# Patient Record
Sex: FEMALE | Race: WHITE | Employment: OTHER | URBAN - METROPOLITAN AREA
[De-identification: names, ages, dates, MRNs, and addresses within clinical notes are randomized per-mention and may not be internally consistent; named-entity substitution may affect disease eponyms.]

---

## 2017-01-10 ENCOUNTER — ANTICOAGULATION THERAPY VISIT (OUTPATIENT)
Dept: ANTICOAGULATION | Facility: CLINIC | Age: 77
End: 2017-01-10
Payer: COMMERCIAL

## 2017-01-10 DIAGNOSIS — Z79.01 LONG-TERM (CURRENT) USE OF ANTICOAGULANTS: Primary | ICD-10-CM

## 2017-01-10 LAB — INR POINT OF CARE: 2.5 (ref 0.86–1.14)

## 2017-01-10 PROCEDURE — 36416 COLLJ CAPILLARY BLOOD SPEC: CPT

## 2017-01-10 PROCEDURE — 99207 ZZC NO CHARGE NURSE ONLY: CPT

## 2017-01-10 PROCEDURE — 85610 PROTHROMBIN TIME: CPT | Mod: QW

## 2017-01-10 NOTE — PROGRESS NOTES
ANTICOAGULATION FOLLOW-UP CLINIC VISIT    Patient Name:  Mariya Rowe  Date:  1/10/2017  Contact Type:  Face to Face    SUBJECTIVE:     Patient Findings     Positives No Problem Findings           OBJECTIVE    INR PROTIME   Date Value Ref Range Status   01/10/2017 2.5* 0.86 - 1.14 Final       ASSESSMENT / PLAN  INR assessment THER    Recheck INR In: 2 WEEKS    INR Location Clinic      Anticoagulation Summary as of 1/10/2017     INR goal 2.0-3.0   Selected INR 2.5 (1/10/2017)   Maintenance plan 2.5 mg (5 mg x 0.5) on Wed; 5 mg (5 mg x 1) all other days   Full instructions 1/14: 2.5 mg; 1/21: 2.5 mg; Otherwise 2.5 mg on Wed; 5 mg all other days   Weekly total 32.5 mg   Plan last modified Karla Waller RN (5/25/2016)   Next INR check 1/25/2017   Priority INR   Target end date     Indications   Long-term (current) use of anticoagulants [Z79.01] [Z79.01]  Atrial fibrillation (H) [I48.91]         Anticoagulation Episode Summary     INR check location     Preferred lab     Send INR reminders to [facility]    Comments       Anticoagulation Care Providers     Provider Role Specialty Phone number    Chavez Molina MD Responsible Internal Medicine [phone]            See the Encounter Report to view Anticoagulation Flowsheet and Dosing Calendar (Go to Encounters tab in chart review, and find the Anticoagulation Therapy Visit)    Dosage adjustment made based on physician directed care plan.    Karla Waller, RN

## 2017-01-10 NOTE — MR AVS SNAPSHOT
Mariya Rowe   1/10/2017 9:45 AM   Anticoagulation Therapy Visit    Description:  76 year old female   Provider[facility]  [facility]   Department:  [facility]           INR as of 1/10/2017     Selected INR 2.5 (1/10/2017)      Anticoagulation Summary as of 1/10/2017     INR goal 2.0-3.0   Selected INR 2.5 (1/10/2017)   Full instructions 1/14: 2.5 mg; 1/21: 2.5 mg; Otherwise 2.5 mg on Wed; 5 mg all other days   Next INR check 1/25/2017    Indications   Long-term (current) use of anticoagulants [Z79.01] [Z79.01]  Atrial fibrillation (H) [I48.91]         Your next Anticoagulation Clinic appointment(s)     Jan 25, 2017  9:15 AM   [facility] Visit with [facility]   [facility]    [address] [facility] [address]  [facility] [id number] [phone]              Contact Numbers     [facility] Phone Numbers:  Anticoagulation Clinic Appointments : [phone]  Anticoagulation Nurse: [phone]         January 2017 Details    Sun Mon Tue Wed Thu Fri Sat     1               2               3               4               5               6               7                 8               9               10      5 mg   See details      11      2.5 mg         12      5 mg         13      5 mg         14      2.5 mg           15      5 mg         16      5 mg         17      5 mg         18      2.5 mg         19      5 mg         20      5 mg         21      2.5 mg           22      5 mg         23      5 mg         24      5 mg         25            26               27               28                 29               30               31                    Date Details   01/10 This INR check       Date of next INR:  1/25/2017         How to take your warfarin dose     To take:  2.5 mg Take 0.5 of a 5 mg tablet.    To take:  5 mg Take 1 of the 5 mg tablets.

## 2017-01-24 DIAGNOSIS — R00.2 PALPITATIONS: Primary | ICD-10-CM

## 2017-01-24 RX ORDER — WARFARIN SODIUM 5 MG/1
TABLET ORAL
Qty: 90 TABLET | Refills: 1 | Status: SHIPPED | OUTPATIENT
Start: 2017-01-24 | End: 2017-06-28

## 2017-01-25 ENCOUNTER — ANTICOAGULATION THERAPY VISIT (OUTPATIENT)
Dept: ANTICOAGULATION | Facility: CLINIC | Age: 77
End: 2017-01-25
Payer: COMMERCIAL

## 2017-01-25 DIAGNOSIS — I48.91 ATRIAL FIBRILLATION (H): ICD-10-CM

## 2017-01-25 DIAGNOSIS — Z79.01 LONG-TERM (CURRENT) USE OF ANTICOAGULANTS: Primary | ICD-10-CM

## 2017-01-25 LAB — INR POINT OF CARE: 2.5 (ref 0.86–1.14)

## 2017-01-25 PROCEDURE — 99207 ZZC NO CHARGE NURSE ONLY: CPT

## 2017-01-25 PROCEDURE — 36416 COLLJ CAPILLARY BLOOD SPEC: CPT

## 2017-01-25 PROCEDURE — 85610 PROTHROMBIN TIME: CPT | Mod: QW

## 2017-01-25 NOTE — TELEPHONE ENCOUNTER
Warfarin 5 mg    Last Written Prescription Date: 7/13/16  Last Fill Qty: 90, # refills: 1  Last Office Visit with [facility], [facility] or [facility] prescribing provider: 6/21/16    Date and Result of Last PT/INR:   INR      2.5   1/10/2017  INR      2.7   12/27/2016  INR     2.70   9/21/2011  INR     2.59   9/20/2011     Prescription approved per [facility] Refill Protocol.  Karla Waller RN

## 2017-01-25 NOTE — MR AVS SNAPSHOT
Mariya KUHN Soledad   1/25/2017 9:15 AM   Anticoagulation Therapy Visit    Description:  76 year old female   Provider:  [facility]   Department:  [facility]           INR as of 1/25/2017     Selected INR 2.5 (1/25/2017)      Anticoagulation Summary as of 1/25/2017     INR goal 2.0-3.0   Selected INR 2.5 (1/25/2017)   Full instructions 2.5 mg on Wed, Sat; 5 mg all other days   Next INR check 2/15/2017    Indications   Long-term (current) use of anticoagulants [Z79.01] [Z79.01]  Atrial fibrillation (H) [I48.91]         Your next Anticoagulation Clinic appointment(s)     Jan 25, 2017  9:15 AM   [facility]ticoagulation Visit with [facility]   [facility] ([facility]    [address] [facility] [address]  [facility] [id number] [phone]              Contact Numbers     [facility] Phone Numbers:  Anticoagulation Clinic Appointments : [phone]  Anticoagulation Nurse: [phone]         January 2017 Details    Sun Mon Tue Wed Thu Fri Sat     1               2               3               4               5               6               7                 8               9               10               11               12               13               14                 15               16               17               18               19               20               21                 22               23               24               25      2.5 mg   See details      26      5 mg         27      5 mg         28      2.5 mg           29      5 mg         30      5 mg         31      5 mg              Date Details   01/25 This INR check               How to take your warfarin dose     To take:  2.5 mg Take 0.5 of a 5 mg tablet.    To take:  5 mg Take 1 of the 5 mg tablets.           February 2017 Details    Sun Mon Tue Wed Thu Fri Sat        1      2.5 mg         2      5 mg         3      5 mg         4      2.5 mg           5      5 mg          6      5 mg         7      5 mg         8      2.5 mg         9      5 mg         10      5 mg         11      2.5 mg           12      5 mg         13      5 mg         14      5 mg         15            16               17               18                 19               20               21               22               23               24               25                 26               27               28                    Date Details   No additional details    Date of next INR:  2/15/2017         How to take your warfarin dose     To take:  2.5 mg Take 0.5 of a 5 mg tablet.    To take:  5 mg Take 1 of the 5 mg tablets.

## 2017-01-25 NOTE — PROGRESS NOTES
ANTICOAGULATION FOLLOW-UP CLINIC VISIT    Patient Name:  Mariya Rowe  Date:  1/25/2017  Contact Type:  Face to Face    SUBJECTIVE:     Patient Findings     Positives No Problem Findings    Comments Pt leaving for [place] this week, for 3 months. Have provider down there.            OBJECTIVE    INR PROTIME   Date Value Ref Range Status   01/25/2017 2.5* 0.86 - 1.14 Final       ASSESSMENT / PLAN  INR assessment THER    Recheck INR In: 3 WEEKS    INR Location [facility] [place] for 3 months, will check down there      Anticoagulation Summary as of 1/25/2017     INR goal 2.0-3.0   Selected INR 2.5 (1/25/2017)   Maintenance plan 2.5 mg (5 mg x 0.5) on Wed, Sat; 5 mg (5 mg x 1) all other days   Full instructions 2.5 mg on Wed, Sat; 5 mg all other days   Weekly total 30 mg   Plan last modified Anjali Moss, RN (1/25/2017)   Next INR check 2/15/2017   Priority INR   Target end date     Indications   Long-term (current) use of anticoagulants [Z79.01] [Z79.01]  Atrial fibrillation (H) [I48.91]         Anticoagulation Episode Summary     INR check location     Preferred lab     Send INR reminders to [facility]    Comments       Anticoagulation Care Providers     Provider Role Specialty Phone number    Chavez Molina MD Responsible Internal Medicine [phone]            See the Encounter Report to view Anticoagulation Flowsheet and Dosing Calendar (Go to Encounters tab in chart review, and find the Anticoagulation Therapy Visit)        Anjali Moss RN

## 2017-02-15 ENCOUNTER — TRANSFERRED RECORDS (OUTPATIENT)
Dept: HEALTH INFORMATION MANAGEMENT | Facility: CLINIC | Age: 77
End: 2017-02-15

## 2017-06-08 ENCOUNTER — ANTICOAGULATION THERAPY VISIT (OUTPATIENT)
Dept: ANTICOAGULATION | Facility: CLINIC | Age: 77
End: 2017-06-08
Payer: COMMERCIAL

## 2017-06-08 DIAGNOSIS — Z79.01 LONG-TERM (CURRENT) USE OF ANTICOAGULANTS: ICD-10-CM

## 2017-06-08 LAB — INR POINT OF CARE: 4 (ref 0.86–1.14)

## 2017-06-08 PROCEDURE — 99207 ZZC NO CHARGE NURSE ONLY: CPT

## 2017-06-08 PROCEDURE — 85610 PROTHROMBIN TIME: CPT | Mod: QW

## 2017-06-08 PROCEDURE — 36416 COLLJ CAPILLARY BLOOD SPEC: CPT

## 2017-06-08 NOTE — PROGRESS NOTES
ANTICOAGULATION FOLLOW-UP CLINIC VISIT    Patient Name:  Mariya Rowe  Date:  6/8/2017  Contact Type:  Face to Face    SUBJECTIVE:     Patient Findings     Positives Change in diet/appetite (no changes), Other complaints (Pt has broken blood vessel in right eye, has seen eye dr for this), Unexplained INR or factor level change    Comments Pt reports being back in [place] for about a month after being in [place] over the winter           OBJECTIVE    INR Protime   Date Value Ref Range Status   06/08/2017 4.0 (A) 0.86 - 1.14 Final       ASSESSMENT / PLAN  INR assessment SUPRA    Recheck INR In: 10 DAYS    INR Location Clinic      Anticoagulation Summary as of 6/8/2017     INR goal 2.0-3.0   Today's INR 4.0!   Maintenance plan 2.5 mg (5 mg x 0.5) on Wed, Sat; 5 mg (5 mg x 1) all other days   Full instructions 6/8: Hold; 6/9: 2.5 mg; Otherwise 2.5 mg on Wed, Sat; 5 mg all other days   Weekly total 30 mg   Plan last modified Anjali Moss RN (1/25/2017)   Next INR check 6/19/2017   Priority INR   Target end date     Indications   Long-term (current) use of anticoagulants [Z79.01] [Z79.01]  Atrial fibrillation (H) [I48.91]         Anticoagulation Episode Summary     INR check location     Preferred lab     Send INR reminders to [facility]    Comments       Anticoagulation Care Providers     Provider Role Specialty Phone number    Chavez Molina MD [facility] Internal [facility] [phone]            See the Encounter Report to view Anticoagulation Flowsheet and Dosing Calendar (Go to Encounters tab in chart review, and find the Anticoagulation Therapy Visit)    Dosage adjustment made based on physician directed care plan.    Karla Waller RN

## 2017-06-08 NOTE — MR AVS SNAPSHOT
Mariya Rowe   6/8/2017 11:15 AM   Anticoagulation Therapy Visit    Description:  76 year old female   Provider[facility]  [facility]   Department:  [facility]           INR as of 6/8/2017     Today's INR 4.0!      Anticoagulation Summary as of 6/8/2017     INR goal 2.0-3.0   Today's INR 4.0!   Full instructions 6/8: Hold; 6/9: 2.5 mg; Otherwise 2.5 mg on Wed, Sat; 5 mg all other days   Next INR check 6/19/2017    Indications   Long-term (current) use of anticoagulants [Z79.01] [Z79.01]  Atrial fibrillation (H) [I48.91]         Your next Anticoagulation Clinic appointment(s)     Jun 19, 2017 11:15 AM CDT   [facility] with [facility]   [facility]    [address] [facility] [address]  [facility] [id number] [phone]              Contact Numbers     [facility] Phone Numbers:  Anticoagulation Clinic Appointments : [phone]  Anticoagulation Nurse: [phone]         June 2017 Details    Sun Mon Tue Wed Thu Fri Sat         1               2               3                 4               5               6               7               8      Hold   See details      9      2.5 mg         10      2.5 mg           11      5 mg         12      5 mg         13      5 mg         14      2.5 mg         15      5 mg         16      5 mg         17      2.5 mg           18      5 mg         19            20               21               22               23               24                 25               26               27               28               29               30                 Date Details   06/08 This INR check       Date of next INR:  6/19/2017         How to take your warfarin dose     To take:  2.5 mg Take 0.5 of a 5 mg tablet.    To take:  5 mg Take 1 of the 5 mg tablets.    Hold Do not take your warfarin dose. See the Details table to the right for additional instructions.

## 2017-06-13 ENCOUNTER — TELEPHONE (OUTPATIENT)
Dept: INTERNAL MEDICINE | Facility: CLINIC | Age: 77
End: 2017-06-13

## 2017-06-13 NOTE — TELEPHONE ENCOUNTER
Reason for Call:  Medication or medication refill:Medication replacement    Do you use a [facility]?  Name of the pharmacy and phone number for the current request:  [facility] Tel [phone]    Name of the medication requested: alendronate (FOSAMAX) 10 MG tablet    Other request: Pt stating that [facility] cannot get the alendronate (Fosamax) same message from [facility].  Can provider prescribe a different medication.  Patient has about 4 pills left    Can we leave a detailed message on this number? YES    Phone number patient can be reached at: Home number on file [phone] (home) or Cell number on file:    Telephone Information:   Mobile [phone]       Best Time: anytime    Call taken on 6/13/2017 at 10:30 AM by ZULEIKA SUTTON

## 2017-06-13 NOTE — TELEPHONE ENCOUNTER
Called patient and clarified alendronate, TEVA brand isn't available at [facility] or [facility]. Asking for an alternative medication to be prescribed for Osteoporosis. Patient is going to check with some other pharmacies first to see if they can cover the TEVA brand before another medication is prescribed. Patient will call back with information.

## 2017-06-19 ENCOUNTER — ANTICOAGULATION THERAPY VISIT (OUTPATIENT)
Dept: ANTICOAGULATION | Facility: CLINIC | Age: 77
End: 2017-06-19
Payer: COMMERCIAL

## 2017-06-19 DIAGNOSIS — Z79.01 LONG-TERM (CURRENT) USE OF ANTICOAGULANTS: ICD-10-CM

## 2017-06-19 LAB — INR POINT OF CARE: 2.2 (ref 0.86–1.14)

## 2017-06-19 PROCEDURE — 85610 PROTHROMBIN TIME: CPT | Mod: QW

## 2017-06-19 PROCEDURE — 36416 COLLJ CAPILLARY BLOOD SPEC: CPT

## 2017-06-19 PROCEDURE — 99207 ZZC NO CHARGE NURSE ONLY: CPT

## 2017-06-19 NOTE — MR AVS SNAPSHOT
Mariya Rowe   6/19/2017 11:15 AM   Anticoagulation Therapy Visit    Description:  76 year old female   Provider[facility]   Department:  [facility]           INR as of 6/19/2017     Today's INR 2.2      Anticoagulation Summary as of 6/19/2017     INR goal 2.0-3.0   Today's INR 2.2   Full instructions 2.5 mg on Wed, Sat; 5 mg all other days   Next INR check 7/17/2017    Indications   Long-term (current) use of anticoagulants [Z79.01] [Z79.01]  Atrial fibrillation (H) [I48.91]         Your next Anticoagulation Clinic appointment(s)     Jul 17, 2017  9:00 AM CDT   Anticoagulation Visit with [facility]   [facility] ([facility])    [address] [facility] [address]  [facility] [address] [phone]              Contact Numbers     [facility] Phone Numbers:  Anticoagulation Clinic Appointments : [phone]  Anticoagulation Nurse: [phone]         June 2017 Details    Sun Mon Tue Wed Thu Fri Sat         1               2               3                 4               5               6               7               8               9               10                 11               12               13               14               15               16               17                 18               19      5 mg   See details      20      5 mg         21      2.5 mg         22      5 mg         23      5 mg         24      2.5 mg           25      5 mg         26      5 mg         27      5 mg         28      2.5 mg         29      5 mg         30      5 mg           Date Details   06/19 This INR check               How to take your warfarin dose     To take:  2.5 mg Take 0.5 of a 5 mg tablet.    To take:  5 mg Take 1 of the 5 mg tablets.           July 2017 Details    Sun Mon Tue Wed Thu Fri Sat           1      2.5 mg           2      5 mg         3      5 mg         4      5 mg         5      2.5 mg         6      5 mg          7      5 mg         8      2.5 mg           9      5 mg         10      5 mg         11      5 mg         12      2.5 mg         13      5 mg         14      5 mg         15      2.5 mg           16      5 mg         17            18               19               20               21               22                 23               24               25               26               27               28               29                 30               31                     Date Details   No additional details    Date of next INR:  7/17/2017         How to take your warfarin dose     To take:  2.5 mg Take 0.5 of a 5 mg tablet.    To take:  5 mg Take 1 of the 5 mg tablets.

## 2017-06-28 ENCOUNTER — OFFICE VISIT (OUTPATIENT)
Dept: INTERNAL MEDICINE | Facility: CLINIC | Age: 77
End: 2017-06-28
Payer: COMMERCIAL

## 2017-06-28 VITALS
SYSTOLIC BLOOD PRESSURE: 120 MMHG | WEIGHT: 170.8 LBS | TEMPERATURE: 98 F | BODY MASS INDEX: 23.91 KG/M2 | OXYGEN SATURATION: 98 % | DIASTOLIC BLOOD PRESSURE: 76 MMHG | HEART RATE: 84 BPM | HEIGHT: 71 IN

## 2017-06-28 DIAGNOSIS — M81.0 OSTEOPOROSIS, UNSPECIFIED OSTEOPOROSIS TYPE, UNSPECIFIED PATHOLOGICAL FRACTURE PRESENCE: ICD-10-CM

## 2017-06-28 DIAGNOSIS — I48.0 PAROXYSMAL ATRIAL FIBRILLATION (H): ICD-10-CM

## 2017-06-28 DIAGNOSIS — Z00.00 ROUTINE GENERAL MEDICAL EXAMINATION AT A HEALTH CARE FACILITY: Primary | ICD-10-CM

## 2017-06-28 DIAGNOSIS — I49.5 SSS (SICK SINUS SYNDROME) (H): ICD-10-CM

## 2017-06-28 DIAGNOSIS — R00.2 PALPITATIONS: ICD-10-CM

## 2017-06-28 DIAGNOSIS — Z12.31 VISIT FOR SCREENING MAMMOGRAM: ICD-10-CM

## 2017-06-28 DIAGNOSIS — Q21.12 PFO (PATENT FORAMEN OVALE): ICD-10-CM

## 2017-06-28 DIAGNOSIS — E78.5 HYPERLIPIDEMIA WITH TARGET LDL LESS THAN 130: ICD-10-CM

## 2017-06-28 LAB
ALBUMIN SERPL-MCNC: 3.6 G/DL (ref 3.4–5)
ALP SERPL-CCNC: 75 U/L (ref 40–150)
ALT SERPL W P-5'-P-CCNC: 31 U/L (ref 0–50)
ANION GAP SERPL CALCULATED.3IONS-SCNC: 7 MMOL/L (ref 3–14)
AST SERPL W P-5'-P-CCNC: 22 U/L (ref 0–45)
BILIRUB SERPL-MCNC: 1.3 MG/DL (ref 0.2–1.3)
BUN SERPL-MCNC: 18 MG/DL (ref 7–30)
CALCIUM SERPL-MCNC: 9.5 MG/DL (ref 8.5–10.1)
CHLORIDE SERPL-SCNC: 106 MMOL/L (ref 94–109)
CHOLEST SERPL-MCNC: 147 MG/DL
CO2 SERPL-SCNC: 28 MMOL/L (ref 20–32)
CREAT SERPL-MCNC: 0.91 MG/DL (ref 0.52–1.04)
ERYTHROCYTE [DISTWIDTH] IN BLOOD BY AUTOMATED COUNT: 13.5 % (ref 10–15)
GFR SERPL CREATININE-BSD FRML MDRD: 60 ML/MIN/1.7M2
GLUCOSE SERPL-MCNC: 96 MG/DL (ref 70–99)
HCT VFR BLD AUTO: 45 % (ref 35–47)
HDLC SERPL-MCNC: 63 MG/DL
HGB BLD-MCNC: 14.9 G/DL (ref 11.7–15.7)
LDLC SERPL CALC-MCNC: 68 MG/DL
MCH RBC QN AUTO: 31.4 PG (ref 26.5–33)
MCHC RBC AUTO-ENTMCNC: 33.1 G/DL (ref 31.5–36.5)
MCV RBC AUTO: 95 FL (ref 78–100)
NONHDLC SERPL-MCNC: 84 MG/DL
PLATELET # BLD AUTO: 202 10E9/L (ref 150–450)
POTASSIUM SERPL-SCNC: 4.2 MMOL/L (ref 3.4–5.3)
PROT SERPL-MCNC: 7.1 G/DL (ref 6.8–8.8)
RBC # BLD AUTO: 4.75 10E12/L (ref 3.8–5.2)
SODIUM SERPL-SCNC: 141 MMOL/L (ref 133–144)
TRIGL SERPL-MCNC: 82 MG/DL
TSH SERPL DL<=0.005 MIU/L-ACNC: 2.37 MU/L (ref 0.4–4)
WBC # BLD AUTO: 6.7 10E9/L (ref 4–11)

## 2017-06-28 PROCEDURE — 80053 COMPREHEN METABOLIC PANEL: CPT | Performed by: INTERNAL MEDICINE

## 2017-06-28 PROCEDURE — 99397 PER PM REEVAL EST PAT 65+ YR: CPT | Performed by: INTERNAL MEDICINE

## 2017-06-28 PROCEDURE — 85027 COMPLETE CBC AUTOMATED: CPT | Performed by: INTERNAL MEDICINE

## 2017-06-28 PROCEDURE — 84443 ASSAY THYROID STIM HORMONE: CPT | Performed by: INTERNAL MEDICINE

## 2017-06-28 PROCEDURE — 80061 LIPID PANEL: CPT | Performed by: INTERNAL MEDICINE

## 2017-06-28 PROCEDURE — 36415 COLL VENOUS BLD VENIPUNCTURE: CPT | Performed by: INTERNAL MEDICINE

## 2017-06-28 RX ORDER — WARFARIN SODIUM 5 MG/1
TABLET ORAL
Qty: 90 TABLET | Refills: 1 | Status: SHIPPED | OUTPATIENT
Start: 2017-06-28 | End: 2017-08-22

## 2017-06-28 RX ORDER — ROSUVASTATIN CALCIUM 5 MG/1
5 TABLET, COATED ORAL DAILY
Qty: 93 TABLET | Refills: 3 | Status: SHIPPED | OUTPATIENT
Start: 2017-06-28 | End: 2018-08-02

## 2017-06-28 RX ORDER — ALENDRONATE SODIUM 35 MG/1
35 TABLET ORAL
Qty: 26 TABLET | Refills: 3 | Status: SHIPPED | OUTPATIENT
Start: 2017-06-29 | End: 2017-07-11

## 2017-06-28 RX ORDER — SOTALOL HYDROCHLORIDE 120 MG/1
TABLET ORAL
Qty: 93 TABLET | Refills: 3 | Status: SHIPPED | OUTPATIENT
Start: 2017-06-28 | End: 2017-08-16

## 2017-06-28 RX ORDER — PREDNISOLONE ACETATE 10 MG/ML
SUSPENSION/ DROPS OPHTHALMIC
COMMUNITY
Start: 2017-06-05 | End: 2017-12-28 | Stop reason: ALTCHOICE

## 2017-06-28 NOTE — PATIENT INSTRUCTIONS
Preventive Health Recommendations    Female Ages 65 +    Yearly exam:     See your health care provider every year in order to  o Review health changes.   o Discuss preventive care.    o Review your medicines if your doctor has prescribed any.      You no longer need a yearly Pap test unless you've had an abnormal Pap test in the past 10 years. If you have vaginal symptoms, such as bleeding or discharge, be sure to talk with your provider about a Pap test.      Every 1 to 2 years, have a mammogram.  If you are over 69, talk with your health care provider about whether or not you want to continue having screening mammograms.      Every 10 years, have a colonoscopy. Or, have a yearly FIT test (stool test). These exams will check for colon cancer.       Have a cholesterol test every 5 years, or more often if your doctor advises it.       Have a diabetes test (fasting glucose) every three years. If you are at risk for diabetes, you should have this test more often.       At age 65, have a bone density scan (DEXA) to check for osteoporosis (brittle bone disease).    Shots:    Get a flu shot each year.    Get a tetanus shot every 10 years.    Talk to your doctor about your pneumonia vaccines. There are now two you should receive - Pneumovax (PPSV 23) and Prevnar (PCV 13).    Talk to your doctor about the shingles vaccine.    Talk to your doctor about the hepatitis B vaccine.    Nutrition:     Eat at least 5 servings of fruits and vegetables each day.      Eat whole-grain bread, whole-wheat pasta and brown rice instead of white grains and rice.      Talk to your provider about Calcium and Vitamin D.     Lifestyle    Exercise at least 150 minutes a week (30 minutes a day, 5 days a week). This will help you control your weight and prevent disease.      Limit alcohol to one drink per day.      No smoking.       Wear sunscreen to prevent skin cancer.       See your dentist twice a year for an exam and cleaning.      See your  "eye doctor every 1 to 2 years to screen for conditions such as glaucoma, macular degeneration and cataracts.    You appear due for the second pneumonia vaccination, called \"Prevnar 13\". You may want to check your insurance to assure that it is covered, and we could wait until next year.     Everything looks fine!    Refills of medications have been faxed to your pharmacy.     I'll get back to you with lab results soon, especially if there is anything of concern.      See you in a year, sooner if problems.    "

## 2017-06-28 NOTE — MR AVS SNAPSHOT
After Visit Summary   6/28/2017    Mariya Rowe    MRN: [id number]           Patient Information     Date Of Birth          1940        Visit Information        Provider Department      6/28/2017 8:00 AM Chavez Molina MD [facility]        Today's Diagnoses     Routine general medical examination at a health care facility    -  1    Paroxysmal atrial fibrillation (H)        Palpitations        Hyperlipidemia with target LDL less than 130        SSS (sick sinus syndrome) (H)        Osteoporosis, unspecified osteoporosis type, unspecified pathological fracture presence        PFO (patent foramen ovale)        Visit for screening mammogram          Care Instructions      Preventive Health Recommendations    Female Ages 65 +    Yearly exam:     See your health care provider every year in order to  o Review health changes.   o Discuss preventive care.    o Review your medicines if your doctor has prescribed any.      You no longer need a yearly Pap test unless you've had an abnormal Pap test in the past 10 years. If you have vaginal symptoms, such as bleeding or discharge, be sure to talk with your provider about a Pap test.      Every 1 to 2 years, have a mammogram.  If you are over 69, talk with your health care provider about whether or not you want to continue having screening mammograms.      Every 10 years, have a colonoscopy. Or, have a yearly FIT test (stool test). These exams will check for colon cancer.       Have a cholesterol test every 5 years, or more often if your doctor advises it.       Have a diabetes test (fasting glucose) every three years. If you are at risk for diabetes, you should have this test more often.       At age 65, have a bone density scan (DEXA) to check for osteoporosis (brittle bone disease).    Shots:    Get a flu shot each year.    Get a tetanus shot every 10 years.    Talk to your doctor about your pneumonia vaccines. There are now two you  "should receive - Pneumovax (PPSV 23) and Prevnar (PCV 13).    Talk to your doctor about the shingles vaccine.    Talk to your doctor about the hepatitis B vaccine.    Nutrition:     Eat at least 5 servings of fruits and vegetables each day.      Eat whole-grain bread, whole-wheat pasta and brown rice instead of white grains and rice.      Talk to your provider about Calcium and Vitamin D.     Lifestyle    Exercise at least 150 minutes a week (30 minutes a day, 5 days a week). This will help you control your weight and prevent disease.      Limit alcohol to one drink per day.      No smoking.       Wear sunscreen to prevent skin cancer.       See your dentist twice a year for an exam and cleaning.      See your eye doctor every 1 to 2 years to screen for conditions such as glaucoma, macular degeneration and cataracts.    You appear due for the second pneumonia vaccination, called \"Prevnar 13\". You may want to check your insurance to assure that it is covered, and we could wait until next year.     Everything looks fine!    Refills of medications have been faxed to your pharmacy.     I'll get back to you with lab results soon, especially if there is anything of concern.      See you in a year, sooner if problems.            Follow-ups after your visit        Your next 10 appointments already scheduled     Jul 19, 2017  9:15 AM CDT   [facility] Visit with [facility]   [facility]    [address] [facility] [address]  [facility] [id number] [phone]            Aug 16, 2017  2:45 PM CDT   [facility] RETURN with Yelena Kenyon MD   [facility]    [address]  [facility] [id number] [phone]              Future tests that were ordered for you today     Open Future Orders        Priority Expected Expires Ordered    MA Screening Digital Bilateral Routine  6/28/2018 " "6/28/2017            Who to contact     If you have questions or need follow up information about today's clinic visit or your schedule please contact [facility] directly at [phone].  Normal or non-critical lab and imaging results will be communicated to you by MyChart, letter or phone within 4 business days after the clinic has received the results. If you do not hear from us within 7 days, please contact the clinic through Transactivhart or phone. If you have a critical or abnormal lab result, we will notify you by phone as soon as possible.  Submit refill requests through Textic or call your pharmacy and they will forward the refill request to us. Please allow 3 business days for your refill to be completed.          Additional Information About Your Visit        TransactivharCareem Information     Textic gives you secure access to your electronic health record. If you see a primary care provider, you can also send messages to your care team and make appointments. If you have questions, please call your primary care clinic.  If you do not have a primary care provider, please call [phone] and they will assist you.        Care EveryWhere ID     This is your Care EveryWhere ID. This could be used by other organizations to access your [place] medical records  [id number]        Your Vitals Were     Pulse Temperature Height Pulse Oximetry BMI (Body Mass Index)       84 98  F (36.7  C) (Oral) 5' 11\" (1.803 m) 98% 23.82 kg/m2        Blood Pressure from Last 3 Encounters:   06/28/17 120/76   08/15/16 122/70   06/21/16 130/72    Weight from Last 3 Encounters:   06/28/17 170 lb 12.8 oz (77.5 kg)   08/15/16 173 lb 6.4 oz (78.7 kg)   06/21/16 172 lb (78 kg)              We Performed the Following     CBC with platelets     Comprehensive metabolic panel     Lipid panel reflex to direct LDL     TSH with free T4 reflex          Today's Medication Changes          These changes are accurate as of: 6/28/17  8:55 AM.  " If you have any questions, ask your nurse or doctor.               These medicines have changed or have updated prescriptions.        Dose/Directions    alendronate 35 MG tablet   Commonly known as:  FOSAMAX   This may have changed:    - medication strength  - how much to take  - when to take this  - additional instructions   Used for:  Osteoporosis, unspecified osteoporosis type, unspecified pathological fracture presence   Changed by:  Chavez Molina MD        Dose:  35 mg   Start taking on:  6/29/2017   Take 1 tablet (35 mg) by mouth twice a week   Quantity:  26 tablet   Refills:  3            Where to get your medicines      These medications were sent to [id number] - [place], [place] - [address] [address]   [address] [address] , [facility] [address]     Phone:  [phone]     rosuvastatin 5 MG tablet    warfarin 5 MG tablet         These medications were sent to [facility] #[id number] - [place], [place] - [address] [address]  [address] [address] [address]    Hours:  [facility] Phone:  [phone]     sotalol 120 MG tablet         Some of these will need a paper prescription and others can be bought over the counter.  Ask your nurse if you have questions.     Bring a paper prescription for each of these medications     alendronate 35 MG tablet                Primary Care Provider Office Phone # Fax #    Chavez Molina [phone]       [facility] [address]  [facility] [address]        Equal Access to Services     CARI BROWN AH: Hadii pretty ku hadasho Soomaali, waaxda luqadaha, qaybta kaalmada adeegyada, violeta blood hayesau segura . So [facility] [phone].    ATENCIÓN: Si habla español, tiene a perdomo disposición servicios gratuitos de asistencia lingüística. Llame al [phone].    We comply with applicable federal civil rights laws and [place] laws. We do not discriminate on the basis of race, color, national origin, age,  disability sex, sexual orientation or gender identity.            Thank you!     Thank you for choosing [facility]  for your care. Our goal is always to provide you with excellent care. Hearing back from our patients is one way we can continue to improve our services. Please take a few minutes to complete the written survey that you may receive in the mail after your visit with us. Thank you!             Your Updated Medication List - Protect others around you: Learn how to safely use, store and throw away your medicines at www.disposemymeds.org.          This list is accurate as of: 6/28/17  8:55 AM.  Always use your most recent med list.                   Brand Name Dispense Instructions for use Diagnosis    alendronate 35 MG tablet   Start taking on:  6/29/2017    FOSAMAX    26 tablet    Take 1 tablet (35 mg) by mouth twice a week    Osteoporosis, unspecified osteoporosis type, unspecified pathological fracture presence       ASPIRIN NOT PRESCRIBED    INTENTIONAL    0 each    Antiplatelet medication not prescribed intentionally due to Current anticoagulant therapy (warfarin/enoxaparin)    Paroxysmal atrial fibrillation (H), Hyperlipidemia with target LDL less than 130       CALCIUM 500 + D 500-200 MG-IU Tabs      1 tablet bid    Routine general medical examination at a health care facility       MULTI-VITAMIN PO      Take  by mouth.        prednisoLONE acetate 1 % ophthalmic susp    PRED FORTE          rosuvastatin 5 MG tablet    CRESTOR    93 tablet    Take 1 tablet (5 mg) by mouth daily    Hyperlipidemia with target LDL less than 130       sotalol 120 MG tablet    BETAPACE    93 tablet    Take 1/2 tab (60 mg) every 12 hours    Paroxysmal atrial fibrillation (H)       warfarin 5 MG tablet    COUMADIN    90 tablet    Take 1 tablet daily except half tablet on Wednesday or as instructed based on INR result.    Palpitations

## 2017-06-28 NOTE — NURSING NOTE
"Chief Complaint   Patient presents with     Medicare Visit     Fasting. Medication question (alendronate sodium)       Initial /76  Pulse 84  Temp 98  F (36.7  C) (Oral)  Ht 5' 11\" (1.803 m)  Wt 170 lb 12.8 oz (77.5 kg)  SpO2 98%  BMI 23.82 kg/m2 Estimated body mass index is 23.82 kg/(m^2) as calculated from the following:    Height as of this encounter: 5' 11\" (1.803 m).    Weight as of this encounter: 170 lb 12.8 oz (77.5 kg).  Medication Reconciliation: complete     Roro Man CMA      "

## 2017-06-28 NOTE — PROGRESS NOTES
SUBJECTIVE:   Mariya Rowe is a 76 year old female who presents for Preventive Visit.    Are you in the first 12 months of your Medicare Part B coverage?  No    Healthy Habits:    Do you get at least three servings of calcium containing foods daily (dairy, green leafy vegetables, etc.)? yes    Amount of exercise or daily activities, outside of work: 3 day(s) per week    Problems taking medications regularly No    Medication side effects: No    Have you had an eye exam in the past two years? yes    Do you see a dentist twice per year? yes    Do you have sleep apnea, excessive snoring or daytime drowsiness?no    COGNITIVE SCREEN  1) Repeat 3 items (Banana, Sunrise, Chair)    2) Clock draw: NORMAL  3) 3 item recall: Recalls 2 objects   Results: NORMAL clock, 1-2 items recalled: COGNITIVE IMPAIRMENT LESS LIKELY    Mini-CogTM Copyright S Lefty. Licensed by the author for use in [facility]; reprinted with permission (nuno@[facility]). All rights reserved.        Osteoporosis  Patient states she continues to take fosamax once daily. She notes the pharmacy has been unable to fill 10 mg daily dosing. She is agreeable with taking 35 mg twice a week instead.    Exercise regimen  Patient notes she uses exercise bike or walkes 2-3 times per week.    Integumentary  Patient reports dry skin on her legs. She follows with derm twice per year    Past/recent records reviewed and discussed for:  -Arthritis in hands  -Feels colder than other people  -No recent Afib episodes. Follows with cardiology yearly.   -Medications and refill requests  -Mammogram is due in August 2017  -Colonoscopy in 2015, colon polyps and diverticulosis. Q 3 years. Due in 2018.   -Past medical, family and social histories reviewed and updated as needed.      Reviewed and updated as needed this visit by clinical staff  Tobacco  Allergies  Meds  Med Hx  Surg Hx  Fam Hx  Soc Hx        Reviewed and updated as needed this visit by Provider         Social History   Substance Use Topics     Smoking status: Never Smoker     Smokeless tobacco: Never Used     Alcohol use No       The patient does not drink >3 drinks per day nor >7 drinks per week.    Today's PHQ-2 Score:   PHQ-2 ( 1999 Pfizer) 6/28/2017 6/25/2017   Q1: Little interest or pleasure in doing things 0 0   Q2: Feeling down, depressed or hopeless 0 0   PHQ-2 Score 0 0   Q1: Little interest or pleasure in doing things - Not at all   Q2: Feeling down, depressed or hopeless - Not at all   PHQ-2 Score - 0       Do you feel safe in your environment - Yes    Do you have a Health Care Directive?: Yes: Patient states has Advance Directive and will bring in a copy to clinic.    Current providers sharing in care for this patient include:   Patient Care Team:  Chavez Molina MD as PCP - General (Internal Medicine)      Hearing impairment: No    Ability to successfully perform activities of daily living: Yes, no assistance needed     Fall risk:  Fallen 2 or more times in the past year?: No  Any fall with injury in the past year?: No    Home safety:  none identified      The following health maintenance items are reviewed in Epic and correct as of today:  Health Maintenance   Topic Date Due     PNEUMOCOCCAL (2 of 2 - PCV13) 09/06/2007     ADVANCE DIRECTIVE PLANNING Q5 YRS  05/03/2016     FALL RISK ASSESSMENT  06/21/2017     INFLUENZA VACCINE (SYSTEM ASSIGNED)  09/01/2017     OP ANNUAL INR REFERRAL  09/28/2017     TETANUS IMMUNIZATION (SYSTEM ASSIGNED)  12/17/2018     LIPID SCREEN Q5 YR FEMALE (SYSTEM ASSIGNED)  06/21/2021     COLON CANCER SCREEN (SYSTEM ASSIGNED)  12/17/2025     DEXA SCAN SCREENING (SYSTEM ASSIGNED)  Completed       ROS:  C: NEGATIVE for fever, chills, change in weight  I: POSITIVE for dry skin NEGATIVE for worrisome rashes, moles or lesions  E: NEGATIVE for vision changes or irritation  E/M: NEGATIVE for ear, mouth and throat problems  R: NEGATIVE for significant cough or SOB  B: NEGATIVE for  "masses, tenderness or discharge  CV: NEGATIVE for chest pain, palpitations or peripheral edema  GI: NEGATIVE for nausea, abdominal pain, heartburn, or change in bowel habits  : NEGATIVE for frequency, dysuria, or hematuria  M: NEGATIVE for significant arthralgias or myalgia  N: NEGATIVE for weakness, dizziness or paresthesias  E: POSITIVE for cold intolerance NEGATIVE for skin/hair changes  H: NEGATIVE for bleeding problems  P: NEGATIVE for changes in mood or affect    This document serves as a record of the services and decisions personally performed and made by Chavez Molina MD. It was created on his behalf by Leandra Toscano, a trained medical scribe. The creation of this document is based on the provider's statements to the medical scribe.  Leandra Toscano June 28, 2017 8:34 AM       OBJECTIVE:   /76  Pulse 84  Temp 98  F (36.7  C) (Oral)  Ht 1.803 m (5' 11\")  Wt 77.5 kg (170 lb 12.8 oz)  SpO2 98%  BMI 23.82 kg/m2 Estimated body mass index is 23.82 kg/(m^2) as calculated from the following:    Height as of this encounter: 5' 11\" (1.803 m).    Weight as of this encounter: 170 lb 12.8 oz (77.5 kg).  EXAM:   GENERAL APPEARANCE: healthy, alert and no distress  EYES: Eyes grossly normal to inspection, PERRL and conjunctivae and sclerae normal  HENT: ear canals partially occluded by wax, part of TM's that is visible is normal bilaterally, nose and mouth without ulcers or lesions, oropharynx clear and oral mucous membranes moist  NECK: no adenopathy, no asymmetry, masses, or scars and thyroid normal to palpation  RESP: lungs clear to auscultation - no rales, rhonchi or wheezes  BREAST: normal without masses, tenderness or nipple discharge and no palpable axillary masses or adenopathy  CV: regular rate and rhythm, normal S1 S2, no S3 or S4, no murmur, click or rub, no peripheral edema and peripheral pulses strong  ABDOMEN: soft, nontender, no hepatosplenomegaly, no masses and bowel sounds " normal  PELVIC: Not performed.   MS: no musculoskeletal defects are noted and gait is age appropriate without ataxia  SKIN: no suspicious lesions or rashes  NEURO: Normal strength and tone, sensory exam grossly normal, mentation intact and speech normal  PSYCH: mentation appears normal and affect normal/bright    ASSESSMENT / PLAN:   (Z00.00) Routine general medical examination at a health care facility  (primary encounter diagnosis)  Comment: Stable health. See epic orders. Patient is fasting.  Plan: Comprehensive metabolic panel, Lipid panel         reflex to direct LDL, TSH with free T4 reflex,         CBC with platelets        Follow up yearly     (I48.0) Paroxysmal atrial fibrillation (H)  Comment: Refill provided. No recent episodes of Afib noted by patient.   Plan: sotalol (BETAPACE) 120 MG tablet          (R00.2) Palpitations  Comment: Refill provided. Continue following with cardiology as planned.  Plan: warfarin (COUMADIN) 5 MG tablet          (E78.5) Hyperlipidemia with target LDL less than 130  Comment: Updating lipids. Patient is fasting. Continue current meds.   Plan: rosuvastatin (CRESTOR) 5 MG tablet,         Comprehensive metabolic panel, Lipid panel         reflex to direct LDL          (I49.5) SSS (sick sinus syndrome) (H)    (M81.0) Osteoporosis, unspecified osteoporosis type, unspecified pathological fracture presence  Comment: Refills provided. Patient would like to try 35 mg twice weekly--she is concerned that some friends/acquaintences have had difficulty tolerating 70 mg dose once weekly. Will try Update Dexa scan.   Plan: alendronate (FOSAMAX) 35 MG tablet, DX         Hip/Pelvis/Spine          (Q21.1) PFO (patent foramen ovale)  No workup needed.     (Z12.31) Visit for screening mammogram  Plan: MA Screening Digital Bilateral          End of Life Planning:  Patient currently has an advanced directive: Yes.  Practitioner is supportive of decision.    COUNSELING:  Reviewed preventive health  "counseling, as reflected in patient instructions       Regular exercise       Healthy diet/nutrition       Vision screening       Colon cancer screening      Estimated body mass index is 23.82 kg/(m^2) as calculated from the following:    Height as of this encounter: 1.803 m (5' 11\").    Weight as of this encounter: 77.5 kg (170 lb 12.8 oz).   reports that she has never smoked. She has never used smokeless tobacco.    Appropriate preventive services were discussed with this patient, including applicable screening as appropriate for cardiovascular disease, diabetes, osteopenia/osteoporosis, and glaucoma.  As appropriate for age/gender, discussed screening for colorectal cancer, prostate cancer, breast cancer, and cervical cancer. Checklist reviewing preventive services available has been given to the patient.    Reviewed patients plan of care and provided an AVS. The Basic Care Plan (routine screening as documented in Health Maintenance) for Mariya meets the Care Plan requirement. This Care Plan has been established and reviewed with the Patient.    Counseling Resources:  ATP IV Guidelines  Pooled Cohorts Equation Calculator  Breast Cancer Risk Calculator  FRAX Risk Assessment  ICSI Preventive Guidelines  Dietary Guidelines for Americans, 2010  USDA's MyPlate  ASA Prophylaxis  Lung CA Screening    The information in this document, created by the medical scribe for me, accurately reflects the services I personally performed and the decisions made by me. I have reviewed and approved this document for accuracy prior to leaving the patient care area.  June 28, 2017 8:37 AM    Chavez Molina MD  [facility]   "

## 2017-07-10 ENCOUNTER — TELEPHONE (OUTPATIENT)
Dept: INTERNAL MEDICINE | Facility: CLINIC | Age: 77
End: 2017-07-10

## 2017-07-10 DIAGNOSIS — M81.0 OSTEOPOROSIS, UNSPECIFIED OSTEOPOROSIS TYPE, UNSPECIFIED PATHOLOGICAL FRACTURE PRESENCE: Primary | ICD-10-CM

## 2017-07-10 NOTE — TELEPHONE ENCOUNTER
"Received fax from [facility] for refill of Alendronate 10mg with note pt prefers Impax/Teva Brand NDC [address]-00. Prescription on file is for 35mg twice a week.     Contacted pt, she states that she looked around at different pharmacies for the Teva brand of Alendronate 35mg. None of the pharmacies care this, however, [facility] told her the Teva was merging with another company and would have a generic tablet available in the 10mg tabs. Pt requesting new prescription for \"Teva Brand\" Alendronate 10mg tablets daily. Order pended and sent to provider to review.  "

## 2017-07-11 RX ORDER — ALENDRONATE SODIUM 10 MG/1
10 TABLET ORAL
Qty: 93 TABLET | Refills: 3 | Status: SHIPPED | OUTPATIENT
Start: 2017-07-11 | End: 2017-08-11

## 2017-07-25 ENCOUNTER — ANTICOAGULATION THERAPY VISIT (OUTPATIENT)
Dept: ANTICOAGULATION | Facility: CLINIC | Age: 77
End: 2017-07-25
Payer: COMMERCIAL

## 2017-07-25 DIAGNOSIS — I48.91 ATRIAL FIBRILLATION, UNSPECIFIED TYPE (H): ICD-10-CM

## 2017-07-25 DIAGNOSIS — Z79.01 LONG-TERM (CURRENT) USE OF ANTICOAGULANTS: ICD-10-CM

## 2017-07-25 LAB — INR POINT OF CARE: 3.5 (ref 0.86–1.14)

## 2017-07-25 PROCEDURE — 85610 PROTHROMBIN TIME: CPT | Mod: QW

## 2017-07-25 PROCEDURE — 99207 ZZC NO CHARGE NURSE ONLY: CPT

## 2017-07-25 PROCEDURE — 36416 COLLJ CAPILLARY BLOOD SPEC: CPT

## 2017-07-25 NOTE — MR AVS SNAPSHOT
Mariya KUHN Soledad   7/25/2017 1:15 PM   Anticoagulation Therapy Visit    Description:  76 year old female   Provider:  [facility]   Department:  [facility]           INR as of 7/25/2017     Today's INR 3.5!      Anticoagulation Summary as of 7/25/2017     INR goal 2.0-3.0   Today's INR 3.5!   Full instructions 7/25: Hold; Otherwise 2.5 mg on Wed, Sat; 5 mg all other days   Next INR check 8/8/2017    Indications   Long-term (current) use of anticoagulants [Z79.01] [Z79.01]  Atrial fibrillation (H) [I48.91]         Your next Anticoagulation Clinic appointment(s)     Aug 09, 2017  9:15 AM CDT   Anticoagulation [facility] with [facility]   [facility]    [address] [facility] [address]  [facility] [id number] [phone]            Aug 22, 2017  9:15 AM CDT   Anticoagulation Visit with [facility]   [facility] ([facility]    [address] [facility] [address]  [facility] [id number] [phone]              Contact Numbers     [facility] Phone Numbers:  Anticoagulation Clinic Appointments : [phone]  Anticoagulation Nurse: [phone]         July 2017 Details    Sun Mon Tue Wed Thu Fri Sat           1                 2               3               4               5               6               7               8                 9               10               11               12               13               14               15                 16               17               18               19               20               21               22                 23               24               25      Hold   See details      26      2.5 mg         27      5 mg         28      5 mg         29      2.5 mg           30      5 mg         31      5 mg               Date Details   07/25 This INR check               How to take your warfarin dose     To take:  2.5 mg Take 0.5 of a 5  mg tablet.    To take:  5 mg Take 1 of the 5 mg tablets.    Hold Do not take your warfarin dose. See the Details table to the right for additional instructions.                August 2017 Details    Sun Mon Tue Wed Thu Fri Sat       1      5 mg         2      2.5 mg         3      5 mg         4      5 mg         5      2.5 mg           6      5 mg         7      5 mg         8            9               10               11               12                 13               14               15               16               17               18               19                 20               21               22               23               24               25               26                 27               28               29               30               31                  Date Details   No additional details    Date of next INR:  8/8/2017         How to take your warfarin dose     To take:  2.5 mg Take 0.5 of a 5 mg tablet.    To take:  5 mg Take 1 of the 5 mg tablets.

## 2017-07-25 NOTE — PROGRESS NOTES
ANTICOAGULATION FOLLOW-UP CLINIC VISIT    Patient Name:  Mariya Rowe  Date:  7/25/2017  Contact Type:  Face to Face    SUBJECTIVE:     Patient Findings     Positives Change in diet/appetite (pt has not been eating as many green veggies this week.  Plans to try to increase these in the next couple days.)           OBJECTIVE    INR Protime   Date Value Ref Range Status   07/25/2017 3.5 (A) 0.86 - 1.14 Final       ASSESSMENT / PLAN  INR assessment SUPRA    Recheck INR In: 2 WEEKS    INR Location Clinic      Anticoagulation Summary as of 7/25/2017     INR goal 2.0-3.0   Today's INR 3.5!   Maintenance plan 2.5 mg (5 mg x 0.5) on Wed, Sat; 5 mg (5 mg x 1) all other days   Full instructions 7/25: Hold; Otherwise 2.5 mg on Wed, Sat; 5 mg all other days   Weekly total 30 mg   Plan last modified Anjali Moss RN (1/25/2017)   Next INR check 8/8/2017   Priority INR   Target end date     Indications   Long-term (current) use of anticoagulants [Z79.01] [Z79.01]  Atrial fibrillation (H) [I48.91]         Anticoagulation Episode Summary     INR check location     Preferred lab     Send INR reminders to [facility]    Comments       Anticoagulation Care Providers     Provider Role Specialty Phone number    Chavez Molina MD [facility] Internal Medicine [phone]            See the Encounter Report to view Anticoagulation Flowsheet and Dosing Calendar (Go to Encounters tab in chart review, and find the Anticoagulation Therapy Visit)    Dosage adjustment made based on physician directed care plan.    Vira Crawford RN

## 2017-08-07 ENCOUNTER — MYC MEDICAL ADVICE (OUTPATIENT)
Dept: INTERNAL MEDICINE | Facility: CLINIC | Age: 77
End: 2017-08-07

## 2017-08-09 ENCOUNTER — ANTICOAGULATION THERAPY VISIT (OUTPATIENT)
Dept: ANTICOAGULATION | Facility: CLINIC | Age: 77
End: 2017-08-09
Payer: COMMERCIAL

## 2017-08-09 DIAGNOSIS — Z79.01 LONG-TERM (CURRENT) USE OF ANTICOAGULANTS: ICD-10-CM

## 2017-08-09 LAB — INR POINT OF CARE: 3 (ref 0.86–1.14)

## 2017-08-09 PROCEDURE — 36416 COLLJ CAPILLARY BLOOD SPEC: CPT

## 2017-08-09 PROCEDURE — 85610 PROTHROMBIN TIME: CPT | Mod: QW

## 2017-08-09 PROCEDURE — 99207 ZZC NO CHARGE NURSE ONLY: CPT

## 2017-08-09 NOTE — MR AVS SNAPSHOT
Mariya Rowe   8/9/2017 9:15 AM   Anticoagulation Therapy Visit    Description:  76 year old female   Provider:  [facility]   Department:  [facility]           INR as of 8/9/2017     Today's INR 3.0      Anticoagulation Summary as of 8/9/2017     INR goal 2.0-3.0   Today's INR 3.0   Full instructions 2.5 mg on Wed, Sat; 5 mg all other days   Next INR check 8/22/2017    Indications   Long-term (current) use of anticoagulants [Z79.01] [Z79.01]  Atrial fibrillation (H) [I48.91]         Your next Anticoagulation Clinic appointment(s)     Aug 22, 2017  9:15 AM CDT   Anticoagulation Visit with [facility]   [facility] ([facility])    [address] [facility] [address]  [facility] [address] [phone]              Contact Numbers     [facility] Phone Numbers:  Anticoagulation Clinic Appointments : [phone]  Anticoagulation Nurse: [phone]         August 2017 Details    Sun Mon Tue Wed Thu Fri Sat       1               2               3               4               5                 6               7               8               9      2.5 mg   See details      10      5 mg         11      5 mg         12      2.5 mg           13      5 mg         14      5 mg         15      5 mg         16      2.5 mg         17      5 mg         18      5 mg         19      2.5 mg           20      5 mg         21      5 mg         22            23               24               25               26                 27               28               29               30               31                  Date Details   08/09 This INR check       Date of next INR:  8/22/2017         How to take your warfarin dose     To take:  2.5 mg Take 0.5 of a 5 mg tablet.    To take:  5 mg Take 1 of the 5 mg tablets.

## 2017-08-09 NOTE — PROGRESS NOTES
ANTICOAGULATION FOLLOW-UP CLINIC VISIT    Patient Name:  Mariya Rowe  Date:  8/9/2017  Contact Type:  Face to Face    SUBJECTIVE:     Patient Findings     Positives Change in diet/appetite (pt reports she is increasing her greens in her diet ), No Problem Findings           OBJECTIVE    INR Protime   Date Value Ref Range Status   08/09/2017 3.0 (A) 0.86 - 1.14 Final       ASSESSMENT / PLAN  INR assessment THER    Recheck INR In: 2 WEEKS    INR Location Clinic      Anticoagulation Summary as of 8/9/2017     INR goal 2.0-3.0   Today's INR 3.0   Maintenance plan 2.5 mg (5 mg x 0.5) on Wed, Sat; 5 mg (5 mg x 1) all other days   Full instructions 2.5 mg on Wed, Sat; 5 mg all other days   Weekly total 30 mg   No change documented Anjali Moss RN   Plan last modified Anjali Moss RN (1/25/2017)   Next INR check 8/22/2017   Priority INR   Target end date     Indications   Long-term (current) use of anticoagulants [Z79.01] [Z79.01]  Atrial fibrillation (H) [I48.91]         Anticoagulation Episode Summary     INR check location     Preferred lab     Send INR reminders to [facility]    Comments       Anticoagulation Care Providers     Provider Role Specialty Phone number    Chavez Molina MD [facility] Internal Medicine [phone]            See the Encounter Report to view Anticoagulation Flowsheet and Dosing Calendar (Go to Encounters tab in chart review, and find the Anticoagulation Therapy Visit)    Dosage adjustment made based on physician directed care plan.    Anjali Moss RN

## 2017-08-16 ENCOUNTER — OFFICE VISIT (OUTPATIENT)
Dept: CARDIOLOGY | Facility: CLINIC | Age: 77
End: 2017-08-16
Attending: INTERNAL MEDICINE
Payer: COMMERCIAL

## 2017-08-16 VITALS
DIASTOLIC BLOOD PRESSURE: 62 MMHG | HEART RATE: 87 BPM | OXYGEN SATURATION: 98 % | SYSTOLIC BLOOD PRESSURE: 102 MMHG | BODY MASS INDEX: 24.36 KG/M2 | HEIGHT: 71 IN | WEIGHT: 174 LBS

## 2017-08-16 DIAGNOSIS — I48.19 PERSISTENT ATRIAL FIBRILLATION (H): Primary | ICD-10-CM

## 2017-08-16 PROCEDURE — 99214 OFFICE O/P EST MOD 30 MIN: CPT | Performed by: INTERNAL MEDICINE

## 2017-08-16 PROCEDURE — 93000 ELECTROCARDIOGRAM COMPLETE: CPT | Performed by: INTERNAL MEDICINE

## 2017-08-16 RX ORDER — METOPROLOL TARTRATE 25 MG/1
25 TABLET, FILM COATED ORAL 2 TIMES DAILY
Qty: 60 TABLET | Refills: 11 | Status: SHIPPED | OUTPATIENT
Start: 2017-08-16 | End: 2018-07-30

## 2017-08-16 RX ORDER — SOTALOL HYDROCHLORIDE 120 MG/1
TABLET ORAL
Qty: 90 TABLET | Refills: 3 | Status: CANCELLED | OUTPATIENT
Start: 2017-08-16

## 2017-08-16 NOTE — PROGRESS NOTES
"HPI and Plan:   See dictation    Orders Placed This Encounter   Procedures     Follow-Up with Cardiac Advanced Practice Provider     EKG 12-lead complete w/read - Clinics (performed today)     Holter Monitor 24 hour - Adult       Orders Placed This Encounter   Medications     metoprolol (LOPRESSOR) 25 MG tablet     Sig: Take 1 tablet (25 mg) by mouth 2 times daily     Dispense:  60 tablet     Refill:  11       Medications Discontinued During This Encounter   Medication Reason     sotalol (BETAPACE) 120 MG tablet          Encounter Diagnoses   Name Primary?     Persistent atrial fibrillation (H) Yes     Paroxysmal atrial fibrillation (H)        CURRENT MEDICATIONS:  Current Outpatient Prescriptions   Medication Sig Dispense Refill     metoprolol (LOPRESSOR) 25 MG tablet Take 1 tablet (25 mg) by mouth 2 times daily 60 tablet 11     alendronate (FOSAMAX) 10 MG tablet Take 1 tablet (10 mg) by mouth every morning (before breakfast) Patient requests \"Impax\" generic brand. 93 tablet 3     prednisoLONE acetate (PRED FORTE) 1 % ophthalmic susp        rosuvastatin (CRESTOR) 5 MG tablet Take 1 tablet (5 mg) by mouth daily 93 tablet 3     warfarin (COUMADIN) 5 MG tablet Take 1 tablet daily except half tablet on Wednesday or as instructed based on INR result. 90 tablet 1     ASPIRIN NOT PRESCRIBED (INTENTIONAL) Antiplatelet medication not prescribed intentionally due to Current anticoagulant therapy (warfarin/enoxaparin) 0 each 0     Multiple Vitamin (MULTI-VITAMIN PO) Take  by mouth.       CALCIUM 500 + D 500-200 MG-IU OR TABS 1 tablet bid  0       ALLERGIES     Allergies   Allergen Reactions     Lidocaine Other (See Comments)     Pt lopez Hx of a reaction to Lidocaine Eye solution.     Atorvastatin Rash     LIPITOR - pt gets a rash       PAST MEDICAL HISTORY:  Past Medical History:   Diagnosis Date     A-fib (H)      Abnormal EKG      Atrial flutter (H)      Chronotropic incompetence 8/25/2015     Hyperlipidemia with target LDL " less than 130 6/6/2014     Diagnosis updated by automated process. Provider to review and confirm.     Osteoporosis, unspecified 12/98    improved by DEXA of 2003; then unchanged in 2004     Palpitations      PFO (patent foramen ovale)      SSS (sick sinus syndrome) (H)        PAST SURGICAL HISTORY:  Past Surgical History:   Procedure Laterality Date     C NONSPECIFIC PROCEDURE  2/05    colonoscopy      C NONSPECIFIC PROCEDURE Right 11/2016    Right cataract repair and corneal transplant     COLONOSCOPY  12/17/2015    Adenomatous polyp in cecum, repeat in 5 yrs       FAMILY HISTORY:  Family History   Problem Relation Age of Onset     Adopted: Yes     CEREBROVASCULAR DISEASE Mother      at age 88 ;; lived to be 99     Unknown/Adopted Father      Don't know father's history       SOCIAL HISTORY:  Social History     Social History     Marital status:      Spouse name: Julio     Number of children: 2     Years of education: N/A     Occupational History           Retired     Social History Main Topics     Smoking status: Never Smoker     Smokeless tobacco: Never Used     Alcohol use No     Drug use: No     Sexual activity: Yes     Partners: Male     Other Topics Concern     Caffeine Concern No     Sleep Concern No     Special Diet No     Exercise No     some walking cleaning     Social History Narrative    Exercise bike or walking 2-3x/week.        Review of Systems:  Skin:  Negative       Eyes:  Positive for glasses Hx cornea transplant  ENT:  Negative      Respiratory:  Negative       Cardiovascular:  Negative      Gastroenterology: Negative      Genitourinary:  Negative      Musculoskeletal:  Positive for arthritis    Neurologic:  Positive for migraine headaches rare   Psychiatric:  Negative      Heme/Lymph/Imm:  Negative      Endocrine:  Negative        [id number]             Never smoker

## 2017-08-16 NOTE — LETTER
"8/16/2017    Chavez Molina MD  [address] [facility] [address]  [facility] [address]    RE: Mariya Rowe       Dear Colleague,    I had the pleasure of seeing Mariya Rowe in the [facility].    I had the pleasure of seeing Ms. Mariya Rowe in follow-up of persistent atrial fibrillation and sick sinus syndrome.  Mariya is a very pleasant 77-year-old female who used to have symptomatic persistent AF but maintained sinus rhythm on sotalol for about 5 years.      I have seen her on a yearly basis for the past several years, and she always came in with sinus bradycardia in the 40s and no symptoms.      In coming in today, Mariya tells me that she has continued to feel well.  She has noticed mild dyspnea on exertion when she goes up stairs but nothing she finds too bothersome.  She does not have chest pain and has not had syncope or near-syncope.      PHYSICAL EXAMINATION:   VITAL SIGNS:  Blood pressure 102/62, pulse 87 and irregular, weight 79 kg, height 180 cm.   GENERAL:  She is a pleasant, healthy-appearing woman in no distress.   NECK:  Supple, without carotid bruits.   LUNGS:  Clear.   CARDIOVASCULAR:  Normal JVP, irregularly irregular rhythm without gallop or murmur.   EXTREMITIES:  No edema.  She has some venous varicosities bilaterally.      DIAGNOSTIC STUDIES:    Her 12-lead ECG today showed atrial fibrillation with ventricular rate in the 70s.  Corrected QT of 456 milliseconds.     Outpatient Encounter Prescriptions as of 8/16/2017   Medication Sig Dispense Refill     metoprolol (LOPRESSOR) 25 MG tablet Take 1 tablet (25 mg) by mouth 2 times daily 60 tablet 11     alendronate (FOSAMAX) 10 MG tablet Take 1 tablet (10 mg) by mouth every morning (before breakfast) Patient requests \"Impax\" generic brand. 93 tablet 3     prednisoLONE acetate (PRED FORTE) 1 % ophthalmic susp        rosuvastatin (CRESTOR) 5 MG tablet Take 1 tablet (5 mg) by mouth daily 93 tablet 3     [DISCONTINUED] " warfarin (COUMADIN) 5 MG tablet Take 1 tablet daily except half tablet on Wednesday or as instructed based on INR result. 90 tablet 1     ASPIRIN NOT PRESCRIBED (INTENTIONAL) Antiplatelet medication not prescribed intentionally due to Current anticoagulant therapy (warfarin/enoxaparin) 0 each 0     Multiple Vitamin (MULTI-VITAMIN PO) Take  by mouth.       CALCIUM 500 + D 500-200 MG-IU OR TABS 1 tablet bid  0     [DISCONTINUED] sotalol (BETAPACE) 120 MG tablet Take 1/2 tab (60 mg) every 12 hours 93 tablet 3     No facility-administered encounter medications on file as of 8/16/2017.       IMPRESSION:   1.  Recurrence of atrial fibrillation.  I suspect her arrhythmia is again persistent as was the case in the past.  Mariya has failed sotalol 60 mg every 12 hours, but even on that modest dose she had significant bradycardia while in sinus rhythm, so I would not increase the dose.        On a more positive note, she is almost unaware of the atrial fibrillation at this time, which is markedly different than the situation in 2011.  Given her minimal symptoms and age of 77 years, I recommended treatment with rate control from here on.      RECOMMENDATIONS:   A.  Stop sotalol.   B.  Start metoprolol 25 mg b.i.d.   C.  A 24-hour Holter monitor will be assessed in 2 weeks to evaluate the heart rate response on metoprolol.   D.  Continue warfarin for anticoagulation.   E.  Follow-up in 1 year or sooner if she has issues.       Mariya seemed surprised about being back in AF, but I tried to reassure her that as long as she feels well and remains on anticoagulation, she should be safe.      It was my pleasure seeing this delightful patient.  I do appreciate the opportunity to be part of her care.  Time spent was 25 minutes, with greater than 50% of the time spent in discussion.     Sincerely,    Yelena Kenyon MD     [facility]

## 2017-08-16 NOTE — MR AVS SNAPSHOT
After Visit Summary   8/16/2017    Mariya Rowe    MRN: [id number]           Patient Information     Date Of Birth          1940        Visit Information        Provider Department      8/16/2017 2:45 PM Yelena Kenyon MD [facility] [place]        Today's Diagnoses     Persistent atrial fibrillation (H)    -  1    Paroxysmal atrial fibrillation (H)           Follow-ups after your visit        Additional Services     Follow-Up with Cardiac Advanced Practice Provider                 Your next 10 appointments already scheduled     Aug 22, 2017  9:15 AM CDT   Anticoagulation Visit with [facility]   [facility] ([facility])    [address]  [facility] [id number] [phone]            Aug 30, 2017  3:45 PM CDT   Holter Monitor with [facility] DEVICE [facility] ([facility])    [address]  [facility] [address] [phone]           LOCATION - [address] [facility], [address] [place] [address] **Please check-in at the [place] Registration Office, [address], in the [facility] building. When you are finished registering, please go to [address] and have a seat.              Future tests that were ordered for you today     Open Future Orders        Priority Expected Expires Ordered    Holter Monitor 24 hour - Adult Routine 8/29/2017 8/16/2018 8/16/2017    Follow-Up with Cardiac Advanced Practice Provider Routine 8/16/2018 8/17/2018 8/16/2017            Who to contact     If you have questions or need follow up information about today's clinic visit or your schedule please contact [facility] [place] directly at [phone].  Normal or non-critical lab and imaging results will be communicated to you by MyChart, letter or phone within 4 business days after the clinic has  "received the results. If you do not hear from us within 7 days, please contact the clinic through [employer] or phone. If you have a critical or abnormal lab result, we will notify you by phone as soon as possible.  Submit refill requests through [employer] or call your pharmacy and they will forward the refill request to us. Please allow 3 business days for your refill to be completed.          Additional Information About Your Visit        [employer] Information     [employer] gives you secure access to your electronic health record. If you see a primary care provider, you can also send messages to your care team and make appointments. If you have questions, please call your primary care clinic.  If you do not have a primary care provider, please call [phone] and they will assist you.        Care EveryWhere ID     This is your Care EveryWhere ID. This could be used by other organizations to access your [facility] medical records  [id number]        Your Vitals Were     Pulse Height Pulse Oximetry BMI (Body Mass Index)          87 1.803 m (5' 11\") 98% 24.27 kg/m2         Blood Pressure from Last 3 Encounters:   08/16/17 102/62   06/28/17 120/76   08/15/16 122/70    Weight from Last 3 Encounters:   08/16/17 78.9 kg (174 lb)   06/28/17 77.5 kg (170 lb 12.8 oz)   08/15/16 78.7 kg (173 lb 6.4 oz)              We Performed the Following     EKG 12-lead complete w/read - Clinics (performed today)     Follow-Up with Electrophysiologist          Today's Medication Changes          These changes are accurate as of: 8/16/17  3:10 PM.  If you have any questions, ask your nurse or doctor.               Start taking these medicines.        Dose/Directions    metoprolol 25 MG tablet   Commonly known as:  LOPRESSOR   Used for:  Persistent atrial fibrillation (H)   Started by:  Yelena Kenyon MD        Dose:  25 mg   Take 1 tablet (25 mg) by mouth 2 times daily   Quantity:  60 tablet   Refills:  11         Stop taking these " medicines if you haven't already. Please contact your care team if you have questions.     sotalol 120 MG tablet   Commonly known as:  BETAPACE   Stopped by:  Yelena Kenyon MD                Where to get your medicines      These medications were sent to [facility]/pharmacy #[id number] - [place], [place] - [address] [facility]  [address] [facility] [address], [facility] [address]    Hours:  [facility] Phone:  [phone]     metoprolol 25 MG tablet                Primary Care Provider Office Phone # Fax #    Chavez Molina [phone]       [address] [facility] [address]  [facility] [address]        Equal Access to Services     [facility] AH: Hadii pretty ku hadasho Soomaali, waaxda luqadaha, qaybta kaalmada adeegyada, violeta segura . So [facility] [phone].    ATENCIÓN: Si habla español, tiene a perdomo disposición servicios gratuitos de asistencia lingüística. Llame al [phone].    We comply with applicable federal civil rights laws and [place] laws. We do not discriminate on the basis of race, color, national origin, age, disability sex, sexual orientation or gender identity.            Thank you!     Thank you for choosing [facility] [place]  for your care. Our goal is always to provide you with excellent care. Hearing back from our patients is one way we can continue to improve our services. Please take a few minutes to complete the written survey that you may receive in the mail after your visit with us. Thank you!             Your Updated Medication List - Protect others around you: Learn how to safely use, store and throw away your medicines at www.disposemymeds.org.          This list is accurate as of: 8/16/17  3:10 PM.  Always use your most recent med list.                   Brand Name Dispense Instructions for use Diagnosis    alendronate 10 MG tablet    FOSAMAX    93 tablet    Take 1 tablet (10 mg) by mouth every morning (before  "breakfast) Patient requests \"Impax\" generic brand.    Osteoporosis, unspecified osteoporosis type, unspecified pathological fracture presence       ASPIRIN NOT PRESCRIBED    INTENTIONAL    0 each    Antiplatelet medication not prescribed intentionally due to Current anticoagulant therapy (warfarin/enoxaparin)    Paroxysmal atrial fibrillation (H), Hyperlipidemia with target LDL less than 130       CALCIUM 500 + D 500-200 MG-IU Tabs      1 tablet bid    Routine general medical examination at a health care facility       metoprolol 25 MG tablet    LOPRESSOR    60 tablet    Take 1 tablet (25 mg) by mouth 2 times daily    Persistent atrial fibrillation (H)       MULTI-VITAMIN PO      Take  by mouth.        prednisoLONE acetate 1 % ophthalmic susp    PRED FORTE          rosuvastatin 5 MG tablet    CRESTOR    93 tablet    Take 1 tablet (5 mg) by mouth daily    Hyperlipidemia with target LDL less than 130       warfarin 5 MG tablet    COUMADIN    90 tablet    Take 1 tablet daily except half tablet on Wednesday or as instructed based on INR result.    Palpitations         " 01-Apr-2017 22:45

## 2017-08-17 NOTE — PROGRESS NOTES
HISTORY OF PRESENT ILLNESS:    I had the pleasure of seeing Ms. Mariya Rowe in follow-up of persistent atrial fibrillation and sick sinus syndrome.  Mariya is a very pleasant 77-year-old female who used to have symptomatic persistent AF but maintained sinus rhythm on sotalol for about 5 years.      I have seen her on a yearly basis for the past several years, and she always came in with sinus bradycardia in the 40s and no symptoms.      In coming in today, Mariya tells me that she has continued to feel well.  She has noticed mild dyspnea on exertion when she goes up stairs but nothing she finds too bothersome.  She does not have chest pain and has not had syncope or near-syncope.      PHYSICAL EXAMINATION:   VITAL SIGNS:  Blood pressure 102/62, pulse 87 and irregular, weight 79 kg, height 180 cm.   GENERAL:  She is a pleasant, healthy-appearing woman in no distress.   NECK:  Supple, without carotid bruits.   LUNGS:  Clear.   CARDIOVASCULAR:  Normal JVP, irregularly irregular rhythm without gallop or murmur.   EXTREMITIES:  No edema.  She has some venous varicosities bilaterally.      DIAGNOSTIC STUDIES:    Her 12-lead ECG today showed atrial fibrillation with ventricular rate in the 70s.  Corrected QT of 456 milliseconds.      IMPRESSION:   1.  Recurrence of atrial fibrillation.  I suspect her arrhythmia is again persistent as was the case in the past.  Mariya has failed sotalol 60 mg every 12 hours, but even on that modest dose she had significant bradycardia while in sinus rhythm, so I would not increase the dose.        On a more positive note, she is almost unaware of the atrial fibrillation at this time, which is markedly different than the situation in 2011.  Given her minimal symptoms and age of 77 years, I recommended treatment with rate control from here on.      RECOMMENDATIONS:   A.  Stop sotalol.   B.  Start metoprolol 25 mg b.i.d.   C.  A 24-hour Holter monitor will be assessed in 2 weeks to evaluate  the heart rate response on metoprolol.   D.  Continue warfarin for anticoagulation.   E.  Follow-up in 1 year or sooner if she has issues.       Cleopatra seemed surprised about being back in AF, but I tried to reassure her that as long as she feels well and remains on anticoagulation, she should be safe.      It was my pleasure seeing this delightful patient.  I do appreciate the opportunity to be part of her care.  Time spent was 25 minutes, with greater than 50% of the time spent in discussion.        LARRY BONNER MD, [facility]         cc:   Chavez Molina MD    [facility]    [address]    [place]  [address]          D: 2017 15:12   T: 2017 19:16   MT: BARRY      Name:     CLEOPATRA CHAVARRIA   MRN:      [id number]        Account:      [id number]   :      1940           Service Date: 2017      Document: [id number]

## 2017-08-22 ENCOUNTER — ANTICOAGULATION THERAPY VISIT (OUTPATIENT)
Dept: ANTICOAGULATION | Facility: CLINIC | Age: 77
End: 2017-08-22
Payer: COMMERCIAL

## 2017-08-22 DIAGNOSIS — Z79.01 LONG-TERM (CURRENT) USE OF ANTICOAGULANTS: ICD-10-CM

## 2017-08-22 DIAGNOSIS — R00.2 PALPITATIONS: ICD-10-CM

## 2017-08-22 LAB — INR POINT OF CARE: 2.6 (ref 0.86–1.14)

## 2017-08-22 PROCEDURE — 36416 COLLJ CAPILLARY BLOOD SPEC: CPT

## 2017-08-22 PROCEDURE — 85610 PROTHROMBIN TIME: CPT | Mod: QW

## 2017-08-22 PROCEDURE — 99207 ZZC NO CHARGE NURSE ONLY: CPT

## 2017-08-22 RX ORDER — WARFARIN SODIUM 5 MG/1
TABLET ORAL
Qty: 90 TABLET | Refills: 1 | Status: SHIPPED | OUTPATIENT
Start: 2017-08-22 | End: 2018-07-18

## 2017-08-22 NOTE — PROGRESS NOTES
ANTICOAGULATION FOLLOW-UP CLINIC VISIT    Patient Name:  Mariya Rowe  Date:  8/22/2017  Contact Type:  Face to Face    SUBJECTIVE:     Patient Findings     Positives No Problem Findings           OBJECTIVE    INR Protime   Date Value Ref Range Status   08/22/2017 2.6 (A) 0.86 - 1.14 Final       ASSESSMENT / PLAN  INR assessment THER    Recheck INR In: 4 WEEKS    INR Location Clinic      Anticoagulation Summary as of 8/22/2017     INR goal 2.0-3.0   Today's INR 2.6   Maintenance plan 2.5 mg (5 mg x 0.5) on Wed, Sat; 5 mg (5 mg x 1) all other days   Full instructions 2.5 mg on Wed, Sat; 5 mg all other days   Weekly total 30 mg   No change documented Karla Waller, RN   Plan last modified Anjali Moss RN (1/25/2017)   Next INR check 9/19/2017   Priority INR   Target end date     Indications   Long-term (current) use of anticoagulants [Z79.01] [Z79.01]  Atrial fibrillation (H) [I48.91]         Anticoagulation Episode Summary     INR check location     Preferred lab     Send INR reminders to [facility]    Comments       Anticoagulation Care Providers     Provider Role Specialty Phone number    Chavez Molina MD Responsible Internal Medicine [phone]            See the Encounter Report to view Anticoagulation Flowsheet and Dosing Calendar (Go to Encounters tab in chart review, and find the Anticoagulation Therapy Visit)    Dosage adjustment made based on physician directed care plan.    Karla Waller, RN

## 2017-08-22 NOTE — MR AVS SNAPSHOT
Mariya Rowe   8/22/2017 9:15 AM   Anticoagulation Therapy Visit    Description:  77 year old female   Provider[facility]  [facility]   Department:  [facility]           INR as of 8/22/2017     Today's INR 2.6      Anticoagulation Summary as of 8/22/2017     INR goal 2.0-3.0   Today's INR 2.6   Full instructions 2.5 mg on Wed, Sat; 5 mg all other days   Next INR check 9/19/2017    Indications   Long-term (current) use of anticoagulants [Z79.01] [Z79.01]  Atrial fibrillation (H) [I48.91]         Your next Anticoagulation Clinic appointment(s)     Sep 19, 2017  9:45 AM CDT   [facility]    [address] [facility] [address]  [facility] [address] [phone]              Contact Numbers     [facility] Phone Numbers:  Anticoagulation Clinic Appointments : [phone]  Anticoagulation Nurse: [phone]         August 2017 Details    Sun Mon Tue Wed Thu Fri Sat       1               2               3               4               5                 6               7               8               9               10               11               12                 13               14               15               16               17               18               19                 20               21               22      5 mg   See details      23      2.5 mg         24      5 mg         25      5 mg         26      2.5 mg           27      5 mg         28      5 mg         29      5 mg         30      2.5 mg         31      5 mg            Date Details   08/22 This INR check               How to take your warfarin dose     To take:  2.5 mg Take 0.5 of a 5 mg tablet.    To take:  5 mg Take 1 of the 5 mg tablets.           September 2017 Details    Sun Mon Tue Wed Thu Fri Sat          1      5 mg         2      2.5 mg           3      5 mg         4      5 mg         5      5 mg         6       2.5 mg         7      5 mg         8      5 mg         9      2.5 mg           10      5 mg         11      5 mg         12      5 mg         13      2.5 mg         14      5 mg         15      5 mg         16      2.5 mg           17      5 mg         18      5 mg         19            20               21               22               23                 24               25               26               27               28               29               30                Date Details   No additional details    Date of next INR:  9/19/2017         How to take your warfarin dose     To take:  2.5 mg Take 0.5 of a 5 mg tablet.    To take:  5 mg Take 1 of the 5 mg tablets.

## 2017-08-28 ENCOUNTER — TELEPHONE (OUTPATIENT)
Dept: INTERNAL MEDICINE | Facility: CLINIC | Age: 77
End: 2017-08-28

## 2017-08-28 DIAGNOSIS — Z12.31 ENCOUNTER FOR SCREENING MAMMOGRAM FOR BREAST CANCER: Primary | ICD-10-CM

## 2017-08-28 NOTE — TELEPHONE ENCOUNTER
Pt left voice message at [phone]. She called to schedule mammogram as ordered by Dr. Molina, but told the order is for a 2D mammogram. Pt had a 3D Mammogram last year and would like to get this again. Requesting updated order.     Order pended and sent to provider to review.

## 2017-08-30 ENCOUNTER — HOSPITAL ENCOUNTER (OUTPATIENT)
Dept: CARDIOLOGY | Facility: CLINIC | Age: 77
Discharge: HOME | End: 2017-08-30
Attending: INTERNAL MEDICINE | Admitting: INTERNAL MEDICINE
Payer: MEDICARE

## 2017-08-30 ENCOUNTER — MYC MEDICAL ADVICE (OUTPATIENT)
Dept: INTERNAL MEDICINE | Facility: CLINIC | Age: 77
End: 2017-08-30

## 2017-08-30 DIAGNOSIS — I48.19 PERSISTENT ATRIAL FIBRILLATION (H): ICD-10-CM

## 2017-08-30 PROCEDURE — 93225 XTRNL ECG REC<48 HRS REC: CPT

## 2017-08-30 PROCEDURE — 93227 XTRNL ECG REC<48 HR R&I: CPT | Performed by: INTERNAL MEDICINE

## 2017-09-11 ENCOUNTER — HOSPITAL ENCOUNTER (OUTPATIENT)
Dept: MAMMOGRAPHY | Facility: CLINIC | Age: 77
Discharge: HOME | End: 2017-09-11
Attending: INTERNAL MEDICINE | Admitting: INTERNAL MEDICINE
Payer: MEDICARE

## 2017-09-11 ENCOUNTER — TELEPHONE (OUTPATIENT)
Dept: CARDIOLOGY | Facility: CLINIC | Age: 77
End: 2017-09-11

## 2017-09-11 DIAGNOSIS — Z12.31 VISIT FOR SCREENING MAMMOGRAM: ICD-10-CM

## 2017-09-11 PROCEDURE — G0202 SCR MAMMO BI INCL CAD: HCPCS

## 2017-09-11 PROCEDURE — 77063 BREAST TOMOSYNTHESIS BI: CPT

## 2017-09-11 NOTE — TELEPHONE ENCOUNTER
Pt called back and results reviewed. Instructed to continue current medication regimen. ROB Alberts RN.

## 2017-09-11 NOTE — TELEPHONE ENCOUNTER
Message  Received: Yesterday       Yelena Kenyon MD  [facility] Heart Ep Nurse                     Persistent AF, overall rate control is OK.  Please call her.   I will see her in 1 year, but she can call if she has issues.   DI      Writer attempted to call pt with results as above, but not home. Spouse will have pt return my phone call. ROB Alberts RN.

## 2017-09-14 ENCOUNTER — TELEPHONE (OUTPATIENT)
Dept: INTERNAL MEDICINE | Facility: CLINIC | Age: 77
End: 2017-09-14

## 2017-09-14 NOTE — TELEPHONE ENCOUNTER
Fatou with [facility] left voice message at [phone]. Pt needs to have follow up breast u/s for abnormal mammogram. They placed order for Dr. Molina to co-sign and need this done before they can schedule an appt.

## 2017-09-19 ENCOUNTER — ANTICOAGULATION THERAPY VISIT (OUTPATIENT)
Dept: ANTICOAGULATION | Facility: CLINIC | Age: 77
End: 2017-09-19
Payer: COMMERCIAL

## 2017-09-19 ENCOUNTER — HOSPITAL ENCOUNTER (OUTPATIENT)
Dept: ULTRASOUND IMAGING | Facility: CLINIC | Age: 77
Discharge: HOME | End: 2017-09-19
Attending: INTERNAL MEDICINE | Admitting: INTERNAL MEDICINE
Payer: MEDICARE

## 2017-09-19 DIAGNOSIS — Z79.01 LONG-TERM (CURRENT) USE OF ANTICOAGULANTS: ICD-10-CM

## 2017-09-19 DIAGNOSIS — R92.8 ABNORMAL MAMMOGRAM: ICD-10-CM

## 2017-09-19 LAB — INR POINT OF CARE: 3.9 (ref 0.86–1.14)

## 2017-09-19 PROCEDURE — 76642 ULTRASOUND BREAST LIMITED: CPT | Mod: RT

## 2017-09-19 PROCEDURE — 85610 PROTHROMBIN TIME: CPT | Mod: QW

## 2017-09-19 PROCEDURE — 99207 ZZC NO CHARGE NURSE ONLY: CPT

## 2017-09-19 PROCEDURE — 36416 COLLJ CAPILLARY BLOOD SPEC: CPT

## 2017-09-19 NOTE — PROGRESS NOTES
ANTICOAGULATION FOLLOW-UP CLINIC VISIT    Patient Name:  Mariya Rowe  Date:  9/19/2017  Contact Type:  Face to Face    SUBJECTIVE:     Patient Findings     Positives Unexplained INR or factor level change           OBJECTIVE    INR Protime   Date Value Ref Range Status   09/19/2017 3.9 (A) 0.86 - 1.14 Final       ASSESSMENT / PLAN  INR assessment SUPRA    Recheck INR In: 1 WEEK    INR Location Clinic      Anticoagulation Summary as of 9/19/2017     INR goal 2.0-3.0   Today's INR 3.9!   Maintenance plan 2.5 mg (5 mg x 0.5) on Wed, Sat; 5 mg (5 mg x 1) all other days   Full instructions 9/19: Hold; Otherwise 2.5 mg on Wed, Sat; 5 mg all other days   Weekly total 30 mg   Plan last modified Anjali Moss RN (1/25/2017)   Next INR check 9/26/2017   Priority INR   Target end date     Indications   Long-term (current) use of anticoagulants [Z79.01] [Z79.01]  Atrial fibrillation (H) [I48.91]         Anticoagulation Episode Summary     INR check location     Preferred lab     Send INR reminders to [facility]    Comments       Anticoagulation Care Providers     Provider Role Specialty Phone number    Chavez Molina MD Responsible Internal Medicine [phone]            See the Encounter Report to view Anticoagulation Flowsheet and Dosing Calendar (Go to Encounters tab in chart review, and find the Anticoagulation Therapy Visit)    Dosage adjustment made based on physician directed care plan.    Karla Waller RN

## 2017-09-19 NOTE — PROGRESS NOTES
After INR appt, pt's spouse mentioned they have been drinking a lot of inderjit juice. [place] can increase INR, pt notified.

## 2017-09-19 NOTE — MR AVS SNAPSHOT
Mariya Rowe   9/19/2017 9:45 AM   Anticoagulation Therapy Visit    Description:  77 year old female   Provider[facility]  [facility]   Department:  [facility]           INR as of 9/19/2017     Today's INR 3.9!      Anticoagulation Summary as of 9/19/2017     INR goal 2.0-3.0   Today's INR 3.9!   Full instructions 9/19: Hold; Otherwise 2.5 mg on Wed, Sat; 5 mg all other days   Next INR check 9/26/2017    Indications   Long-term (current) use of anticoagulants [Z79.01] [Z79.01]  Atrial fibrillation (H) [I48.91]         Your next Anticoagulation Clinic appointment(s)     Sep 26, 2017  9:15 AM CDT   [facility]tico[facility] Visit with [facility]   [facility]    [address]  [facility] [id number] [phone]              Contact Numbers     [facility] Phone Numbers:  Anticoagulation Clinic Appointments : [phone]  Anticoagulation Nurse: [phone]         September 2017 Details    Sun Mon Tue Wed Thu Fri Sat          1               2                 3               4               5               6               7               8               9                 10               11               12               13               14               15               16                 17               18               19      Hold   See details      20      2.5 mg         21      5 mg         22      5 mg         23      2.5 mg           24      5 mg         25      5 mg         26            27               28               29               30                Date Details   09/19 This INR check       Date of next INR:  9/26/2017         How to take your warfarin dose     To take:  2.5 mg Take 0.5 of a 5 mg tablet.    To take:  5 mg Take 1 of the 5 mg tablets.    Hold Do not take your warfarin dose. See the Details table to the right for additional instructions.

## 2017-09-26 ENCOUNTER — ANTICOAGULATION THERAPY VISIT (OUTPATIENT)
Dept: ANTICOAGULATION | Facility: CLINIC | Age: 77
End: 2017-09-26
Payer: COMMERCIAL

## 2017-09-26 DIAGNOSIS — Z79.01 LONG-TERM (CURRENT) USE OF ANTICOAGULANTS: ICD-10-CM

## 2017-09-26 LAB — INR POINT OF CARE: 2.9 (ref 0.86–1.14)

## 2017-09-26 PROCEDURE — 85610 PROTHROMBIN TIME: CPT | Mod: QW

## 2017-09-26 PROCEDURE — 36416 COLLJ CAPILLARY BLOOD SPEC: CPT

## 2017-09-26 PROCEDURE — 99207 ZZC NO CHARGE NURSE ONLY: CPT

## 2017-09-26 NOTE — PROGRESS NOTES
ANTICOAGULATION FOLLOW-UP CLINIC VISIT    Patient Name:  Mariya Rowe  Date:  9/26/2017  Contact Type:  Face to Face    SUBJECTIVE:     Patient Findings     Positives No Problem Findings    Comments Pt will be leaving 9/30/17 for [place], will return in November for a visit.           OBJECTIVE    INR Protime   Date Value Ref Range Status   09/26/2017 2.9 (A) 0.86 - 1.14 Final       ASSESSMENT / PLAN  INR assessment THER    Recheck INR In: 4 WEEKS    INR Location Clinic      Anticoagulation Summary as of 9/26/2017     INR goal 2.0-3.0   Today's INR 2.9   Maintenance plan 2.5 mg (5 mg x 0.5) on Wed, Sat; 5 mg (5 mg x 1) all other days   Full instructions 2.5 mg on Wed, Sat; 5 mg all other days   Weekly total 30 mg   No change documented Karla Waller RN   Plan last modified Anjali Moss RN (1/25/2017)   Next INR check 11/20/2017   Priority INR   Target end date     Indications   Long-term (current) use of anticoagulants [Z79.01] [Z79.01]  Atrial fibrillation (H) [I48.91]         Anticoagulation Episode Summary     INR check location     Preferred lab     Send INR reminders to [facility]    Comments       Anticoagulation Care Providers     Provider Role Specialty Phone number    Chavez Molina MD [facility] Internal Medicine [phone]            See the Encounter Report to view Anticoagulation Flowsheet and Dosing Calendar (Go to Encounters tab in chart review, and find the Anticoagulation Therapy Visit)    Dosage adjustment made based on physician directed care plan.    Karla Waller, GILA

## 2017-09-26 NOTE — MR AVS SNAPSHOT
Mariya Rowe   9/26/2017 9:15 AM   Anticoagulation Therapy Visit    Description:  77 year old female   Provider[facility]   Department:  [facility]           INR as of 9/26/2017     Today's INR 2.9      Anticoagulation Summary as of 9/26/2017     INR goal 2.0-3.0   Today's INR 2.9   Full instructions 2.5 mg on Wed, Sat; 5 mg all other days   Next INR check 11/20/2017    Indications   Long-term (current) use of anticoagulants [Z79.01] [Z79.01]  Atrial fibrillation (H) [I48.91]         Your next Anticoagulation Clinic appointment(s)     Nov 20, 2017  9:00 AM CST   Anticoagulation Visit with [facility]   [facility]    [address] [facility] [address]  [facility] [address] [phone]              Contact Numbers     [facility] Phone Numbers:  Anticoagulation Clinic Appointments : [phone]  Anticoagulation Nurse: [phone]         September 2017 Details    Sun Mon Tue Wed Thu Fri Sat          1               2                 3               4               5               6               7               8               9                 10               11               12               13               14               15               16                 17               18               19               20               21               22               23                 24               25               26      5 mg   See details      27      2.5 mg         28      5 mg         29      5 mg         30      2.5 mg          Date Details   09/26 This INR check               How to take your warfarin dose     To take:  2.5 mg Take 0.5 of a 5 mg tablet.    To take:  5 mg Take 1 of the 5 mg tablets.           October 2017 Details    Sun Mon Tue Wed Thu Fri Sat     1      5 mg         2      5 mg         3      5 mg         4      2.5 mg         5      5 mg         6      5 mg         7      2.5 mg           8       5 mg         9      5 mg         10      5 mg         11      2.5 mg         12      5 mg         13      5 mg         14      2.5 mg           15      5 mg         16      5 mg         17      5 mg         18      2.5 mg         19      5 mg         20      5 mg         21      2.5 mg           22      5 mg         23      5 mg         24      5 mg         25      2.5 mg         26      5 mg         27      5 mg         28      2.5 mg           29      5 mg         30      5 mg         31      5 mg              Date Details   No additional details            How to take your warfarin dose     To take:  2.5 mg Take 0.5 of a 5 mg tablet.    To take:  5 mg Take 1 of the 5 mg tablets.           November 2017 Details    Sun Mon Tue Wed Thu Fri Sat        1      2.5 mg         2      5 mg         3      5 mg         4      2.5 mg           5      5 mg         6      5 mg         7      5 mg         8      2.5 mg         9      5 mg         10      5 mg         11      2.5 mg           12      5 mg         13      5 mg         14      5 mg         15      2.5 mg         16      5 mg         17      5 mg         18      2.5 mg           19      5 mg         20            21               22               23               24               25                 26               27               28               29               30                  Date Details   No additional details    Date of next INR:  11/20/2017         How to take your warfarin dose     To take:  2.5 mg Take 0.5 of a 5 mg tablet.    To take:  5 mg Take 1 of the 5 mg tablets.

## 2017-11-20 ENCOUNTER — ANTICOAGULATION THERAPY VISIT (OUTPATIENT)
Dept: ANTICOAGULATION | Facility: CLINIC | Age: 77
End: 2017-11-20
Payer: COMMERCIAL

## 2017-11-20 DIAGNOSIS — Z79.01 LONG-TERM (CURRENT) USE OF ANTICOAGULANTS: ICD-10-CM

## 2017-11-20 LAB — INR POINT OF CARE: 3 (ref 0.86–1.14)

## 2017-11-20 PROCEDURE — 36416 COLLJ CAPILLARY BLOOD SPEC: CPT

## 2017-11-20 PROCEDURE — 99207 ZZC NO CHARGE NURSE ONLY: CPT

## 2017-11-20 PROCEDURE — 85610 PROTHROMBIN TIME: CPT | Mod: QW

## 2017-11-20 NOTE — MR AVS SNAPSHOT
Mariya KUHN Soledad   11/20/2017 9:00 AM   Anticoagulation Therapy Visit    Description:  77 year old female   Provider[facility]  [facility]   Department:  [facility]           INR as of 11/20/2017     Today's INR 3.0      Anticoagulation Summary as of 11/20/2017     INR goal 2.0-3.0   Today's INR 3.0   Full instructions 2.5 mg on Wed, Sat; 5 mg all other days   Next INR check 1/2/2018    Indications   Long-term (current) use of anticoagulants [Z79.01] [Z79.01]  Atrial fibrillation (H) [I48.91]         Your next Anticoagulation Clinic appointment(s)     Jan 02, 2018  9:15 AM CST   Anticoagulation Visit with [facility]   [facility]    [address] [facility] [address]  [facility] [address] [phone]              Contact Numbers     [facility] Phone Numbers:  Anticoagulation Clinic Appointments : [phone]  Anticoagulation Nurse: [phone]         November 2017 Details    Sun Mon Tue Wed Thu Fri Sat        1               2               3               4                 5               6               7               8               9               10               11                 12               13               14               15               16               17               18                 19               20      5 mg   See details      21      5 mg         22      2.5 mg         23      5 mg         24      5 mg         25      2.5 mg           26      5 mg         27      5 mg         28      5 mg         29      2.5 mg         30      5 mg            Date Details   11/20 This INR check               How to take your warfarin dose     To take:  2.5 mg Take 0.5 of a 5 mg tablet.    To take:  5 mg Take 1 of the 5 mg tablets.           December 2017 Details    Sun Mon Tue Wed Thu Fri Sat          1      5 mg         2      2.5 mg           3      5 mg         4      5 mg         5      5 mg         6      2.5 mg          7      5 mg         8      5 mg         9      2.5 mg           10      5 mg         11      5 mg         12      5 mg         13      2.5 mg         14      5 mg         15      5 mg         16      2.5 mg           17      5 mg         18      5 mg         19      5 mg         20      2.5 mg         21      5 mg         22      5 mg         23      2.5 mg           24      5 mg         25      5 mg         26      5 mg         27      2.5 mg         28      5 mg         29      5 mg         30      2.5 mg           31      5 mg                Date Details   No additional details            How to take your warfarin dose     To take:  2.5 mg Take 0.5 of a 5 mg tablet.    To take:  5 mg Take 1 of the 5 mg tablets.           January 2018 Details    Sun Mon Tue Wed Thu Fri Sat      1      5 mg         2            3               4               5               6                 7               8               9               10               11               12               13                 14               15               16               17               18               19               20                 21               22               23               24               25               26               27                 28               29               30               31                   Date Details   No additional details    Date of next INR:  1/2/2018         How to take your warfarin dose     To take:  5 mg Take 1 of the 5 mg tablets.

## 2017-11-20 NOTE — PROGRESS NOTES
ANTICOAGULATION FOLLOW-UP CLINIC VISIT    Patient Name:  Mariya Rowe  Date:  11/20/2017  Contact Type:  Face to Face    SUBJECTIVE:     Patient Findings     Positives No Problem Findings           OBJECTIVE    INR Protime   Date Value Ref Range Status   11/20/2017 3.0 (A) 0.86 - 1.14 Final       ASSESSMENT / PLAN  INR assessment THER    Recheck INR In: 6 WEEKS    INR Location Clinic      Anticoagulation Summary as of 11/20/2017     INR goal 2.0-3.0   Today's INR 3.0   Maintenance plan 2.5 mg (5 mg x 0.5) on Wed, Sat; 5 mg (5 mg x 1) all other days   Full instructions 2.5 mg on Wed, Sat; 5 mg all other days   Weekly total 30 mg   No change documented Karla Waller, RN   Plan last modified Anjali Moss RN (1/25/2017)   Next INR check 1/2/2018   Priority INR   Target end date     Indications   Long-term (current) use of anticoagulants [Z79.01] [Z79.01]  Atrial fibrillation (H) [I48.91]         Anticoagulation Episode Summary     INR check location     Preferred lab     Send INR reminders to [facility]    Comments       Anticoagulation Care Providers     Provider Role Specialty Phone number    Chavez Molina MD Responsible Internal Medicine [phone]            See the Encounter Report to view Anticoagulation Flowsheet and Dosing Calendar (Go to Encounters tab in chart review, and find the Anticoagulation Therapy Visit)    Dosage adjustment made based on physician directed care plan.    Karla Waller, RN

## 2017-11-21 ENCOUNTER — TELEPHONE (OUTPATIENT)
Dept: ANTICOAGULATION | Facility: CLINIC | Age: 77
End: 2017-11-21

## 2017-11-21 DIAGNOSIS — I48.20 CHRONIC ATRIAL FIBRILLATION (H): Primary | ICD-10-CM

## 2017-11-21 NOTE — TELEPHONE ENCOUNTER
For insurance purposes, an annual INR referral is required. Please sign the order and route back to the INR Clinic. Karla Waller RN

## 2017-11-29 ENCOUNTER — TELEPHONE (OUTPATIENT)
Dept: CARDIOLOGY | Facility: CLINIC | Age: 77
End: 2017-11-29

## 2017-12-28 ENCOUNTER — MYC MEDICAL ADVICE (OUTPATIENT)
Dept: INTERNAL MEDICINE | Facility: CLINIC | Age: 77
End: 2017-12-28

## 2017-12-28 ENCOUNTER — TELEPHONE (OUTPATIENT)
Dept: INTERNAL MEDICINE | Facility: CLINIC | Age: 77
End: 2017-12-28

## 2017-12-28 ENCOUNTER — RADIANT APPOINTMENT (OUTPATIENT)
Dept: GENERAL RADIOLOGY | Facility: CLINIC | Age: 77
End: 2017-12-28
Attending: INTERNAL MEDICINE
Payer: COMMERCIAL

## 2017-12-28 ENCOUNTER — OFFICE VISIT (OUTPATIENT)
Dept: INTERNAL MEDICINE | Facility: CLINIC | Age: 77
End: 2017-12-28
Payer: COMMERCIAL

## 2017-12-28 VITALS
RESPIRATION RATE: 16 BRPM | DIASTOLIC BLOOD PRESSURE: 76 MMHG | HEIGHT: 71 IN | HEART RATE: 100 BPM | BODY MASS INDEX: 24.57 KG/M2 | WEIGHT: 175.5 LBS | TEMPERATURE: 97.5 F | OXYGEN SATURATION: 98 % | SYSTOLIC BLOOD PRESSURE: 98 MMHG

## 2017-12-28 DIAGNOSIS — I48.20 CHRONIC ATRIAL FIBRILLATION (H): ICD-10-CM

## 2017-12-28 DIAGNOSIS — R07.89 LEFT-SIDED CHEST WALL PAIN: Primary | ICD-10-CM

## 2017-12-28 DIAGNOSIS — S22.32XA CLOSED FRACTURE OF ONE RIB OF LEFT SIDE, INITIAL ENCOUNTER: ICD-10-CM

## 2017-12-28 DIAGNOSIS — R07.89 LEFT-SIDED CHEST WALL PAIN: ICD-10-CM

## 2017-12-28 DIAGNOSIS — M81.0 OSTEOPOROSIS, UNSPECIFIED OSTEOPOROSIS TYPE, UNSPECIFIED PATHOLOGICAL FRACTURE PRESENCE: ICD-10-CM

## 2017-12-28 LAB — RADIOLOGIST FLAGS: ABNORMAL

## 2017-12-28 PROCEDURE — 99214 OFFICE O/P EST MOD 30 MIN: CPT | Performed by: INTERNAL MEDICINE

## 2017-12-28 PROCEDURE — 71101 X-RAY EXAM UNILAT RIBS/CHEST: CPT | Mod: LT

## 2017-12-28 NOTE — NURSING NOTE
"Chief Complaint   Patient presents with     Musculoskeletal Problem     left sided pain in rib cage area, patient leasned over the arm of a office chair and heard a \"pop\" 3 days ago.       Initial BP 98/76 (BP Location: Right arm, Patient Position: Sitting, Cuff Size: Adult Large)  Pulse 100  Temp 97.5  F (36.4  C) (Oral)  Resp 16  Ht 5' 11\" (1.803 m)  Wt 175 lb 8 oz (79.6 kg)  SpO2 98%  BMI 24.48 kg/m2 Estimated body mass index is 24.48 kg/(m^2) as calculated from the following:    Height as of this encounter: 5' 11\" (1.803 m).    Weight as of this encounter: 175 lb 8 oz (79.6 kg).  Medication Reconciliation: complete    "

## 2017-12-28 NOTE — PROGRESS NOTES
"    ASSESSMENT & PLAN:                                                      She developed left rib fracture after minimal trauma, bending over the chair.  She has pain with movement, but not with breathing.  She agrees she does not need pain medications.  She has osteoporosis, for which she takes Fosamax and follows up with Dr. Molina.   She has had atrial fibrillation, on Coumadin treatment.  She does not have signs of hematoma    (R07.89) Left-sided chest wall pain  (primary encounter diagnosis)  Comment:   Plan: XR Ribs & Chest Left G/E 3 Views            (S22.32XA) Closed fracture of one rib of left side, initial encounter  Comment:   Plan:     (I48.2) Chronic atrial fibrillation (H)  Comment:   Plan:     (M81.0) Osteoporosis, unspecified osteoporosis type, unspecified pathological fracture presence  Comment:   Plan:        Chief Complaint:                                                      Left chest pain      SUBJECTIVE:                                                    History of present illness     3-4 days she bent over the chioar arm rest. She felt a po and has pain since L thorax, pain increases with movoment No pain with breathing    Osteoporosis at lumbar spine - on Fosamax     She wants CXR SHe is leaving town this weekend     I called her back and discussed the chest x-ray results    ROS:                                                      ROS: negative for fever, chills, cough, wheezes,  shortness of breath, vomiting, abdominal pain, leg swelling positive for left chest pain, as above    A 10-point review of systems was obtained.  Those pertinent are above and in the in the Subjective section.  The rest of the systems are negative.        OBJECTIVE:                                                    Physical Exam :    Blood pressure 98/76, pulse 100, temperature 97.5  F (36.4  C), temperature source Oral, resp. rate 16, height 5' 11\" (1.803 m), weight 175 lb 8 oz (79.6 kg), SpO2 98 %, not " "currently breastfeeding.   NAD, appears comfortable  Skin: no rashes   Neck: supple, no JVD,  No thyroidmegaly. Lymph nodes nonpalpable cervical and supraclavicular.  Chest: clear to auscultation bilaterally, good respiratory effort.  Tenderness on the left lower ribs  Heart: S1 S2, RRR, no mgr appreciated  Abdomen: soft, not tender,  Extremities: no edema,   Neurologic: A, Ox3, no focal signs appreciated    PMHx: reviewed  Past Medical History:   Diagnosis Date     A-fib (H)      Abnormal EKG      Atrial flutter (H)      Chronotropic incompetence 8/25/2015     Hyperlipidemia with target LDL less than 130 6/6/2014     Diagnosis updated by automated process. Provider to review and confirm.     Osteoporosis, unspecified 12/98    improved by DEXA of 2003; then unchanged in 2004     Palpitations      PFO (patent foramen ovale)      SSS (sick sinus syndrome) (H)       PSHx: reviewed  Past Surgical History:   Procedure Laterality Date     C NONSPECIFIC PROCEDURE  2/05    colonoscopy      C NONSPECIFIC PROCEDURE Right 11/2016    Right cataract repair and corneal transplant     COLONOSCOPY  12/17/2015    Adenomatous polyp in cecum, repeat in 5 yrs        Meds: reviewed  Current Outpatient Prescriptions   Medication Sig Dispense Refill     warfarin (COUMADIN) 5 MG tablet Take 1 tablet daily except half tablet on Wed and Sat or as instructed based on INR result. 90 tablet 1     metoprolol (LOPRESSOR) 25 MG tablet Take 1 tablet (25 mg) by mouth 2 times daily 60 tablet 11     alendronate (FOSAMAX) 10 MG tablet Take 1 tablet (10 mg) by mouth every morning (before breakfast) Patient requests \"Impax\" generic brand. 93 tablet 3     rosuvastatin (CRESTOR) 5 MG tablet Take 1 tablet (5 mg) by mouth daily 93 tablet 3     CALCIUM 500 + D 500-200 MG-IU OR TABS 1 tablet bid  0     Multiple Vitamin (MULTI-VITAMIN PO) Take  by mouth.         Soc Hx: reviewed  Fam Hx: reviewed          Donna Heaton MD  Internal Medicine     "

## 2017-12-28 NOTE — MR AVS SNAPSHOT
After Visit Summary   12/28/2017    Mariya Rowe    MRN: [id number]           Patient Information     Date Of Birth          1940        Visit Information        Provider Department      12/28/2017 10:20 AM Donna Mercedes MD [facility]        Today's Diagnoses     Left-sided chest wall pain    -  1    Closed fracture of one rib of left side, initial encounter        Chronic atrial fibrillation (H)        Osteoporosis, unspecified osteoporosis type, unspecified pathological fracture presence           Follow-ups after your visit        Your next 10 appointments already scheduled     Jan 02, 2018  9:15 AM CST   [facility]tico[facility] Visit with [facility]   [facility] ([facility])    [address]  [facility] [address] [phone]              Who to contact     If you have questions or need follow up information about today's clinic visit or your schedule please contact [facility] directly at [phone].  Normal or non-critical lab and imaging results will be communicated to you by [employer], letter or phone within 4 business days after the clinic has received the results. If you do not hear from us within 7 days, please contact the clinic through [employer] or phone. If you have a critical or abnormal lab result, we will notify you by phone as soon as possible.  Submit refill requests through [employer] or call your pharmacy and they will forward the refill request to us. Please allow 3 business days for your refill to be completed.          Additional Information About Your Visit        MyChart Information     [employer] gives you secure access to your electronic health record. If you see a primary care provider, you can also send messages to your care team and make appointments. If you have questions, please call your primary care clinic.  If you do not have a primary care provider,  "please call [phone] and they will assist you.        Care EveryWhere ID     This is your Care EveryWhere ID. This could be used by other organizations to access your [place] medical records  [id number]        Your Vitals Were     Pulse Temperature Respirations Height Pulse Oximetry BMI (Body Mass Index)    100 97.5  F (36.4  C) (Oral) 16 5' 11\" (1.803 m) 98% 24.48 kg/m2       Blood Pressure from Last 3 Encounters:   12/28/17 98/76   08/16/17 102/62   06/28/17 120/76    Weight from Last 3 Encounters:   12/28/17 175 lb 8 oz (79.6 kg)   08/16/17 174 lb (78.9 kg)   06/28/17 170 lb 12.8 oz (77.5 kg)                 Today's Medication Changes          These changes are accurate as of: 12/28/17 11:08 PM.  If you have any questions, ask your nurse or doctor.               Stop taking these medicines if you haven't already. Please contact your care team if you have questions.     prednisoLONE acetate 1 % ophthalmic susp   Commonly known as:  PRED FORTE   Stopped by:  Donna Mercedes MD                    Primary Care Provider Office Phone # Fax #    Chavez Molina [phone]       [address] [id number]        Equal Access to Services     [facility] : Hadii pretty wilder hadasho Sominda, waaxda luqadaha, qaybta kaalmada sharlene, violeta silva. So [facility] [phone].    ATENCIÓN: Si habla español, tiene a perdomo disposición servicios gratuitos de asistencia lingüística. Llame al [phone].    We comply with applicable federal civil rights laws and [place] laws. We do not discriminate on the basis of race, color, national origin, age, disability, sex, sexual orientation, or gender identity.            Thank you!     Thank you for choosing [facility]  for your care. Our goal is always to provide you with excellent care. Hearing back from our patients is one way we can continue to improve our services. Please take a few " "minutes to complete the written survey that you may receive in the mail after your visit with us. Thank you!             Your Updated Medication List - Protect others around you: Learn how to safely use, store and throw away your medicines at www.disposemymeds.org.          This list is accurate as of: 12/28/17 11:08 PM.  Always use your most recent med list.                   Brand Name Dispense Instructions for use Diagnosis    alendronate 10 MG tablet    FOSAMAX    93 tablet    Take 1 tablet (10 mg) by mouth every morning (before breakfast) Patient requests \"Impax\" generic brand.    Osteoporosis, unspecified osteoporosis type, unspecified pathological fracture presence       CALCIUM 500 + D 500-200 MG-IU Tabs      1 tablet bid    Routine general medical examination at a health care facility       metoprolol 25 MG tablet    LOPRESSOR    60 tablet    Take 1 tablet (25 mg) by mouth 2 times daily    Persistent atrial fibrillation (H)       MULTI-VITAMIN PO      Take  by mouth.        rosuvastatin 5 MG tablet    CRESTOR    93 tablet    Take 1 tablet (5 mg) by mouth daily    Hyperlipidemia with target LDL less than 130       warfarin 5 MG tablet    COUMADIN    90 tablet    Take 1 tablet daily except half tablet on Wed and Sat or as instructed based on INR result.    Palpitations         "

## 2018-01-02 ENCOUNTER — ANTICOAGULATION THERAPY VISIT (OUTPATIENT)
Dept: ANTICOAGULATION | Facility: CLINIC | Age: 78
End: 2018-01-02
Payer: COMMERCIAL

## 2018-01-02 DIAGNOSIS — Z79.01 LONG-TERM (CURRENT) USE OF ANTICOAGULANTS: ICD-10-CM

## 2018-01-02 LAB — INR POINT OF CARE: 2.5 (ref 0.86–1.14)

## 2018-01-02 PROCEDURE — 36416 COLLJ CAPILLARY BLOOD SPEC: CPT

## 2018-01-02 PROCEDURE — 99207 ZZC NO CHARGE NURSE ONLY: CPT

## 2018-01-02 PROCEDURE — 85610 PROTHROMBIN TIME: CPT | Mod: QW

## 2018-01-02 NOTE — PROGRESS NOTES
ANTICOAGULATION FOLLOW-UP CLINIC VISIT    Patient Name:  Mariya Rowe  Date:  1/2/2018  Contact Type:  Face to Face    SUBJECTIVE:     Patient Findings     Positives No Problem Findings           OBJECTIVE    INR Protime   Date Value Ref Range Status   01/02/2018 2.5 (A) 0.86 - 1.14 Final       ASSESSMENT / PLAN  INR assessment THER    Recheck INR In: 6 WEEKS    INR Location Clinic      Anticoagulation Summary as of 1/2/2018     INR goal 2.0-3.0   Today's INR 2.5   Maintenance plan 2.5 mg (5 mg x 0.5) on Wed, Sat; 5 mg (5 mg x 1) all other days   Full instructions 2.5 mg on Wed, Sat; 5 mg all other days   Weekly total 30 mg   No change documented Karla Waller, RN   Plan last modified Anjali Moss RN (1/25/2017)   Next INR check 5/7/2018   Priority INR   Target end date     Indications   Long-term (current) use of anticoagulants [Z79.01] [Z79.01]  Atrial fibrillation (H) [I48.91]         Anticoagulation Episode Summary     INR check location     Preferred lab     Send INR reminders to [facility]    Comments       Anticoagulation Care Providers     Provider Role Specialty Phone number    Chavez Molina MD Responsible Internal Medicine [phone]            See the Encounter Report to view Anticoagulation Flowsheet and Dosing Calendar (Go to Encounters tab in chart review, and find the Anticoagulation Therapy Visit)    Dosage adjustment made based on physician directed care plan.    Karla Waller, RN

## 2018-01-02 NOTE — MR AVS SNAPSHOT
Mariya Rowe   1/2/2018 9:15 AM   Anticoagulation Therapy Visit    Description:  77 year old female   Provider[facility]  [facility]   Department:  [facility]           INR as of 1/2/2018     Today's INR 2.5      Anticoagulation Summary as of 1/2/2018     INR goal 2.0-3.0   Today's INR 2.5   Full instructions 2.5 mg on Wed, Sat; 5 mg all other days   Next INR check 5/7/2018    Indications   Long-term (current) use of anticoagulants [Z79.01] [Z79.01]  Atrial fibrillation (H) [I48.91]         Your next Anticoagulation Clinic appointment(s)     Jan 02, 2018  9:15 AM CST   Anticoagulation Visit with [facility]   [facility]    [address] [facility] [address]  [facility] [address] [phone]              Contact Numbers     [facility] Phone Numbers:  Anticoagulation Clinic Appointments : [phone]  Anticoagulation Nurse: [phone]         January 2018 Details    Sun Mon Tue Wed Thu Fri Sat      1               2      5 mg   See details      3      2.5 mg         4      5 mg         5      5 mg         6      2.5 mg           7      5 mg         8      5 mg         9      5 mg         10      2.5 mg         11      5 mg         12      5 mg         13      2.5 mg           14      5 mg         15      5 mg         16      5 mg         17      2.5 mg         18      5 mg         19      5 mg         20      2.5 mg           21      5 mg         22      5 mg         23      5 mg         24      2.5 mg         25      5 mg         26      5 mg         27      2.5 mg           28      5 mg         29      5 mg         30      5 mg         31      2.5 mg             Date Details   01/02 This INR check               How to take your warfarin dose     To take:  2.5 mg Take 0.5 of a 5 mg tablet.    To take:  5 mg Take 1 of the 5 mg tablets.           February 2018 Details    Sun Mon Tue Wed Thu Fri Sat         1      5 mg         2       5 mg         3      2.5 mg           4      5 mg         5      5 mg         6      5 mg         7      2.5 mg         8      5 mg         9      5 mg         10      2.5 mg           11      5 mg         12      5 mg         13      5 mg         14      2.5 mg         15      5 mg         16      5 mg         17      2.5 mg           18      5 mg         19      5 mg         20      5 mg         21      2.5 mg         22      5 mg         23      5 mg         24      2.5 mg           25      5 mg         26      5 mg         27      5 mg         28      2.5 mg             Date Details   No additional details            How to take your warfarin dose     To take:  2.5 mg Take 0.5 of a 5 mg tablet.    To take:  5 mg Take 1 of the 5 mg tablets.           March 2018 Details    Sun Mon Tue Wed Thu Fri Sat         1      5 mg         2      5 mg         3      2.5 mg           4      5 mg         5      5 mg         6      5 mg         7      2.5 mg         8      5 mg         9      5 mg         10      2.5 mg           11      5 mg         12      5 mg         13      5 mg         14      2.5 mg         15      5 mg         16      5 mg         17      2.5 mg           18      5 mg         19      5 mg         20      5 mg         21      2.5 mg         22      5 mg         23      5 mg         24      2.5 mg           25      5 mg         26      5 mg         27      5 mg         28      2.5 mg         29      5 mg         30      5 mg         31      2.5 mg          Date Details   No additional details            How to take your warfarin dose     To take:  2.5 mg Take 0.5 of a 5 mg tablet.    To take:  5 mg Take 1 of the 5 mg tablets.           April 2018 Details    Sun Mon Tue Wed Thu Fri Sat     1      5 mg         2      5 mg         3      5 mg         4      2.5 mg         5      5 mg         6      5 mg         7      2.5 mg           8      5 mg         9      5 mg         10      5 mg         11       2.5 mg         12      5 mg         13      5 mg         14      2.5 mg           15      5 mg         16      5 mg         17      5 mg         18      2.5 mg         19      5 mg         20      5 mg         21      2.5 mg           22      5 mg         23      5 mg         24      5 mg         25      2.5 mg         26      5 mg         27      5 mg         28      2.5 mg           29      5 mg         30      5 mg               Date Details   No additional details            How to take your warfarin dose     To take:  2.5 mg Take 0.5 of a 5 mg tablet.    To take:  5 mg Take 1 of the 5 mg tablets.           May 2018 Details    Sun Mon Tue Wed Thu Fri Sat       1      5 mg         2      2.5 mg         3      5 mg         4      5 mg         5      2.5 mg           6      5 mg         7            8               9               10               11               12                 13               14               15               16               17               18               19                 20               21               22               23               24               25               26                 27               28               29               30               31                  Date Details   No additional details    Date of next INR:  5/7/2018         How to take your warfarin dose     To take:  2.5 mg Take 0.5 of a 5 mg tablet.    To take:  5 mg Take 1 of the 5 mg tablets.

## 2018-05-09 ENCOUNTER — ANTICOAGULATION THERAPY VISIT (OUTPATIENT)
Dept: ANTICOAGULATION | Facility: CLINIC | Age: 78
End: 2018-05-09
Payer: COMMERCIAL

## 2018-05-09 DIAGNOSIS — I48.91 ATRIAL FIBRILLATION (H): ICD-10-CM

## 2018-05-09 DIAGNOSIS — Z79.01 LONG-TERM (CURRENT) USE OF ANTICOAGULANTS: ICD-10-CM

## 2018-05-09 LAB — INR POINT OF CARE: 3.3 (ref 0.86–1.14)

## 2018-05-09 PROCEDURE — 85610 PROTHROMBIN TIME: CPT | Mod: QW

## 2018-05-09 PROCEDURE — 36416 COLLJ CAPILLARY BLOOD SPEC: CPT

## 2018-05-09 PROCEDURE — 99207 ZZC NO CHARGE NURSE ONLY: CPT

## 2018-05-09 NOTE — MR AVS SNAPSHOT
Mariya Rowe   5/9/2018 9:00 AM   Anticoagulation Therapy Visit    Description:  77 year old female   Provider[facility]  [facility]   Department:  [facility]           INR as of 5/9/2018     Today's INR 3.3!      Anticoagulation Summary as of 5/9/2018     INR goal 2.0-3.0   Today's INR 3.3!   Full instructions 5/9: Hold; Otherwise 2.5 mg on Wed, Sat; 5 mg all other days   Next INR check 5/30/2018    Indications   Long-term (current) use of anticoagulants [Z79.01] [Z79.01]  Atrial fibrillation (H) [I48.91]         Your next Anticoagulation Clinic appointment(s)     May 30, 2018  9:15 AM CDT   [facility]ticoagulation Visit with [facility]   [facility]    [address] [facility] [address]  [facility] [id number] [phone]            Jun 20, 2018  9:15 AM CDT   Anticoagulation Visit with [facility]   [facility] ([facility])    [address] [facility] [address]  [facility] [id number] [phone]              Contact Numbers     [facility] Phone Numbers:  Anticoagulation Clinic Appointments : [phone]  Anticoagulation Nurse: [phone]         May 2018 Details    Sun Mon Tue Wed Thu Fri Sat       1               2               3               4               5                 6               7               8               9      Hold   See details      10      5 mg         11      5 mg         12      2.5 mg           13      5 mg         14      5 mg         15      5 mg         16      2.5 mg         17      5 mg         18      5 mg         19      2.5 mg           20      5 mg         21      5 mg         22      5 mg         23      2.5 mg         24      5 mg         25      5 mg         26      2.5 mg           27      5 mg         28      5 mg         29      5 mg         30            31                  Date Details   05/09 This INR check       Date of next INR:  5/30/2018          How to take your warfarin dose     To take:  2.5 mg Take 0.5 of a 5 mg tablet.    To take:  5 mg Take 1 of the 5 mg tablets.    Hold Do not take your warfarin dose. See the Details table to the right for additional instructions.

## 2018-05-09 NOTE — PROGRESS NOTES
ANTICOAGULATION FOLLOW-UP CLINIC VISIT    Patient Name:  Mariya Rowe  Date:  5/9/2018  Contact Type:  Face to Face    SUBJECTIVE:     Patient Findings     Positives Unexplained INR or factor level change    Comments Pt just returned from [place].  INR was a little unstable while there.  Last INR was done about 2 weeks ago and was in range.           OBJECTIVE    INR Protime   Date Value Ref Range Status   05/09/2018 3.3 (A) 0.86 - 1.14 Final       ASSESSMENT / PLAN  INR assessment SUPRA    Recheck INR In: 3 WEEKS    INR Location Clinic      Anticoagulation Summary as of 5/9/2018     INR goal 2.0-3.0   Today's INR 3.3!   Maintenance plan 2.5 mg (5 mg x 0.5) on Wed, Sat; 5 mg (5 mg x 1) all other days   Full instructions 5/9: Hold; Otherwise 2.5 mg on Wed, Sat; 5 mg all other days   Weekly total 30 mg   Plan last modified Anjali Moss RN (1/25/2017)   Next INR check 5/30/2018   Priority INR   Target end date     Indications   Long-term (current) use of anticoagulants [Z79.01] [Z79.01]  Atrial fibrillation (H) [I48.91]         Anticoagulation Episode Summary     INR check location     Preferred lab     Send INR reminders to [facility]    Comments       Anticoagulation Care Providers     Provider Role Specialty Phone number    Chavez Molina MD [facility] Internal Medicine [phone]            See the Encounter Report to view Anticoagulation Flowsheet and Dosing Calendar (Go to Encounters tab in chart review, and find the Anticoagulation Therapy Visit)    Dosage adjustment made based on physician directed care plan.    Vira Crawford RN

## 2018-05-30 ENCOUNTER — ANTICOAGULATION THERAPY VISIT (OUTPATIENT)
Dept: ANTICOAGULATION | Facility: CLINIC | Age: 78
End: 2018-05-30
Payer: COMMERCIAL

## 2018-05-30 DIAGNOSIS — I48.91 ATRIAL FIBRILLATION (H): ICD-10-CM

## 2018-05-30 DIAGNOSIS — Z79.01 LONG-TERM (CURRENT) USE OF ANTICOAGULANTS: ICD-10-CM

## 2018-05-30 LAB — INR POINT OF CARE: 2.7 (ref 0.86–1.14)

## 2018-05-30 PROCEDURE — 36416 COLLJ CAPILLARY BLOOD SPEC: CPT

## 2018-05-30 PROCEDURE — 85610 PROTHROMBIN TIME: CPT | Mod: QW

## 2018-05-30 PROCEDURE — 99207 ZZC NO CHARGE NURSE ONLY: CPT

## 2018-05-30 NOTE — MR AVS SNAPSHOT
Mariya Rowe   5/30/2018 9:15 AM   Anticoagulation Therapy Visit    Description:  77 year old female   Provider[facility]   Department:  [facility]           INR as of 5/30/2018     Today's INR 2.7      Anticoagulation Summary as of 5/30/2018     INR goal 2.0-3.0   Today's INR 2.7   Full warfarin instructions 2.5 mg on Wed, Sat; 5 mg all other days   Next INR check 6/20/2018    Indications   Long-term (current) use of anticoagulants [Z79.01] [Z79.01]  Atrial fibrillation (H) [I48.91]         Your next Anticoagulation Clinic appointment(s)     Jun 20, 2018  9:15 AM CDT   [facility] with [facility]   [facility]    [address] [facility] [address]  [facility] [address] [phone]              Contact Numbers     [facility] Phone Numbers:  Anticoagulation Clinic Appointments : [phone]  Anticoagulation Nurse: [phone]         May 2018 Details    Sun Mon Tue Wed Thu Fri Sat       1               2               3               4               5                 6               7               8               9               10               11               12                 13               14               15               16               17               18               19                 20               21               22               23               24               25               26                 27               28               29               30      2.5 mg   See details      31      5 mg            Date Details   05/30 This INR check               How to take your warfarin dose     To take:  2.5 mg Take 0.5 of a 5 mg tablet.    To take:  5 mg Take 1 of the 5 mg tablets.           June 2018 Details    Sun Mon Tue Wed Thu Fri Sat          1      5 mg         2      2.5 mg           3      5 mg         4      5 mg         5      5 mg         6      2.5 mg         7      5 mg          8      5 mg         9      2.5 mg           10      5 mg         11      5 mg         12      5 mg         13      2.5 mg         14      5 mg         15      5 mg         16      2.5 mg           17      5 mg         18      5 mg         19      5 mg         20            21               22               23                 24               25               26               27               28               29               30                Date Details   No additional details    Date of next INR:  6/20/2018         How to take your warfarin dose     To take:  2.5 mg Take 0.5 of a 5 mg tablet.    To take:  5 mg Take 1 of the 5 mg tablets.

## 2018-05-30 NOTE — PROGRESS NOTES
ANTICOAGULATION FOLLOW-UP CLINIC VISIT    Patient Name:  Mariya Rowe  Date:  5/30/2018  Contact Type:  Face to Face    SUBJECTIVE:     Patient Findings     Positives No Problem Findings           OBJECTIVE    INR Protime   Date Value Ref Range Status   05/30/2018 2.7 (A) 0.86 - 1.14 Final       ASSESSMENT / PLAN  INR assessment THER    Recheck INR In: 3 WEEKS    INR Location Clinic      Anticoagulation Summary as of 5/30/2018     INR goal 2.0-3.0   Today's INR 2.7   Warfarin maintenance plan 2.5 mg (5 mg x 0.5) on Wed, Sat; 5 mg (5 mg x 1) all other days   Full warfarin instructions 2.5 mg on Wed, Sat; 5 mg all other days   Weekly warfarin total 30 mg   No change documented Vira Crawford RN   Plan last modified Anjali Moss RN (1/25/2017)   Next INR check 6/20/2018   Priority INR   Target end date     Indications   Long-term (current) use of anticoagulants [Z79.01] [Z79.01]  Atrial fibrillation (H) [I48.91]         Anticoagulation Episode Summary     INR check location     Preferred lab     Send INR reminders to [facility]    Comments       Anticoagulation Care Providers     Provider Role Specialty Phone number    Chavez Molina MD Responsible Internal Medicine [phone]            See the Encounter Report to view Anticoagulation Flowsheet and Dosing Calendar (Go to Encounters tab in chart review, and find the Anticoagulation Therapy Visit)    Dosage adjustment made based on physician directed care plan.    Vira Crawford, RN

## 2018-06-21 ENCOUNTER — ANTICOAGULATION THERAPY VISIT (OUTPATIENT)
Dept: ANTICOAGULATION | Facility: CLINIC | Age: 78
End: 2018-06-21
Payer: COMMERCIAL

## 2018-06-21 DIAGNOSIS — I48.91 ATRIAL FIBRILLATION (H): ICD-10-CM

## 2018-06-21 DIAGNOSIS — Z79.01 LONG-TERM (CURRENT) USE OF ANTICOAGULANTS: ICD-10-CM

## 2018-06-21 LAB — INR POINT OF CARE: 2.5 (ref 0.86–1.14)

## 2018-06-21 PROCEDURE — 85610 PROTHROMBIN TIME: CPT | Mod: QW

## 2018-06-21 PROCEDURE — 99207 ZZC NO CHARGE NURSE ONLY: CPT

## 2018-06-21 PROCEDURE — 36416 COLLJ CAPILLARY BLOOD SPEC: CPT

## 2018-06-21 NOTE — MR AVS SNAPSHOT
Mariya Rowe   6/21/2018 9:00 AM   Anticoagulation Therapy Visit    Description:  77 year old female   Provider[facility]  [facility]   Department:  [facility]           INR as of 6/21/2018     Today's INR 2.5      Anticoagulation Summary as of 6/21/2018     INR goal 2.0-3.0   Today's INR 2.5   Full warfarin instructions 2.5 mg on Wed, Sat; 5 mg all other days   Next INR check 8/3/2018    Indications   Long-term (current) use of anticoagulants [Z79.01] [Z79.01]  Atrial fibrillation (H) [I48.91]         Your next Anticoagulation Clinic appointment(s)     Jun 21, 2018  9:00 AM CDT   [facility] Visit with [facility]   [facility] ([facility]    [address] [facility] [address]  [facility] [id number] [phone]            Aug 03, 2018  9:15 AM CDT   Anticoagulation Visit with [facility]   [facility] ([facility])    [address] [facility] [address]  [facility] [id number] [phone]              Contact Numbers     [facility] Phone Numbers:  Anticoagulation Clinic Appointments : [phone]  Anticoagulation Nurse: [phone]         June 2018 Details    Sun Mon Tue Wed Thu Fri Sat          1               2                 3               4               5               6               7               8               9                 10               11               12               13               14               15               16                 17               18               19               20               21      5 mg   See details      22      5 mg         23      2.5 mg           24      5 mg         25      5 mg         26      5 mg         27      2.5 mg         28      5 mg         29      5 mg         30      2.5 mg          Date Details   06/21 This INR check               How to take your warfarin dose     To take:  2.5 mg Take 0.5 of a 5 mg tablet.    To take:  5  mg Take 1 of the 5 mg tablets.           July 2018 Details    Sun Mon Tue Wed Thu Fri Sat     1      5 mg         2      5 mg         3      5 mg         4      2.5 mg         5      5 mg         6      5 mg         7      2.5 mg           8      5 mg         9      5 mg         10      5 mg         11      2.5 mg         12      5 mg         13      5 mg         14      2.5 mg           15      5 mg         16      5 mg         17      5 mg         18      2.5 mg         19      5 mg         20      5 mg         21      2.5 mg           22      5 mg         23      5 mg         24      5 mg         25      2.5 mg         26      5 mg         27      5 mg         28      2.5 mg           29      5 mg         30      5 mg         31      5 mg              Date Details   No additional details            How to take your warfarin dose     To take:  2.5 mg Take 0.5 of a 5 mg tablet.    To take:  5 mg Take 1 of the 5 mg tablets.           August 2018 Details    Sun Mon Tue Wed Thu Fri Sat        1      2.5 mg         2      5 mg         3            4                 5               6               7               8               9               10               11                 12               13               14               15               16               17               18                 19               20               21               22               23               24               25                 26               27               28               29               30               31                 Date Details   No additional details    Date of next INR:  8/3/2018         How to take your warfarin dose     To take:  2.5 mg Take 0.5 of a 5 mg tablet.    To take:  5 mg Take 1 of the 5 mg tablets.

## 2018-06-21 NOTE — PROGRESS NOTES
ANTICOAGULATION FOLLOW-UP CLINIC VISIT    Patient Name:  Mariya Rowe  Date:  6/21/2018  Contact Type:  Face to Face    SUBJECTIVE:     Patient Findings     Positives No Problem Findings           OBJECTIVE    INR Protime   Date Value Ref Range Status   06/21/2018 2.5 (A) 0.86 - 1.14 Final       ASSESSMENT / PLAN  INR assessment THER    Recheck INR In: 6 WEEKS    INR Location Clinic      Anticoagulation Summary as of 6/21/2018     INR goal 2.0-3.0   Today's INR 2.5   Warfarin maintenance plan 2.5 mg (5 mg x 0.5) on Wed, Sat; 5 mg (5 mg x 1) all other days   Full warfarin instructions 2.5 mg on Wed, Sat; 5 mg all other days   Weekly warfarin total 30 mg   No change documented Vira Crawford RN   Plan last modified Anjali Moss RN (1/25/2017)   Next INR check 8/3/2018   Priority INR   Target end date     Indications   Long-term (current) use of anticoagulants [Z79.01] [Z79.01]  Atrial fibrillation (H) [I48.91]         Anticoagulation Episode Summary     INR check location     Preferred lab     Send INR reminders to [facility]    Comments       Anticoagulation Care Providers     Provider Role Specialty Phone number    Chavez Molina MD Responsible Internal Medicine [phone]            See the Encounter Report to view Anticoagulation Flowsheet and Dosing Calendar (Go to Encounters tab in chart review, and find the Anticoagulation Therapy Visit)    Dosage adjustment made based on physician directed care plan.    Vira Crawford, RN

## 2018-07-15 DIAGNOSIS — M81.0 OSTEOPOROSIS, UNSPECIFIED OSTEOPOROSIS TYPE, UNSPECIFIED PATHOLOGICAL FRACTURE PRESENCE: ICD-10-CM

## 2018-07-16 NOTE — TELEPHONE ENCOUNTER
"Requested Prescriptions   Pending Prescriptions Disp Refills     alendronate (FOSAMAX) 10 MG tablet [Pharmacy Med Name: ALENDRONATE SODIUM 10 MG TAB] 42 tablet 0    Last Written Prescription Date:  08/11/2017  Last Fill Quantity: 93,  # refills: 3   Last office visit: 12/28/2017 with prescribing provider:     Future Office Visit:   Next 5 appointments (look out 90 days)     Aug 22, 2018  7:00 AM CDT   PHYSICAL with Chavez Molina MD   [facility] ([facility])    [address]  [facility] [address]   [phone]                Sig: TAKE 1 TABLET BY MOUTH EVERY MORNING BEFORE BREAKFAST    Bisphosphonates Failed    7/15/2018  2:11 AM       Failed - Normal serum creatinine on file within past 12 months    Recent Labs   Lab Test  06/28/17   0908   CR  0.91            Passed - Recent (12 mo) or future (30 days) visit within the authorizing provider's specialty    Patient had office visit in the last 12 months or has a visit in the next 30 days with authorizing provider or within the authorizing provider's specialty.  See \"Patient Info\" tab in inbasket, or \"Choose Columns\" in Meds & Orders section of the refill encounter.           Passed - Dexa on file within past 2 years    Please review last Dexa result.          Passed - Patient is age 18 or older        "

## 2018-07-18 DIAGNOSIS — R00.2 PALPITATIONS: ICD-10-CM

## 2018-07-18 RX ORDER — ALENDRONATE SODIUM 10 MG/1
TABLET ORAL
Qty: 42 TABLET | Refills: 0 | Status: SHIPPED | OUTPATIENT
Start: 2018-07-18 | End: 2018-08-28

## 2018-07-18 RX ORDER — WARFARIN SODIUM 5 MG/1
TABLET ORAL
Qty: 90 TABLET | Refills: 1 | Status: SHIPPED | OUTPATIENT
Start: 2018-07-18 | End: 2019-02-24

## 2018-07-18 NOTE — TELEPHONE ENCOUNTER
"Requested Prescriptions   Pending Prescriptions Disp Refills     warfarin (COUMADIN) 5 MG tablet 90 tablet 1     Sig: Take 1 tablet daily except half tablet on Wed and Sat or as instructed based on INR result.    Vitamin K Antagonists Failed    7/18/2018  2:03 PM       Failed - INR is within goal in the past 6 weeks    Confirm INR is within goal in the past 6 weeks.     Recent Labs   Lab Test 06/21/18   INR  2.5*                      Passed - Recent (12 mo) or future (30 days) visit within the authorizing provider's specialty    Patient had office visit in the last 12 months or has a visit in the next 30 days with authorizing provider or within the authorizing provider's specialty.  See \"Patient Info\" tab in inbasket, or \"Choose Columns\" in Meds & Orders section of the refill encounter.           Passed - Patient is 18 years of age or older       Passed - Patient is not pregnant       Passed - No positive pregnancy on file in past 12 months        Last office visit 12/28/17.  Prescription approved per [facility] Refill Protocol.  Vira Crawford RN    "

## 2018-07-30 DIAGNOSIS — I48.19 PERSISTENT ATRIAL FIBRILLATION (H): ICD-10-CM

## 2018-07-30 RX ORDER — METOPROLOL TARTRATE 25 MG/1
25 TABLET, FILM COATED ORAL 2 TIMES DAILY
Qty: 30 TABLET | Refills: 0 | Status: SHIPPED | OUTPATIENT
Start: 2018-07-30 | End: 2018-08-13

## 2018-08-02 DIAGNOSIS — E78.5 HYPERLIPIDEMIA WITH TARGET LDL LESS THAN 130: ICD-10-CM

## 2018-08-02 NOTE — TELEPHONE ENCOUNTER
"Requested Prescriptions   Pending Prescriptions Disp Refills     rosuvastatin (CRESTOR) 5 MG tablet  Last Written Prescription Date:  06/28/17  Last Fill Quantity: 93,  # refills: 3   Last office visit: 12/28/2017 with prescribing provider:  Sudarshan  Future Office Visit:   Next 5 appointments (look out 90 days)     Aug 22, 2018  7:00 AM CDT   PHYSICAL with Chavez Molina MD   [facility]    [address]  [facility] [id number] [phone]                  93 tablet 3     Sig: Take 1 tablet (5 mg) by mouth daily    Statins Protocol Failed    8/2/2018  3:40 PM       Failed - LDL on file in past 12 months    Recent Labs   Lab Test  06/28/17   0908   LDL  68            Passed - No abnormal creatine kinase in past 12 months    Recent Labs   Lab Test  05/17/12   0933   CKT  58               Passed - Recent (12 mo) or future (30 days) visit within the authorizing provider's specialty    Patient had office visit in the last 12 months or has a visit in the next 30 days with authorizing provider or within the authorizing provider's specialty.  See \"Patient Info\" tab in inbasket, or \"Choose Columns\" in Meds & Orders section of the refill encounter.           Passed - Patient is age 18 or older       Passed - No active pregnancy on record       Passed - No positive pregnancy test in past 12 months          "

## 2018-08-03 ENCOUNTER — ANTICOAGULATION THERAPY VISIT (OUTPATIENT)
Dept: ANTICOAGULATION | Facility: CLINIC | Age: 78
End: 2018-08-03
Payer: COMMERCIAL

## 2018-08-03 DIAGNOSIS — I48.91 ATRIAL FIBRILLATION (H): ICD-10-CM

## 2018-08-03 DIAGNOSIS — Z79.01 LONG-TERM (CURRENT) USE OF ANTICOAGULANTS: ICD-10-CM

## 2018-08-03 LAB — INR POINT OF CARE: 2.6 (ref 0.86–1.14)

## 2018-08-03 PROCEDURE — 36416 COLLJ CAPILLARY BLOOD SPEC: CPT

## 2018-08-03 PROCEDURE — 85610 PROTHROMBIN TIME: CPT | Mod: QW

## 2018-08-03 PROCEDURE — 99207 ZZC NO CHARGE NURSE ONLY: CPT

## 2018-08-03 NOTE — PROGRESS NOTES
ANTICOAGULATION FOLLOW-UP CLINIC VISIT    Patient Name:  Mariya Rowe  Date:  8/3/2018  Contact Type:  Face to Face    SUBJECTIVE:     Patient Findings     Positives No Problem Findings           OBJECTIVE    INR Protime   Date Value Ref Range Status   08/03/2018 2.6 (A) 0.86 - 1.14 Final       ASSESSMENT / PLAN  INR assessment THER    Recheck INR In: 6 WEEKS    INR Location Clinic      Anticoagulation Summary as of 8/3/2018     INR goal 2.0-3.0   Today's INR 2.6   Warfarin maintenance plan 2.5 mg (5 mg x 0.5) on Wed, Sat; 5 mg (5 mg x 1) all other days   Full warfarin instructions 2.5 mg on Wed, Sat; 5 mg all other days   Weekly warfarin total 30 mg   No change documented Vira Crawford RN   Plan last modified Anjali Moss RN (1/25/2017)   Next INR check 9/14/2018   Priority INR   Target end date     Indications   Long-term (current) use of anticoagulants [Z79.01] [Z79.01]  Atrial fibrillation (H) [I48.91]         Anticoagulation Episode Summary     INR check location     Preferred lab     Send INR reminders to [facility]    Comments       Anticoagulation Care Providers     Provider Role Specialty Phone number    Chavez Molina MD Responsible Internal Medicine [phone]            See the Encounter Report to view Anticoagulation Flowsheet and Dosing Calendar (Go to Encounters tab in chart review, and find the Anticoagulation Therapy Visit)    Dosage adjustment made based on physician directed care plan.    Vira Crawford, RN

## 2018-08-03 NOTE — MR AVS SNAPSHOT
Mariya Rowe   8/3/2018 9:15 AM   Anticoagulation Therapy Visit    Description:  77 year old female   Provider[facility]  [facility]   Department:  [facility]           INR as of 8/3/2018     Today's INR 2.6      Anticoagulation Summary as of 8/3/2018     INR goal 2.0-3.0   Today's INR 2.6   Full warfarin instructions 2.5 mg on Wed, Sat; 5 mg all other days   Next INR check 9/14/2018    Indications   Long-term (current) use of anticoagulants [Z79.01] [Z79.01]  Atrial fibrillation (H) [I48.91]         Your next Anticoagulation Clinic appointment(s)     Sep 14, 2018  9:15 AM CDT   Anticoagulation [facility] with [facility]   [facility] ([facility]    [address] [facility] [address]  [facility] [address] [phone]              Contact Numbers     [facility] Phone Numbers:  Anticoagulation Clinic Appointments : [phone]  Anticoagulation Nurse: [phone]         August 2018 Details    Sun Mon Tue Wed Thu Fri Sat        1               2               3      5 mg   See details      4      2.5 mg           5      5 mg         6      5 mg         7      5 mg         8      2.5 mg         9      5 mg         10      5 mg         11      2.5 mg           12      5 mg         13      5 mg         14      5 mg         15      2.5 mg         16      5 mg         17      5 mg         18      2.5 mg           19      5 mg         20      5 mg         21      5 mg         22      2.5 mg         23      5 mg         24      5 mg         25      2.5 mg           26      5 mg         27      5 mg         28      5 mg         29      2.5 mg         30      5 mg         31      5 mg           Date Details   08/03 This INR check               How to take your warfarin dose     To take:  2.5 mg Take 0.5 of a 5 mg tablet.    To take:  5 mg Take 1 of the 5 mg tablets.           September 2018 Details    Sun Mon Tue Wed Thu Fri Sat           1      2.5 mg            2      5 mg         3      5 mg         4      5 mg         5      2.5 mg         6      5 mg         7      5 mg         8      2.5 mg           9      5 mg         10      5 mg         11      5 mg         12      2.5 mg         13      5 mg         14            15                 16               17               18               19               20               21               22                 23               24               25               26               27               28               29                 30                      Date Details   No additional details    Date of next INR:  9/14/2018         How to take your warfarin dose     To take:  2.5 mg Take 0.5 of a 5 mg tablet.    To take:  5 mg Take 1 of the 5 mg tablets.

## 2018-08-04 RX ORDER — ROSUVASTATIN CALCIUM 5 MG/1
5 TABLET, COATED ORAL DAILY
Qty: 93 TABLET | Refills: 0 | Status: SHIPPED | OUTPATIENT
Start: 2018-08-04 | End: 2018-08-22

## 2018-08-13 DIAGNOSIS — I48.19 PERSISTENT ATRIAL FIBRILLATION (H): ICD-10-CM

## 2018-08-13 RX ORDER — METOPROLOL TARTRATE 25 MG/1
25 TABLET, FILM COATED ORAL 2 TIMES DAILY
Qty: 180 TABLET | Refills: 0 | Status: SHIPPED | OUTPATIENT
Start: 2018-08-13 | End: 2018-08-15

## 2018-08-15 ENCOUNTER — OFFICE VISIT (OUTPATIENT)
Dept: CARDIOLOGY | Facility: CLINIC | Age: 78
End: 2018-08-15
Attending: INTERNAL MEDICINE
Payer: COMMERCIAL

## 2018-08-15 VITALS
HEIGHT: 71 IN | BODY MASS INDEX: 24.43 KG/M2 | SYSTOLIC BLOOD PRESSURE: 136 MMHG | WEIGHT: 174.5 LBS | HEART RATE: 80 BPM | DIASTOLIC BLOOD PRESSURE: 82 MMHG

## 2018-08-15 DIAGNOSIS — I48.19 PERSISTENT ATRIAL FIBRILLATION (H): Primary | ICD-10-CM

## 2018-08-15 PROCEDURE — 99213 OFFICE O/P EST LOW 20 MIN: CPT | Performed by: NURSE PRACTITIONER

## 2018-08-15 RX ORDER — METOPROLOL TARTRATE 25 MG/1
25 TABLET, FILM COATED ORAL 2 TIMES DAILY
Qty: 180 TABLET | Refills: 3 | Status: SHIPPED | OUTPATIENT
Start: 2018-08-15 | End: 2019-08-27

## 2018-08-15 RX ORDER — FLUOROMETHOLONE 0.1 %
1 SUSPENSION, DROPS(FINAL DOSAGE FORM)(ML) OPHTHALMIC (EYE) DAILY
COMMUNITY

## 2018-08-15 NOTE — PROGRESS NOTES
HPI:  Mariya Rowe is a 78 year old female who is a patient of Dr. Kenyon and presents for annual follow up for her chronic rate controlled atrial fibrillation and sick sinus syndrome.  She has a history of symptomatic persistent AF and was maintained sinus rhythm on sotalol for about 5 years, however when she saw Dr. Kenyon on 8/2017 it was discontinued and a rate controlled method was adopted.  She also has a history of asymptomatic sinus bradycardia in the 40s.  Her last Holter (2017) revealed 100% atrial fibrillation with average HR 83 bpm, minimum 56 and maximum 158 bpm.        Today Mariya Rowe presents feeling good with no CV complaints.  She uses an exercise bike and walks intermittently with no CV symptoms.  She had many questions about permanent atrial fibrillation and particularly going to higher elevations in [facility] where she guillen.  She does not smoke cigarettes and she does not drink alcohol because she is on Coumadin.  At this time, she denies chest pain or pressure, headaches, dizziness, syncope, angina, dyspnea at rest or with exertion, dry cough, palpitations, orthopnea, PND, abdominal pain, abdominal edema, pedal edema, or claudication.  Denies easy bruising or bleeding, hematuria, hematochezia, and epistaxis. Denies signs/symptoms of stroke such as visual disturbance, difficulty speaking, facial drooping, confusion, problems with gait, or any new numbness or weakness.    ASSESSMENT AND PLAN    (I48.1) Persistent atrial fibrillation (H)  Continue metoprolol tartrate 25 mg twice daily for rate control.    Anticoagulation indefinitely for CHADS VASC 3 (female, age++), currently tolerating warfarin with INR goal of 2-3.     Follow up in 1 year with Holter prior to seeing Dr. Kenyon.     Patient expresses understanding and agreement with the plan.     I appreciate the chance to help with Mariya Rowe Please let me know if you have any questions or concerns.    DAVID Howard,  CNP    This note was completed in part using Dragon voice recognition software. Although reviewed after completion, some word and grammatical errors may occur.    Orders Placed This Encounter   Procedures     Follow-Up with Electrophysiologist     Holter Monitor 24 hour - Adult     Orders Placed This Encounter   Medications     fluorometholone (FML LIQUIFILM) 0.1 % ophthalmic susp     Sig: Place 1 drop into the right eye daily     metoprolol tartrate (LOPRESSOR) 25 MG tablet     Sig: Take 1 tablet (25 mg) by mouth 2 times daily     Dispense:  180 tablet     Refill:  3     Medications Discontinued During This Encounter   Medication Reason     metoprolol tartrate (LOPRESSOR) 25 MG tablet Reorder         Encounter Diagnosis   Name Primary?     Persistent atrial fibrillation (H) Yes       CURRENT MEDICATIONS:  Current Outpatient Prescriptions   Medication Sig Dispense Refill     alendronate (FOSAMAX) 10 MG tablet TAKE 1 TABLET BY MOUTH EVERY MORNING BEFORE BREAKFAST 42 tablet 0     CALCIUM 500 + D 500-200 MG-IU OR TABS 1 tablet bid  0     fluorometholone (FML LIQUIFILM) 0.1 % ophthalmic susp Place 1 drop into the right eye daily       metoprolol tartrate (LOPRESSOR) 25 MG tablet Take 1 tablet (25 mg) by mouth 2 times daily 180 tablet 3     Multiple Vitamin (MULTI-VITAMIN PO) Take  by mouth.       rosuvastatin (CRESTOR) 5 MG tablet Take 1 tablet (5 mg) by mouth daily 93 tablet 0     warfarin (COUMADIN) 5 MG tablet Take 1 tablet daily except half tablet on Wed and Sat or as instructed based on INR result. 90 tablet 1     [DISCONTINUED] metoprolol tartrate (LOPRESSOR) 25 MG tablet Take 1 tablet (25 mg) by mouth 2 times daily 180 tablet 0       ALLERGIES     Allergies   Allergen Reactions     Lidocaine Other (See Comments)     Pt lopez Hx of a reaction to Lidocaine Eye solution.     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 Provider to review and confirm.     Osteoporosis, unspecified 12/98    improved by DEXA of 2003; then unchanged in 2004     Palpitations      PFO (patent foramen ovale)      SSS (sick sinus syndrome) (H)        PAST SURGICAL HISTORY:  Past Surgical History:   Procedure Laterality Date     C NONSPECIFIC PROCEDURE  2/05    colonoscopy      C NONSPECIFIC PROCEDURE Right 11/2016    Right cataract repair and corneal transplant     COLONOSCOPY  12/17/2015    Adenomatous polyp in cecum, repeat in 5 yrs       FAMILY HISTORY:  Family History   Problem Relation Age of Onset     Adopted: Yes     Cerebrovascular Disease Mother      at age 88 ;; lived to be 99     Unknown/Adopted Father      Don't know father's history       SOCIAL HISTORY:  Social History     Social History     Marital status:      Spouse name: Julio     Number of children: 2     Years of education: N/A     Occupational History           Retired     Social History Main Topics     Smoking status: Never Smoker     Smokeless tobacco: Never Used     Alcohol use No     Drug use: No     Sexual activity: Yes     Partners: Male     Other Topics Concern     Caffeine Concern No     Sleep Concern No     Special Diet No     Exercise No     some walking cleaning     Social History Narrative    Exercise bike or walking 2-3x/week.        Review of Systems:  Skin:  Negative     Eyes:  Positive for glasses  ENT:  Negative    Respiratory:  Negative    Cardiovascular:    Positive for;palpitations  Gastroenterology: Negative    Genitourinary:  Negative    Musculoskeletal:  Positive for arthritis  Neurologic:  Negative    Psychiatric:  Negative    Heme/Lymph/Imm:  Negative    Endocrine:  Negative      Physical Exam:  Vitals: /82 (BP Location:  "Right arm, Patient Position: Chair, Cuff Size: Adult Regular)  Pulse 80  Ht 1.803 m (5' 11\")  Wt 79.2 kg (174 lb 8 oz)  BMI 24.34 kg/m2    Constitutional:  cooperative, alert and oriented, well developed, well nourished, in no acute distress        Skin:  warm and dry to the touch, no apparent skin lesions or masses noted        Head:  normocephalic        Eyes:  pupils equal and round        ENT:  no pallor or cyanosis        Neck:  JVP normal        Chest:  clear to auscultation        Cardiac:   irregularly irregular rhythm                Abdomen:  abdomen soft        Vascular:       right radial artery;1+             left radial artery;1+                  Extremities and Back:  no edema        Neurological:  no gross motor deficits          Recent Lab Results:  LIPID RESULTS:  Lab Results   Component Value Date    CHOL 147 06/28/2017    HDL 63 06/28/2017    LDL 68 06/28/2017    TRIG 82 06/28/2017    CHOLHDLRATIO 2.5 06/16/2015       LIVER ENZYME RESULTS:  Lab Results   Component Value Date    AST 22 06/28/2017    ALT 31 06/28/2017       CBC RESULTS:  Lab Results   Component Value Date    WBC 6.7 06/28/2017    RBC 4.75 06/28/2017    HGB 14.9 06/28/2017    HCT 45.0 06/28/2017    MCV 95 06/28/2017    MCH 31.4 06/28/2017    MCHC 33.1 06/28/2017    RDW 13.5 06/28/2017     06/28/2017       BMP RESULTS:  Lab Results   Component Value Date     06/28/2017    POTASSIUM 4.2 06/28/2017    CHLORIDE 106 06/28/2017    CO2 28 06/28/2017    ANIONGAP 7 06/28/2017    GLC 96 06/28/2017    BUN 18 06/28/2017    CR 0.91 06/28/2017    GFRESTIMATED 60 (L) 06/28/2017    GFRESTBLACK 73 06/28/2017    TONO 9.5 06/28/2017        A1C RESULTS:  No results found for: A1C    INR RESULTS:  Lab Results   Component Value Date    INR 2.6 (A) 08/03/2018    INR 2.5 (A) 06/21/2018    INR 2.70 (H) 09/21/2011    INR 2.59 (H) 09/20/2011           CC  Yelena Kenyon MD  [address]  [place] [address]                "

## 2018-08-15 NOTE — MR AVS SNAPSHOT
After Visit Summary   8/15/2018    Mariya Rowe    MRN: [id number]           Patient Information     Date Of Birth          1940        Visit Information        Provider Department      8/15/2018 8:10 AM Callie Whiting APRN [facility]        Today's Diagnoses     Persistent atrial fibrillation (H)    -  1      Care Instructions    Follow up in 1 year with a Holter monitor prior to seeing Dr. Kenyon    As always, thank you for trusting us with your health care needs!    If you have any questions regarding your visit please contact your care team:   Cardiology  Telephone Number    Afib RNs:  Flores Ash, and Romi [phone]   Call for Electrophysiology procedure scheduling concerns [phone]   Device Clinic (Pacemakers, ICDs, Loop Recorders)   RN's:  Lali Enrique Lynda, MJ, Amy Ramey During business hours:   [phone]                 Follow-ups after your visit        Additional Services     Follow-Up with Electrophysiologist                 Your next 10 appointments already scheduled     Aug 22, 2018  7:00 AM CDT   [facility]    [address] [place]  [facility] [address] [phone]            Sep 14, 2018  9:15 AM CDT   Anticoagulation Visit with [facility]   [facility]    [address]  [facility] [id number] [phone]              Future tests that were ordered for you today     Open Future Orders        Priority Expected Expires Ordered    Holter Monitor 24 hour - Adult Routine 8/15/2019 8/16/2019 8/15/2018    Follow-Up with Electrophysiologist Routine 8/15/2019 8/16/2019 8/15/2018            Who to contact     If you have questions or need follow up information about today's clinic visit or your schedule please contact [facility]  "HEART CARE   [place] directly at [phone].  Normal or non-critical lab and imaging results will be communicated to you by MyChart, letter or phone within 4 business days after the clinic has received the results. If you do not hear from us within 7 days, please contact the clinic through Nanobiotix[employer] or phone. If you have a critical or abnormal lab result, we will notify you by phone as soon as possible.  Submit refill requests through [employer]ler or call your pharmacy and they will forward the refill request to us. Please allow 3 business days for your refill to be completed.          Additional Information About Your Visit        [employer]iotix[employer]Ex[employer] Information     [employer]ler gives you secure access to your electronic health record. If you see a primary care provider, you can also send messages to your care team and make appointments. If you have questions, please call your primary care clinic.  If you do not have a primary care provider, please call [phone] and they will assist you.        Care EveryWhere ID     This is your Care EveryWhere ID. This could be used by other organizations to access your [facility] medical records  [id number]        Your Vitals Were     Pulse Height BMI (Body Mass Index)             80 1.803 m (5' 11\") 24.34 kg/m2          Blood Pressure from Last 3 Encounters:   08/15/18 136/82   12/28/17 98/76   08/16/17 102/62    Weight from Last 3 Encounters:   08/15/18 79.2 kg (174 lb 8 oz)   12/28/17 79.6 kg (175 lb 8 oz)   08/16/17 78.9 kg (174 lb)              We Performed the Following     Follow-Up with Cardiac Advanced Practice Provider          Where to get your medicines      These medications were sent to [facility]/pharmacy #[id number] - [place], [place] - [address]  [address] [address], [facility] [address]    Hours:  [facility] Phone:  [phone]     metoprolol tartrate 25 MG tablet          Primary Care Provider Office Phone # Fax #    Chavez Molina [phone]    "    [address] [facility] [address]  [facility] [address]        Equal Access to Services     SHIREENMANDIE STEPHANIE : Hadii aad ku hadchristeno Solisaali, waaxda luqadaha, qaybta kaalmada okclevebrian, violeta blood michellerigo bairesdelfinoannita segura . So [facility] [phone].    ATENCIÓN: Si habla español, tiene a perdomo disposición servicios gratuitos de asistencia lingüística. Llame al [phone].    We comply with applicable federal civil rights laws and [place] laws. We do not discriminate on the basis of race, color, national origin, age, disability, sex, sexual orientation, or gender identity.            Thank you!     Thank you for choosing [facility]  for your care. Our goal is always to provide you with excellent care. Hearing back from our patients is one way we can continue to improve our services. Please take a few minutes to complete the written survey that you may receive in the mail after your visit with us. Thank you!             Your Updated Medication List - Protect others around you: Learn how to safely use, store and throw away your medicines at www.disposemymeds.org.          This list is accurate as of 8/15/18  8:35 AM.  Always use your most recent med list.                   Brand Name Dispense Instructions for use Diagnosis    alendronate 10 MG tablet    FOSAMAX    42 tablet    TAKE 1 TABLET BY MOUTH EVERY MORNING BEFORE BREAKFAST    Osteoporosis, unspecified osteoporosis type, unspecified pathological fracture presence       CALCIUM 500 + D 500-200 MG-IU Tabs      1 tablet bid    Routine general medical examination at a health care facility       fluorometholone 0.1 % ophthalmic susp    FML LIQUIFILM     Place 1 drop into the right eye daily        metoprolol tartrate 25 MG tablet    LOPRESSOR    180 tablet    Take 1 tablet (25 mg) by mouth 2 times daily    Persistent atrial fibrillation (H)       MULTI-VITAMIN PO      Take  by mouth.        rosuvastatin 5 MG tablet    CRESTOR    93  tablet    Take 1 tablet (5 mg) by mouth daily    Hyperlipidemia with target LDL less than 130       warfarin 5 MG tablet    COUMADIN    90 tablet    Take 1 tablet daily except half tablet on Wed and Sat or as instructed based on INR result.    Palpitations

## 2018-08-15 NOTE — PATIENT INSTRUCTIONS
Follow up in 1 year with a Holter monitor prior to seeing Dr. Kenyon    As always, thank you for trusting us with your health care needs!    If you have any questions regarding your visit please contact your care team:   Cardiology  Telephone Number    Afib RNs:  Flores Ash and Pat [phone]   Call for Electrophysiology procedure scheduling concerns [phone]   Device Clinic (Pacemakers, ICDs, Loop Recorders)   RN's:  Lali Enrique, TALAT Grimes, Amy Ramey During business hours:   [phone]

## 2018-08-15 NOTE — LETTER
8/15/2018    Chavez Molina MD, MD  [address] [facility] [address]  [facility] [address]    RE: Mariya Rowe       Dear Colleague,    I had the pleasure of seeing Mariya Rowe in the [facility].    HPI:  Mariya Rowe is a 78 year old female who is a patient of Dr. Kenyon and presents for annual follow up for her chronic rate controlled atrial fibrillation and sick sinus syndrome.  She has a history of symptomatic persistent AF and was maintained sinus rhythm on sotalol for about 5 years, however when she saw Dr. Kenyon on 8/2017 it was discontinued and a rate controlled method was adopted.  She also has a history of asymptomatic sinus bradycardia in the 40s.  Her last Holter (2017) revealed 100% atrial fibrillation with average HR 83 bpm, minimum 56 and maximum 158 bpm.        Today Mariya Rowe presents feeling good with no CV complaints.  She uses an exercise bike and walks intermittently with no CV symptoms.  She had many questions about permanent atrial fibrillation and particularly going to higher elevations in [facility] where she guillen.  She does not smoke cigarettes and she does not drink alcohol because she is on Coumadin.  At this time, she denies chest pain or pressure, headaches, dizziness, syncope, angina, dyspnea at rest or with exertion, dry cough, palpitations, orthopnea, PND, abdominal pain, abdominal edema, pedal edema, or claudication.  Denies easy bruising or bleeding, hematuria, hematochezia, and epistaxis. Denies signs/symptoms of stroke such as visual disturbance, difficulty speaking, facial drooping, confusion, problems with gait, or any new numbness or weakness.    ASSESSMENT AND PLAN    (I48.1) Persistent atrial fibrillation (H)  Continue metoprolol tartrate 25 mg twice daily for rate control.    Anticoagulation indefinitely for CHADS VASC 3 (female, age++), currently tolerating warfarin with INR goal of 2-3.     Follow up in 1 year with Holter  prior to seeing Dr. Kenyon.     Patient expresses understanding and agreement with the plan.     I appreciate the chance to help with Mariya Rowe Please let me know if you have any questions or concerns.    DAVID Howard, CNP    This note was completed in part using Dragon voice recognition software. Although reviewed after completion, some word and grammatical errors may occur.    Orders Placed This Encounter   Procedures     Follow-Up with Electrophysiologist     Holter Monitor 24 hour - Adult     Orders Placed This Encounter   Medications     fluorometholone (FML LIQUIFILM) 0.1 % ophthalmic susp     Sig: Place 1 drop into the right eye daily     metoprolol tartrate (LOPRESSOR) 25 MG tablet     Sig: Take 1 tablet (25 mg) by mouth 2 times daily     Dispense:  180 tablet     Refill:  3     Medications Discontinued During This Encounter   Medication Reason     metoprolol tartrate (LOPRESSOR) 25 MG tablet Reorder         Encounter Diagnosis   Name Primary?     Persistent atrial fibrillation (H) Yes       CURRENT MEDICATIONS:  Current Outpatient Prescriptions   Medication Sig Dispense Refill     alendronate (FOSAMAX) 10 MG tablet TAKE 1 TABLET BY MOUTH EVERY MORNING BEFORE BREAKFAST 42 tablet 0     CALCIUM 500 + D 500-200 MG-IU OR TABS 1 tablet bid  0     fluorometholone (FML LIQUIFILM) 0.1 % ophthalmic susp Place 1 drop into the right eye daily       metoprolol tartrate (LOPRESSOR) 25 MG tablet Take 1 tablet (25 mg) by mouth 2 times daily 180 tablet 3     Multiple Vitamin (MULTI-VITAMIN PO) Take  by mouth.       rosuvastatin (CRESTOR) 5 MG tablet Take 1 tablet (5 mg) by mouth daily 93 tablet 0     warfarin (COUMADIN) 5 MG tablet Take 1 tablet daily except half tablet on Wed and Sat or as instructed based on INR result. 90 tablet 1     [DISCONTINUED] metoprolol tartrate (LOPRESSOR) 25 MG tablet Take 1 tablet (25 mg) by mouth 2 times daily 180 tablet 0       ALLERGIES     Allergies   Allergen Reactions      Lidocaine Other (See Comments)     Pt lopez Hx of a reaction to Lidocaine Eye solution.     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 Provider to review and confirm.     Osteoporosis, unspecified 12/98    improved by DEXA of 2003; then unchanged in 2004     Palpitations      PFO (patent foramen ovale)      SSS (sick sinus syndrome) (H)        PAST SURGICAL HISTORY:  Past Surgical History:   Procedure Laterality Date     C NONSPECIFIC PROCEDURE  2/05    colonoscopy      C NONSPECIFIC PROCEDURE Right 11/2016    Right cataract repair and corneal transplant     COLONOSCOPY  12/17/2015    Adenomatous polyp in cecum, repeat in 5 yrs       FAMILY HISTORY:  Family History   Problem Relation Age of Onset     Adopted: Yes     Cerebrovascular Disease Mother      at age 88 ;; lived to be 99     Unknown/Adopted Father      Don't know father's history       SOCIAL HISTORY:  Social History     Social History     Marital status:      Spouse name: Julio     Number of children: 2     Years of education: N/A     Occupational History           Retired     Social History Main Topics     Smoking status: Never Smoker     Smokeless tobacco: Never Used     Alcohol use No     Drug use: No     Sexual activity: Yes     Partners: Male     Other Topics Concern     Caffeine Concern No     Sleep Concern No     Special Diet No     Exercise No     some walking cleaning     Social History Narrative    Exercise bike or walking 2-3x/week.        Review of Systems:  Skin:  Negative     Eyes:  Positive for glasses  ENT:  Negative    Respiratory:  Negative    Cardiovascular:    Positive for;palpitations  Gastroenterology:  "Negative    Genitourinary:  Negative    Musculoskeletal:  Positive for arthritis  Neurologic:  Negative    Psychiatric:  Negative    Heme/Lymph/Imm:  Negative    Endocrine:  Negative      Physical Exam:  Vitals: /82 (BP Location: Right arm, Patient Position: Chair, Cuff Size: Adult Regular)  Pulse 80  Ht 1.803 m (5' 11\")  Wt 79.2 kg (174 lb 8 oz)  BMI 24.34 kg/m2    Constitutional:  cooperative, alert and oriented, well developed, well nourished, in no acute distress        Skin:  warm and dry to the touch, no apparent skin lesions or masses noted        Head:  normocephalic        Eyes:  pupils equal and round        ENT:  no pallor or cyanosis        Neck:  JVP normal        Chest:  clear to auscultation        Cardiac:   irregularly irregular rhythm                Abdomen:  abdomen soft        Vascular:       right radial artery;1+             left radial artery;1+                  Extremities and Back:  no edema        Neurological:  no gross motor deficits          Recent Lab Results:  LIPID RESULTS:  Lab Results   Component Value Date    CHOL 147 06/28/2017    HDL 63 06/28/2017    LDL 68 06/28/2017    TRIG 82 06/28/2017    CHOLHDLRATIO 2.5 06/16/2015       LIVER ENZYME RESULTS:  Lab Results   Component Value Date    AST 22 06/28/2017    ALT 31 06/28/2017       CBC RESULTS:  Lab Results   Component Value Date    WBC 6.7 06/28/2017    RBC 4.75 06/28/2017    HGB 14.9 06/28/2017    HCT 45.0 06/28/2017    MCV 95 06/28/2017    MCH 31.4 06/28/2017    MCHC 33.1 06/28/2017    RDW 13.5 06/28/2017     06/28/2017       BMP RESULTS:  Lab Results   Component Value Date     06/28/2017    POTASSIUM 4.2 06/28/2017    CHLORIDE 106 06/28/2017    CO2 28 06/28/2017    ANIONGAP 7 06/28/2017    GLC 96 06/28/2017    BUN 18 06/28/2017    CR 0.91 06/28/2017    GFRESTIMATED 60 (L) 06/28/2017    GFRESTBLACK 73 06/28/2017    TONO 9.5 06/28/2017        A1C RESULTS:  No results found for: A1C    INR RESULTS:  Lab " Results   Component Value Date    INR 2.6 (A) 08/03/2018    INR 2.5 (A) 06/21/2018    INR 2.70 (H) 09/21/2011    INR 2.59 (H) 09/20/2011           AL Kenyon MD  [address]  [place] [address]                  Thank you for allowing me to participate in the care of your patient.      Sincerely,     DAVID Barragan [facility]

## 2018-08-22 ENCOUNTER — OFFICE VISIT (OUTPATIENT)
Dept: INTERNAL MEDICINE | Facility: CLINIC | Age: 78
End: 2018-08-22
Payer: COMMERCIAL

## 2018-08-22 VITALS
OXYGEN SATURATION: 99 % | SYSTOLIC BLOOD PRESSURE: 138 MMHG | HEART RATE: 68 BPM | RESPIRATION RATE: 14 BRPM | HEIGHT: 71 IN | BODY MASS INDEX: 24.08 KG/M2 | TEMPERATURE: 98.4 F | WEIGHT: 172 LBS | DIASTOLIC BLOOD PRESSURE: 78 MMHG

## 2018-08-22 DIAGNOSIS — I48.20 CHRONIC ATRIAL FIBRILLATION (H): ICD-10-CM

## 2018-08-22 DIAGNOSIS — Z00.00 ROUTINE GENERAL MEDICAL EXAMINATION AT A HEALTH CARE FACILITY: Primary | ICD-10-CM

## 2018-08-22 DIAGNOSIS — M81.0 OSTEOPOROSIS, UNSPECIFIED OSTEOPOROSIS TYPE, UNSPECIFIED PATHOLOGICAL FRACTURE PRESENCE: ICD-10-CM

## 2018-08-22 DIAGNOSIS — Z12.31 VISIT FOR SCREENING MAMMOGRAM: ICD-10-CM

## 2018-08-22 DIAGNOSIS — E78.5 HYPERLIPIDEMIA WITH TARGET LDL LESS THAN 130: ICD-10-CM

## 2018-08-22 DIAGNOSIS — H69.92 DYSFUNCTION OF LEFT EUSTACHIAN TUBE: ICD-10-CM

## 2018-08-22 DIAGNOSIS — D12.2 BENIGN NEOPLASM OF ASCENDING COLON: ICD-10-CM

## 2018-08-22 LAB
ALBUMIN SERPL-MCNC: 3.5 G/DL (ref 3.4–5)
ALP SERPL-CCNC: 86 U/L (ref 40–150)
ALT SERPL W P-5'-P-CCNC: 29 U/L (ref 0–50)
ANION GAP SERPL CALCULATED.3IONS-SCNC: 5 MMOL/L (ref 3–14)
AST SERPL W P-5'-P-CCNC: 30 U/L (ref 0–45)
BILIRUB SERPL-MCNC: 1 MG/DL (ref 0.2–1.3)
BUN SERPL-MCNC: 13 MG/DL (ref 7–30)
CALCIUM SERPL-MCNC: 9.2 MG/DL (ref 8.5–10.1)
CHLORIDE SERPL-SCNC: 106 MMOL/L (ref 94–109)
CHOLEST SERPL-MCNC: 144 MG/DL
CO2 SERPL-SCNC: 30 MMOL/L (ref 20–32)
CREAT SERPL-MCNC: 0.82 MG/DL (ref 0.52–1.04)
ERYTHROCYTE [DISTWIDTH] IN BLOOD BY AUTOMATED COUNT: 13.4 % (ref 10–15)
GFR SERPL CREATININE-BSD FRML MDRD: 68 ML/MIN/1.7M2
GLUCOSE SERPL-MCNC: 92 MG/DL (ref 70–99)
HCT VFR BLD AUTO: 44.7 % (ref 35–47)
HDLC SERPL-MCNC: 57 MG/DL
HGB BLD-MCNC: 14.7 G/DL (ref 11.7–15.7)
LDLC SERPL CALC-MCNC: 69 MG/DL
MCH RBC QN AUTO: 31.4 PG (ref 26.5–33)
MCHC RBC AUTO-ENTMCNC: 32.9 G/DL (ref 31.5–36.5)
MCV RBC AUTO: 96 FL (ref 78–100)
NONHDLC SERPL-MCNC: 87 MG/DL
PLATELET # BLD AUTO: 209 10E9/L (ref 150–450)
POTASSIUM SERPL-SCNC: 4.1 MMOL/L (ref 3.4–5.3)
PROT SERPL-MCNC: 7.2 G/DL (ref 6.8–8.8)
RBC # BLD AUTO: 4.68 10E12/L (ref 3.8–5.2)
SODIUM SERPL-SCNC: 141 MMOL/L (ref 133–144)
TRIGL SERPL-MCNC: 92 MG/DL
TSH SERPL DL<=0.005 MIU/L-ACNC: 2.48 MU/L (ref 0.4–4)
WBC # BLD AUTO: 6.8 10E9/L (ref 4–11)

## 2018-08-22 PROCEDURE — 85027 COMPLETE CBC AUTOMATED: CPT | Performed by: INTERNAL MEDICINE

## 2018-08-22 PROCEDURE — 80053 COMPREHEN METABOLIC PANEL: CPT | Performed by: INTERNAL MEDICINE

## 2018-08-22 PROCEDURE — 36415 COLL VENOUS BLD VENIPUNCTURE: CPT | Performed by: INTERNAL MEDICINE

## 2018-08-22 PROCEDURE — 80061 LIPID PANEL: CPT | Performed by: INTERNAL MEDICINE

## 2018-08-22 PROCEDURE — 99397 PER PM REEVAL EST PAT 65+ YR: CPT | Performed by: INTERNAL MEDICINE

## 2018-08-22 PROCEDURE — 84443 ASSAY THYROID STIM HORMONE: CPT | Performed by: INTERNAL MEDICINE

## 2018-08-22 RX ORDER — ROSUVASTATIN CALCIUM 5 MG/1
5 TABLET, COATED ORAL DAILY
Qty: 93 TABLET | Refills: 3 | Status: SHIPPED | OUTPATIENT
Start: 2018-08-22 | End: 2019-08-27

## 2018-08-22 ASSESSMENT — ACTIVITIES OF DAILY LIVING (ADL)
I_NEED_ASSISTANCE_FOR_THE_FOLLOWING_DAILY_ACTIVITIES:: NO ASSISTANCE IS NEEDED
CURRENT_FUNCTION: NO ASSISTANCE NEEDED

## 2018-08-22 NOTE — PATIENT INSTRUCTIONS
Preventive Health Recommendations    Female Ages 65 +    Yearly exam:     See your health care provider every year in order to  o Review health changes.   o Discuss preventive care.    o Review your medicines if your doctor has prescribed any.      You no longer need a yearly Pap test unless you've had an abnormal Pap test in the past 10 years. If you have vaginal symptoms, such as bleeding or discharge, be sure to talk with your provider about a Pap test.      Every 1 to 2 years, have a mammogram.  If you are over 69, talk with your health care provider about whether or not you want to continue having screening mammograms.      Every 10 years, have a colonoscopy. Or, have a yearly FIT test (stool test). These exams will check for colon cancer.       Have a cholesterol test every 5 years, or more often if your doctor advises it.       Have a diabetes test (fasting glucose) every three years. If you are at risk for diabetes, you should have this test more often.       At age 65, have a bone density scan (DEXA) to check for osteoporosis (brittle bone disease).    Shots:    Get a flu shot each year.    Get a tetanus shot every 10 years.    Talk to your doctor about your pneumonia vaccines. There are now two you should receive - Pneumovax (PPSV 23) and Prevnar (PCV 13).    Talk to your pharmacist about the shingles vaccine.    Talk to your doctor about the hepatitis B vaccine.    Nutrition:     Eat at least 5 servings of fruits and vegetables each day.      Eat whole-grain bread, whole-wheat pasta and brown rice instead of white grains and rice.      Get adequate Calcium and Vitamin D.     Lifestyle    Exercise at least 150 minutes a week (30 minutes a day, 5 days a week). This will help you control your weight and prevent disease.      Limit alcohol to one drink per day.      No smoking.       Wear sunscreen to prevent skin cancer.       See your dentist twice a year for an exam and cleaning.      See your eye doctor  "every 1 to 2 years to screen for conditions such as glaucoma, macular degeneration and cataracts.        Let's allow you to take a break from alendronate--you've taken this since at least 2004. We can update a bone density test this year, then maybe again in 2-3 years to assure no major changes.     Someone will also contact you to get a mammogram. Consider future pneumonia shot called \"Prevnar-13.\"    My records sugest that [facility] wanted to repeat the  Colonoscopy in three years, which would be around December of 2018, They will likely contact you, or you could give them a call. (Okay to stop warfarin for 4-5 days prior to procedure without use of any Lovenox injections).     Everything looks fine! For your left low back, you could google \"Abdi exercises\".     Refills of medications have been faxed to your pharmacy.     I'll get back to you with lab results soon, especially if there is anything of concern.      See you in a year, sooner if problems.    "

## 2018-08-22 NOTE — MR AVS SNAPSHOT
After Visit Summary   8/22/2018    Mariya Rowe    MRN: [id number]           Patient Information     Date Of Birth          1940        Visit Information        Provider Department      8/22/2018 7:00 AM Chavez Molina MD [facility]        Today's Diagnoses     Routine general medical examination at a health care facility    -  1    Chronic atrial fibrillation (H)        Hyperlipidemia with target LDL less than 130        Osteoporosis, unspecified osteoporosis type, unspecified pathological fracture presence        Benign neoplasm of ascending colon        Visit for screening mammogram          Care Instructions      Preventive Health Recommendations    Female Ages 65 +    Yearly exam:     See your health care provider every year in order to  o Review health changes.   o Discuss preventive care.    o Review your medicines if your doctor has prescribed any.      You no longer need a yearly Pap test unless you've had an abnormal Pap test in the past 10 years. If you have vaginal symptoms, such as bleeding or discharge, be sure to talk with your provider about a Pap test.      Every 1 to 2 years, have a mammogram.  If you are over 69, talk with your health care provider about whether or not you want to continue having screening mammograms.      Every 10 years, have a colonoscopy. Or, have a yearly FIT test (stool test). These exams will check for colon cancer.       Have a cholesterol test every 5 years, or more often if your doctor advises it.       Have a diabetes test (fasting glucose) every three years. If you are at risk for diabetes, you should have this test more often.       At age 65, have a bone density scan (DEXA) to check for osteoporosis (brittle bone disease).    Shots:    Get a flu shot each year.    Get a tetanus shot every 10 years.    Talk to your doctor about your pneumonia vaccines. There are now two you should receive - Pneumovax (PPSV 23) and Prevnar (PCV  "13).    Talk to your pharmacist about the shingles vaccine.    Talk to your doctor about the hepatitis B vaccine.    Nutrition:     Eat at least 5 servings of fruits and vegetables each day.      Eat whole-grain bread, whole-wheat pasta and brown rice instead of white grains and rice.      Get adequate Calcium and Vitamin D.     Lifestyle    Exercise at least 150 minutes a week (30 minutes a day, 5 days a week). This will help you control your weight and prevent disease.      Limit alcohol to one drink per day.      No smoking.       Wear sunscreen to prevent skin cancer.       See your dentist twice a year for an exam and cleaning.      See your eye doctor every 1 to 2 years to screen for conditions such as glaucoma, macular degeneration and cataracts.        Let's allow you to take a break from alendronate--you've taken this since at least 2004. We can update a bone density test this year, then maybe again in 2-3 years to assure no major changes.     Someone will also contact you to get a mammogram. Consider future pneumonia shot called \"Prevnar-13.\"    My records sugest that AJAY Briggs wanted to repeat the  Colonoscopy in three years, which would be around December of 2018, They will likely contact you, or you could give them a call. (Okay to stop warfarin for 4-5 days prior to procedure without use of any Lovenox injections).     Everything looks fine! For your left low back, you could google \"Abdi exercises\".     Refills of medications have been faxed to your pharmacy.     I'll get back to you with lab results soon, especially if there is anything of concern.      See you in a year, sooner if problems.            Follow-ups after your visit        Additional Services     GASTROENTEROLOGY ADULT REF PROCEDURE ONLY Other; [facility] [phone]       Last Lab Result: Creatinine (mg/dL)       Date                     Value                 06/28/2017               0.91             ----------  Body mass index is " 23.99 kg/(m^2).      Patient will be contacted to schedule procedure.     Please be aware that coverage of these services is subject to the terms and limitations of your health insurance plan.  Call member services at your health plan with any benefit or coverage questions.  Any procedures must be performed at a [facility] facility OR coordinated by your clinic's referral office.    Please bring the following with you to your appointment:    (1) Any X-Rays, CTs or MRIs which have been performed.  Contact the facility where they were done to arrange for  prior to your scheduled appointment.    (2) List of current medications   (3) This referral request   (4) Any documents/labs given to you for this referral                  Your next 10 appointments already scheduled     Sep 14, 2018  9:15 AM CDT   [facility]tico[facility] with [facility]    [address] [facility] [address]  [facility] [id number] [phone]              Future tests that were ordered for you today     Open Future Orders        Priority Expected Expires Ordered    *MA Screening Digital Bilateral Routine  8/22/2019 8/22/2018    DX Hip/Pelvis/Spine Routine  8/22/2019 8/22/2018            Who to contact     If you have questions or need follow up information about today's clinic visit or your schedule please contact [facility] directly at [phone].  Normal or non-critical lab and imaging results will be communicated to you by MyChart, letter or phone within 4 business days after the clinic has received the results. If you do not hear from us within 7 days, please contact the clinic through MyChart or phone. If you have a critical or abnormal lab result, we will notify you by phone as soon as possible.  Submit refill requests through [employer] or call your pharmacy and they will forward the refill request to us. Please allow 3 business days for your refill to  "be completed.          Additional Information About Your Visit        [employer] Information     [employer] gives you secure access to your electronic health record. If you see a primary care provider, you can also send messages to your care team and make appointments. If you have questions, please call your primary care clinic.  If you do not have a primary care provider, please call [phone] and they will assist you.        Care EveryWhere ID     This is your Care EveryWhere ID. This could be used by other organizations to access your [place] medical records  [id number]        Your Vitals Were     Pulse Temperature Respirations Height Pulse Oximetry Breastfeeding?    68 98.4  F (36.9  C) (Oral) 14 5' 11\" (1.803 m) 99% No    BMI (Body Mass Index)                   23.99 kg/m2            Blood Pressure from Last 3 Encounters:   08/22/18 138/78   08/15/18 136/82   12/28/17 98/76    Weight from Last 3 Encounters:   08/22/18 172 lb (78 kg)   08/15/18 174 lb 8 oz (79.2 kg)   12/28/17 175 lb 8 oz (79.6 kg)              We Performed the Following     CBC with platelets     Comprehensive metabolic panel     GASTROENTEROLOGY ADULT REF PROCEDURE ONLY Other; [facility] [phone]     Lipid panel reflex to direct LDL Fasting     TSH with free T4 reflex          Today's Medication Changes          These changes are accurate as of 8/22/18  7:41 AM.  If you have any questions, ask your nurse or doctor.               Start taking these medicines.        Dose/Directions    ASPIRIN NOT PRESCRIBED   Commonly known as:  INTENTIONAL   Used for:  Hyperlipidemia with target LDL less than 130, Chronic atrial fibrillation (H)   Started by:  Chavez Molina MD        Please choose reason not prescribed, below   Quantity:  1 each   Refills:  0            Where to get your medicines      These medications were sent to [id number] - [place], [place] - [address]  [address], [place] [address]     Phone:  " [phone]     rosuvastatin 5 MG tablet         Some of these will need a paper prescription and others can be bought over the counter.  Ask your nurse if you have questions.     You don't need a prescription for these medications     ASPIRIN NOT PRESCRIBED                Primary Care Provider Office Phone # Fax #    Chavez Molina [phone]       [address] [facility] [address]  [facility] [address]        Equal Access to Services     [facility] : Hadii aad ku hadasho Soomaali, waaxda luqadaha, qaybta kaalmada adeegyada, waxay idiin hayaan adeeg kharash la'aan . So [facility] [phone].    ATENCIÓN: Si habla español, tiene a perdomo disposición servicios gratuitos de asistencia lingüística. Llame al [phone].    We comply with applicable federal civil rights laws and [place] laws. We do not discriminate on the basis of race, color, national origin, age, disability, sex, sexual orientation, or gender identity.            Thank you!     Thank you for choosing [facility]  for your care. Our goal is always to provide you with excellent care. Hearing back from our patients is one way we can continue to improve our services. Please take a few minutes to complete the written survey that you may receive in the mail after your visit with us. Thank you!             Your Updated Medication List - Protect others around you: Learn how to safely use, store and throw away your medicines at www.disposemymeds.org.          This list is accurate as of 8/22/18  7:41 AM.  Always use your most recent med list.                   Brand Name Dispense Instructions for use Diagnosis    alendronate 10 MG tablet    FOSAMAX    42 tablet    TAKE 1 TABLET BY MOUTH EVERY MORNING BEFORE BREAKFAST    Osteoporosis, unspecified osteoporosis type, unspecified pathological fracture presence       ASPIRIN NOT PRESCRIBED    INTENTIONAL    1 each    Please choose reason not prescribed, below    Hyperlipidemia with target LDL  less than 130, Chronic atrial fibrillation (H)       CALCIUM 500 + D 500-200 MG-IU Tabs      1 tablet bid    Routine general medical examination at a health care facility       fluorometholone 0.1 % ophthalmic susp    FML LIQUIFILM     Place 1 drop into the right eye daily        metoprolol tartrate 25 MG tablet    LOPRESSOR    180 tablet    Take 1 tablet (25 mg) by mouth 2 times daily    Persistent atrial fibrillation (H)       MULTI-VITAMIN PO      Take  by mouth.        rosuvastatin 5 MG tablet    CRESTOR    93 tablet    Take 1 tablet (5 mg) by mouth daily    Hyperlipidemia with target LDL less than 130       warfarin 5 MG tablet    COUMADIN    90 tablet    Take 1 tablet daily except half tablet on Wed and Sat or as instructed based on INR result.    Palpitations

## 2018-08-22 NOTE — PROGRESS NOTES
"  SUBJECTIVE:   Mariya Rowe is a 78 year old female who presents for Preventive Visit.    Are you in the first 12 months of your Medicare Part B coverage?  No    Healthy Habits:  Answers for HPI/ROS submitted by the patient on 8/22/2018   Annual Exam:  Getting at least 3 servings of Calcium per day:: Yes  Bi-annual eye exam:: Yes  Dental care twice a year:: Yes  Sleep apnea or symptoms of sleep apnea:: None  Diet:: Regular (no restrictions), Low fat/cholesterol  Taking medications regularly:: Yes  Medication side effects:: None  Additional concerns today:: YES  Activities of Daily Living: no assistance needed  Home safety: no safety concerns identified  Hearing Impairment:: no hearing concerns  PHQ-2 Score: 0      COGNITIVE SCREEN  1) Repeat 3 items (Leader, Season, Table)    2) Clock draw: NORMAL  3) 3 item recall: Recalls 3 objects  Results: 3 items recalled: COGNITIVE IMPAIRMENT LESS LIKELY    Mini-CogTM Copyright S [employer]. Licensed by the author for use in [place] [employer]; reprinted with permission (nuno@[facility]). All rights reserved.      Atrial Fibulation   Patient reports she is seeing cardiology NP Q1 year to monitor Afib and check up on Warfarin and metoprolol. Last seen by cardiology on 8/15/18.     Osteoporosis  Reports she has been on Fosamax >10 years, reviewed medication refill history with patient, has been on medications since at least 2004.     Arthritis   Reports some pain in knees, upper back that does not radiate. Patient believes it is due to her arthritis.     Right Finger  Patient has noticed a bump on right middle finger that gives her shooting, needle/knife pain occasionally. Radiates into the base of her finger when palpated.     Also:  Sees dermatology 2x a year. Notes she is \"always cold\".     Reviewed and updated as needed this visit by clinical staff  Tobacco  Allergies  Meds  Med Hx  Surg Hx  Fam Hx  Soc Hx        Reviewed and updated as needed this visit by " Provider        Social History   Substance Use Topics     Smoking status: Never Smoker     Smokeless tobacco: Never Used     Alcohol use No       If you drink alcohol do you typically have >3 drinks per day or >7 drinks per week? No                        Today's PHQ-2 Score:   PHQ-2 ( 1999 [employer]) 8/22/2018 6/28/2017   Q1: Little interest or pleasure in doing things 0 0   Q2: Feeling down, depressed or hopeless 0 0   PHQ-2 Score 0 0   Q1: Little interest or pleasure in doing things Not at all -   Q2: Feeling down, depressed or hopeless Not at all -   PHQ-2 Score 0 -       Do you feel safe in your environment - Yes    Do you have a Health Care Directive?: No: Advance care planning reviewed with patient; information given to patient to review.    Current providers sharing in care for this patient include:   Patient Care Team:  Chavez Molina MD as PCP - General (Internal Medicine)    The following health maintenance items are reviewed in Epic and correct as of today:  Health Maintenance   Topic Date Due     PNEUMOCOCCAL (2 of 2 - PCV13) 09/06/2007     ADVANCE DIRECTIVE PLANNING Q5 YRS  05/03/2016     OP ANNUAL INR REFERRAL  09/28/2017     FALL RISK ASSESSMENT  06/28/2018     PHQ-2 Q1 YR  06/28/2018     INFLUENZA VACCINE (1) 09/01/2018     TETANUS IMMUNIZATION (SYSTEM ASSIGNED)  12/17/2018     LIPID SCREEN Q5 YR FEMALE (SYSTEM ASSIGNED)  06/28/2022     DEXA SCAN SCREENING (SYSTEM ASSIGNED)  Completed     Labs reviewed in EPIC  BP Readings from Last 3 Encounters:   08/22/18 (!) 138/8   08/15/18 136/82   12/28/17 98/76    Wt Readings from Last 3 Encounters:   08/22/18 78 kg (172 lb)   08/15/18 79.2 kg (174 lb 8 oz)   12/28/17 79.6 kg (175 lb 8 oz)                  Patient Active Problem List   Diagnosis     Osteoporosis     Advanced directives, counseling/discussion     Atrial fibrillation (H)     Hyperlipidemia with target LDL less than 130     Atrial flutter (H)     Palpitations     PFO (patent foramen ovale)      Abnormal EKG     SSS (sick sinus syndrome) (H)     Long-term (current) use of anticoagulants [Z79.01]     Chronotropic incompetence     Past Surgical History:   Procedure Laterality Date     C NONSPECIFIC PROCEDURE  2/05    colonoscopy      C NONSPECIFIC PROCEDURE Right 11/2016    Right cataract repair and corneal transplant     COLONOSCOPY  12/17/2015    11-15 mm Adenomatous polyp in cecum, repeat in 3 yrs       Social History   Substance Use Topics     Smoking status: Never Smoker     Smokeless tobacco: Never Used     Alcohol use No     Family History   Problem Relation Age of Onset     Adopted: Yes     Cerebrovascular Disease Mother      at age 88 ;; lived to be 99     Unknown/Adopted Father      Don't know father's history         Current Outpatient Prescriptions   Medication Sig Dispense Refill     alendronate (FOSAMAX) 10 MG tablet TAKE 1 TABLET BY MOUTH EVERY MORNING BEFORE BREAKFAST 42 tablet 0     CALCIUM 500 + D 500-200 MG-IU OR TABS 1 tablet bid  0     fluorometholone (FML LIQUIFILM) 0.1 % ophthalmic susp Place 1 drop into the right eye daily       metoprolol tartrate (LOPRESSOR) 25 MG tablet Take 1 tablet (25 mg) by mouth 2 times daily 180 tablet 3     Multiple Vitamin (MULTI-VITAMIN PO) Take  by mouth.       rosuvastatin (CRESTOR) 5 MG tablet Take 1 tablet (5 mg) by mouth daily 93 tablet 0     warfarin (COUMADIN) 5 MG tablet Take 1 tablet daily except half tablet on Wed and Sat or as instructed based on INR result. 90 tablet 1     Allergies   Allergen Reactions     Lidocaine Other (See Comments)     Pt lopez Hx of a reaction to Lidocaine Eye solution.     Atorvastatin Rash     LIPITOR - pt gets a rash       Pneumonia Vaccine:Adults age 65+ who received Pneumovax (PPSV23) at 65 years or older: Should be given PCV13 > 1 year after their most recent PPSV23    ROS:  REVIEW OF SYSTEMS: The following systems have been completely reviewed and are negative except as noted in the HPI:   Constitutional, HEENT,  "respiratory, cardiovascular, gastrointestinal, genitourinary, musculoskeletal, dermatologic, hematologic, endocrine, psychiatric, and neurologic systems.    OBJECTIVE:   BP (!) 138/8 (BP Location: Left arm, Patient Position: Sitting, Cuff Size: Adult Large)  Pulse 68  Temp 98.4  F (36.9  C) (Oral)  Resp 14  Ht 5' 11\" (1.803 m)  Wt 172 lb (78 kg)  SpO2 99%  Breastfeeding? No  BMI 23.99 kg/m2 Estimated body mass index is 23.99 kg/(m^2) as calculated from the following:    Height as of this encounter: 5' 11\" (1.803 m).    Weight as of this encounter: 172 lb (78 kg).  EXAM:   GENERAL APPEARANCE: healthy, alert and no distress  EYES: Eyes grossly normal to inspection, PERRL and conjunctivae and sclerae normal  HENT: wax in R TM, L TM appears normal, nose and mouth without ulcers or lesions, oropharynx clear and oral mucous membranes moist  NECK: no adenopathy, no asymmetry, masses, or scars and thyroid normal to palpation  RESP: lungs clear to auscultation - no rales, rhonchi or wheezesadenopathy  CV: regular rate and rhythm, normal S1 S2, no S3 or S4, no murmur, click or rub, no peripheral edema and peripheral pulses strong  ABDOMEN: soft, nontender, no hepatosplenomegaly, no masses and bowel sounds normal  MS: no musculoskeletal defects are noted and gait is age appropriate without ataxia  SKIN: no suspicious lesions or rashes  NEURO: Normal strength and tone, sensory exam grossly normal, mentation intact and speech normal  PSYCH: mentation appears normal and affect normal/bright    Diagnostic Test Results:  No results found for this or any previous visit (from the past 24 hour(s)).    ASSESSMENT / PLAN:   (Z00.00) Routine general medical examination at a health care facility  (primary encounter diagnosis)  Immunizations utd, encouraged regular exercise and healthy diet. Routine lab work taken today. Patient requesting to continue with mammograms, reordered.   Plan: Comprehensive metabolic panel, Lipid panel    " "     reflex to direct LDL Fasting, TSH with free T4         reflex, CBC with platele    (I48.2) Chronic atrial fibrillation (H)  Followed by cardiology, continue medications without changes.   Plan: ASPIRIN NOT PRESCRIBED (INTENTIONAL)    (E78.5) Hyperlipidemia with target LDL less than 130  Recheck fasting labs.  Plan: Comprehensive metabolic panel, Lipid panel         reflex to direct LDL Fasting, rosuvastatin         (CRESTOR) 5 MG tablet            (M81.0) Osteoporosis, unspecified osteoporosis type, unspecified pathological fracture presence  Patient has been on Fosamax since at least 2004, reviewed recent DEXA scanning in 2016. Will take drug holiday and repeat DEXA in 2 years.   Plan: DX Hip/Pelvis/Spine          (D12.2) Benign neoplasm of ascending colon  Reviewed last colonoscopy results with patient recommending screening Q3 years. Order placed for referral for colonoscopy. Okay to stop Warifrin 3-4 days before procedure without Lovenox injections.   Plan: GASTROENTEROLOGY ADULT REF PROCEDURE ONLY         Other; [facility] [phone]    (Z12.31) Visit for screening mammogram  Plan: *MA Screening Digital Bilateral          Right Middle Finger Pain  Likely inflamed blood vessel or nerve pain. Offered patient she can see hand surgeon, but patient defers at this time. Will continue with watchful waiting.           End of Life Planning:  Patient currently has an advanced directive: Yes.  Practitioner is supportive of decision.    COUNSELING:  Reviewed preventive health counseling, as reflected in patient instructions    BP Readings from Last 1 Encounters:   08/22/18 (!) 138/8     Estimated body mass index is 23.99 kg/(m^2) as calculated from the following:    Height as of this encounter: 5' 11\" (1.803 m).    Weight as of this encounter: 172 lb (78 kg).           reports that she has never smoked. She has never used smokeless tobacco.      Appropriate preventive services were discussed with this patient, " including applicable screening as appropriate for cardiovascular disease, diabetes, osteopenia/osteoporosis, and glaucoma.  As appropriate for age/gender, discussed screening for colorectal cancer, prostate cancer, breast cancer, and cervical cancer. Checklist reviewing preventive services available has been given to the patient.    Reviewed patients plan of care and provided an AVS. The Basic Care Plan (routine screening as documented in Health Maintenance) for Mariya meets the Care Plan requirement. This Care Plan has been established and reviewed with the Patient.    Counseling Resources:  ATP IV Guidelines  Pooled Cohorts Equation Calculator  Breast Cancer Risk Calculator  FRAX Risk Assessment  ICSI Preventive Guidelines  Dietary Guidelines for Americans, 2010  USDA's MyPlate  ASA Prophylaxis  Lung CA Screening    The information in this document, created by the medical scribe for me, accurately reflects the services I personally performed and the decisions made by me. I have reviewed and approved this document for accuracy prior to leaving the patient care area.  Chavez Molina MD,   [facility]

## 2018-08-28 DIAGNOSIS — M81.0 OSTEOPOROSIS, UNSPECIFIED OSTEOPOROSIS TYPE, UNSPECIFIED PATHOLOGICAL FRACTURE PRESENCE: ICD-10-CM

## 2018-08-28 NOTE — TELEPHONE ENCOUNTER
"Requested Prescriptions   Pending Prescriptions Disp Refills     alendronate (FOSAMAX) 10 MG tablet [Pharmacy Med Name: ALENDRONATE SODIUM 10 MG TAB]  Last Written Prescription Date:  7/18/18  Last Fill Quantity: 42,  # refills: 0   Last office visit: 8/22/2018 with prescribing provider:  Jesse   Future Office Visit:     42 tablet 0     Sig: TAKE 1 TABLET BY MOUTH EVERY DAY BEFORE BREAKFAST    Bisphosphonates Failed    8/28/2018  2:11 AM       Failed - Dexa on file within past 2 years    Please review last Dexa result.          Passed - Recent (12 mo) or future (30 days) visit within the authorizing provider's specialty    Patient had office visit in the last 12 months or has a visit in the next 30 days with authorizing provider or within the authorizing provider's specialty.  See \"Patient Info\" tab in inbasket, or \"Choose Columns\" in Meds & Orders section of the refill encounter.           Passed - Patient is age 18 or older       Passed - Normal serum creatinine on file within past 12 months    Recent Labs   Lab Test  08/22/18   0746   CR  0.82               "

## 2018-08-30 ENCOUNTER — TELEPHONE (OUTPATIENT)
Dept: INTERNAL MEDICINE | Facility: CLINIC | Age: 78
End: 2018-08-30

## 2018-08-30 NOTE — TELEPHONE ENCOUNTER
Call received from AJAY HADDAD requesting orders to hold Warfarin for 4 days prior to an upcoming colonoscopy. Also asking if bridging is needed. Please advise.

## 2018-08-31 NOTE — TELEPHONE ENCOUNTER
Okay to hold warfarin for 4 days prior to procedure without Lovenox bridging. Please advise AJAY HADDAD.

## 2018-09-04 RX ORDER — ALENDRONATE SODIUM 10 MG/1
TABLET ORAL
Qty: 12 TABLET | Refills: 0 | Status: SHIPPED | OUTPATIENT
Start: 2018-09-04 | End: 2018-09-04

## 2018-09-04 RX ORDER — ALENDRONATE SODIUM 10 MG/1
TABLET ORAL
Qty: 42 TABLET | Refills: 0 | Status: SHIPPED | OUTPATIENT
Start: 2018-09-04 | End: 2019-08-27

## 2018-09-04 NOTE — TELEPHONE ENCOUNTER
Patient scheduled for DEXA on 9/24/18.   Medication is being filled for 1 time refill only due to:  pending DEXA results

## 2018-09-14 ENCOUNTER — ANTICOAGULATION THERAPY VISIT (OUTPATIENT)
Dept: ANTICOAGULATION | Facility: CLINIC | Age: 78
End: 2018-09-14
Payer: COMMERCIAL

## 2018-09-14 DIAGNOSIS — Z79.01 LONG-TERM (CURRENT) USE OF ANTICOAGULANTS: ICD-10-CM

## 2018-09-14 LAB — INR POINT OF CARE: 2.6 (ref 0.86–1.14)

## 2018-09-14 PROCEDURE — 36416 COLLJ CAPILLARY BLOOD SPEC: CPT

## 2018-09-14 PROCEDURE — 85610 PROTHROMBIN TIME: CPT | Mod: QW

## 2018-09-14 PROCEDURE — 99207 ZZC NO CHARGE NURSE ONLY: CPT

## 2018-09-14 NOTE — PROGRESS NOTES
ANTICOAGULATION FOLLOW-UP CLINIC VISIT    Patient Name:  Mariya Rowe  Date:  9/14/2018  Contact Type:  Face to Face    SUBJECTIVE:     Patient Findings     Positives No Problem Findings    Comments Pt leaving for [place] and will return mid November. Pt and spouse have Dr down there that they will have check INR.            OBJECTIVE    INR Protime   Date Value Ref Range Status   09/14/2018 2.6 (A) 0.86 - 1.14 Final       ASSESSMENT / PLAN  INR assessment THER    Recheck INR In: 6 WEEKS In [place]    INR Location Clinic In [place]      Anticoagulation Summary as of 9/14/2018     INR goal 2.0-3.0   Today's INR 2.6   Warfarin maintenance plan 2.5 mg (5 mg x 0.5) on Wed, Sat; 5 mg (5 mg x 1) all other days   Full warfarin instructions 2.5 mg on Wed, Sat; 5 mg all other days   Weekly warfarin total 30 mg   No change documented Anjali Moss, RN   Plan last modified Anjali Moss RN (1/25/2017)   Next INR check 11/15/2018   Priority INR   Target end date     Indications   Long-term (current) use of anticoagulants [Z79.01] [Z79.01]  Atrial fibrillation (H) [I48.91]         Anticoagulation Episode Summary     INR check location     Preferred lab     Send INR reminders to [facility]    Comments       Anticoagulation Care Providers     Provider Role Specialty Phone number    Chavez Molina MD [facility] Internal Medicine [phone]            See the Encounter Report to view Anticoagulation Flowsheet and Dosing Calendar (Go to Encounters tab in chart review, and find the Anticoagulation Therapy Visit)    Dosage adjustment made based on physician directed care plan.    Anjali Moss RN

## 2018-09-14 NOTE — MR AVS SNAPSHOT
Mariya Rowe   9/14/2018 9:15 AM   Anticoagulation Therapy Visit    Description:  78 year old female   Provider[facility]  [facility]   Department:  [facility]           INR as of 9/14/2018     Today's INR 2.6      Anticoagulation Summary as of 9/14/2018     INR goal 2.0-3.0   Today's INR 2.6   Full warfarin instructions 2.5 mg on Wed, Sat; 5 mg all other days   Next INR check 11/15/2018    Indications   Long-term (current) use of anticoagulants [Z79.01] [Z79.01]  Atrial fibrillation (H) [I48.91]         Your next Anticoagulation Clinic appointment(s)     Nov 15, 2018  9:00 AM CST   [facility] with [facility]   [facility]    [address] [facility] [address]  [facility] [address] [phone]              Contact Numbers     [facility] Phone Numbers:  Anticoagulation Clinic Appointments : [phone]  Anticoagulation Nurse: [phone]         September 2018 Details    Sun Mon Tue Wed Thu Fri Sat           1                 2               3               4               5               6               7               8                 9               10               11               12               13               14      5 mg   See details      15      2.5 mg           16      5 mg         17      5 mg         18      5 mg         19      2.5 mg         20      5 mg         21      5 mg         22      2.5 mg           23      5 mg         24      5 mg         25      5 mg         26      2.5 mg         27      5 mg         28      5 mg         29      2.5 mg           30      5 mg                Date Details   09/14 This INR check               How to take your warfarin dose     To take:  2.5 mg Take 0.5 of a 5 mg tablet.    To take:  5 mg Take 1 of the 5 mg tablets.           October 2018 Details    Sun Mon Tue Wed Thu Fri Sat      1      5 mg         2      5 mg         3      2.5 mg         4      5 mg          5      5 mg         6      2.5 mg           7      5 mg         8      5 mg         9      5 mg         10      2.5 mg         11      5 mg         12      5 mg         13      2.5 mg           14      5 mg         15      5 mg         16      5 mg         17      2.5 mg         18      5 mg         19      5 mg         20      2.5 mg           21      5 mg         22      5 mg         23      5 mg         24      2.5 mg         25      5 mg         26      5 mg         27      2.5 mg           28      5 mg         29      5 mg         30      5 mg         31      2.5 mg             Date Details   No additional details            How to take your warfarin dose     To take:  2.5 mg Take 0.5 of a 5 mg tablet.    To take:  5 mg Take 1 of the 5 mg tablets.           November 2018 Details    Sun Mon Tue Wed Thu Fri Sat         1      5 mg         2      5 mg         3      2.5 mg           4      5 mg         5      5 mg         6      5 mg         7      2.5 mg         8      5 mg         9      5 mg         10      2.5 mg           11      5 mg         12      5 mg         13      5 mg         14      2.5 mg         15            16               17                 18               19               20               21               22               23               24                 25               26               27               28               29               30                 Date Details   No additional details    Date of next INR:  11/15/2018         How to take your warfarin dose     To take:  2.5 mg Take 0.5 of a 5 mg tablet.    To take:  5 mg Take 1 of the 5 mg tablets.

## 2018-09-21 ENCOUNTER — HOSPITAL ENCOUNTER (OUTPATIENT)
Dept: MAMMOGRAPHY | Facility: CLINIC | Age: 78
Discharge: HOME | End: 2018-09-21
Attending: INTERNAL MEDICINE | Admitting: INTERNAL MEDICINE
Payer: MEDICARE

## 2018-09-21 DIAGNOSIS — Z12.31 VISIT FOR SCREENING MAMMOGRAM: ICD-10-CM

## 2018-09-21 PROCEDURE — 77063 BREAST TOMOSYNTHESIS BI: CPT

## 2018-11-14 ENCOUNTER — RADIANT APPOINTMENT (OUTPATIENT)
Dept: BONE DENSITY | Facility: CLINIC | Age: 78
End: 2018-11-14
Payer: COMMERCIAL

## 2018-11-14 DIAGNOSIS — M81.0 OSTEOPOROSIS, UNSPECIFIED OSTEOPOROSIS TYPE, UNSPECIFIED PATHOLOGICAL FRACTURE PRESENCE: ICD-10-CM

## 2018-11-14 PROCEDURE — 77085 DXA BONE DENSITY AXL VRT FX: CPT | Performed by: INTERNAL MEDICINE

## 2018-11-15 ENCOUNTER — ANTICOAGULATION THERAPY VISIT (OUTPATIENT)
Dept: ANTICOAGULATION | Facility: CLINIC | Age: 78
End: 2018-11-15
Payer: COMMERCIAL

## 2018-11-15 DIAGNOSIS — I48.91 ATRIAL FIBRILLATION (H): ICD-10-CM

## 2018-11-15 DIAGNOSIS — Z79.01 LONG TERM CURRENT USE OF ANTICOAGULANTS WITH INR GOAL OF 2.0-3.0: Primary | ICD-10-CM

## 2018-11-15 LAB — INR POINT OF CARE: 1.8 (ref 0.86–1.14)

## 2018-11-15 PROCEDURE — 36416 COLLJ CAPILLARY BLOOD SPEC: CPT

## 2018-11-15 PROCEDURE — 85610 PROTHROMBIN TIME: CPT | Mod: QW

## 2018-11-15 PROCEDURE — 99207 ZZC NO CHARGE NURSE ONLY: CPT

## 2018-11-15 NOTE — MR AVS SNAPSHOT
Mariya Rowe   11/15/2018 9:00 AM   Anticoagulation Therapy Visit    Description:  78 year old female   Provider[facility]   Department:  [facility]           INR as of 11/15/2018     Today's INR 1.8!      Anticoagulation Summary as of 11/15/2018     INR goal 2.0-3.0   Today's INR 1.8!   Full warfarin instructions 2.5 mg on Wed, Sat; 5 mg all other days   Next INR check 11/29/2018    Indications   Long term current use of anticoagulants with INR goal of 2.0-3.0 [Z79.01]  Atrial fibrillation (H) [I48.91]         Your next Anticoagulation Clinic appointment(s)     Nov 29, 2018 11:15 AM CST   [facility] with [facility]   [facility]    [address] [facility] [address]  [facility] [address] [phone]              Contact Numbers     [facility] Phone Numbers:  Anticoagulation Clinic Appointments : [phone]  Anticoagulation Nurse: [phone]         November 2018 Details    Sun Mon Tue Wed Thu Fri Sat         1               2               3                 4               5               6               7               8               9               10                 11               12               13               14               15      5 mg   See details      16      5 mg         17      2.5 mg           18      5 mg         19      5 mg         20      5 mg         21      2.5 mg         22      5 mg         23      5 mg         24      2.5 mg           25      5 mg         26      5 mg         27      5 mg         28      2.5 mg         29            30                 Date Details   11/15 This INR check       Date of next INR:  11/29/2018         How to take your warfarin dose     To take:  2.5 mg Take 0.5 of a 5 mg tablet.    To take:  5 mg Take 1 of the 5 mg tablets.

## 2018-11-15 NOTE — PROGRESS NOTES
ANTICOAGULATION FOLLOW-UP CLINIC VISIT    Patient Name:  Mariya Rowe  Date:  11/15/2018  Contact Type:  Face to Face    SUBJECTIVE:     Patient Findings     Positives Unexplained INR or factor level change    Comments Intentional hold of therapy - Pt reports did hold warfarin 11/7 and 11/8 and decreased dose 11/9/18 d/t elevated INR.  Pt denies any bleeding or bruising problems. Pt reports had INR tested in [place] 11/7/18 and was 4.6.              OBJECTIVE    INR Protime   Date Value Ref Range Status   11/15/2018 1.8 (A) 0.86 - 1.14 Final       ASSESSMENT / PLAN  INR assessment SUB    Recheck INR In: 2 WEEKS    INR Location Clinic      Anticoagulation Summary as of 11/15/2018     INR goal 2.0-3.0   Today's INR 1.8!   Warfarin maintenance plan 2.5 mg (5 mg x 0.5) on Wed, Sat; 5 mg (5 mg x 1) all other days   Full warfarin instructions 2.5 mg on Wed, Sat; 5 mg all other days   Weekly warfarin total 30 mg   No change documented Karla Waller RN   Plan last modified Anjali Moss RN (1/25/2017)   Next INR check 11/29/2018   Priority INR   Target end date     Indications   Long term current use of anticoagulants with INR goal of 2.0-3.0 [Z79.01]  Atrial fibrillation (H) [I48.91]         Anticoagulation Episode Summary     INR check location     Preferred lab     Send INR reminders to [facility]    Comments       Anticoagulation Care Providers     Provider Role Specialty Phone number    Chavez Molina MD [facility] Internal Medicine [phone]            See the Encounter Report to view Anticoagulation Flowsheet and Dosing Calendar (Go to Encounters tab in chart review, and find the Anticoagulation Therapy Visit)    Dosage adjustment made based on physician directed care plan.    Karla Waller, RN

## 2018-11-29 ENCOUNTER — ANTICOAGULATION THERAPY VISIT (OUTPATIENT)
Dept: ANTICOAGULATION | Facility: CLINIC | Age: 78
End: 2018-11-29
Payer: COMMERCIAL

## 2018-11-29 ENCOUNTER — TELEPHONE (OUTPATIENT)
Dept: ANTICOAGULATION | Facility: CLINIC | Age: 78
End: 2018-11-29

## 2018-11-29 DIAGNOSIS — Z79.01 LONG TERM CURRENT USE OF ANTICOAGULANTS WITH INR GOAL OF 2.0-3.0: ICD-10-CM

## 2018-11-29 DIAGNOSIS — I48.91 ATRIAL FIBRILLATION (H): Primary | ICD-10-CM

## 2018-11-29 DIAGNOSIS — I48.20 CHRONIC ATRIAL FIBRILLATION (H): ICD-10-CM

## 2018-11-29 LAB — INR POINT OF CARE: 2.8 (ref 0.86–1.14)

## 2018-11-29 PROCEDURE — 36416 COLLJ CAPILLARY BLOOD SPEC: CPT

## 2018-11-29 PROCEDURE — 99207 ZZC NO CHARGE NURSE ONLY: CPT

## 2018-11-29 PROCEDURE — 85610 PROTHROMBIN TIME: CPT | Mod: QW

## 2018-11-29 NOTE — TELEPHONE ENCOUNTER
For insurance purposes, an annual INR referral is required. Please complete and sign the order then route back to [facility]. Karla Waller RN

## 2018-11-29 NOTE — PROGRESS NOTES
ANTICOAGULATION FOLLOW-UP CLINIC VISIT    Patient Name:  Mariya Rowe  Date:  11/29/2018  Contact Type:  Face to Face    SUBJECTIVE:     Patient Findings     Positives No Problem Findings    Comments Pt denies any changes or missed warfarin doses since last INR visit. Pt denies any bleeding or bruising problems. Pt has colonoscopy on 12/4/18           OBJECTIVE    INR Protime   Date Value Ref Range Status   11/29/2018 2.8 (A) 0.86 - 1.14 Final       ASSESSMENT / PLAN  INR assessment THER    Recheck INR In: 2 WEEKS    INR Location Clinic      Anticoagulation Summary as of 11/29/2018     INR goal 2.0-3.0   Today's INR 2.8   Warfarin maintenance plan 2.5 mg (5 mg x 0.5) on Wed, Sat; 5 mg (5 mg x 1) all other days   Full warfarin instructions 11/29: Hold; 11/30: Hold; 12/1: Hold; 12/2: Hold; 12/3: Hold; Otherwise 2.5 mg on Wed, Sat; 5 mg all other days   Weekly warfarin total 30 mg   Plan last modified Anjali Moss RN (1/25/2017)   Next INR check 12/13/2018   Priority INR   Target end date     Indications   Long term current use of anticoagulants with INR goal of 2.0-3.0 [Z79.01]  Atrial fibrillation (H) [I48.91]         Anticoagulation Episode Summary     INR check location     Preferred lab     Send INR reminders to [facility]    Comments       Anticoagulation Care Providers     Provider Role Specialty Phone number    Chavez Molina MD Responsible Internal Medicine [phone]            See the Encounter Report to view Anticoagulation Flowsheet and Dosing Calendar (Go to Encounters tab in chart review, and find the Anticoagulation Therapy Visit)    Dosage adjustment made based on physician directed care plan.    Karla Waller RN

## 2018-11-29 NOTE — MR AVS SNAPSHOT
Mariya Rowe   11/29/2018 11:15 AM   Anticoagulation Therapy Visit    Description:  78 year old female   Provider[facility]   Department:  [facility]           INR as of 11/29/2018     Today's INR 2.8      Anticoagulation Summary as of 11/29/2018     INR goal 2.0-3.0   Today's INR 2.8   Full warfarin instructions 11/29: Hold; 11/30: Hold; 12/1: Hold; 12/2: Hold; 12/3: Hold; Otherwise 2.5 mg on Wed, Sat; 5 mg all other days   Next INR check 12/13/2018    Indications   Long term current use of anticoagulants with INR goal of 2.0-3.0 [Z79.01]  Atrial fibrillation (H) [I48.91]         Your next Anticoagulation Clinic appointment(s)     Dec 13, 2018  9:30 AM CST   [facility] with [facility]   [facility]    [address] [facility] [address]  [facility] [id number] [phone]              Contact Numbers     [facility] Phone Numbers:  Anticoagulation Clinic Appointments : [phone]  Anticoagulation Nurse: [phone]         November 2018 Details    Sun Mon Tue Wed Thu Fri Sat         1               2               3                 4               5               6               7               8               9               10                 11               12               13               14               15               16               17                 18               19               20               21               22               23               24                 25               26               27               28               29      Hold   See details      30      Hold           Date Details   11/29 This INR check               How to take your warfarin dose     Hold Do not take your warfarin dose. See the Details table to the right for additional instructions.                December 2018 Details    Sun Mon Tue Wed Thu Fri Sat           1      Hold           2      Hold         3       Hold         4      5 mg         5      2.5 mg         6      5 mg         7      5 mg         8      2.5 mg           9      5 mg         10      5 mg         11      5 mg         12      2.5 mg         13            14               15                 16               17               18               19               20               21               22                 23               24               25               26               27               28               29                 30               31                     Date Details   No additional details    Date of next INR:  12/13/2018         How to take your warfarin dose     To take:  2.5 mg Take 0.5 of a 5 mg tablet.    To take:  5 mg Take 1 of the 5 mg tablets.    Hold Do not take your warfarin dose. See the Details table to the right for additional instructions.

## 2018-12-04 ENCOUNTER — TRANSFERRED RECORDS (OUTPATIENT)
Dept: HEALTH INFORMATION MANAGEMENT | Facility: CLINIC | Age: 78
End: 2018-12-04

## 2018-12-06 LAB — INR PPP: 1 (ref 0.8–1.1)

## 2018-12-07 ENCOUNTER — ANTICOAGULATION THERAPY VISIT (OUTPATIENT)
Dept: ANTICOAGULATION | Facility: CLINIC | Age: 78
End: 2018-12-07
Payer: COMMERCIAL

## 2018-12-07 DIAGNOSIS — Z79.01 LONG TERM CURRENT USE OF ANTICOAGULANTS WITH INR GOAL OF 2.0-3.0: ICD-10-CM

## 2018-12-07 DIAGNOSIS — I48.91 ATRIAL FIBRILLATION (H): ICD-10-CM

## 2018-12-07 PROCEDURE — 99207 ZZC NO CHARGE NURSE ONLY: CPT | Performed by: INTERNAL MEDICINE

## 2018-12-07 NOTE — MR AVS SNAPSHOT
Mariya Rowe   12/7/2018   Anticoagulation Therapy Visit    Description:  78 year old female   Provider:  Chavez Molina MD   Department:  [facility]           INR as of 12/7/2018     Today's INR 1.0! (12/6/2018)      Anticoagulation Summary as of 12/7/2018     INR goal 2.0-3.0   Today's INR 1.0! (12/6/2018)   Full warfarin instructions 2.5 mg on Wed, Sat; 5 mg all other days   Next INR check 12/17/2018    Indications   Long term current use of anticoagulants with INR goal of 2.0-3.0 [Z79.01]  Atrial fibrillation (H) [I48.91]         Your next Anticoagulation Clinic appointment(s)     Dec 27, 2018 11:45 AM CST   [facility] with [facility]   [facility] ([facility])    [address] [facility] [address]  [facility] [id number] [phone]              Contact Numbers     [facility] Phone Numbers:  Anticoagulation Clinic Appointments : [phone]  Anticoagulation Nurse: [phone]         December 2018 Details    Sun Mon Tue Wed Thu Fri Sat           1                 2               3               4               5               6               7      5 mg   See details      8      2.5 mg           9      5 mg         10      5 mg         11      5 mg         12      2.5 mg         13      5 mg         14      5 mg         15      2.5 mg           16      5 mg         17            18               19               20               21               22                 23               24               25               26               27               28               29                 30               31                     Date Details   12/07 This INR check       Date of next INR:  12/17/2018         How to take your warfarin dose     To take:  2.5 mg Take 0.5 of a 5 mg tablet.    To take:  5 mg Take 1 of the 5 mg tablets.

## 2018-12-07 NOTE — PROGRESS NOTES
ANTICOAGULATION FOLLOW-UP CLINIC VISIT    Patient Name:  Mariya Rowe  Date:  12/7/2018  Contact Type:  Telephone/ discussed dosing instructions with patient.    SUBJECTIVE:     Patient Findings     Positives Intentional hold of therapy (held for colonoscopy.  Restarted warfarin on 12/5/18)    Comments Patient was started on the [employer] home monitoring program.  [employer] came to her home for this INR check to educate her on how to do home INR testing.  Results were received today and INR was drawn yesterday.  Advised patient to also call INR clinic if she does not hear from us so that we can discuss dosing information.  Patient agreed.  She plans to do the next INR check at home.  She will be traveling until 12/17 and plans to check it upon her return.           OBJECTIVE    INR   Date Value Ref Range Status   12/06/2018 1.0 0.8 - 1.1 Final       ASSESSMENT / PLAN  INR assessment SUB    Recheck INR In: 10 DAYS    INR Location Home INR    Billed home INR No      Anticoagulation Summary as of 12/7/2018     INR goal 2.0-3.0   Today's INR 1.0! (12/6/2018)   Warfarin maintenance plan 2.5 mg (5 mg x 0.5) on Wed, Sat; 5 mg (5 mg x 1) all other days   Full warfarin instructions 2.5 mg on Wed, Sat; 5 mg all other days   Weekly warfarin total 30 mg   Plan last modified Anjali Moss, RN (1/25/2017)   Next INR check 12/17/2018   Priority INR   Target end date     Indications   Long term current use of anticoagulants with INR goal of 2.0-3.0 [Z79.01]  Atrial fibrillation (H) [I48.91]         Anticoagulation Episode Summary     INR check location     Preferred lab     Send INR reminders to [facility]    Comments started home testing on 12/6/18.      Anticoagulation Care Providers     Provider Role Specialty Phone number    Chavez Molina MD Responsible Internal Medicine [phone]            See the Encounter Report to view Anticoagulation Flowsheet and Dosing Calendar (Go to Encounters tab in chart review, and find the  Anticoagulation Therapy Visit)    Dosage adjustment made based on physician directed care plan.    Vira Crawford RN

## 2018-12-18 ENCOUNTER — ANTICOAGULATION THERAPY VISIT (OUTPATIENT)
Dept: ANTICOAGULATION | Facility: CLINIC | Age: 78
End: 2018-12-18
Payer: COMMERCIAL

## 2018-12-18 DIAGNOSIS — Z79.01 LONG TERM CURRENT USE OF ANTICOAGULANTS WITH INR GOAL OF 2.0-3.0: ICD-10-CM

## 2018-12-18 LAB — INR PPP: 2.6 (ref 0.8–1.1)

## 2018-12-18 PROCEDURE — 99207 ZZC NO CHARGE NURSE ONLY: CPT | Performed by: INTERNAL MEDICINE

## 2018-12-18 NOTE — PROGRESS NOTES
ANTICOAGULATION FOLLOW-UP CLINIC VISIT    Patient Name:  Mariya Rowe  Date:  2018  Contact Type:  Telephone/ Fax received from [employer] reporting INR result. Called pt, reports no changes. Orders discussed with pt.     SUBJECTIVE:     Patient Findings     Positives:   No Problem Findings    Comments:   Pt denies any changes or missed warfarin doses since last INR check. Pt denies any bleeding or bruising problems.              OBJECTIVE    INR   Date Value Ref Range Status   2018 2.6 (A) 0.8 - 1.1 Final     Comment:     [employer]       ASSESSMENT / PLAN  INR assessment THER    Recheck INR In: 2 WEEKS    INR Location Home INR    Billed home INR No      Anticoagulation Summary  As of 2018    INR goal:   2.0-3.0   TTR:   68.1 % (2.5 y)   INR used for dosin.6 (2018)   Warfarin maintenance plan:   2.5 mg (5 mg x 0.5) every Wed, Sat; 5 mg (5 mg x 1) all other days   Full warfarin instructions:   2.5 mg every Wed, Sat; 5 mg all other days   Weekly warfarin total:   30 mg   No change documented:   Karla Waller RN   Plan last modified:   Anjali Moss RN (2017)   Next INR check:   2018   Priority:   INR   Target end date:       Indications    Long term current use of anticoagulants with INR goal of 2.0-3.0 [Z79.01]  Atrial fibrillation (H) [I48.91]             Anticoagulation Episode Summary     INR check location:       Preferred lab:       Send INR reminders to:   [facility]    Comments:   started home testing on 18.      Anticoagulation Care Providers     Provider Role Specialty Phone number    Chavez Molina MD [facility] Internal Medicine [phone]            See the Encounter Report to view Anticoagulation Flowsheet and Dosing Calendar (Go to Encounters tab in chart review, and find the Anticoagulation Therapy Visit)    Dosage adjustment made based on physician directed care plan.    Karla Waller, RN

## 2018-12-31 ENCOUNTER — ANTICOAGULATION THERAPY VISIT (OUTPATIENT)
Dept: ANTICOAGULATION | Facility: CLINIC | Age: 78
End: 2018-12-31
Payer: COMMERCIAL

## 2018-12-31 DIAGNOSIS — Z79.01 LONG TERM CURRENT USE OF ANTICOAGULANTS WITH INR GOAL OF 2.0-3.0: ICD-10-CM

## 2018-12-31 LAB — INR PPP: 2.4 (ref 0.8–1.1)

## 2018-12-31 PROCEDURE — 99207 ZZC NO CHARGE NURSE ONLY: CPT | Performed by: INTERNAL MEDICINE

## 2018-12-31 NOTE — PROGRESS NOTES
ANTICOAGULATION FOLLOW-UP CLINIC VISIT    Patient Name:  Mariya Rowe  Date:  2018  Contact Type:  Telephone/ Faxe received from [employer] reporting INR result. Called pt, denies any changes. Orders discussed with pt.     SUBJECTIVE:     Patient Findings     Positives:   No Problem Findings    Comments:   Pt denies any changes or missed warfarin doses since last INR check. Pt denies any bleeding or bruising problems.              OBJECTIVE    INR   Date Value Ref Range Status   2018 2.4 (A) 0.8 - 1.1 Final     Comment:     [employer]       ASSESSMENT / PLAN  INR assessment THER    Recheck INR In: 2 WEEKS    INR Location Home INR    Billed home INR No      Anticoagulation Summary  As of 2018    INR goal:   2.0-3.0   TTR:   68.5 % (2.6 y)   INR used for dosin.4 (2018)   Warfarin maintenance plan:   2.5 mg (5 mg x 0.5) every Wed, Sat; 5 mg (5 mg x 1) all other days   Full warfarin instructions:   2.5 mg every Wed, Sat; 5 mg all other days   Weekly warfarin total:   30 mg   No change documented:   Karla Waller RN   Plan last modified:   Anjali Moss RN (2017)   Next INR check:   2019   Priority:   INR   Target end date:       Indications    Long term current use of anticoagulants with INR goal of 2.0-3.0 [Z79.01]  Atrial fibrillation (H) [I48.91]             Anticoagulation Episode Summary     INR check location:       Preferred lab:       Send INR reminders to:   [facility]    Comments:   started home testing on 18.      Anticoagulation Care Providers     Provider Role Specialty Phone number    Chavez Molina MD [facility] Internal Medicine [phone]            See the Encounter Report to view Anticoagulation Flowsheet and Dosing Calendar (Go to Encounters tab in chart review, and find the Anticoagulation Therapy Visit)    Dosage adjustment made based on physician directed care plan.    Karla Waller, RN

## 2019-01-16 LAB — INR PPP: 3.1 (ref 0.8–1.1)

## 2019-01-17 ENCOUNTER — ANTICOAGULATION THERAPY VISIT (OUTPATIENT)
Dept: ANTICOAGULATION | Facility: CLINIC | Age: 79
End: 2019-01-17
Payer: MEDICARE

## 2019-01-17 DIAGNOSIS — Z79.01 LONG TERM CURRENT USE OF ANTICOAGULANTS WITH INR GOAL OF 2.0-3.0: ICD-10-CM

## 2019-01-17 PROCEDURE — G0250 MD INR TEST REVIE INTER MGMT: HCPCS | Performed by: INTERNAL MEDICINE

## 2019-01-17 NOTE — PROGRESS NOTES
ANTICOAGULATION FOLLOW-UP CLINIC VISIT    Patient Name:  Mariya Rowe  Date:  1/17/2019  Contact Type:  Telephone/ Fax received from [employer] reporting INR result. Called pt, left detailed message advising of dosing instruction and to call INR clinic with any changes, questions, or concerns.    SUBJECTIVE:     Patient Findings     Comments:   Fax received from [employer] reporting INR result, was place on INR desk after hours on 1/16/18           OBJECTIVE    INR   Date Value Ref Range Status   01/16/2019 3.1 (A) 0.8 - 1.1 Final     Comment:     [employer]       ASSESSMENT / PLAN  INR assessment THER    Recheck INR In: 2 WEEKS    INR Location Home INR    Billed home INR Yes      Anticoagulation Summary  As of 1/17/2019    INR goal:   2.0-3.0   TTR:   68.8 % (2.6 y)   INR used for dosing:   3.1! (1/16/2019)   Warfarin maintenance plan:   2.5 mg (5 mg x 0.5) every Wed, Sat; 5 mg (5 mg x 1) all other days   Full warfarin instructions:   2.5 mg every Wed, Sat; 5 mg all other days   Weekly warfarin total:   30 mg   No change documented:   Karla Waller, RN   Plan last modified:   Anjali Moss RN (1/25/2017)   Next INR check:   1/29/2019   Priority:   INR   Target end date:       Indications    Long term current use of anticoagulants with INR goal of 2.0-3.0 [Z79.01]  Atrial fibrillation (H) [I48.91]             Anticoagulation Episode Summary     INR check location:       Preferred lab:       Send INR reminders to:   [facility]    Comments:   started home testing on 12/6/18.      Anticoagulation Care Providers     Provider Role Specialty Phone number    Chavez Molina MD [facility] Internal Medicine [phone]            See the Encounter Report to view Anticoagulation Flowsheet and Dosing Calendar (Go to Encounters tab in chart review, and find the Anticoagulation Therapy Visit)    Dosage adjustment made based on physician directed care plan.    Karla Waller, RN

## 2019-01-30 ENCOUNTER — ANTICOAGULATION THERAPY VISIT (OUTPATIENT)
Dept: ANTICOAGULATION | Facility: CLINIC | Age: 79
End: 2019-01-30
Payer: COMMERCIAL

## 2019-01-30 DIAGNOSIS — I48.91 ATRIAL FIBRILLATION (H): ICD-10-CM

## 2019-01-30 DIAGNOSIS — Z79.01 LONG TERM CURRENT USE OF ANTICOAGULANTS WITH INR GOAL OF 2.0-3.0: ICD-10-CM

## 2019-01-30 LAB — INR PPP: 3.3 (ref 0.8–1.1)

## 2019-01-30 PROCEDURE — 99207 ZZC NO CHARGE NURSE ONLY: CPT | Performed by: INTERNAL MEDICINE

## 2019-01-30 NOTE — PROGRESS NOTES
ANTICOAGULATION FOLLOW-UP CLINIC VISIT    Patient Name:  Mariya Rowe  Date:  1/30/2019  Contact Type:  Telephone/ discussed warfarin dosing and next INR draw with patient.    SUBJECTIVE:     Patient Findings     Positives:   Unexplained INR or factor level change    Comments:   Patient denies any changes in diet or medications since last INR visit.  Denies any bleeding or bruising problems.  INR has been consistently elevated so maintenance dose was adjusted today.           OBJECTIVE    INR   Date Value Ref Range Status   01/30/2019 3.3 (A) 0.8 - 1.1 Final       ASSESSMENT / PLAN  INR assessment SUPRA    Recheck INR In: 2 WEEKS    INR Location Home INR      Anticoagulation Summary  As of 1/30/2019    INR goal:   2.0-3.0   TTR:   67.8 % (2.7 y)   INR used for dosing:   3.3! (1/30/2019)   Warfarin maintenance plan:   2.5 mg (5 mg x 0.5) every Wed, Sat; 5 mg (5 mg x 1) all other days   Full warfarin instructions:   1/30: Hold; 2/4: 2.5 mg; 2/11: 2.5 mg; Otherwise 2.5 mg every Wed, Sat; 5 mg all other days   Weekly warfarin total:   30 mg   Plan last modified:   Anjali Moss RN (1/25/2017)   Next INR check:   2/13/2019   Priority:   INR   Target end date:       Indications    Long term current use of anticoagulants with INR goal of 2.0-3.0 [Z79.01]  Atrial fibrillation (H) [I48.91]             Anticoagulation Episode Summary     INR check location:       Preferred lab:       Send INR reminders to:   [facility]    Comments:   started home testing on 12/6/18.  Contact patient at [phone] until back in [place].      Anticoagulation Care Providers     Provider Role Specialty Phone number    Chavez Molina MD Responsible Internal Medicine [phone]            See the Encounter Report to view Anticoagulation Flowsheet and Dosing Calendar (Go to Encounters tab in chart review, and find the Anticoagulation Therapy Visit)    Dosage adjustment made based on physician directed care plan.    Vira Crawford RN

## 2019-02-08 LAB — INR PPP: 1.7 (ref 0.8–1.1)

## 2019-02-11 ENCOUNTER — ANTICOAGULATION THERAPY VISIT (OUTPATIENT)
Dept: ANTICOAGULATION | Facility: CLINIC | Age: 79
End: 2019-02-11
Payer: COMMERCIAL

## 2019-02-11 DIAGNOSIS — Z79.01 LONG TERM CURRENT USE OF ANTICOAGULANTS WITH INR GOAL OF 2.0-3.0: ICD-10-CM

## 2019-02-11 PROCEDURE — 99207 ZZC NO CHARGE NURSE ONLY: CPT | Performed by: INTERNAL MEDICINE

## 2019-02-11 NOTE — PROGRESS NOTES
ANTICOAGULATION FOLLOW-UP CLINIC VISIT    Patient Name:  Mariya Rowe  Date:  2019  Contact Type:  Telephone/ discussed warfarin dosing and when to do next INR check with patient.     SUBJECTIVE:     Patient Findings     Positives:   Med error (Pt reports was adjusting warfarin dose herself), No Problem Findings    Comments:   Pt denies any missed warfarin doses since last INR check. Pt denies any bleeding or bruising problems. Pt was not due for INR check until 19. Pt checked INR on 19, results received in clinic on 19 but not received in INR Clinic until 19. Pt reports was adjusting warfarin dose herself. Pt was reminded not to do that and pt reports has been checking INR more often, states will not do that anymore.            OBJECTIVE    INR   Date Value Ref Range Status   2019 1.7 (A) 0.8 - 1.1 Final     Comment:     [facility]       ASSESSMENT / PLAN  INR assessment SUB    Recheck INR In: 2 WEEKS    INR Location Home INR    Billed home INR No      Anticoagulation Summary  As of 2019    INR goal:   2.0-3.0   TTR:   67.8 % (2.7 y)   INR used for dosin.7! (2019)   Warfarin maintenance plan:   2.5 mg (5 mg x 0.5) every Wed, Sat; 5 mg (5 mg x 1) all other days   Full warfarin instructions:   2.5 mg every Wed, Sat; 5 mg all other days   Weekly warfarin total:   30 mg   Plan last modified:   Anjali Moss RN (2017)   Next INR check:   2019   Priority:   INR   Target end date:       Indications    Long term current use of anticoagulants with INR goal of 2.0-3.0 [Z79.01]  Atrial fibrillation (H) [I48.91]             Anticoagulation Episode Summary     INR check location:       Preferred lab:       Send INR reminders to:   [facility]    Comments:   started home testing on 18.  Contact patient at [phone] until back in [place].      Anticoagulation Care Providers     Provider Role Specialty Phone number    Chavez Molina MD Responsible Internal Medicine  [phone]            See the Encounter Report to view Anticoagulation Flowsheet and Dosing Calendar (Go to Encounters tab in chart review, and find the Anticoagulation Therapy Visit)    Dosage adjustment made based on physician directed care plan.    Karla Waller RN

## 2019-02-22 ENCOUNTER — ANTICOAGULATION THERAPY VISIT (OUTPATIENT)
Dept: ANTICOAGULATION | Facility: CLINIC | Age: 79
End: 2019-02-22
Payer: COMMERCIAL

## 2019-02-22 DIAGNOSIS — Z79.01 LONG TERM CURRENT USE OF ANTICOAGULANTS WITH INR GOAL OF 2.0-3.0: ICD-10-CM

## 2019-02-22 LAB — INR PPP: 2.8 (ref 0.8–1.1)

## 2019-02-22 PROCEDURE — 99207 ZZC NO CHARGE NURSE ONLY: CPT | Performed by: INTERNAL MEDICINE

## 2019-02-22 NOTE — PROGRESS NOTES
ANTICOAGULATION FOLLOW-UP CLINIC VISIT    Patient Name:  Mariya Rowe  Date:  2019  Contact Type:  Telephone/ Fax received from "[employer] reporting INR result. Called pt, reports no changes. Orders discussed with pt.     SUBJECTIVE:     Patient Findings     Positives:   No Problem Findings    Comments:   Pt denies any changes or missed warfarin doses since last INR check. Pt denies any bleeding or bruising problems.              OBJECTIVE    INR   Date Value Ref Range Status   2019 2.8 (A) 0.8 - 1.1 Final     Comment:     [facility]       ASSESSMENT / PLAN  INR assessment THER    Recheck INR In: 2 WEEKS    INR Location Home INR    Billed home INR No      Anticoagulation Summary  As of 2019    INR goal:   2.0-3.0   TTR:   67.8 % (2.7 y)   INR used for dosin.8 (2019)   Warfarin maintenance plan:   2.5 mg (5 mg x 0.5) every Wed, Sat; 5 mg (5 mg x 1) all other days   Full warfarin instructions:   2.5 mg every Wed, Sat; 5 mg all other days   Weekly warfarin total:   30 mg   No change documented:   Karla Waller, RN   Plan last modified:   Anjali Moss RN (2017)   Next INR check:   3/8/2019   Priority:   INR   Target end date:       Indications    Long term current use of anticoagulants with INR goal of 2.0-3.0 [Z79.01]  Atrial fibrillation (H) [I48.91]             Anticoagulation Episode Summary     INR check location:       Preferred lab:       Send INR reminders to:   [facility]    Comments:   started home testing on 18.  Contact patient at [phone] until back in [place].      Anticoagulation Care Providers     Provider Role Specialty Phone number    Chavez Molina MD [facility] Internal Medicine [phone]            See the Encounter Report to view Anticoagulation Flowsheet and Dosing Calendar (Go to Encounters tab in chart review, and find the Anticoagulation Therapy Visit)    Dosage adjustment made based on physician directed care plan.    Karla Waller  RN

## 2019-02-24 DIAGNOSIS — R00.2 PALPITATIONS: ICD-10-CM

## 2019-02-25 RX ORDER — WARFARIN SODIUM 5 MG/1
TABLET ORAL
Qty: 90 TABLET | Refills: 0 | Status: SHIPPED | OUTPATIENT
Start: 2019-02-25 | End: 2019-06-10

## 2019-02-25 NOTE — TELEPHONE ENCOUNTER
"Warfarin 5 mg      Last Written Prescription Date:  7/18/18  Last Fill Quantity: 90,   # refills: 1  Last Office Visit: 8/22/18  Future Office visit:       Requested Prescriptions   Pending Prescriptions Disp Refills     warfarin (GUILLERMOTOVEN) 5 MG tablet [Pharmacy Med Name: Guillermotoven Oral Tablet 5 MG] 90 tablet 0     Sig: TAKE 1 TABLET BY MOUTH DAILY EXCEPT FOR WEDNESDAY AND SATURDAY TAKE 1/2 TABLET OR AS INSTRUCTED    Vitamin K Antagonists Failed - 2/24/2019 11:44 AM       Failed - INR is within goal in the past 6 weeks    Confirm INR is within goal in the past 6 weeks.     Recent Labs   Lab Test 02/22/19   INR 2.8*                      Passed - Recent (12 mo) or future (30 days) visit within the authorizing provider's specialty    Patient had office visit in the last 12 months or has a visit in the next 30 days with authorizing provider or within the authorizing provider's specialty.  See \"Patient Info\" tab in inbasket, or \"Choose Columns\" in Meds & Orders section of the refill encounter.             Passed - Medication is active on med list       Passed - Patient is 18 years of age or older       Passed - Patient is not pregnant       Passed - No positive pregnancy on file in past 12 months        Prescription approved per [facility] Refill Protocol.  Karla Waller RN    "

## 2019-03-08 ENCOUNTER — ANTICOAGULATION THERAPY VISIT (OUTPATIENT)
Dept: ANTICOAGULATION | Facility: CLINIC | Age: 79
End: 2019-03-08
Payer: MEDICARE

## 2019-03-08 DIAGNOSIS — Z79.01 LONG TERM CURRENT USE OF ANTICOAGULANTS WITH INR GOAL OF 2.0-3.0: ICD-10-CM

## 2019-03-08 LAB — INR PPP: 3.2 (ref 0.8–1.1)

## 2019-03-08 PROCEDURE — G0250 MD INR TEST REVIE INTER MGMT: HCPCS | Performed by: INTERNAL MEDICINE

## 2019-03-08 NOTE — PROGRESS NOTES
ANTICOAGULATION FOLLOW-UP CLINIC VISIT    Patient Name:  Mariya Rowe  Date:  3/8/2019  Contact Type:  Telephone/ Fax received from [employer] reporting INR result.Called pt reports only diet change, no other changes. Orders discussed with patient.     SUBJECTIVE:     Patient Findings     Positives:   Change in diet/appetite (Pt reports decreased green intake, will resume usual intake. ), No Problem Findings    Comments:   Pt denies any missed warfarin doses since last INR visit. Pt denies any bleeding or bruising problems.              OBJECTIVE    INR   Date Value Ref Range Status   03/08/2019 3.2 (A) 0.8 - 1.1 Final     Comment:     [facility]       ASSESSMENT / PLAN  INR assessment SUPRA    Recheck INR In: 2 WEEKS    INR Location Home INR    Billed home INR Yes      Anticoagulation Summary  As of 3/8/2019    INR goal:   2.0-3.0   TTR:   67.6 % (2.8 y)   INR used for dosing:   3.2! (3/8/2019)   Warfarin maintenance plan:   2.5 mg (5 mg x 0.5) every Wed, Sat; 5 mg (5 mg x 1) all other days   Full warfarin instructions:   3/8: 2.5 mg; Otherwise 2.5 mg every Wed, Sat; 5 mg all other days   Weekly warfarin total:   30 mg   Plan last modified:   Anjali Moss RN (1/25/2017)   Next INR check:   3/22/2019   Priority:   INR   Target end date:       Indications    Long term current use of anticoagulants with INR goal of 2.0-3.0 [Z79.01]  Atrial fibrillation (H) [I48.91]             Anticoagulation Episode Summary     INR check location:       Preferred lab:       Send INR reminders to:   [facility]    Comments:   started home testing on 12/6/18.  Contact patient at [phone] until back in [place].      Anticoagulation Care Providers     Provider Role Specialty Phone number    Chavez Molina MD [facility] Internal Medicine [phone]            See the Encounter Report to view Anticoagulation Flowsheet and Dosing Calendar (Go to Encounters tab in chart review, and find the Anticoagulation Therapy Visit)    Dosage  adjustment made based on physician directed care plan.    Karla Waller RN

## 2019-03-22 ENCOUNTER — ANTICOAGULATION THERAPY VISIT (OUTPATIENT)
Dept: ANTICOAGULATION | Facility: CLINIC | Age: 79
End: 2019-03-22
Payer: MEDICARE

## 2019-03-22 DIAGNOSIS — Z79.01 LONG TERM CURRENT USE OF ANTICOAGULANTS WITH INR GOAL OF 2.0-3.0: ICD-10-CM

## 2019-03-22 DIAGNOSIS — I48.91 ATRIAL FIBRILLATION (H): ICD-10-CM

## 2019-03-22 LAB — INR PPP: 2.6 (ref 0.8–1.1)

## 2019-03-22 PROCEDURE — 99207 ZZC NO CHARGE NURSE ONLY: CPT | Performed by: INTERNAL MEDICINE

## 2019-03-22 NOTE — PROGRESS NOTES
ANTICOAGULATION FOLLOW-UP CLINIC VISIT    Patient Name:  Mariya Rowe  Date:  3/22/2019  Contact Type:  Telephone/ discussed warfarin dosing and next INR check with patient.    SUBJECTIVE:     Patient Findings     Comments:   The patient was assessed for diet, medication, and activity level changes, missed or extra doses, bruising or bleeding, with no problem findings.             OBJECTIVE    INR   Date Value Ref Range Status   2019 2.6 (A) 0.8 - 1.1 Final       ASSESSMENT / PLAN  INR assessment THER    Recheck INR In: 2 WEEKS    INR Location Home INR    Billed home INR No      Anticoagulation Summary  As of 3/22/2019    INR goal:   2.0-3.0   TTR:   67.6 % (2.8 y)   INR used for dosin.6 (3/22/2019)   Warfarin maintenance plan:   2.5 mg (5 mg x 0.5) every Wed, Sat; 5 mg (5 mg x 1) all other days   Full warfarin instructions:   2.5 mg every Wed, Sat; 5 mg all other days   Weekly warfarin total:   30 mg   No change documented:   Vira Crawford RN   Plan last modified:   Anjali Moss RN (2017)   Next INR check:   2019   Priority:   INR   Target end date:       Indications    Long term current use of anticoagulants with INR goal of 2.0-3.0 [Z79.01]  Atrial fibrillation (H) [I48.91]             Anticoagulation Episode Summary     INR check location:       Preferred lab:       Send INR reminders to:   [facility]    Comments:   started home testing on 18.  Contact patient at [phone] until back in [place].      Anticoagulation Care Providers     Provider Role Specialty Phone number    Chavez Molina MD [facility] Internal Medicine [phone]            See the Encounter Report to view Anticoagulation Flowsheet and Dosing Calendar (Go to Encounters tab in chart review, and find the Anticoagulation Therapy Visit)    Dosage adjustment made based on physician directed care plan.    Vira Crawford RN

## 2019-05-17 ENCOUNTER — ANTICOAGULATION THERAPY VISIT (OUTPATIENT)
Dept: ANTICOAGULATION | Facility: CLINIC | Age: 79
End: 2019-05-17
Payer: MEDICARE

## 2019-05-17 DIAGNOSIS — Z79.01 LONG TERM CURRENT USE OF ANTICOAGULANTS WITH INR GOAL OF 2.0-3.0: ICD-10-CM

## 2019-05-17 LAB — INR POINT OF CARE: 4.1 (ref 0.86–1.14)

## 2019-05-17 PROCEDURE — 99207 ZZC NO CHARGE NURSE ONLY: CPT

## 2019-05-17 PROCEDURE — 36416 COLLJ CAPILLARY BLOOD SPEC: CPT

## 2019-05-17 PROCEDURE — 85610 PROTHROMBIN TIME: CPT | Mod: QW

## 2019-05-17 NOTE — PROGRESS NOTES
ANTICOAGULATION FOLLOW-UP CLINIC VISIT    Patient Name:  Mariya Rowe  Date:  5/17/2019  Contact Type:  Face to Face    SUBJECTIVE:  Patient Findings     Comments:   The patient was assessed for diet, medication, and activity level changes, missed or extra doses, bruising or bleeding, with no problem findings. Last INR check on file was 3/22/19. Patient brought home monitor to clinic visit today, was in [place] for the winter, ran out of supplies. Patient is back in [place], was getting errors on her machine, bumped the strip or not enough blood. Spoke to patient this morning to remind her she was past due for INR check and we scheduled appointment for patient to come to clinic. Observed patient test, provided patient with tips for applying the blood to the strip without touching the strip. INR test result on patients home monitor was 4.1. INR result on clinic monitor was 4.1.          Clinical Outcomes     Negatives:   Major bleeding event, Thromboembolic event, Anticoagulation-related hospital admission, Anticoagulation-related ED visit, Anticoagulation-related fatality    Comments:   The patient was assessed for diet, medication, and activity level changes, missed or extra doses, bruising or bleeding, with no problem findings. Last INR check on file was 3/22/19. Patient brought home monitor to clinic visit today, was in [place] for the winter, ran out of supplies. Patient is back in [place], was getting errors on her machine, bumped the strip or not enough blood. Spoke to patient this morning to remind her she was past due for INR check and we scheduled appointment for patient to come to clinic. Observed patient test, provided patient with tips for applying the blood to the strip without touching the strip. INR test result on patients home monitor was 4.1. INR result on clinic monitor was 4.1.             OBJECTIVE    INR Protime   Date Value Ref Range Status   05/17/2019 4.1 (A) 0.86 - 1.14 Final       ASSESSMENT / PLAN  INR  assessment SUPRA    Recheck INR In: 10 DAYS    INR Location Clinic      Anticoagulation Summary  As of 2019    INR goal:   2.0-3.0   TTR:   65.5 % (3 y)   INR used for dosin.1! (2019)   Warfarin maintenance plan:   2.5 mg (5 mg x 0.5) every Wed, Sat; 5 mg (5 mg x 1) all other days   Full warfarin instructions:   : Hold; Otherwise 2.5 mg every Wed, Sat; 5 mg all other days   Weekly warfarin total:   30 mg   Plan last modified:   Anjali Moss RN (2017)   Next INR check:   2019   Priority:   INR   Target end date:       Indications    Long term current use of anticoagulants with INR goal of 2.0-3.0 [Z79.01]  Atrial fibrillation (H) [I48.91]             Anticoagulation Episode Summary     INR check location:       Preferred lab:       Send INR reminders to:   [facility]    Comments:   started home testing on 18.  Contact patient at [phone] until back in [place].      Anticoagulation Care Providers     Provider Role Specialty Phone number    Chavez Molina MD [facility] Internal Medicine [phone]            See the Encounter Report to view Anticoagulation Flowsheet and Dosing Calendar (Go to Encounters tab in chart review, and find the Anticoagulation Therapy Visit)    Dosage adjustment made based on physician directed care plan.    Karla Waller RN

## 2019-05-28 ENCOUNTER — ANTICOAGULATION THERAPY VISIT (OUTPATIENT)
Dept: ANTICOAGULATION | Facility: CLINIC | Age: 79
End: 2019-05-28
Payer: MEDICARE

## 2019-05-28 DIAGNOSIS — Z79.01 LONG TERM CURRENT USE OF ANTICOAGULANTS WITH INR GOAL OF 2.0-3.0: ICD-10-CM

## 2019-05-28 LAB — INR PPP: 2.7 (ref 0.8–1.1)

## 2019-05-28 PROCEDURE — 99207 ZZC NO CHARGE NURSE ONLY: CPT | Performed by: INTERNAL MEDICINE

## 2019-05-28 NOTE — PROGRESS NOTES
ANTICOAGULATION FOLLOW-UP CLINIC VISIT    Patient Name:  Mariya Rowe  Date:  2019  Contact Type:  Telephone/ Fax received from [employer] reporting INR result. Called patient, reports no changes. Orders discussed with patient.     SUBJECTIVE:  Patient Findings     Comments:   The patient was assessed for diet, medication, and activity level changes, missed or extra doses, bruising or bleeding, with no problem findings.          Clinical Outcomes     Negatives:   Major bleeding event, Thromboembolic event, Anticoagulation-related hospital admission, Anticoagulation-related ED visit, Anticoagulation-related fatality    Comments:   The patient was assessed for diet, medication, and activity level changes, missed or extra doses, bruising or bleeding, with no problem findings.             OBJECTIVE    INR   Date Value Ref Range Status   2019 2.7 (A) 0.8 - 1.1 Final     Comment:     [facility]       ASSESSMENT / PLAN  INR assessment THER    Recheck INR In: 2 WEEKS    INR Location Home INR    Billed home INR No      Anticoagulation Summary  As of 2019    INR goal:   2.0-3.0   TTR:   65.0 % (3 y)   INR used for dosin.7 (2019)   Warfarin maintenance plan:   2.5 mg (5 mg x 0.5) every Wed, Sat; 5 mg (5 mg x 1) all other days   Full warfarin instructions:   2.5 mg every Wed, Sat; 5 mg all other days   Weekly warfarin total:   30 mg   No change documented:   Karla Waller RN   Plan last modified:   Anjali Moss, RN (2017)   Next INR check:   2019   Priority:   INR   Target end date:       Indications    Long term current use of anticoagulants with INR goal of 2.0-3.0 [Z79.01]  Atrial fibrillation (H) [I48.91]             Anticoagulation Episode Summary     INR check location:       Preferred lab:       Send INR reminders to:   [facility]    Comments:   started home testing on 18.  Contact patient at [phone] until back in [place].      Anticoagulation Care Providers     Provider Role  Specialty Phone number    Chavez Molina MD [facility] Internal Medicine [phone]            See the Encounter Report to view Anticoagulation Flowsheet and Dosing Calendar (Go to Encounters tab in chart review, and find the Anticoagulation Therapy Visit)    Dosage adjustment made based on physician directed care plan.    Karla Waller RN

## 2019-06-10 DIAGNOSIS — R00.2 PALPITATIONS: ICD-10-CM

## 2019-06-10 RX ORDER — WARFARIN SODIUM 5 MG/1
TABLET ORAL
Qty: 90 TABLET | Refills: 0 | Status: SHIPPED | OUTPATIENT
Start: 2019-06-10 | End: 2019-08-27

## 2019-06-10 NOTE — TELEPHONE ENCOUNTER
"Requested Prescriptions   Pending Prescriptions Disp Refills     warfarin (JANTOVEN) 5 MG tablet [Pharmacy Med Name: Jantoven Oral Tablet 5 MG] 90 tablet 0     Sig: TAKE 1 TABLET BY MOUTH DAILY EXCEPT FOR WED, AND SAT. TAKE 1/2 TAB OR AS DIRECTED       Vitamin K Antagonists Failed - 6/10/2019 10:49 AM        Failed - INR is within goal in the past 6 weeks     Confirm INR is within goal in the past 6 weeks.     Recent Labs   Lab Test 05/28/19   INR 2.7*                       Passed - Recent (12 mo) or future (30 days) visit within the authorizing provider's specialty     Patient had office visit in the last 12 months or has a visit in the next 30 days with authorizing provider or within the authorizing provider's specialty.  See \"Patient Info\" tab in inbasket, or \"Choose Columns\" in Meds & Orders section of the refill encounter.              Passed - Medication is active on med list        Passed - Patient is 18 years of age or older        Passed - Patient is not pregnant        Passed - No positive pregnancy on file in past 12 months        Last office visit 8/22/18. Jantoven dosing is managed by INR Clinic.  Prescription approved per [facility] Refill Protocol.  Vira Crawford RN      "

## 2019-06-12 ENCOUNTER — ANTICOAGULATION THERAPY VISIT (OUTPATIENT)
Dept: ANTICOAGULATION | Facility: CLINIC | Age: 79
End: 2019-06-12
Payer: MEDICARE

## 2019-06-12 DIAGNOSIS — Z79.01 LONG TERM CURRENT USE OF ANTICOAGULANTS WITH INR GOAL OF 2.0-3.0: ICD-10-CM

## 2019-06-12 DIAGNOSIS — I48.91 ATRIAL FIBRILLATION (H): ICD-10-CM

## 2019-06-12 LAB — INR PPP: 3.3 (ref 0.8–1.1)

## 2019-06-12 PROCEDURE — 99207 ZZC NO CHARGE NURSE ONLY: CPT | Performed by: INTERNAL MEDICINE

## 2019-06-12 NOTE — PROGRESS NOTES
"ANTICOAGULATION FOLLOW-UP CLINIC VISIT    Patient Name:  Mariya Rowe  Date:  6/12/2019  Contact Type:  Telephone/ discussed warfarin dosing and next INR check with patient.    SUBJECTIVE:  Patient Findings     Positives:   Change in diet/appetite (Patient has not been eating as many green veggies recently.)    Comments:   Patient \"didn't feel well yesterday.\"  She had a HA, and some stomach discomfort.  Denied any vomiting or diarrhea.  She is feeling better today. The patient was assessed for diet, medication, and activity level changes, missed or extra doses, bruising or bleeding, with no problem findings.          Clinical Outcomes     Negatives:   Major bleeding event, Thromboembolic event, Anticoagulation-related hospital admission, Anticoagulation-related ED visit, Anticoagulation-related fatality    Comments:   Patient \"didn't feel well yesterday.\"  She had a HA, and some stomach discomfort.  Denied any vomiting or diarrhea.  She is feeling better today. The patient was assessed for diet, medication, and activity level changes, missed or extra doses, bruising or bleeding, with no problem findings.             OBJECTIVE    INR   Date Value Ref Range Status   06/12/2019 3.3 (A) 0.8 - 1.1 Final       ASSESSMENT / PLAN  INR assessment SUPRA    Recheck INR In: 2 WEEKS    INR Location Home INR    Billed home INR No      Anticoagulation Summary  As of 6/12/2019    INR goal:   2.0-3.0   TTR:   64.8 % (3 y)   INR used for dosing:   3.3! (6/12/2019)   Warfarin maintenance plan:   2.5 mg (5 mg x 0.5) every Wed, Sat; 5 mg (5 mg x 1) all other days   Full warfarin instructions:   6/13: 2.5 mg; Otherwise 2.5 mg every Wed, Sat; 5 mg all other days   Weekly warfarin total:   30 mg   Plan last modified:   Anjali Moss RN (1/25/2017)   Next INR check:   6/26/2019   Priority:   INR   Target end date:       Indications    Long term current use of anticoagulants with INR goal of 2.0-3.0 [Z79.01]  Atrial fibrillation " (H) [I48.91]             Anticoagulation Episode Summary     INR check location:       Preferred lab:       Send INR reminders to:   [facility]    Comments:   started home testing on 12/6/18.  Contact patient at [phone] until back in [place].      Anticoagulation Care Providers     Provider Role Specialty Phone number    Chavez Molina MD [facility] Internal Medicine [phone]            See the Encounter Report to view Anticoagulation Flowsheet and Dosing Calendar (Go to Encounters tab in chart review, and find the Anticoagulation Therapy Visit)    Dosage adjustment made based on physician directed care plan.    Vira Crawford RN

## 2019-06-25 ENCOUNTER — ANTICOAGULATION THERAPY VISIT (OUTPATIENT)
Dept: ANTICOAGULATION | Facility: CLINIC | Age: 79
End: 2019-06-25
Payer: MEDICARE

## 2019-06-25 DIAGNOSIS — Z79.01 LONG TERM CURRENT USE OF ANTICOAGULANTS WITH INR GOAL OF 2.0-3.0: ICD-10-CM

## 2019-06-25 LAB — INR PPP: 3 (ref 0.8–1.1)

## 2019-06-25 PROCEDURE — G0250 MD INR TEST REVIE INTER MGMT: HCPCS | Performed by: INTERNAL MEDICINE

## 2019-06-25 NOTE — PROGRESS NOTES
ANTICOAGULATION FOLLOW-UP CLINIC VISIT    Patient Name:  Mariya Rowe  Date:  6/25/2019  Contact Type:  Telephone/ Fax received from [employer] reporting INR result. Called patient, no changes. Orders discussed with patient.     SUBJECTIVE:  Patient Findings     Comments:   The patient was assessed for diet, medication, and activity level changes, missed or extra doses, bruising or bleeding, with no problem findings.          Clinical Outcomes     Negatives:   Major bleeding event, Thromboembolic event, Anticoagulation-related hospital admission, Anticoagulation-related ED visit, Anticoagulation-related fatality    Comments:   The patient was assessed for diet, medication, and activity level changes, missed or extra doses, bruising or bleeding, with no problem findings.             OBJECTIVE    INR   Date Value Ref Range Status   06/25/2019 3.0 (A) 0.8 - 1.1 Final     Comment:     [facility]       ASSESSMENT / PLAN  INR assessment THER    Recheck INR In: 2 WEEKS    INR Location Home INR    Billed home INR Yes      Anticoagulation Summary  As of 6/25/2019    INR goal:   2.0-3.0   TTR:   64.1 % (3.1 y)   INR used for dosing:   3.0 (6/25/2019)   Warfarin maintenance plan:   2.5 mg (5 mg x 0.5) every Wed, Sat; 5 mg (5 mg x 1) all other days   Full warfarin instructions:   2.5 mg every Wed, Sat; 5 mg all other days   Weekly warfarin total:   30 mg   No change documented:   Karla Waller, RN   Plan last modified:   Anjali Moss, RN (1/25/2017)   Next INR check:   7/9/2019   Priority:   INR   Target end date:       Indications    Long term current use of anticoagulants with INR goal of 2.0-3.0 [Z79.01]  Atrial fibrillation (H) [I48.91]             Anticoagulation Episode Summary     INR check location:       Preferred lab:       Send INR reminders to:   [facility]    Comments:   started home testing on 12/6/18.  Contact patient at [phone] until back in [place].      Anticoagulation Care Providers      Provider Role Specialty Phone number    Chavez Molnia MD [facility] Internal Medicine [phone]            See the Encounter Report to view Anticoagulation Flowsheet and Dosing Calendar (Go to Encounters tab in chart review, and find the Anticoagulation Therapy Visit)    Dosage adjustment made based on physician directed care plan.    Karla Waller RN

## 2019-07-09 ENCOUNTER — ANTICOAGULATION THERAPY VISIT (OUTPATIENT)
Dept: ANTICOAGULATION | Facility: CLINIC | Age: 79
End: 2019-07-09
Payer: MEDICARE

## 2019-07-09 DIAGNOSIS — Z79.01 LONG TERM CURRENT USE OF ANTICOAGULANTS WITH INR GOAL OF 2.0-3.0: ICD-10-CM

## 2019-07-09 LAB — INR PPP: 2.8 (ref 0.8–1.1)

## 2019-07-09 PROCEDURE — 99207 ZZC NO CHARGE NURSE ONLY: CPT | Performed by: INTERNAL MEDICINE

## 2019-07-09 NOTE — PROGRESS NOTES
ANTICOAGULATION FOLLOW-UP CLINIC VISIT    Patient Name:  Mariya Rowe  Date:  2019  Contact Type:Telephone. Fax received from [employer] reporting INR result. Called patient, not at home, spouse reports no changes. Orders discussed with spouse, consent to communicate is on file.     SUBJECTIVE:  Patient Findings     Comments:   The patient was assessed for diet, medication, and activity level changes, missed or extra doses, bruising or bleeding, with no problem findings.          Clinical Outcomes     Negatives:   Major bleeding event, Thromboembolic event, Anticoagulation-related hospital admission, Anticoagulation-related ED visit, Anticoagulation-related fatality    Comments:   The patient was assessed for diet, medication, and activity level changes, missed or extra doses, bruising or bleeding, with no problem findings.             OBJECTIVE    INR   Date Value Ref Range Status   2019 2.8 (A) 0.8 - 1.1 Final     Comment:     [facility]       ASSESSMENT / PLAN  INR assessment THER    Recheck INR In: 2 WEEKS    INR Location Home INR    Billed home INR No      Anticoagulation Summary  As of 2019    INR goal:   2.0-3.0   TTR:   64.5 % (3.1 y)   INR used for dosin.8 (2019)   Warfarin maintenance plan:   2.5 mg (5 mg x 0.5) every Wed, Sat; 5 mg (5 mg x 1) all other days   Full warfarin instructions:   2.5 mg every Wed, Sat; 5 mg all other days   Weekly warfarin total:   30 mg   No change documented:   Karla Waller RN   Plan last modified:   Anjali Moss, RN (2017)   Next INR check:   2019   Priority:   INR   Target end date:       Indications    Long term current use of anticoagulants with INR goal of 2.0-3.0 [Z79.01]  Atrial fibrillation (H) [I48.91]             Anticoagulation Episode Summary     INR check location:       Preferred lab:       Send INR reminders to:   [facility]    Comments:   started home testing on 18.  Contact patient at [phone]  until back in [place].      Anticoagulation Care Providers     Provider Role Specialty Phone number    Chavez Molina MD Responsible Internal Medicine [phone]            See the Encounter Report to view Anticoagulation Flowsheet and Dosing Calendar (Go to Encounters tab in chart review, and find the Anticoagulation Therapy Visit)    Dosage adjustment made based on physician directed care plan.    Karla Waller RN

## 2019-07-23 ENCOUNTER — ANTICOAGULATION THERAPY VISIT (OUTPATIENT)
Dept: ANTICOAGULATION | Facility: CLINIC | Age: 79
End: 2019-07-23
Payer: MEDICARE

## 2019-07-23 DIAGNOSIS — I48.91 ATRIAL FIBRILLATION (H): ICD-10-CM

## 2019-07-23 DIAGNOSIS — Z79.01 LONG TERM CURRENT USE OF ANTICOAGULANTS WITH INR GOAL OF 2.0-3.0: ICD-10-CM

## 2019-07-23 LAB — INR PPP: 3.4 (ref 0.8–1.1)

## 2019-07-23 PROCEDURE — 99207 ZZC NO CHARGE NURSE ONLY: CPT | Performed by: INTERNAL MEDICINE

## 2019-07-23 NOTE — PROGRESS NOTES
ANTICOAGULATION FOLLOW-UP CLINIC VISIT    Patient Name:  Mariya Rowe  Date:  7/23/2019  Contact Type:  Telephone/ discussed warfarin dosing and next INR with patient    SUBJECTIVE:  Patient Findings     Positives:   Change in diet/appetite (has not had as many green veggies this week.  She will eat more green veggies to try to lower her INR.)    Comments:   The patient was assessed for medication, and activity level changes, missed or extra doses, bruising or bleeding, with no problem findings.          Clinical Outcomes     Negatives:   Major bleeding event, Thromboembolic event, Anticoagulation-related hospital admission, Anticoagulation-related ED visit, Anticoagulation-related fatality    Comments:   The patient was assessed for medication, and activity level changes, missed or extra doses, bruising or bleeding, with no problem findings.             OBJECTIVE    INR   Date Value Ref Range Status   07/23/2019 3.4 (A) 0.8 - 1.1 Final       ASSESSMENT / PLAN  INR assessment SUPRA    Recheck INR In: 2 WEEKS    INR Location Home INR    Billed home INR No      Anticoagulation Summary  As of 7/23/2019    INR goal:   2.0-3.0   TTR:   64.1 % (3.1 y)   INR used for dosing:   3.4! (7/23/2019)   Warfarin maintenance plan:   2.5 mg (5 mg x 0.5) every Wed, Sat; 5 mg (5 mg x 1) all other days   Full warfarin instructions:   2.5 mg every Wed, Sat; 5 mg all other days   Weekly warfarin total:   30 mg   No change documented:   Vira Crawford, RN   Plan last modified:   Anjali Moss, RN (1/25/2017)   Next INR check:   8/6/2019   Priority:   INR   Target end date:       Indications    Long term current use of anticoagulants with INR goal of 2.0-3.0 [Z79.01]  Atrial fibrillation (H) [I48.91]             Anticoagulation Episode Summary     INR check location:       Preferred lab:       Send INR reminders to:   [facility]    Comments:   started home testing on 12/6/18.  Contact patient at [phone] until  back in [place].      Anticoagulation Care Providers     Provider Role Specialty Phone number    Chavez Molina MD Responsible Internal Medicine [phone]            See the Encounter Report to view Anticoagulation Flowsheet and Dosing Calendar (Go to Encounters tab in chart review, and find the Anticoagulation Therapy Visit)    Dosage adjustment made based on physician directed care plan.    Vira Crawford RN

## 2019-08-06 ENCOUNTER — ANTICOAGULATION THERAPY VISIT (OUTPATIENT)
Dept: ANTICOAGULATION | Facility: CLINIC | Age: 79
End: 2019-08-06
Payer: MEDICARE

## 2019-08-06 DIAGNOSIS — Z79.01 LONG TERM CURRENT USE OF ANTICOAGULANTS WITH INR GOAL OF 2.0-3.0: ICD-10-CM

## 2019-08-06 LAB — INR PPP: 2.7 (ref 0.8–1.1)

## 2019-08-06 PROCEDURE — 99207 ZZC NO CHARGE NURSE ONLY: CPT | Performed by: INTERNAL MEDICINE

## 2019-08-06 NOTE — PROGRESS NOTES
ANTICOAGULATION FOLLOW-UP CLINIC VISIT    Patient Name:  Mariya Rowe  Date:  2019  Contact Type:  Telephone/ Fax received from [employer] reporting INR result. Called patient, reports no changes. Orders discussed with patient.     SUBJECTIVE:  Patient Findings     Comments:   The patient was assessed for diet, medication, and activity level changes, missed or extra doses, bruising or bleeding, with no problem findings.          Clinical Outcomes     Negatives:   Major bleeding event, Thromboembolic event, Anticoagulation-related hospital admission, Anticoagulation-related ED visit, Anticoagulation-related fatality    Comments:   The patient was assessed for diet, medication, and activity level changes, missed or extra doses, bruising or bleeding, with no problem findings.             OBJECTIVE    INR   Date Value Ref Range Status   2019 2.7 (A) 0.8 - 1.1 Final     Comment:     [facility]       ASSESSMENT / PLAN  INR assessment THER    Recheck INR In: 2 WEEKS    INR Location Home INR    Billed home INR No      Anticoagulation Summary  As of 2019    INR goal:   2.0-3.0   TTR:   63.9 % (3.2 y)   INR used for dosin.7 (2019)   Warfarin maintenance plan:   2.5 mg (5 mg x 0.5) every Wed, Sat; 5 mg (5 mg x 1) all other days   Full warfarin instructions:   2.5 mg every Wed, Sat; 5 mg all other days   Weekly warfarin total:   30 mg   No change documented:   Karla Waller RN   Plan last modified:   Anjali Moss, RN (2017)   Next INR check:   2019   Priority:   INR   Target end date:       Indications    Long term current use of anticoagulants with INR goal of 2.0-3.0 [Z79.01]  Atrial fibrillation (H) [I48.91]             Anticoagulation Episode Summary     INR check location:       Preferred lab:       Send INR reminders to:   [facility]    Comments:   started home testing on 18.  Contact patient at [phone] until back in [place].      Anticoagulation Care Providers      Provider Role Specialty Phone number    Chavez Molina MD [facility] Internal Medicine [phone]            See the Encounter Report to view Anticoagulation Flowsheet and Dosing Calendar (Go to Encounters tab in chart review, and find the Anticoagulation Therapy Visit)    Dosage adjustment made based on physician directed care plan.    Karla Waller RN

## 2019-08-12 ENCOUNTER — HOSPITAL ENCOUNTER (OUTPATIENT)
Dept: CARDIOLOGY | Facility: CLINIC | Age: 79
Discharge: HOME | End: 2019-08-12
Attending: NURSE PRACTITIONER | Admitting: NURSE PRACTITIONER
Payer: MEDICARE

## 2019-08-12 DIAGNOSIS — I48.19 PERSISTENT ATRIAL FIBRILLATION (H): ICD-10-CM

## 2019-08-12 PROCEDURE — 93225 XTRNL ECG REC<48 HRS REC: CPT

## 2019-08-12 PROCEDURE — 93227 XTRNL ECG REC<48 HR R&I: CPT | Performed by: INTERNAL MEDICINE

## 2019-08-21 ENCOUNTER — ANTICOAGULATION THERAPY VISIT (OUTPATIENT)
Dept: ANTICOAGULATION | Facility: CLINIC | Age: 79
End: 2019-08-21
Payer: MEDICARE

## 2019-08-21 DIAGNOSIS — I48.91 ATRIAL FIBRILLATION (H): ICD-10-CM

## 2019-08-21 DIAGNOSIS — Z79.01 LONG TERM CURRENT USE OF ANTICOAGULANTS WITH INR GOAL OF 2.0-3.0: ICD-10-CM

## 2019-08-21 LAB — INR PPP: 2.7 (ref 0.8–1.1)

## 2019-08-21 PROCEDURE — G0250 MD INR TEST REVIE INTER MGMT: HCPCS | Performed by: INTERNAL MEDICINE

## 2019-08-21 NOTE — PROGRESS NOTES
ANTICOAGULATION FOLLOW-UP CLINIC VISIT    Patient Name:  Mariya Rowe  Date:  2019  Contact Type:  Telephone/ warfarin dosing and next INR draw discussed with patient.    SUBJECTIVE:  Patient Findings     Comments:   The patient was assessed for diet, medication, and activity level changes, missed or extra doses, bruising or bleeding, with no problem findings.          Clinical Outcomes     Negatives:   Major bleeding event, Thromboembolic event, Anticoagulation-related hospital admission, Anticoagulation-related ED visit, Anticoagulation-related fatality    Comments:   The patient was assessed for diet, medication, and activity level changes, missed or extra doses, bruising or bleeding, with no problem findings.             OBJECTIVE    INR   Date Value Ref Range Status   2019 2.7 (A) 0.8 - 1.1 Final       ASSESSMENT / PLAN  INR assessment THER    Recheck INR In: 2 WEEKS    INR Location Home INR    Billed home INR Yes      Anticoagulation Summary  As of 2019    INR goal:   2.0-3.0   TTR:   64.3 % (3.2 y)   INR used for dosin.7 (2019)   Warfarin maintenance plan:   2.5 mg (5 mg x 0.5) every Wed, Sat; 5 mg (5 mg x 1) all other days   Full warfarin instructions:   2.5 mg every Wed, Sat; 5 mg all other days   Weekly warfarin total:   30 mg   No change documented:   Vira Crawford RN   Plan last modified:   Anjali Moss RN (2017)   Next INR check:   2019   Priority:   INR   Target end date:       Indications    Long term current use of anticoagulants with INR goal of 2.0-3.0 [Z79.01]  Atrial fibrillation (H) [I48.91]             Anticoagulation Episode Summary     INR check location:       Preferred lab:       Send INR reminders to:   [facility]    Comments:   started home testing on 18.  Contact patient at [phone] until back in [place].      Anticoagulation Care Providers     Provider Role Specialty Phone number    Chavez Molina MD Responsible  Internal Medicine [phone]            See the Encounter Report to view Anticoagulation Flowsheet and Dosing Calendar (Go to Encounters tab in chart review, and find the Anticoagulation Therapy Visit)    Dosage adjustment made based on physician directed care plan.    Vira Crawford RN

## 2019-08-27 ENCOUNTER — OFFICE VISIT (OUTPATIENT)
Dept: INTERNAL MEDICINE | Facility: CLINIC | Age: 79
End: 2019-08-27
Payer: MEDICARE

## 2019-08-27 VITALS
BODY MASS INDEX: 23.62 KG/M2 | RESPIRATION RATE: 12 BRPM | HEART RATE: 97 BPM | WEIGHT: 168.7 LBS | TEMPERATURE: 98.1 F | OXYGEN SATURATION: 99 % | HEIGHT: 71 IN | SYSTOLIC BLOOD PRESSURE: 142 MMHG | DIASTOLIC BLOOD PRESSURE: 83 MMHG

## 2019-08-27 DIAGNOSIS — M81.0 OSTEOPOROSIS WITHOUT CURRENT PATHOLOGICAL FRACTURE, UNSPECIFIED OSTEOPOROSIS TYPE: ICD-10-CM

## 2019-08-27 DIAGNOSIS — E78.5 HYPERLIPIDEMIA WITH TARGET LDL LESS THAN 130: ICD-10-CM

## 2019-08-27 DIAGNOSIS — I49.5 SSS (SICK SINUS SYNDROME) (H): ICD-10-CM

## 2019-08-27 DIAGNOSIS — Z91.89 RECENTLY IN LYME DISEASE ENDEMIC AREA: ICD-10-CM

## 2019-08-27 DIAGNOSIS — Z12.31 VISIT FOR SCREENING MAMMOGRAM: ICD-10-CM

## 2019-08-27 DIAGNOSIS — Z00.00 ENCOUNTER FOR MEDICARE ANNUAL WELLNESS EXAM: Primary | ICD-10-CM

## 2019-08-27 DIAGNOSIS — I48.20 CHRONIC ATRIAL FIBRILLATION (H): ICD-10-CM

## 2019-08-27 LAB
ERYTHROCYTE [DISTWIDTH] IN BLOOD BY AUTOMATED COUNT: 13.9 % (ref 10–15)
HCT VFR BLD AUTO: 45.7 % (ref 35–47)
HGB BLD-MCNC: 15 G/DL (ref 11.7–15.7)
MCH RBC QN AUTO: 30.9 PG (ref 26.5–33)
MCHC RBC AUTO-ENTMCNC: 32.8 G/DL (ref 31.5–36.5)
MCV RBC AUTO: 94 FL (ref 78–100)
PLATELET # BLD AUTO: 207 10E9/L (ref 150–450)
RBC # BLD AUTO: 4.86 10E12/L (ref 3.8–5.2)
WBC # BLD AUTO: 6.8 10E9/L (ref 4–11)

## 2019-08-27 PROCEDURE — 99213 OFFICE O/P EST LOW 20 MIN: CPT | Mod: 25 | Performed by: INTERNAL MEDICINE

## 2019-08-27 PROCEDURE — 80053 COMPREHEN METABOLIC PANEL: CPT | Performed by: INTERNAL MEDICINE

## 2019-08-27 PROCEDURE — 84443 ASSAY THYROID STIM HORMONE: CPT | Performed by: INTERNAL MEDICINE

## 2019-08-27 PROCEDURE — 80061 LIPID PANEL: CPT | Performed by: INTERNAL MEDICINE

## 2019-08-27 PROCEDURE — G0439 PPPS, SUBSEQ VISIT: HCPCS | Performed by: INTERNAL MEDICINE

## 2019-08-27 PROCEDURE — 85027 COMPLETE CBC AUTOMATED: CPT | Performed by: INTERNAL MEDICINE

## 2019-08-27 PROCEDURE — 36415 COLL VENOUS BLD VENIPUNCTURE: CPT | Performed by: INTERNAL MEDICINE

## 2019-08-27 PROCEDURE — 86618 LYME DISEASE ANTIBODY: CPT | Performed by: INTERNAL MEDICINE

## 2019-08-27 RX ORDER — ROSUVASTATIN CALCIUM 5 MG/1
5 TABLET, COATED ORAL DAILY
Qty: 93 TABLET | Refills: 3 | Status: SHIPPED | OUTPATIENT
Start: 2019-08-27 | End: 2020-10-13

## 2019-08-27 RX ORDER — WARFARIN SODIUM 5 MG/1
TABLET ORAL
Qty: 90 TABLET | Refills: 0 | Status: SHIPPED | OUTPATIENT
Start: 2019-08-27 | End: 2019-11-14

## 2019-08-27 RX ORDER — METOPROLOL TARTRATE 25 MG/1
25 TABLET, FILM COATED ORAL 2 TIMES DAILY
Qty: 180 TABLET | Refills: 3 | Status: SHIPPED | OUTPATIENT
Start: 2019-08-27 | End: 2020-10-20

## 2019-08-27 SDOH — SOCIAL STABILITY: SOCIAL NETWORK: ARE YOU MARRIED, WIDOWED, DIVORCED, SEPARATED, NEVER MARRIED, OR LIVING WITH A PARTNER?: MARRIED

## 2019-08-27 SDOH — SOCIAL STABILITY: SOCIAL NETWORK: HOW OFTEN DO YOU ATTENT MEETINGS OF THE CLUB OR ORGANIZATION YOU BELONG TO?: NEVER

## 2019-08-27 SDOH — ECONOMIC STABILITY: FOOD INSECURITY: WITHIN THE PAST 12 MONTHS, YOU WORRIED THAT YOUR FOOD WOULD RUN OUT BEFORE YOU GOT MONEY TO BUY MORE.: NEVER TRUE

## 2019-08-27 SDOH — SOCIAL STABILITY: SOCIAL INSECURITY: WITHIN THE LAST YEAR, HAVE YOU BEEN AFRAID OF YOUR PARTNER OR EX-PARTNER?: NO

## 2019-08-27 SDOH — ECONOMIC STABILITY: TRANSPORTATION INSECURITY
IN THE PAST 12 MONTHS, HAS THE LACK OF TRANSPORTATION KEPT YOU FROM MEDICAL APPOINTMENTS OR FROM GETTING MEDICATIONS?: NO

## 2019-08-27 SDOH — SOCIAL STABILITY: SOCIAL INSECURITY
WITHIN THE LAST YEAR, HAVE TO BEEN RAPED OR FORCED TO HAVE ANY KIND OF SEXUAL ACTIVITY BY YOUR PARTNER OR EX-PARTNER?: NO

## 2019-08-27 SDOH — SOCIAL STABILITY: SOCIAL INSECURITY: WITHIN THE LAST YEAR, HAVE YOU BEEN HUMILIATED OR EMOTIONALLY ABUSED IN OTHER WAYS BY YOUR PARTNER OR EX-PARTNER?: NO

## 2019-08-27 SDOH — SOCIAL STABILITY: SOCIAL INSECURITY
WITHIN THE LAST YEAR, HAVE YOU BEEN KICKED, HIT, SLAPPED, OR OTHERWISE PHYSICALLY HURT BY YOUR PARTNER OR EX-PARTNER?: NO

## 2019-08-27 SDOH — SOCIAL STABILITY: SOCIAL NETWORK
IN A TYPICAL WEEK, HOW MANY TIMES DO YOU TALK ON THE PHONE WITH FAMILY, FRIENDS, OR NEIGHBORS?: MORE THAN THREE TIMES A WEEK

## 2019-08-27 SDOH — ECONOMIC STABILITY: TRANSPORTATION INSECURITY
IN THE PAST 12 MONTHS, HAS LACK OF TRANSPORTATION KEPT YOU FROM MEETINGS, WORK, OR FROM GETTING THINGS NEEDED FOR DAILY LIVING?: NO

## 2019-08-27 SDOH — SOCIAL STABILITY: SOCIAL NETWORK
DO YOU BELONG TO ANY CLUBS OR ORGANIZATIONS SUCH AS CHURCH GROUPS UNIONS, FRATERNAL OR ATHLETIC GROUPS, OR SCHOOL GROUPS?: NO

## 2019-08-27 SDOH — HEALTH STABILITY: PHYSICAL HEALTH: ON AVERAGE, HOW MANY MINUTES DO YOU ENGAGE IN EXERCISE AT THIS LEVEL?: 20 MIN

## 2019-08-27 SDOH — HEALTH STABILITY: MENTAL HEALTH
STRESS IS WHEN SOMEONE FEELS TENSE, NERVOUS, ANXIOUS, OR CAN'T SLEEP AT NIGHT BECAUSE THEIR MIND IS TROUBLED. HOW STRESSED ARE YOU?: NOT AT ALL

## 2019-08-27 SDOH — ECONOMIC STABILITY: INCOME INSECURITY: HOW HARD IS IT FOR YOU TO PAY FOR THE VERY BASICS LIKE FOOD, HOUSING, MEDICAL CARE, AND HEATING?: NOT HARD AT ALL

## 2019-08-27 SDOH — ECONOMIC STABILITY: FOOD INSECURITY: WITHIN THE PAST 12 MONTHS, THE FOOD YOU BOUGHT JUST DIDN'T LAST AND YOU DIDN'T HAVE MONEY TO GET MORE.: NEVER TRUE

## 2019-08-27 SDOH — SOCIAL STABILITY: SOCIAL NETWORK: HOW OFTEN DO YOU ATTEND CHURCH OR RELIGIOUS SERVICES?: MORE THAN 4 TIMES PER YEAR

## 2019-08-27 SDOH — SOCIAL STABILITY: SOCIAL NETWORK: HOW OFTEN DO YOU GET TOGETHER WITH FRIENDS OR RELATIVES?: ONCE A WEEK

## 2019-08-27 SDOH — HEALTH STABILITY: PHYSICAL HEALTH: ON AVERAGE, HOW MANY DAYS PER WEEK DO YOU ENGAGE IN MODERATE TO STRENUOUS EXERCISE (LIKE A BRISK WALK)?: 4 DAYS

## 2019-08-27 ASSESSMENT — ENCOUNTER SYMPTOMS
HEADACHES: 0
ABDOMINAL PAIN: 0
PARESTHESIAS: 0
CHILLS: 0
HEMATOCHEZIA: 0
FEVER: 0
DIZZINESS: 0
DIARRHEA: 0
MYALGIAS: 1
COUGH: 0
NERVOUS/ANXIOUS: 0
HEARTBURN: 0
JOINT SWELLING: 1
FREQUENCY: 0
NAUSEA: 0
ARTHRALGIAS: 1
PALPITATIONS: 0
HEMATURIA: 0
EYE PAIN: 0
DYSURIA: 0
CONSTIPATION: 0

## 2019-08-27 ASSESSMENT — ACTIVITIES OF DAILY LIVING (ADL): CURRENT_FUNCTION: NO ASSISTANCE NEEDED

## 2019-08-27 ASSESSMENT — MIFFLIN-ST. JEOR: SCORE: 1341.35

## 2019-08-27 NOTE — PATIENT INSTRUCTIONS
Patient Education           Everything looks fine!    Refills of medications have been faxed to your pharmacy.     I'll get back to you with lab results soon, especially if there is anything of concern.      See me in a year, sooner if problems.

## 2019-08-27 NOTE — PROGRESS NOTES
"SUBJECTIVE:   Mariya Rowe is a 79 year old female who presents for Preventive Visit.    Are you in the first 12 months of your Medicare coverage?  No    Healthy Habits:     In general, how would you rate your overall health?  Good    Frequency of exercise:  2-3 days/week    Duration of exercise:  15-30 minutes    Do you usually eat at least 4 servings of fruit and vegetables a day, include whole grains    & fiber and avoid regularly eating high fat or \"junk\" foods?  Yes    Taking medications regularly:  Yes    Medication side effects:  None    Ability to successfully perform activities of daily living:  No assistance needed    Home Safety:  No safety concerns identified    Hearing Impairment:  No hearing concerns    In the past 6 months, have you been bothered by leaking of urine?  No    In general, how would you rate your overall mental or emotional health?  Excellent      PHQ-2 Total Score: 0    Additional concerns today:  No    Do you feel safe in your environment? Yes    Do you have a Health Care Directive? No: Advance care planning reviewed with patient; information given to patient to review.      Fall risk  Fallen 2 or more times in the past year?: No  Any fall with injury in the past year?: No    Cognitive Screening   1) Repeat 3 items (Leader, Season, Table)    2) Clock draw: NORMAL  3) 3 item recall: Recalls 3 objects  Results: 3 items recalled: COGNITIVE IMPAIRMENT LESS LIKELY    Mini-CogTM Copyright MARTHA Olea. Licensed by the author for use in [facility]; reprinted with permission (nuno@.[facility]). All rights reserved.      Do you have sleep apnea, excessive snoring or daytime drowsiness?: no     In addition to a preventive exam, we addressed osteoporosis, chronic atrial fibrillation, hyperlipidemia.     She is on chronic oral anticoagulation and metoprolol for rate control. BP slightly elevated, which she states in not typical. She is scheduled to see Dr Kenyon of [facility] tomorrow. "     Alendronate was taken for about 15 years before discontinuation about a year ago. She is interested in having her DEXA rechecked to assure no significant decline in bone density.         Reviewed and updated as needed this visit by clinical staff  Tobacco  Allergies  Meds  Med Hx  Surg Hx  Fam Hx  Soc Hx        Reviewed and updated as needed this visit by Provider        Social History     Tobacco Use     Smoking status: Never Smoker     Smokeless tobacco: Never Used   Substance Use Topics     Alcohol use: No     Alcohol/week: 0.0 oz     If you drink alcohol do you typically have >3 drinks per day or >7 drinks per week? No    Alcohol Use 8/22/2018   Prescreen: >3 drinks/day or >7 drinks/week? Not Applicable   Prescreen: >3 drinks/day or >7 drinks/week? -   No flowsheet data found.        Discuss Fosamax, arthritis in hands.    Current providers sharing in care for this patient include:   Patient Care Team:  Chavez Molina MD as PCP - General (Internal Medicine)  Chavez Mloina MD as Assigned PCP    The following health maintenance items are reviewed in Epic and correct as of today:  Health Maintenance   Topic Date Due     PNEUMOCOCCAL IMMUNIZATION 65+ LOW/MEDIUM RISK (2 of 2 - PCV13) 09/06/2007     ZOSTER IMMUNIZATION (2 of 3) 01/16/2013     DTAP/TDAP/TD IMMUNIZATION (3 - Td) 12/17/2018     PHQ-2  01/01/2019     FALL RISK ASSESSMENT  08/22/2019     MEDICARE ANNUAL WELLNESS VISIT  08/22/2019     INFLUENZA VACCINE (1) 09/01/2019     OP ANNUAL INR REFERRAL  11/29/2019     LIPID  08/22/2023     ADVANCE CARE PLANNING  08/22/2023     DEXA  Completed     IPV IMMUNIZATION  Aged Out     MENINGITIS IMMUNIZATION  Aged Out     Lab work is in process  Pneumonia Vaccine:Declines Prevnar-13    Review of Systems   Constitutional: Negative for chills and fever.   HENT: Negative for congestion, ear pain and hearing loss.    Eyes: Negative for pain.   Respiratory: Negative for cough.    Cardiovascular: Negative for chest  "pain, palpitations and peripheral edema.   Gastrointestinal: Negative for abdominal pain, constipation, diarrhea, heartburn, hematochezia and nausea.   Genitourinary: Negative for dysuria, frequency, genital sores and hematuria.   Musculoskeletal: Positive for arthralgias, joint swelling and myalgias.   Neurological: Negative for dizziness, headaches and paresthesias.   Psychiatric/Behavioral: Negative for mood changes. The patient is not nervous/anxious.      BREAST: NEGATIVE for masses, tenderness or discharge  ENDOCRINE: NEGATIVE for temperature intolerance, skin/hair changes  HEME: NEGATIVE for bleeding problems    OBJECTIVE:   Physical Exam   Vitals: BP (!) 142/83 (BP Location: Right arm, Patient Position: Sitting, Cuff Size: Adult Large)   Pulse 97   Temp 98.1  F (36.7  C) (Oral)   Resp 12   Ht 1.803 m (5' 11\")   Wt 76.5 kg (168 lb 11.2 oz)   SpO2 99%   BMI 23.53 kg/m    BMI= Body mass index is 23.53 kg/m .    GENERAL: healthy, alert and no distress  EYES: Eyes grossly normal to inspection, PERRL and conjunctivae and sclerae normal  HENT: ear canals and TM's normal, nose and mouth without ulcers or lesions  NECK: no adenopathy, no asymmetry, masses, or scars and thyroid normal to palpation  RESP: lungs clear to auscultation - no rales, rhonchi or wheezes  BREAST/PELVIC: not performed  CV: regular rate and rhythm, normal S1 S2, no S3 or S4, no murmur, click or rub, no peripheral edema and peripheral pulses strong  ABDOMEN: soft, nontender, no hepatosplenomegaly, no masses and bowel sounds normal  MS: no gross musculoskeletal defects noted, no edema  SKIN: no suspicious lesions or rashes  NEURO: Normal strength and tone, mentation intact and speech normal  PSYCH: mentation appears normal, affect normal/bright    Diagnostic Test Results:  Labs reviewed in Epic    ASSESSMENT / PLAN:   (Z00.00) Encounter for Medicare annual wellness exam  (primary encounter diagnosis)  Comment: Stable health. See epic " "orders.   Plan: Comprehensive metabolic panel, Lipid panel         reflex to direct LDL Fasting, TSH with free T4         reflex, CBC with platelets         (I48.2) Chronic atrial fibrillation (H)  Comment: Rate controlled. Continue current meds. F/u with EP cardiology tomorrow.   Plan: TSH with free T4 reflex, OFFICE/OUTPT         VISIT,EST,LEVL III          (M81.0) Osteoporosis without current pathological fracture, unspecified osteoporosis type  Comment: Remain off of alendronate--discussed that 10-15 years of use should be satisfactory. Check DEXA.   Plan: DX Hip/Pelvis/Spine, OFFICE/OUTPT         VISIT,EST,LEVL III          (E78.5) Hyperlipidemia with target LDL less than 130  Comment: Refill Rx. Update lipids.   Plan: rosuvastatin (CRESTOR) 5 MG tablet,         Comprehensive metabolic panel, Lipid panel         reflex to direct LDL Fasting, OFFICE/OUTPT         VISIT,EST,LEVL III          (I49.5) SSS (sick sinus syndrome) (H)    (Z20.89) Recently in Lyme disease endemic area  Comment: Patient not aware of any tick bites, rashes or systemic illness, but requests Lyme serology.   Plan: Lyme Disease Domenica with reflex to WB Serum          (Z12.31) Visit for screening mammogram  Plan: MA Screen Bilateral w/Sonny          End of Life Planning:  Patient currently has an advanced directive: No.  I have verified the patient's ablity to prepare an advanced directive/make health care decisions.  Literature was provided to assist patient in preparing an advanced directive.    COUNSELING:  Reviewed preventive health counseling, as reflected in patient instructions    Estimated body mass index is 23.99 kg/m  as calculated from the following:    Height as of 8/22/18: 1.803 m (5' 11\").    Weight as of 8/22/18: 78 kg (172 lb).         reports that she has never smoked. She has never used smokeless tobacco.      Appropriate preventive services were discussed with this patient, including applicable screening as appropriate for " cardiovascular disease, diabetes, osteopenia/osteoporosis, and glaucoma.  As appropriate for age/gender, discussed screening for colorectal cancer, prostate cancer, breast cancer, and cervical cancer. Checklist reviewing preventive services available has been given to the patient.    Reviewed patients plan of care and provided an AVS. The Basic Care Plan (routine screening as documented in Health Maintenance) for Mariya meets the Care Plan requirement. This Care Plan has been established and reviewed with the Patient.    Counseling Resources:  ATP IV Guidelines  Pooled Cohorts Equation Calculator  Breast Cancer Risk Calculator  FRAX Risk Assessment  ICSI Preventive Guidelines  Dietary Guidelines for Americans, 2010  USDA's MyPlate  ASA Prophylaxis  Lung CA Screening    Chavez Molina MD,   [facility]    Identified Health Risks:

## 2019-08-28 ENCOUNTER — OFFICE VISIT (OUTPATIENT)
Dept: CARDIOLOGY | Facility: CLINIC | Age: 79
End: 2019-08-28
Payer: MEDICARE

## 2019-08-28 VITALS
HEIGHT: 71 IN | BODY MASS INDEX: 23.57 KG/M2 | SYSTOLIC BLOOD PRESSURE: 128 MMHG | OXYGEN SATURATION: 95 % | WEIGHT: 168.4 LBS | DIASTOLIC BLOOD PRESSURE: 82 MMHG | HEART RATE: 86 BPM

## 2019-08-28 DIAGNOSIS — I48.21 PERMANENT ATRIAL FIBRILLATION (H): Primary | ICD-10-CM

## 2019-08-28 LAB
ALBUMIN SERPL-MCNC: 3.8 G/DL (ref 3.4–5)
ALP SERPL-CCNC: 77 U/L (ref 40–150)
ALT SERPL W P-5'-P-CCNC: 28 U/L (ref 0–50)
ANION GAP SERPL CALCULATED.3IONS-SCNC: 8 MMOL/L (ref 3–14)
AST SERPL W P-5'-P-CCNC: 24 U/L (ref 0–45)
B BURGDOR IGG+IGM SER QL: 0.05 (ref 0–0.89)
BILIRUB SERPL-MCNC: 1 MG/DL (ref 0.2–1.3)
BUN SERPL-MCNC: 16 MG/DL (ref 7–30)
CALCIUM SERPL-MCNC: 9.2 MG/DL (ref 8.5–10.1)
CHLORIDE SERPL-SCNC: 108 MMOL/L (ref 94–109)
CHOLEST SERPL-MCNC: 160 MG/DL
CO2 SERPL-SCNC: 26 MMOL/L (ref 20–32)
CREAT SERPL-MCNC: 0.83 MG/DL (ref 0.52–1.04)
GFR SERPL CREATININE-BSD FRML MDRD: 67 ML/MIN/{1.73_M2}
GLUCOSE SERPL-MCNC: 88 MG/DL (ref 70–99)
HDLC SERPL-MCNC: 64 MG/DL
LDLC SERPL CALC-MCNC: 74 MG/DL
NONHDLC SERPL-MCNC: 96 MG/DL
POTASSIUM SERPL-SCNC: 4 MMOL/L (ref 3.4–5.3)
PROT SERPL-MCNC: 7.5 G/DL (ref 6.8–8.8)
SODIUM SERPL-SCNC: 142 MMOL/L (ref 133–144)
TRIGL SERPL-MCNC: 109 MG/DL
TSH SERPL DL<=0.005 MIU/L-ACNC: 2.57 MU/L (ref 0.4–4)

## 2019-08-28 PROCEDURE — 99213 OFFICE O/P EST LOW 20 MIN: CPT | Performed by: INTERNAL MEDICINE

## 2019-08-28 ASSESSMENT — MIFFLIN-ST. JEOR: SCORE: 1339.73

## 2019-08-28 NOTE — LETTER
8/28/2019    Chavez Molina MD, MD  [address] [facility] [address]  [facility] [address]    RE: Mariya KUHN Soledad       Dear Colleague,    I had the pleasure of seeing Mariya BAM Rowe in the [facility].    HPI and Plan:   See dictation    Orders Placed This Encounter   Procedures     Follow-Up with Cardiac Advanced Practice Provider       No orders of the defined types were placed in this encounter.      There are no discontinued medications.      Encounter Diagnosis   Name Primary?     Permanent atrial fibrillation (H) Yes       CURRENT MEDICATIONS:  Current Outpatient Medications   Medication Sig Dispense Refill     CALCIUM 500 + D 500-200 MG-IU OR TABS 1 tablet bid  0     fluorometholone (FML LIQUIFILM) 0.1 % ophthalmic susp Place 1 drop into the right eye daily       metoprolol tartrate (LOPRESSOR) 25 MG tablet Take 1 tablet (25 mg) by mouth 2 times daily 180 tablet 3     Multiple Vitamin (MULTI-VITAMIN PO) Take  by mouth.       rosuvastatin (CRESTOR) 5 MG tablet Take 1 tablet (5 mg) by mouth daily 93 tablet 3     warfarin (JANTOVEN) 5 MG tablet TAKE 1 TABLET BY MOUTH DAILY EXCEPT FOR WED, AND SAT. TAKE 1/2 TAB OR AS DIRECTED 90 tablet 0     ASPIRIN NOT PRESCRIBED (INTENTIONAL) Please choose reason not prescribed, below (Patient not taking: Reported on 8/28/2019) 1 each 0       ALLERGIES     Allergies   Allergen Reactions     Lidocaine Other (See Comments)     Pt lopez Hx of a reaction to Lidocaine Eye solution.     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 Provider to review and confirm.     Osteoporosis, unspecified 12/98    improved by DEXA of 2003; then unchanged in 2004      Palpitations      PFO (patent foramen ovale)      SSS (sick sinus syndrome) (H)        PAST SURGICAL HISTORY:  Past Surgical History:   Procedure Laterality Date     C NONSPECIFIC PROCEDURE  2/05    colonoscopy      C NONSPECIFIC PROCEDURE Right 11/2016    Right cataract repair and corneal transplant     COLONOSCOPY  12/17/2015    11-15 mm Adenomatous polyp in cecum, repeat in 3 yrs     COLONOSCOPY  12/04/2018    One 2 mm adenomatous polyp, repeat in 5 years (12/2023)       FAMILY HISTORY:  Family History   Adopted: Yes   Problem Relation Age of Onset     Cerebrovascular Disease Mother         at age 88 ;; lived to be 99     Unknown/Adopted Father         Don't know father's history       SOCIAL HISTORY:  Social History     Socioeconomic History     Marital status:      Spouse name: Julio     Number of children: 2     Years of education: None     Highest education level: Associate degree: occupational, technical, or vocational program   Occupational History     Occupation:      Comment: Retired at age 63   Social Needs     Financial resource strain: Not hard at all     Food insecurity:     Worry: Never true     Inability: Never true     Transportation needs:     Medical: No     Non-medical: No   Tobacco Use     Smoking status: Never Smoker     Smokeless tobacco: Never Used   Substance and Sexual Activity     Alcohol use: No     Alcohol/week: 0.0 oz     Drug use: No     Sexual activity: Yes     Partners: Male   Lifestyle     Physical activity:     Days per week: 4 days     Minutes per session: 20 min     Stress: Not at all   Relationships     Social connections:     Talks on phone: More than three times a week     Gets together: Once a week     Attends Church service: More than 4 times per year     Active member of club or organization: No     Attends meetings of clubs or organizations: Never     Relationship status:      Intimate partner violence:     Fear of current or ex partner: No  "    Emotionally abused: No     Physically abused: No     Forced sexual activity: No   Other Topics Concern     Parent/sibling w/ CABG, MI or angioplasty before 65F 55M? Not Asked      Service Not Asked     Blood Transfusions Not Asked     Caffeine Concern No     Occupational Exposure Not Asked     Hobby Hazards Not Asked     Sleep Concern No     Stress Concern Not Asked     Weight Concern Not Asked     Special Diet No     Back Care Not Asked     Exercise No     Comment: some walking cleaning     Bike Helmet Not Asked     Seat Belt Not Asked     Self-Exams Not Asked   Social History Narrative    Exercise bike or walking 3-4x/week.     Spends 5 months/year in [place], [place].    2 children, both in [place], [place]. 4 grandchildren.        Review of Systems:  Skin:  Negative       Eyes:  Positive for glasses Hx cornea transplant  ENT:  Negative      Respiratory:  Negative       Cardiovascular:  Negative for;palpitations;chest pain;edema;fatigue;syncope or near-syncope;lightheadedness;dizziness   \"very very slight\"  Gastroenterology: Negative      Genitourinary:  Negative      Musculoskeletal:  Positive for arthritis hands  Neurologic:  Negative      Psychiatric:  Negative      Heme/Lymph/Imm:  Negative      Endocrine:  Negative        [id number]              Thank you for allowing me to participate in the care of your patient.      Sincerely,     Yelena Kenyon MD     [facility]    cc:   Chavez Molina MD  [facility] [address]  [place] [address]        "

## 2019-08-28 NOTE — PROGRESS NOTES
HPI and Plan:   See dictation    Orders Placed This Encounter   Procedures     Follow-Up with Cardiac Advanced Practice Provider       No orders of the defined types were placed in this encounter.      There are no discontinued medications.      Encounter Diagnosis   Name Primary?     Permanent atrial fibrillation (H) Yes       CURRENT MEDICATIONS:  Current Outpatient Medications   Medication Sig Dispense Refill     CALCIUM 500 + D 500-200 MG-IU OR TABS 1 tablet bid  0     fluorometholone (FML LIQUIFILM) 0.1 % ophthalmic susp Place 1 drop into the right eye daily       metoprolol tartrate (LOPRESSOR) 25 MG tablet Take 1 tablet (25 mg) by mouth 2 times daily 180 tablet 3     Multiple Vitamin (MULTI-VITAMIN PO) Take  by mouth.       rosuvastatin (CRESTOR) 5 MG tablet Take 1 tablet (5 mg) by mouth daily 93 tablet 3     warfarin (JANTOVEN) 5 MG tablet TAKE 1 TABLET BY MOUTH DAILY EXCEPT FOR WED, AND SAT. TAKE 1/2 TAB OR AS DIRECTED 90 tablet 0     ASPIRIN NOT PRESCRIBED (INTENTIONAL) Please choose reason not prescribed, below (Patient not taking: Reported on 8/28/2019) 1 each 0       ALLERGIES     Allergies   Allergen Reactions     Lidocaine Other (See Comments)     Pt lopez Hx of a reaction to Lidocaine Eye solution.     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 Provider to review and confirm.     Osteoporosis, unspecified 12/98    improved by DEXA of 2003; then unchanged in 2004     Palpitations      PFO (patent foramen ovale)      SSS (sick sinus syndrome) (H)        PAST SURGICAL HISTORY:  Past Surgical History:   Procedure Laterality Date     C NONSPECIFIC PROCEDURE  2/05    colonoscopy      C NONSPECIFIC  PROCEDURE Right 11/2016    Right cataract repair and corneal transplant     COLONOSCOPY  12/17/2015    11-15 mm Adenomatous polyp in cecum, repeat in 3 yrs     COLONOSCOPY  12/04/2018    One 2 mm adenomatous polyp, repeat in 5 years (12/2023)       FAMILY HISTORY:  Family History   Adopted: Yes   Problem Relation Age of Onset     Cerebrovascular Disease Mother         at age 88 ;; lived to be 99     Unknown/Adopted Father         Don't know father's history       SOCIAL HISTORY:  Social History     Socioeconomic History     Marital status:      Spouse name: Julio     Number of children: 2     Years of education: None     Highest education level: Associate degree: occupational, technical, or vocational program   Occupational History     Occupation:      Comment: Retired at age 63   Social Needs     Financial resource strain: Not hard at all     Food insecurity:     Worry: Never true     Inability: Never true     Transportation needs:     Medical: No     Non-medical: No   Tobacco Use     Smoking status: Never Smoker     Smokeless tobacco: Never Used   Substance and Sexual Activity     Alcohol use: No     Alcohol/week: 0.0 oz     Drug use: No     Sexual activity: Yes     Partners: Male   Lifestyle     Physical activity:     Days per week: 4 days     Minutes per session: 20 min     Stress: Not at all   Relationships     Social connections:     Talks on phone: More than three times a week     Gets together: Once a week     Attends [employer] service: More than 4 times per year     Active member of club or organization: No     Attends meetings of clubs or organizations: Never     Relationship status:      Intimate partner violence:     Fear of current or ex partner: No     Emotionally abused: No     Physically abused: No     Forced sexual activity: No   Other Topics Concern     Parent/sibling w/ CABG, MI or angioplasty before 65F 55M? Not Asked      Service Not Asked     Blood  "Transfusions Not Asked     Caffeine Concern No     Occupational Exposure Not Asked     Hobby Hazards Not Asked     Sleep Concern No     Stress Concern Not Asked     Weight Concern Not Asked     Special Diet No     Back Care Not Asked     Exercise No     Comment: some walking cleaning     Bike Helmet Not Asked     Seat Belt Not Asked     Self-Exams Not Asked   Social History Narrative    Exercise bike or walking 3-4x/week.     Spends 5 months/year in [place], [place].    2 children, both in [place], [place]. 4 grandchildren.        Review of Systems:  Skin:  Negative       Eyes:  Positive for glasses Hx cornea transplant  ENT:  Negative      Respiratory:  Negative       Cardiovascular:  Negative for;palpitations;chest pain;edema;fatigue;syncope or near-syncope;lightheadedness;dizziness   \"very very slight\"  Gastroenterology: Negative      Genitourinary:  Negative      Musculoskeletal:  Positive for arthritis hands  Neurologic:  Negative      Psychiatric:  Negative      Heme/Lymph/Imm:  Negative      Endocrine:  Negative        [id number]            "

## 2019-08-28 NOTE — LETTER
"8/28/2019      Chavez Molina MD, MD  [address] [facility] at   [facility] [address]      RE: Mariya Rowe       Dear Colleague,    I had the pleasure of seeing Mariya Rowe in the [facility].    Service Date: 08/28/2019      HISTORY OF PRESENT ILLNESS:    I had the pleasure of seeing Ms. Mariya Rowe, a delightful 79-year-old female with the following issues:   A.  Sick sinus syndrome with bradycardia in the past (while in normal sinus rhythm).   B.  History of persistent atrial fibrillation, treated with antiarrhythmic drugs for years.  However, for the past 2 years the patient has been in atrial fibrillation continuously, thus \"permanent AF\".  Treated with metoprolol and warfarin.   C.  Dyslipidemia.      Mariya has been in atrial fibrillation for at least 2 years.  Interestingly, 8 or 9 years ago when we first met, she felt profoundly fatigued and dyspneic in atrial fibrillation.  Thus, we pursued rhythm control with sotalol for years.  Interestingly, when she developed recurrent atrial fibrillation in 2017 she had no awareness of it whatsoever.  Thus, we have treated her with rate control/anticoagulation since then.      In coming in today, Mariya tells me she feels well and she is able to exercise without limitation.  No chest pain, unusual dyspnea, orthopnea, PND, syncope or near-syncope.      She is on warfarin, and her INR is monitored by Dr. Molina's clinic.  She has been stable in terms of INR.      PHYSICAL EXAMINATION:   VITAL SIGNS:  Blood pressure 128/82, pulse 86 and irregular, weight 76 kilos, height 180 cm.   GENERAL:  She is a pleasant woman in no distress.   NECK:  Supple with normal JVP.   LUNGS:  Clear.   CARDIOVASCULAR:  Irregularly irregular rhythm, no gallop, murmur or rub.   ABDOMEN:  Soft, nontender.   EXTREMITIES:  No edema.      DIAGNOSTIC STUDIES:    Holter monitor earlier this month showed atrial fibrillation throughout.  Average heart rate was 84. "  There was maximum heart rate of 173 around 3:30 p.m. when the patient was exercising.  Overall rate control is very acceptable.        IMPRESSION & PLAN:   1.  Permanent atrial fibrillation.  Treated with metoprolol for rate control and warfarin for anticoagulation.  The patient is virtually asymptomatic.  Her recent Holter monitor showed good rate control.       She will continue the same medications without changes.  Follow-up has been requested in 2 years.  There is no reason to see us on a yearly basis unless new issues arise.  If she remains stable at next visit, she can be discharged from the clinic with long-term f/up with her PCP.       I appreciate the opportunity to be part of her care.         LARRY BONNER MD, [facility]         cc:   Chavez Molina MD    [facility]    [address]    [place]  [address]          D: 2019   T: 2019   MT: BARRY      Name:     CLEOPATRA CHAVARRIA   MRN:      [id number]        Account:      [id number]   :      1940           Service Date: 2019      Document: [id number]           Outpatient Encounter Medications as of 2019   Medication Sig Dispense Refill     CALCIUM 500 + D 500-200 MG-IU OR TABS 1 tablet bid  0     fluorometholone (FML LIQUIFILM) 0.1 % ophthalmic susp Place 1 drop into the right eye daily       metoprolol tartrate (LOPRESSOR) 25 MG tablet Take 1 tablet (25 mg) by mouth 2 times daily 180 tablet 3     Multiple Vitamin (MULTI-VITAMIN PO) Take  by mouth.       rosuvastatin (CRESTOR) 5 MG tablet Take 1 tablet (5 mg) by mouth daily 93 tablet 3     warfarin (JANTOVEN) 5 MG tablet TAKE 1 TABLET BY MOUTH DAILY EXCEPT FOR WED, AND SAT. TAKE 1/2 TAB OR AS DIRECTED 90 tablet 0     ASPIRIN NOT PRESCRIBED (INTENTIONAL) Please choose reason not prescribed, below (Patient not taking: Reported on 2019) 1 each 0     No facility-administered encounter medications on file as of 2019.        Again, thank you for  allowing me to participate in the care of your patient.      Sincerely,    Yelena Kenyon MD     [facility]

## 2019-08-29 NOTE — PROGRESS NOTES
"Service Date: 08/28/2019      HISTORY OF PRESENT ILLNESS:    I had the pleasure of seeing Ms. Mariya Rowe, a delightful 79-year-old female with the following issues:   A.  Sick sinus syndrome with bradycardia in the past (while in normal sinus rhythm).   B.  History of persistent atrial fibrillation, treated with antiarrhythmic drugs for years.  However, for the past 2 years the patient has been in atrial fibrillation continuously, thus \"permanent AF\".  Treated with metoprolol and warfarin.   C.  Dyslipidemia.      Mariya has been in atrial fibrillation for at least 2 years.  Interestingly, 8 or 9 years ago when we first met, she felt profoundly fatigued and dyspneic in atrial fibrillation.  Thus, we pursued rhythm control with sotalol for years.  Interestingly, when she developed recurrent atrial fibrillation in 2017 she had no awareness of it whatsoever.  Thus, we have treated her with rate control/anticoagulation since then.      In coming in today, Mariya tells me she feels well and she is able to exercise without limitation.  No chest pain, unusual dyspnea, orthopnea, PND, syncope or near-syncope.      She is on warfarin, and her INR is monitored by Dr. Molina's clinic.  She has been stable in terms of INR.      PHYSICAL EXAMINATION:   VITAL SIGNS:  Blood pressure 128/82, pulse 86 and irregular, weight 76 kilos, height 180 cm.   GENERAL:  She is a pleasant woman in no distress.   NECK:  Supple with normal JVP.   LUNGS:  Clear.   CARDIOVASCULAR:  Irregularly irregular rhythm, no gallop, murmur or rub.   ABDOMEN:  Soft, nontender.   EXTREMITIES:  No edema.      DIAGNOSTIC STUDIES:    Holter monitor earlier this month showed atrial fibrillation throughout.  Average heart rate was 84.  There was maximum heart rate of 173 around 3:30 p.m. when the patient was exercising.  Overall rate control is very acceptable.        IMPRESSION & PLAN:   1.  Permanent atrial fibrillation.  Treated with metoprolol for rate control " and warfarin for anticoagulation.  The patient is virtually asymptomatic.  Her recent Holter monitor showed good rate control.       She will continue the same medications without changes.  Follow-up has been requested in 2 years.  There is no reason to see us on a yearly basis unless new issues arise.  If she remains stable at next visit, she can be discharged from the clinic with long-term f/up with her PCP.       I appreciate the opportunity to be part of her care.         LARRY BONNER MD, [facility]         cc:   Chavez Molina MD    [facility]    [address]    [place]  [address]          D: 2019   T: 2019   MT: BARRY      Name:     CLEOPATRA CHAVARRIA   MRN:      [id number]        Account:      [id number]   :      1940           Service Date: 2019      Document: [id number]

## 2019-09-03 ENCOUNTER — ANTICOAGULATION THERAPY VISIT (OUTPATIENT)
Dept: ANTICOAGULATION | Facility: CLINIC | Age: 79
End: 2019-09-03
Payer: MEDICARE

## 2019-09-03 DIAGNOSIS — Z79.01 LONG TERM CURRENT USE OF ANTICOAGULANTS WITH INR GOAL OF 2.0-3.0: ICD-10-CM

## 2019-09-03 LAB — INR PPP: 3 (ref 0.8–1.1)

## 2019-09-03 PROCEDURE — 99207 ZZC NO CHARGE NURSE ONLY: CPT | Performed by: INTERNAL MEDICINE

## 2019-09-03 NOTE — PROGRESS NOTES
ANTICOAGULATION FOLLOW-UP CLINIC VISIT    Patient Name:  Mariya Rowe  Date:  9/3/2019  Contact Type:  Telephone/ Fax receivef from [facility] reporting INR result. Called patient, reports no changes. Orders discussed with patient. .sarah    SUBJECTIVE:  Patient Findings     Comments:   The patient was assessed for diet, medication, and activity level changes, missed or extra doses, bruising or bleeding, with no problem findings.          Clinical Outcomes     Negatives:   Major bleeding event, Thromboembolic event, Anticoagulation-related hospital admission, Anticoagulation-related ED visit, Anticoagulation-related fatality    Comments:   The patient was assessed for diet, medication, and activity level changes, missed or extra doses, bruising or bleeding, with no problem findings.             OBJECTIVE    INR   Date Value Ref Range Status   09/03/2019 3.0 (A) 0.8 - 1.1 Final     Comment:     Krzysztof       ASSESSMENT / PLAN  INR assessment THER    Recheck INR In: 2 WEEKS    INR Location Home INR    Billed home INR No      Anticoagulation Summary  As of 9/3/2019    INR goal:   2.0-3.0   TTR:   64.7 % (3.2 y)   INR used for dosing:   3.0 (9/3/2019)   Warfarin maintenance plan:   2.5 mg (5 mg x 0.5) every Wed, Sat; 5 mg (5 mg x 1) all other days   Full warfarin instructions:   2.5 mg every Wed, Sat; 5 mg all other days   Weekly warfarin total:   30 mg   Plan last modified:   Anjali Moss, RN (1/25/2017)   Next INR check:      Priority:   INR   Target end date:       Indications    Long term current use of anticoagulants with INR goal of 2.0-3.0 [Z79.01]  Atrial fibrillation (H) [I48.91]             Anticoagulation Episode Summary     INR check location:       Preferred lab:       Send INR reminders to:   [facility]    Comments:   started home testing on 12/6/18.  Contact patient at [phone] until back in [place].      Anticoagulation Care Providers     Provider Role Specialty Phone number    Jesse  Chavez AGUILAR MD [facility] Internal Medicine [phone]            See the Encounter Report to view Anticoagulation Flowsheet and Dosing Calendar (Go to Encounters tab in chart review, and find the Anticoagulation Therapy Visit)    Dosage adjustment made based on physician directed care plan.    Karla Waller RN

## 2019-09-17 ENCOUNTER — ANTICOAGULATION THERAPY VISIT (OUTPATIENT)
Dept: ANTICOAGULATION | Facility: CLINIC | Age: 79
End: 2019-09-17
Payer: MEDICARE

## 2019-09-17 DIAGNOSIS — Z79.01 LONG TERM CURRENT USE OF ANTICOAGULANTS WITH INR GOAL OF 2.0-3.0: ICD-10-CM

## 2019-09-17 LAB — INR PPP: 2.9 (ref 0.8–1.1)

## 2019-09-17 PROCEDURE — 99207 ZZC NO CHARGE NURSE ONLY: CPT | Performed by: INTERNAL MEDICINE

## 2019-09-17 NOTE — PROGRESS NOTES
ANTICOAGULATION FOLLOW-UP CLINIC VISIT    Patient Name:  Mariya Rowe  Date:  2019  Contact Type:  Telephone/ Fax received from [employer] reporting INR result, Called patient, reports no changes. Orders discussed with patient.     SUBJECTIVE:  Patient Findings     Comments:   The patient was assessed for diet, medication, and activity level changes, missed or extra doses, bruising or bleeding, with no problem findings.          Clinical Outcomes     Negatives:   Major bleeding event, Thromboembolic event, Anticoagulation-related hospital admission, Anticoagulation-related ED visit, Anticoagulation-related fatality    Comments:   The patient was assessed for diet, medication, and activity level changes, missed or extra doses, bruising or bleeding, with no problem findings.             OBJECTIVE    INR   Date Value Ref Range Status   2019 2.9 (A) 0.8 - 1.1 Final     Comment:     Acelis       ASSESSMENT / PLAN  INR assessment THER    Recheck INR In: 2 WEEKS    INR Location Home INR    Billed home INR No      Anticoagulation Summary  As of 2019    INR goal:   2.0-3.0   TTR:   65.1 % (3.3 y)   INR used for dosin.9 (2019)   Warfarin maintenance plan:   2.5 mg (5 mg x 0.5) every Wed, Sat; 5 mg (5 mg x 1) all other days   Full warfarin instructions:   2.5 mg every Wed, Sat; 5 mg all other days   Weekly warfarin total:   30 mg   No change documented:   Karla Waller RN   Plan last modified:   Anjali Moss, RN (2017)   Next INR check:   10/1/2019   Priority:   INR   Target end date:       Indications    Long term current use of anticoagulants with INR goal of 2.0-3.0 [Z79.01]  Atrial fibrillation (H) [I48.91]             Anticoagulation Episode Summary     INR check location:       Preferred lab:       Send INR reminders to:   [facility]    Comments:   started home testing on 18.  Contact patient at [phone] until back in [place].      Anticoagulation Care  Providers     Provider Role Specialty Phone number    Chavez Molina MD [facility] Internal Medicine [phone]            See the Encounter Report to view Anticoagulation Flowsheet and Dosing Calendar (Go to Encounters tab in chart review, and find the Anticoagulation Therapy Visit)    Dosage adjustment made based on physician directed care plan.    Krala Waller RN

## 2019-09-29 ENCOUNTER — HEALTH MAINTENANCE LETTER (OUTPATIENT)
Age: 79
End: 2019-09-29

## 2019-10-01 LAB — INR PPP: 3.2 (ref 0.8–1.1)

## 2019-10-03 ENCOUNTER — ANTICOAGULATION THERAPY VISIT (OUTPATIENT)
Dept: ANTICOAGULATION | Facility: CLINIC | Age: 79
End: 2019-10-03
Payer: MEDICARE

## 2019-10-03 DIAGNOSIS — I48.91 ATRIAL FIBRILLATION (H): ICD-10-CM

## 2019-10-03 DIAGNOSIS — Z79.01 LONG TERM CURRENT USE OF ANTICOAGULANTS WITH INR GOAL OF 2.0-3.0: ICD-10-CM

## 2019-10-03 PROCEDURE — 99207 ZZC NO CHARGE NURSE ONLY: CPT | Performed by: INTERNAL MEDICINE

## 2019-10-03 NOTE — PROGRESS NOTES
ANTICOAGULATION FOLLOW-UP CLINIC VISIT    Patient Name:  Mariya Rowe  Date:  10/3/2019  Contact Type:  Telephone/ left message for patient to contact INR Clinic    SUBJECTIVE:  Patient Findings     Comments:   The patient was assessed for diet, medication, and activity level changes, missed or extra doses, bruising or bleeding, with no problem findings.  Tried contacting patient by phone multiple times, but she did not return calls.  Will assume she does not have any changes to report since INR is close to her therapeutic range.          Clinical Outcomes     Negatives:   Major bleeding event, Thromboembolic event, Anticoagulation-related hospital admission, Anticoagulation-related ED visit, Anticoagulation-related fatality    Comments:   The patient was assessed for diet, medication, and activity level changes, missed or extra doses, bruising or bleeding, with no problem findings.  Tried contacting patient by phone multiple times, but she did not return calls.  Will assume she does not have any changes to report since INR is close to her therapeutic range.             OBJECTIVE    INR   Date Value Ref Range Status   10/01/2019 3.2 (A) 0.8 - 1.1 Final       ASSESSMENT / PLAN  INR assessment SUPRA    Recheck INR In: 2 WEEKS    INR Location Home INR    Billed home INR No      Anticoagulation Summary  As of 10/3/2019    INR goal:   2.0-3.0   TTR:   64.8 % (3.3 y)   INR used for dosing:   3.2! (10/1/2019)   Warfarin maintenance plan:   2.5 mg (5 mg x 0.5) every Wed, Sat; 5 mg (5 mg x 1) all other days   Full warfarin instructions:   2.5 mg every Wed, Sat; 5 mg all other days   Weekly warfarin total:   30 mg   Plan last modified:   Anjali Moss RN (1/25/2017)   Next INR check:   10/15/2019   Priority:   INR   Target end date:       Indications    Long term current use of anticoagulants with INR goal of 2.0-3.0 [Z79.01]  Atrial fibrillation (H) [I48.91]             Anticoagulation Episode Summary     INR check  location:       Preferred lab:       Send INR reminders to:   [facility]    Comments:   started home testing on 12/6/18.  Contact patient at [phone] until back in [place].      Anticoagulation Care Providers     Provider Role Specialty Phone number    Chavez Molina MD [facility] Internal Medicine [phone]            See the Encounter Report to view Anticoagulation Flowsheet and Dosing Calendar (Go to Encounters tab in chart review, and find the Anticoagulation Therapy Visit)    Dosage adjustment made based on physician directed care plan.    Vira Crawford RN

## 2019-10-04 NOTE — PROGRESS NOTES
10/4/19--patient returned call.  She is currently in [place] and should be contacted on her cell phone [phone].  Patient adjusted her dose on Tuesday to 2.5 mg instead of 5 mg.  Updated calendar.  Recommended resuming her maintenance dose and rechecking INR on 10/15 unless she notices any bleeding or bruising problems.  Patient verbalized understanding.  Vira Crawford RN

## 2019-10-08 LAB — INR PPP: 2.5 (ref 0.8–1.1)

## 2019-10-15 ENCOUNTER — TELEPHONE (OUTPATIENT)
Dept: INTERNAL MEDICINE | Facility: CLINIC | Age: 79
End: 2019-10-15

## 2019-10-15 LAB — INR PPP: 3.2 (ref 0.8–1.1)

## 2019-10-15 NOTE — TELEPHONE ENCOUNTER
ANTICOAGULATION MANAGEMENT     Patient Name:  Mariya Rowe  Date:  10/15/2019    ASSESSMENT /SUBJECTIVE:      Today's INR result of 3.2 is Supratherapeutic. Goal INR of 2.0-3.0      Warfarin dose taken: Warfarin taken as previously instructed    Diet: Increased greens/vitamin K intake may be affecting INR    Medication changes/ interactions: No new medications/supplements affecting INR    Previous INR: Supratherapeutic     S/S of bleeding or thromboembolism: No    New injury or illness:  No    Upcoming surgery, procedure or cardioversion:  No    Additional findings: she did recheck her INR last week with home monitor but did not call it in and it was 2.5.      PLAN:    Spoke with Mariya regarding INR result and instructed:     Warfarin Dosing Instructions: Take 2.5 mg today then change your warfarin dose to 2.5 mg Mondays, Wednesdays and Saturdays and 5 mg all othe days.    Instructed patient to follow up no later than: 2 weeks    Education provided: Yes: effects of greens and dose change on INR results.       Mariya verbalizes understanding and agrees to warfarin dosing plan.    Instructed to call the Anticoagulation Clinic for any changes, questions or concerns. (#[phone])        OBJECTIVE:  INR   Date Value Ref Range Status   10/15/2019 3.2 (A) 0.8 - 1.1 Final             Anticoagulation Summary  As of 10/15/2019    INR goal:   2.0-3.0   TTR:   64.0 % (3.4 y)   INR used for dosing:   3.2! (10/15/2019)   Warfarin maintenance plan:   2.5 mg (5 mg x 0.5) every Wed, Sat; 5 mg (5 mg x 1) all other days   Full warfarin instructions:   2.5 mg every Wed, Sat; 5 mg all other days   Weekly warfarin total:   30 mg   Plan last modified:   Anjali Moss, RN (1/25/2017)   Next INR check:   10/29/2019   Priority:   INR   Target end date:       Indications    Long term current use of anticoagulants with INR goal of 2.0-3.0 [Z79.01]  Atrial fibrillation (H) [I48.91]             Anticoagulation Episode Summary     INR  check location:       Preferred lab:       Send INR reminders to:   [facility]    Comments:   started home testing on 12/6/18.  Contact patient at [phone] until back in [place].      Anticoagulation Care Providers     Provider Role Specialty Phone number    Chavez Molina MD [facility] Internal Medicine [phone]

## 2019-10-29 ENCOUNTER — TELEPHONE (OUTPATIENT)
Dept: INTERNAL MEDICINE | Facility: CLINIC | Age: 79
End: 2019-10-29

## 2019-10-29 DIAGNOSIS — I48.91 ATRIAL FIBRILLATION (H): ICD-10-CM

## 2019-10-29 DIAGNOSIS — Z79.01 LONG TERM CURRENT USE OF ANTICOAGULANTS WITH INR GOAL OF 2.0-3.0: ICD-10-CM

## 2019-10-29 LAB — INR PPP: 2.9 (ref 0.8–1.1)

## 2019-10-29 NOTE — TELEPHONE ENCOUNTER
ANTICOAGULATION MANAGEMENT     Patient Name:  Mariya Rowe  Date:  10/29/2019    ASSESSMENT /SUBJECTIVE:      Today's INR result of 2.9 is therapeutic. Goal INR of 2.0-3.0      Warfarin dose taken: Warfarin taken as previously instructed    Diet: No new diet changes affecting INR    Medication changes/ interactions: No new medications/supplements affecting INR    Previous INR: Supratherapeutic     S/S of bleeding or thromboembolism: No    New injury or illness:  No    Upcoming surgery, procedure or cardioversion:  No    Additional findings: none      PLAN:    Spoke with Mariya regarding INR result and instructed:     Warfarin Dosing Instructions: Continue your current warfarin dose of 2.5 mg every Mon, Wed, Sat; 5 mg all other days    Instructed patient to follow up no later than: 2 weeks    Education provided: Yes: no call if therapeutic next time; call clinic with any changes      Mariya verbalizes understanding and agrees to warfarin dosing plan.    Instructed to call the Anticoagulation Clinic for any changes, questions or concerns. (#[phone])        OBJECTIVE:  INR   Date Value Ref Range Status   10/29/2019 2.9 (A) 0.8 - 1.1 Final             Anticoagulation Summary  As of 10/29/2019    INR goal:   2.0-3.0   TTR:   64.5 % (3.4 y)   INR used for dosin.9 (10/29/2019)   Warfarin maintenance plan:   2.5 mg (5 mg x 0.5) every Mon, Wed, Sat; 5 mg (5 mg x 1) all other days   Full warfarin instructions:   2.5 mg every Mon, Wed, Sat; 5 mg all other days   Weekly warfarin total:   27.5 mg   Plan last modified:   Alyssa Cole RN (10/15/2019)   Next INR check:   2019   Priority:   INR   Target end date:       Indications    Long term current use of anticoagulants with INR goal of 2.0-3.0 [Z79.01]  Atrial fibrillation (H) [I48.91]             Anticoagulation Episode Summary     INR check location:       Preferred lab:       Send INR reminders to:   [facility] [place] [facility]    Comments:   started  home testing on 12/6/18.  Contact patient at [phone] until back in [place].      Anticoagulation Care Providers     Provider Role Specialty Phone number    Chavez Molina MD [facility] Internal Medicine [phone]

## 2019-11-13 ENCOUNTER — TELEPHONE (OUTPATIENT)
Dept: INTERNAL MEDICINE | Facility: CLINIC | Age: 79
End: 2019-11-13

## 2019-11-13 DIAGNOSIS — I48.91 ATRIAL FIBRILLATION (H): ICD-10-CM

## 2019-11-13 DIAGNOSIS — Z79.01 LONG TERM CURRENT USE OF ANTICOAGULANTS WITH INR GOAL OF 2.0-3.0: ICD-10-CM

## 2019-11-13 LAB — INR PPP: 2 (ref 0.8–1.1)

## 2019-11-13 NOTE — TELEPHONE ENCOUNTER
ANTICOAGULATION  MANAGEMENT-Patient Home Monitoring Result    Assessment     Therapeutic INR result of 2.0 . Goal range 2.0-3.0. Received via fax from [employer] home INR monitoring company.        Previous INR was therapeutic    Mariya was last contacted by phone: 10/29/2019    Plan     Per home monitoring agreement with patient, patient was NOT contacted regarding therapeutic result today.  Patient is to continue current dose and continue to check INR with home monitor per protocol.  ?       OBJECTIVE    INR   Date Value Ref Range Status   2019 2.0 (A) 0.8 - 1.1 Final       ASSESSMENT / PLAN      Anticoagulation Summary  As of 2019    INR goal:   2.0-3.0   TTR:   64.9 % (3.4 y)   INR used for dosin.0 (2019)   Warfarin maintenance plan:   2.5 mg (5 mg x 0.5) every Mon, Wed, Sat; 5 mg (5 mg x 1) all other days   Full warfarin instructions:   2.5 mg every Mon, Wed, Sat; 5 mg all other days   Weekly warfarin total:   27.5 mg   No change documented:   Thais Castro RN   Plan last modified:   Alyssa Cole, RN (10/15/2019)   Next INR check:   2019   Priority:   Maintenance   Target end date:       Indications    Long term current use of anticoagulants with INR goal of 2.0-3.0 [Z79.01]  Atrial fibrillation (H) [I48.91]             Anticoagulation Episode Summary     INR check location:       Preferred lab:       [facility]    Comments:   started home testing on 18.  Contact patient at [phone] until back in [place].      Anticoagulation Care Providers     Provider Role Specialty Phone number    Chavez Molina MD [facility] Internal Medicine [phone]

## 2019-11-14 DIAGNOSIS — I48.91 ATRIAL FIBRILLATION (H): Primary | ICD-10-CM

## 2019-11-14 RX ORDER — WARFARIN SODIUM 5 MG/1
TABLET ORAL
Qty: 72 TABLET | Refills: 0 | Status: SHIPPED | OUTPATIENT
Start: 2019-11-14 | End: 2020-03-12

## 2019-11-14 NOTE — TELEPHONE ENCOUNTER
"Requested Prescriptions   Pending Prescriptions Disp Refills     warfarin ANTICOAGULANT (JANTOVEN ANTICOAGULANT) 5 MG tablet [Pharmacy  Last Written Prescription Date:  8/27/2019  Last Fill Quantity: 90,  # refills: 0   Last office visit: 8/27/2019 with prescribing provider:     Future Office Visit:   Med Name: Guillermotoven Oral Tablet 5 MG] 72 tablet 0     Sig: TAKE 1/2 TABLET BY MOUTH ON WED AND SAT, AND TAKE 1 TABLET ALL OTHER DAYS AS DIRECTED       Vitamin K Antagonists Failed - 11/14/2019  2:53 PM        Failed - INR is within goal in the past 6 weeks     Confirm INR is within goal in the past 6 weeks.     Recent Labs   Lab Test 11/13/19   INR 2.0*                       Failed - Medication is active on med list        Passed - Recent (12 mo) or future (30 days) visit within the authorizing provider's specialty     Patient has had an office visit with the authorizing provider or a provider within the authorizing providers department within the previous 12 mos or has a future within next 30 days. See \"Patient Info\" tab in inbasket, or \"Choose Columns\" in Meds & Orders section of the refill encounter.              Passed - Patient is 18 years of age or older        Passed - Patient is not pregnant        Passed - No positive pregnancy on file in past 12 months        "

## 2019-11-15 NOTE — TELEPHONE ENCOUNTER
Anticoagulation Monitoring Return Date Recheck   Latest Ref Rng & Units     11/13/2019 11/27/2019      Anticoagulation Monitoring Instructions   Latest Ref Rng & Units    11/13/2019 2.5 mg every Mon, Wed, Sat; 5 mg all other days     Prescription approved per [facility] Refill Protocol.  Anita Palmer, RN  Anticoagulation Nurse - [facility]

## 2019-11-18 ENCOUNTER — HOSPITAL ENCOUNTER (OUTPATIENT)
Dept: MAMMOGRAPHY | Facility: CLINIC | Age: 79
Discharge: HOME | End: 2019-11-18
Attending: INTERNAL MEDICINE | Admitting: INTERNAL MEDICINE
Payer: MEDICARE

## 2019-11-18 DIAGNOSIS — Z12.31 VISIT FOR SCREENING MAMMOGRAM: ICD-10-CM

## 2019-11-18 PROCEDURE — 77063 BREAST TOMOSYNTHESIS BI: CPT

## 2019-11-22 ENCOUNTER — ANCILLARY PROCEDURE (OUTPATIENT)
Dept: BONE DENSITY | Facility: CLINIC | Age: 79
End: 2019-11-22
Attending: INTERNAL MEDICINE
Payer: MEDICARE

## 2019-11-22 DIAGNOSIS — M81.0 OSTEOPOROSIS WITHOUT CURRENT PATHOLOGICAL FRACTURE, UNSPECIFIED OSTEOPOROSIS TYPE: ICD-10-CM

## 2019-11-22 PROCEDURE — 77085 DXA BONE DENSITY AXL VRT FX: CPT | Performed by: INTERNAL MEDICINE

## 2019-11-26 ENCOUNTER — TELEPHONE (OUTPATIENT)
Dept: INTERNAL MEDICINE | Facility: CLINIC | Age: 79
End: 2019-11-26

## 2019-11-26 DIAGNOSIS — I48.91 ATRIAL FIBRILLATION (H): ICD-10-CM

## 2019-11-26 DIAGNOSIS — Z79.01 LONG TERM CURRENT USE OF ANTICOAGULANTS WITH INR GOAL OF 2.0-3.0: ICD-10-CM

## 2019-11-26 LAB — INR PPP: 2.7 (ref 0.8–1.1)

## 2019-11-26 NOTE — TELEPHONE ENCOUNTER
ANTICOAGULATION  MANAGEMENT-Patient Home Monitoring Result    Assessment     Therapeutic INR result of 2.7 . Goal range 2.0-3.0. Received via fax from [employer] home INR monitoring company.        Previous INR was therapeutic    Mariya was last contacted by phone: 10/29/2019    Plan     Per home monitoring agreement with patient, patient was NOT contacted regarding therapeutic result today.  Patient is to continue current dose and continue to check INR with home monitor per protocol.  ?       OBJECTIVE    INR   Date Value Ref Range Status   2019 2.7 (A) 0.8 - 1.1 Final       ASSESSMENT / PLAN      Anticoagulation Summary  As of 2019    INR goal:   2.0-3.0   TTR:   57.1 % (1 y)   INR used for dosin.7 (2019)   Warfarin maintenance plan:   2.5 mg (5 mg x 0.5) every Mon, Wed, Sat; 5 mg (5 mg x 1) all other days   Full warfarin instructions:   2.5 mg every Mon, Wed, Sat; 5 mg all other days   Weekly warfarin total:   27.5 mg   Plan last modified:   Alyssa Cole RN (10/15/2019)   Next INR check:   12/10/2019   Priority:   Maintenance   Target end date:       Indications    Long term current use of anticoagulants with INR goal of 2.0-3.0 [Z79.01]  Atrial fibrillation (H) [I48.91]             Anticoagulation Episode Summary     INR check location:       Preferred lab:       Send INR reminders to:   [facility]    Comments:   started home testing on 18.  Contact patient at [phone] until back in [place].      Anticoagulation Care Providers     Provider Role Specialty Phone number    Chavez Molina MD [facility] Internal Medicine [phone]

## 2019-12-10 ENCOUNTER — TELEPHONE (OUTPATIENT)
Dept: INTERNAL MEDICINE | Facility: CLINIC | Age: 79
End: 2019-12-10

## 2019-12-10 DIAGNOSIS — Z79.01 LONG TERM CURRENT USE OF ANTICOAGULANTS WITH INR GOAL OF 2.0-3.0: ICD-10-CM

## 2019-12-10 DIAGNOSIS — I48.91 ATRIAL FIBRILLATION (H): ICD-10-CM

## 2019-12-10 LAB — INR PPP: 2.8 (ref 0.8–1.1)

## 2019-12-10 NOTE — TELEPHONE ENCOUNTER
ANTICOAGULATION  MANAGEMENT-Patient Home Monitoring Result    Assessment     Therapeutic INR result of 2.8 . Goal range 2.0-3.0. Received via fax from [employer] home INR monitoring company.        Previous INR was therapeutic    Mariya was last contacted by phone: 10/29/2019    Plan     Per home monitoring agreement with patient, patient was NOT contacted regarding therapeutic result today.  Patient is to continue current dose and continue to check INR with home monitor per protocol.  ?       OBJECTIVE    INR   Date Value Ref Range Status   12/10/2019 2.8 (A) 0.8 - 1.1 Final       ASSESSMENT / PLAN      Anticoagulation Summary  As of 12/10/2019    INR goal:   2.0-3.0   TTR:   59.2 % (1 y)   INR used for dosin.8 (12/10/2019)   Warfarin maintenance plan:   2.5 mg (5 mg x 0.5) every Mon, Wed, Sat; 5 mg (5 mg x 1) all other days   Full warfarin instructions:   2.5 mg every Mon, Wed, Sat; 5 mg all other days   Weekly warfarin total:   27.5 mg   Plan last modified:   Alyssa Cole RN (10/15/2019)   Next INR check:      Priority:   Maintenance   Target end date:       Indications    Long term current use of anticoagulants with INR goal of 2.0-3.0 [Z79.01]  Atrial fibrillation (H) [I48.91]             Anticoagulation Episode Summary     INR check location:       Preferred lab:       Send INR reminders to:   [facility]    Comments:   started home testing on 18.  Contact patient at [phone] until back in [place].      Anticoagulation Care Providers     Provider Role Specialty Phone number    Chavez Molina MD [facility] Internal Medicine [phone]

## 2019-12-13 ENCOUNTER — TELEPHONE (OUTPATIENT)
Dept: INTERNAL MEDICINE | Facility: CLINIC | Age: 79
End: 2019-12-13

## 2019-12-13 NOTE — TELEPHONE ENCOUNTER
Patient called for results of the Dexa scan she had on 11/22/19. Please call her at [phone] (home)

## 2019-12-25 ENCOUNTER — TRANSFERRED RECORDS (OUTPATIENT)
Dept: HEALTH INFORMATION MANAGEMENT | Facility: CLINIC | Age: 79
End: 2019-12-25

## 2019-12-25 LAB
INR PPP: 3.7 (ref 0.9–1.1)
INR PPP: 3.7 (ref 0.9–1.1)

## 2019-12-26 ENCOUNTER — TELEPHONE (OUTPATIENT)
Dept: INTERNAL MEDICINE | Facility: CLINIC | Age: 79
End: 2019-12-26

## 2019-12-26 ENCOUNTER — ANTICOAGULATION THERAPY VISIT (OUTPATIENT)
Dept: INTERNAL MEDICINE | Facility: CLINIC | Age: 79
End: 2019-12-26
Payer: MEDICARE

## 2019-12-26 DIAGNOSIS — I48.91 ATRIAL FIBRILLATION (H): ICD-10-CM

## 2019-12-26 DIAGNOSIS — Z79.01 LONG TERM CURRENT USE OF ANTICOAGULANTS WITH INR GOAL OF 2.0-3.0: ICD-10-CM

## 2019-12-26 PROCEDURE — 99207 ZZC NO CHARGE NURSE ONLY: CPT | Performed by: INTERNAL MEDICINE

## 2019-12-26 NOTE — PROGRESS NOTES
ANTICOAGULATION FOLLOW-UP TELEPHONE VISIT    Patient Name:  Mariya Rowe  Date:  12/26/2019  Contact Type:  Telephone/ I called patient regarding fax INR result from home meter co from yesterday, 12-25-19.  See telephone encounter also  She skipped her 2.5 mg warfarin dose that evening.  I advised to resume her usual warfarin schedule today and eat some plant based vitamin k to help get INR back in range of 2.0-3.0.  patient is agreeable to plan of care.  she will retest INR at her usual day next week Tuesday 12-31-19.    SUBJECTIVE:  Patient Findings     Comments:   See telephone encounter also        Clinical Outcomes     Negatives:   Major bleeding event, Thromboembolic event, Anticoagulation-related hospital admission, Anticoagulation-related ED visit, Anticoagulation-related fatality    Comments:   See telephone encounter also           OBJECTIVE    INR   Date Value Ref Range Status   12/25/2019 3.7 (A) 0.90 - 1.10 Final   12/25/2019 3.7 (A) 0.90 - 1.10 Final       ASSESSMENT / PLAN  INR assessment SUPRA    Recheck INR In: 1 WEEK    INR Location Home INR      Anticoagulation Summary  As of 12/26/2019    INR goal:   2.0-3.0   TTR:   56.9 % (1 y)   INR used for dosing:   3.7! (12/25/2019)   Warfarin maintenance plan:   2.5 mg (5 mg x 0.5) every Mon, Wed, Sat; 5 mg (5 mg x 1) all other days   Full warfarin instructions:   2.5 mg every Mon, Wed, Sat; 5 mg all other days   Weekly warfarin total:   27.5 mg   No change documented:   Misty Contreras RN   Plan last modified:   Alyssa Cole RN (10/15/2019)   Next INR check:   12/31/2019   Priority:   Critical   Target end date:       Indications    Long term current use of anticoagulants with INR goal of 2.0-3.0 [Z79.01]  Atrial fibrillation (H) [I48.91]             Anticoagulation Episode Summary     INR check location:       Preferred lab:       Send INR reminders to:   [facility]    Comments:   started home testing on 12/6/18.  Contact  patient at [phone] until back in [place].      Anticoagulation Care Providers     Provider Role Specialty Phone number    Chavez Molina MD Responsible Internal Medicine [phone]            See the Encounter Report to view Anticoagulation Flowsheet and Dosing Calendar (Go to Encounters tab in chart review, and find the Anticoagulation Therapy Visit)    Dosage adjustment made based on physician directed care plan.    Misty Contreras RN

## 2019-12-26 NOTE — TELEPHONE ENCOUNTER
Spoke to patient and verified her INR was 3.7 on Wednesday 12-25-19.    Patient skipped her 2.5mg warfarin dose that evening.    Plan to resume her usual schedule today and she will have a salad or green veg / plant based vitamin k to help achieve her INR goal of 2.0-3.0.    She denies symptoms of concern and unknown reason for supra-therapeutic result.    See other anticoagulation episode documentation.    Misty Mishra RN

## 2019-12-26 NOTE — TELEPHONE ENCOUNTER
Incoming fax from [employer] home monitoring company    Date of INR 12/25/19    INR result 3.7

## 2020-01-01 ENCOUNTER — TRANSFERRED RECORDS (OUTPATIENT)
Dept: HEALTH INFORMATION MANAGEMENT | Facility: CLINIC | Age: 80
End: 2020-01-01

## 2020-01-02 ENCOUNTER — TELEPHONE (OUTPATIENT)
Dept: INTERNAL MEDICINE | Facility: CLINIC | Age: 80
End: 2020-01-02

## 2020-01-02 DIAGNOSIS — I48.91 ATRIAL FIBRILLATION (H): ICD-10-CM

## 2020-01-02 DIAGNOSIS — Z79.01 LONG TERM CURRENT USE OF ANTICOAGULANTS WITH INR GOAL OF 2.0-3.0: ICD-10-CM

## 2020-01-02 LAB — INR PPP: 2.5 (ref 0.9–1.1)

## 2020-01-02 NOTE — TELEPHONE ENCOUNTER
Incoming fax from [email or web address] home monitoring company    Date of INR 01/01/20     INR result 2.5

## 2020-01-02 NOTE — TELEPHONE ENCOUNTER
ANTICOAGULATION MANAGEMENT     Patient Name:  Mariya Rowe  Date:  2020    ASSESSMENT /SUBJECTIVE:      Today's INR result of 2.5 is therapeutic. Goal INR of 2.0-3.0      Warfarin dose taken: Warfarin taken as previously instructed    Diet: No new diet changes affecting INR    Medication changes/ interactions: No new medications/supplements affecting INR    Previous INR: Therapeutic     S/S of bleeding or thromboembolism: No    New injury or illness:  No    Upcoming surgery, procedure or cardioversion:  No    Additional findings: None      PLAN:    Spoke with Mariya regarding INR result and instructed:     Warfarin Dosing Instructions: Continue your current warfarin dose    Instructed patient to follow up no later than: 2 weeks    Education provided: Yes: Watch for increased signs of bruising or bleeding and if noted to be seen right away.       Mariya,  verbalizes understanding and agrees to warfarin dosing plan.    Instructed to call the Anticoagulation Clinic for any changes, questions or concerns. (#[phone])        OBJECTIVE:  INR   Date Value Ref Range Status   2020 2.5 (A) 0.90 - 1.10 Final             Anticoagulation Summary  As of 2020    INR goal:   2.0-3.0   TTR:   55.7 % (1 y)   INR used for dosin.5 (2020)   Warfarin maintenance plan:   2.5 mg (5 mg x 0.5) every Mon, Wed, Sat; 5 mg (5 mg x 1) all other days   Full warfarin instructions:   2.5 mg every Mon, Wed, Sat; 5 mg all other days   Weekly warfarin total:   27.5 mg   No change documented:   Anita Palmer RN   Plan last modified:   Alyssa Cole RN (10/15/2019)   Next INR check:   2020   Priority:   Maintenance   Target end date:       Indications    Long term current use of anticoagulants with INR goal of 2.0-3.0 [Z79.01]  Atrial fibrillation (H) [I48.91]             Anticoagulation Episode Summary     INR check location:       Preferred lab:       Send INR reminders to:   [facility]     Comments:   started home testing on 12/6/18.  Contact patient at [phone] until back in [place].      Anticoagulation Care Providers     Provider Role Specialty Phone number    Chvaez Molina MD [facility] Internal Medicine [phone]

## 2020-01-15 ENCOUNTER — TRANSFERRED RECORDS (OUTPATIENT)
Dept: HEALTH INFORMATION MANAGEMENT | Facility: CLINIC | Age: 80
End: 2020-01-15

## 2020-01-15 ENCOUNTER — TELEPHONE (OUTPATIENT)
Dept: INTERNAL MEDICINE | Facility: CLINIC | Age: 80
End: 2020-01-15

## 2020-01-15 DIAGNOSIS — Z79.01 LONG TERM CURRENT USE OF ANTICOAGULANTS WITH INR GOAL OF 2.0-3.0: ICD-10-CM

## 2020-01-15 DIAGNOSIS — I48.91 ATRIAL FIBRILLATION (H): ICD-10-CM

## 2020-01-15 LAB — INR PPP: 3.2 (ref 0.9–1.1)

## 2020-01-15 NOTE — TELEPHONE ENCOUNTER
ANTICOAGULATION MANAGEMENT     Patient Name:  Mariya Rowe  Date:  1/15/2020    ASSESSMENT /SUBJECTIVE:      Today's INR result of 3.2 is supratherapeutic. Goal INR of 2.0-3.0      Warfarin dose taken: Warfarin taken as previously instructed    Diet: [place] change may be affecting diet and INR    Medication changes/ interactions: No new medications/supplements affecting INR    Previous INR: Therapeutic     S/S of bleeding or thromboembolism: No    New injury or illness:  No    Upcoming surgery, procedure or cardioversion:  No    Additional findings: Patient is in [place] for winter      PLAN:    Spoke with Mariya regarding INR result and instructed:     Warfarin Dosing Instructions: hold x 1, the resume normal dosing    Instructed patient to follow up no later than: 2 weeks    Education provided:  Yes: Watch for increased signs of bruising or bleeding and notify INR if noted.  Also notifiy INR clinic of any medication changes including OTCs, diet, or activity changes and any upcoming procedures.    Mariya,  verbalizes understanding and agrees to warfarin dosing plan.    Instructed to call the Anticoagulation Clinic for any changes, questions or concerns. (#[phone])        OBJECTIVE:  INR   Date Value Ref Range Status   01/15/2020 3.2 (A) 0.90 - 1.10 Final             Anticoagulation Summary  As of 1/15/2020    INR goal:   2.0-3.0   TTR:   55.1 % (1 y)   INR used for dosing:   3.2! (1/15/2020)   Warfarin maintenance plan:   2.5 mg (5 mg x 0.5) every Wed, Sat; 5 mg (5 mg x 1) all other days   Full warfarin instructions:   1/15: Hold; Otherwise 2.5 mg every Wed, Sat; 5 mg all other days   Weekly warfarin total:   30 mg   Plan last modified:   Anita Palmer RN (1/15/2020)   Next INR check:   1/29/2020   Priority:   Maintenance   Target end date:       Indications    Long term current use of anticoagulants with INR goal of 2.0-3.0 [Z79.01]  Atrial fibrillation (H) [I48.91]             Anticoagulation  Episode Summary     INR check location:       Preferred lab:   EXTERNAL LAB    Send INR reminders to:   [facility]    Comments:   started home testing on 12/6/18.  Contact patient at [phone] until back in [place].      Anticoagulation Care Providers     Provider Role Specialty Phone number    Chavez Molina MD [facility] Internal Medicine [phone]

## 2020-01-29 ENCOUNTER — TELEPHONE (OUTPATIENT)
Dept: INTERNAL MEDICINE | Facility: CLINIC | Age: 80
End: 2020-01-29

## 2020-01-29 ENCOUNTER — TRANSFERRED RECORDS (OUTPATIENT)
Dept: HEALTH INFORMATION MANAGEMENT | Facility: CLINIC | Age: 80
End: 2020-01-29

## 2020-01-29 DIAGNOSIS — Z79.01 LONG TERM CURRENT USE OF ANTICOAGULANTS WITH INR GOAL OF 2.0-3.0: ICD-10-CM

## 2020-01-29 DIAGNOSIS — I48.91 ATRIAL FIBRILLATION (H): ICD-10-CM

## 2020-01-29 LAB — INR PPP: 2.1 (ref 0.9–1.1)

## 2020-01-29 NOTE — TELEPHONE ENCOUNTER
ANTICOAGULATION MANAGEMENT     Patient Name:  Mariya Rowe  Date:  2020    ASSESSMENT /SUBJECTIVE:    Today's INR result of 2.1 is therapeutic. Goal INR of 2.0-3.0      Warfarin dose taken: Warfarin taken as previously instructed    Diet: No new diet changes affecting INR    Medication changes/ interactions: No new medications/supplements affecting INR    Previous INR: Supratherapeutic     S/S of bleeding or thromboembolism: No    New injury or illness:  No    Upcoming surgery, procedure or cardioversion:  No    Additional findings: None    PLAN:    Spoke with Mariya regarding INR result and instructed:     Warfarin Dosing Instructions: Continue your current warfarin dose of 2.5mg Wed/Sat; 5mg all other days    Instructed patient to follow up no later than: 2 weeks  Patient to recheck with home meter    Education provided: Mary Meraz verbalizes understanding and agrees to warfarin dosing plan.    Instructed to call the Anticoagulation Clinic for any changes, questions or concerns. (#[phone])        OBJECTIVE:  INR   Date Value Ref Range Status   2020 2.1 (A) 0.90 - 1.10 Final             Anticoagulation Summary  As of 2020    INR goal:   2.0-3.0   TTR:   58.3 % (1 y)   INR used for dosin.1 (2020)   Warfarin maintenance plan:   2.5 mg (5 mg x 0.5) every Wed, Sat; 5 mg (5 mg x 1) all other days   Full warfarin instructions:   2.5 mg every Wed, Sat; 5 mg all other days   Weekly warfarin total:   30 mg   Plan last modified:   Anita Palmer RN (1/15/2020)   Next INR check:   2020   Priority:   Maintenance   Target end date:       Indications    Long term current use of anticoagulants with INR goal of 2.0-3.0 [Z79.01]  Atrial fibrillation (H) [I48.91]             Anticoagulation Episode Summary     INR check location:       Preferred lab:   EXTERNAL LAB    Send INR reminders to:   [facility]    Comments:   started home testing on 18.  Contact patient at [phone]  until back in [place].      Anticoagulation Care Providers     Provider Role Specialty Phone number    Chavez Molina MD [facility] Internal Medicine [phone]

## 2020-02-11 ENCOUNTER — TELEPHONE (OUTPATIENT)
Dept: INTERNAL MEDICINE | Facility: CLINIC | Age: 80
End: 2020-02-11

## 2020-02-11 DIAGNOSIS — Z79.01 LONG TERM CURRENT USE OF ANTICOAGULANTS WITH INR GOAL OF 2.0-3.0: ICD-10-CM

## 2020-02-11 DIAGNOSIS — I48.91 ATRIAL FIBRILLATION (H): ICD-10-CM

## 2020-02-11 LAB — INR PPP: 2.9 (ref 0.9–1.1)

## 2020-02-11 NOTE — TELEPHONE ENCOUNTER
ANTICOAGULATION MANAGEMENT     Patient Name:  Mariya Rowe  Date:  2020    ASSESSMENT /SUBJECTIVE:      Today's INR result of 2.9 is therapeutic. Goal INR of 2.0-3.0      Warfarin dose taken: Warfarin taken as previously instructed    Diet: No new diet changes affecting INR    Medication changes/ interactions: No new medications/supplements affecting INR    Previous INR: Therapeutic     S/S of bleeding or thromboembolism: No    New injury or illness:  No    Upcoming surgery, procedure or cardioversion:  No    Additional findings: None      PLAN:    Spoke with Mariya regarding INR result and instructed:     Warfarin Dosing Instructions: Continue your current warfarin dose    Instructed patient to follow up no later than: 2 weeks  Patient to recheck with home meter    Education provided: Affects of diet, vitamin K on INR      Mariya verbalizes understanding and agrees to warfarin dosing plan.    Instructed to call the Anticoagulation Clinic for any changes, questions or concerns. (#[phone])        OBJECTIVE:  INR   Date Value Ref Range Status   2020 2.9 (A) 0.90 - 1.10 Final             Anticoagulation Summary  As of 2020    INR goal:   2.0-3.0   TTR:   60.3 % (1 y)   INR used for dosin.9 (2020)   Warfarin maintenance plan:   2.5 mg (5 mg x 0.5) every Wed, Sat; 5 mg (5 mg x 1) all other days   Full warfarin instructions:   2.5 mg every Wed, Sat; 5 mg all other days   Weekly warfarin total:   30 mg   No change documented:   Destinee Christine RN   Plan last modified:   Anita Palmer RN (1/15/2020)   Next INR check:   2020   Priority:   Maintenance   Target end date:       Indications    Long term current use of anticoagulants with INR goal of 2.0-3.0 [Z79.01]  Atrial fibrillation (H) [I48.91]             Anticoagulation Episode Summary     INR check location:       Preferred lab:   EXTERNAL LAB    Send INR reminders to:   [facility]    Comments:   started home testing on  12/6/18.  Contact patient at [phone] until back in [place].      Anticoagulation Care Providers     Provider Role Specialty Phone number    Chavez Molina MD [facility] Internal Medicine [phone]           Destinee Christine RN - [facility] Anticoagulation Clinic

## 2020-02-25 ENCOUNTER — TELEPHONE (OUTPATIENT)
Dept: INTERNAL MEDICINE | Facility: CLINIC | Age: 80
End: 2020-02-25

## 2020-02-25 ENCOUNTER — TRANSFERRED RECORDS (OUTPATIENT)
Dept: HEALTH INFORMATION MANAGEMENT | Facility: CLINIC | Age: 80
End: 2020-02-25

## 2020-02-25 DIAGNOSIS — I48.91 ATRIAL FIBRILLATION (H): ICD-10-CM

## 2020-02-25 DIAGNOSIS — Z79.01 LONG TERM CURRENT USE OF ANTICOAGULANTS WITH INR GOAL OF 2.0-3.0: ICD-10-CM

## 2020-02-25 LAB — INR PPP: 3 (ref 0.9–1.1)

## 2020-03-10 ENCOUNTER — TELEPHONE (OUTPATIENT)
Dept: INTERNAL MEDICINE | Facility: CLINIC | Age: 80
End: 2020-03-10

## 2020-03-10 DIAGNOSIS — Z79.01 LONG TERM CURRENT USE OF ANTICOAGULANTS WITH INR GOAL OF 2.0-3.0: ICD-10-CM

## 2020-03-10 DIAGNOSIS — I48.91 ATRIAL FIBRILLATION (H): ICD-10-CM

## 2020-03-10 LAB — INR PPP: 2.7 (ref 0.9–1.1)

## 2020-03-10 NOTE — TELEPHONE ENCOUNTER
ANTICOAGULATION MANAGEMENT     Patient Name:  Mariya Rowe  Date:  3/10/2020    ASSESSMENT /SUBJECTIVE:      Today's INR result of 2.7 is therapeutic. Goal INR of 2.0-3.0      Warfarin dose taken: Warfarin taken as previously instructed    Diet: No new diet changes affecting INR    Medication changes/ interactions: No new medications/supplements affecting INR    Previous INR: Therapeutic     S/S of bleeding or thromboembolism: No    New injury or illness:  No    Upcoming surgery, procedure or cardioversion:  No    Additional findings: patient is back from [place]      PLAN:    Spoke with Mariya regarding INR result and instructed:     Warfarin Dosing Instructions: Continue your current warfarin dose    Instructed patient to follow up no later than: 2 weeks  Patient to recheck with home meter    Education provided: Yes: discussed management by exception and patient is interested.  Did discuss s/s bleeding/clotting and what to watch for.  [place] number given to patient to call with any questions/concerns or changes.      Mariya verbalizes understanding and agrees to warfarin dosing plan.    Instructed to call the Anticoagulation Clinic for any changes, questions or concerns. (#[phone])        OBJECTIVE:  INR   Date Value Ref Range Status   03/10/2020 2.7 (A) 0.90 - 1.10 Final             Anticoagulation Summary  As of 3/10/2020    INR goal:   2.0-3.0   TTR:   63.2 % (1 y)   INR used for dosin.7 (3/10/2020)   Warfarin maintenance plan:   2.5 mg (5 mg x 0.5) every Wed, Sat; 5 mg (5 mg x 1) all other days   Full warfarin instructions:   2.5 mg every Wed, Sat; 5 mg all other days   Weekly warfarin total:   30 mg   Plan last modified:   Anita Palmer RN (1/15/2020)   Next INR check:   3/24/2020   Priority:   Maintenance   Target end date:       Indications    Long term current use of anticoagulants with INR goal of 2.0-3.0 [Z79.01]  Atrial fibrillation (H) [I48.91]             Anticoagulation Episode Summary      INR check location:       Preferred lab:   EXTERNAL LAB    Send INR reminders to:   [facility]    Comments:    Contact patient at [phone] until back in [place]. Management by Exception       Anticoagulation Care Providers     Provider Role Specialty Phone number    Chavez Molina MD [facility] Internal Medicine [phone]

## 2020-03-24 ENCOUNTER — TELEPHONE (OUTPATIENT)
Dept: INTERNAL MEDICINE | Facility: CLINIC | Age: 80
End: 2020-03-24

## 2020-03-24 ENCOUNTER — ANTICOAGULATION THERAPY VISIT (OUTPATIENT)
Dept: ANTICOAGULATION | Facility: CLINIC | Age: 80
End: 2020-03-24

## 2020-03-24 DIAGNOSIS — I48.91 ATRIAL FIBRILLATION (H): Primary | ICD-10-CM

## 2020-03-24 DIAGNOSIS — Z79.01 LONG TERM CURRENT USE OF ANTICOAGULANTS WITH INR GOAL OF 2.0-3.0: ICD-10-CM

## 2020-03-24 DIAGNOSIS — I48.91 ATRIAL FIBRILLATION (H): ICD-10-CM

## 2020-03-24 DIAGNOSIS — I48.21 PERMANENT ATRIAL FIBRILLATION (H): ICD-10-CM

## 2020-03-24 LAB — INR PPP: 4 (ref 0.9–1.1)

## 2020-03-24 NOTE — PROGRESS NOTES
Patient denies any changes.  Will hold dose today and then resume maintenance dose and recheck INR in 9 days on home monitor.     Mariya is aware if signs of clotting (pain, tenderness, swelling, color change in leg or arm, SOB) and bleeding occur (blood in stool, urine, large bruising, bleeding gums, nosebleeds) to have INR check sooner. If sx severe report to ER or concerned for stroke call 911. If general questions or concerns arise, call clinic.     Brittni Narvaez RN  [facility]

## 2020-03-31 ENCOUNTER — TELEPHONE (OUTPATIENT)
Dept: INTERNAL MEDICINE | Facility: CLINIC | Age: 80
End: 2020-03-31

## 2020-03-31 DIAGNOSIS — I48.91 ATRIAL FIBRILLATION (H): ICD-10-CM

## 2020-03-31 DIAGNOSIS — I48.21 PERMANENT ATRIAL FIBRILLATION (H): ICD-10-CM

## 2020-03-31 DIAGNOSIS — Z79.01 LONG TERM CURRENT USE OF ANTICOAGULANTS WITH INR GOAL OF 2.0-3.0: ICD-10-CM

## 2020-03-31 LAB — INR PPP: 2.1 (ref 0.9–1.1)

## 2020-03-31 NOTE — TELEPHONE ENCOUNTER
ANTICOAGULATION MANAGEMENT     Patient Name:  Mariya Rowe  Date:  3/31/2020    ASSESSMENT /SUBJECTIVE:      Today's INR result of 2.1 is therapeutic. Goal INR of 2.0-3.0      Warfarin dose taken: Warfarin taken as previously instructed    Diet: No new diet changes affecting INR    Medication changes/ interactions: No new medications/supplements affecting INR    Previous INR: Supratherapeutic     S/S of bleeding or thromboembolism: No    New injury or illness:  No    Upcoming surgery, procedure or cardioversion:  No    Additional findings: None      PLAN:    Spoke with Mariya regarding INR result and instructed:     Warfarin Dosing Instructions: Continue your current warfarin dose 2.5 mg Wed/Sat and 5 mg ROW    Instructed patient to follow up no later than: 1 week  Patient to recheck with home meter    Education provided: Mary Meraz verbalizes understanding and agrees to warfarin dosing plan.    Instructed to call the Anticoagulation Clinic for any changes, questions or concerns. (#[phone])        OBJECTIVE:  INR   Date Value Ref Range Status   2020 2.1 (A) 0.90 - 1.10 Final             Anticoagulation Summary  As of 3/31/2020    INR goal:   2.0-3.0   TTR:   59.7 % (1 y)   INR used for dosin.1 (3/31/2020)   Warfarin maintenance plan:   2.5 mg (5 mg x 0.5) every Wed, Sat; 5 mg (5 mg x 1) all other days   Full warfarin instructions:   2.5 mg every Wed, Sat; 5 mg all other days   Weekly warfarin total:   30 mg   No change documented:   Destinee Christine RN   Plan last modified:   Anita Palmer RN (1/15/2020)   Next INR check:   2020   Priority:   Maintenance   Target end date:   Indefinite    Indications    Long term current use of anticoagulants with INR goal of 2.0-3.0 [Z79.01]  Atrial fibrillation (H) [I48.91]  Permanent atrial fibrillation [I48.21]             Anticoagulation Episode Summary     INR check location:       Preferred lab:   EXTERNAL LAB    Send INR reminders to:   LUIS EDUARDO  FREDERICK    Comments:    Contact patient at [phone] until back in [place]. Management by Exception       Anticoagulation Care Providers     Provider Role Specialty Phone number    Chavez Molina MD Referring Internal Medicine [phone]         Destinee Christine RN - [facility]

## 2020-04-07 ENCOUNTER — ANTICOAGULATION THERAPY VISIT (OUTPATIENT)
Dept: ANTICOAGULATION | Facility: CLINIC | Age: 80
End: 2020-04-07

## 2020-04-07 DIAGNOSIS — I48.21 PERMANENT ATRIAL FIBRILLATION (H): ICD-10-CM

## 2020-04-07 DIAGNOSIS — Z79.01 LONG TERM CURRENT USE OF ANTICOAGULANTS WITH INR GOAL OF 2.0-3.0: ICD-10-CM

## 2020-04-07 DIAGNOSIS — I48.91 ATRIAL FIBRILLATION (H): ICD-10-CM

## 2020-04-07 LAB — INR PPP: 2.3 (ref 0.9–1.1)

## 2020-04-07 NOTE — PROGRESS NOTES
ANTICOAGULATION  MANAGEMENT-Patient Home Monitoring Result    Assessment     Therapeutic INR result of 2.3 . Goal range 2.0-3.0. Received via fax from [employer] home INR monitoring company.        Previous INR was therapeutic    Mariya was last contacted by phone: 3/31/2020    Plan     Per home monitoring agreement with patient, patient was NOT contacted regarding therapeutic result today.  Patient is to continue current dose and continue to check INR with home monitor per protocol.  ?       OBJECTIVE    INR   Date Value Ref Range Status   2020 2.3 (A) 0.90 - 1.10 Final       ASSESSMENT / PLAN  No question data found.  Anticoagulation Summary  As of 2020    INR goal:   2.0-3.0   TTR:   60.2 % (1 y)   INR used for dosin.3 (2020)   Warfarin maintenance plan:   2.5 mg (5 mg x 0.5) every Wed, Sat; 5 mg (5 mg x 1) all other days   Full warfarin instructions:   2.5 mg every Wed, Sat; 5 mg all other days   Weekly warfarin total:   30 mg   No change documented:   Brittni Narvaez RN   Plan last modified:   Anita Palmer RN (1/15/2020)   Next INR check:   2020   Priority:   Maintenance   Target end date:   Indefinite    Indications    Long term current use of anticoagulants with INR goal of 2.0-3.0 [Z79.01]  Atrial fibrillation (H) [I48.91]  Permanent atrial fibrillation [I48.21]             Anticoagulation Episode Summary     INR check location:       Preferred lab:   EXTERNAL LAB    Send INR reminders to:   LUIS EDUARDO MACK    Comments:    Contact patient at [phone] until back in [place]. Management by Exception       Anticoagulation Care Providers     Provider Role Specialty Phone number    Chavez Molina MD Referring Internal Medicine [phone]

## 2020-04-21 ENCOUNTER — TRANSFERRED RECORDS (OUTPATIENT)
Dept: HEALTH INFORMATION MANAGEMENT | Facility: CLINIC | Age: 80
End: 2020-04-21

## 2020-04-21 ENCOUNTER — TELEPHONE (OUTPATIENT)
Dept: INTERNAL MEDICINE | Facility: CLINIC | Age: 80
End: 2020-04-21

## 2020-04-21 DIAGNOSIS — I48.21 PERMANENT ATRIAL FIBRILLATION (H): ICD-10-CM

## 2020-04-21 DIAGNOSIS — Z79.01 LONG TERM CURRENT USE OF ANTICOAGULANTS WITH INR GOAL OF 2.0-3.0: ICD-10-CM

## 2020-04-21 DIAGNOSIS — I48.0 PAROXYSMAL ATRIAL FIBRILLATION (H): ICD-10-CM

## 2020-04-21 LAB — INR PPP: 3.1 (ref 0.9–1.1)

## 2020-04-21 NOTE — TELEPHONE ENCOUNTER
Incoming fax from [employer] home monitoring company    Date of INR 4/21    INR result 3.1

## 2020-04-21 NOTE — TELEPHONE ENCOUNTER
ANTICOAGULATION MANAGEMENT     Patient Name:  Mariya Rowe  Date:  4/21/2020    ASSESSMENT /SUBJECTIVE:    Today's INR result of 3.1 is supratherapeutic. Goal INR of 2.0-3.0      Warfarin dose taken: Warfarin taken as previously instructed    Diet: No new diet changes affecting INR    Medication changes/ interactions: No new medications/supplements affecting INR    Previous INR: Therapeutic     S/S of bleeding or thromboembolism: No    New injury or illness: No    Upcoming surgery, procedure or cardioversion: No    Additional findings: None      PLAN:    Spoke with Mariya regarding INR result and instructed:     Warfarin Dosing Instructions: Reduce dose today of 2.5 mg, then resume normal dosing    Instructed patient to follow up no later than: 2 weeks  Patient to recheck with home meter    Education provided: Monitoring for bleeding signs and symptoms and Monitoring for clotting signs and symptoms      Mariya verbalizes understanding and agrees to warfarin dosing plan.    Instructed to call the Anticoagulation Clinic for any changes, questions or concerns. (#[phone])        OBJECTIVE:  INR   Date Value Ref Range Status   04/21/2020 3.1 (A) 0.90 - 1.10 Final             Anticoagulation Summary  As of 4/21/2020    INR goal:   2.0-3.0   TTR:   63.6 % (1 y)   INR used for dosing:   3.1! (4/21/2020)   Warfarin maintenance plan:   2.5 mg (5 mg x 0.5) every Wed, Sat; 5 mg (5 mg x 1) all other days   Full warfarin instructions:   4/21: 2.5 mg; Otherwise 2.5 mg every Wed, Sat; 5 mg all other days   Weekly warfarin total:   30 mg   Plan last modified:   Anita Palmer RN (1/15/2020)   Next INR check:   5/5/2020   Priority:   Maintenance   Target end date:   Indefinite    Indications    Long term current use of anticoagulants with INR goal of 2.0-3.0 [Z79.01]  Atrial fibrillation (H) [I48.91]  Permanent atrial fibrillation [I48.21]             Anticoagulation Episode Summary     INR check location:       Preferred  lab:   EXTERNAL LAB    Send INR reminders to:   LUIS EDUARDO MACK    Comments:    Contact patient at [phone] until back in [place]. Management by Exception       Anticoagulation Care Providers     Provider Role Specialty Phone number    Chavez Molina MD Referring Internal Medicine [phone]

## 2020-05-07 ENCOUNTER — TRANSFERRED RECORDS (OUTPATIENT)
Dept: HEALTH INFORMATION MANAGEMENT | Facility: CLINIC | Age: 80
End: 2020-05-07

## 2020-05-07 ENCOUNTER — TELEPHONE (OUTPATIENT)
Dept: INTERNAL MEDICINE | Facility: CLINIC | Age: 80
End: 2020-05-07

## 2020-05-07 DIAGNOSIS — I48.0 PAROXYSMAL ATRIAL FIBRILLATION (H): ICD-10-CM

## 2020-05-07 DIAGNOSIS — Z79.01 LONG TERM CURRENT USE OF ANTICOAGULANTS WITH INR GOAL OF 2.0-3.0: ICD-10-CM

## 2020-05-07 DIAGNOSIS — I48.21 PERMANENT ATRIAL FIBRILLATION (H): ICD-10-CM

## 2020-05-07 LAB — INR PPP: 3.2 (ref 0.9–1.1)

## 2020-05-07 NOTE — TELEPHONE ENCOUNTER
ANTICOAGULATION MANAGEMENT     Patient Name:  Mariya Rowe  Date:  5/7/2020    ASSESSMENT /SUBJECTIVE:    Today's INR result of 3.2 is supratherapeutic. Goal INR of 2.0-3.0      Warfarin dose taken: Warfarin taken as previously instructed    Diet: No new diet changes affecting INR    Medication changes/ interactions: No new medications/supplements affecting INR    Previous INR: Supratherapeutic     S/S of bleeding or thromboembolism: No    New injury or illness: No    Upcoming surgery, procedure or cardioversion: No    Additional findings: None      PLAN:    Spoke with Mariya regarding INR result and instructed:     Warfarin Dosing Instructions: 2.5mg today (pt requested a partial hold) then change your warfarin dose to 2.5mg Mon/Wed/Sat; 5mg all other days (8.3% reduction in weekly maintenance dose)    Instructed patient to follow up no later than: 1 week  Patient to recheck with home meter    Education provided: Target INR goal and significance of current INR result      Mariya verbalizes understanding and agrees to warfarin dosing plan.    Instructed to call the Anticoagulation Clinic for any changes, questions or concerns. (#[phone])        OBJECTIVE:  INR   Date Value Ref Range Status   05/07/2020 3.2 (A) 0.90 - 1.10 Final             Anticoagulation Summary  As of 5/7/2020    INR goal:   2.0-3.0   TTR:   63.6 % (1 y)   INR used for dosing:   3.2! (5/7/2020)   Warfarin maintenance plan:   2.5 mg (5 mg x 0.5) every Mon, Wed, Sat; 5 mg (5 mg x 1) all other days   Full warfarin instructions:   5/7: 2.5 mg; Otherwise 2.5 mg every Mon, Wed, Sat; 5 mg all other days   Weekly warfarin total:   27.5 mg   Plan last modified:   Ijeoma Concepcion, RN (5/7/2020)   Next INR check:   5/14/2020   Priority:   High   Target end date:   Indefinite    Indications    Long term current use of anticoagulants with INR goal of 2.0-3.0 [Z79.01]  Atrial fibrillation (H) [I48.91]  Permanent atrial fibrillation [I48.21]              Anticoagulation Episode Summary     INR check location:       Preferred lab:   EXTERNAL LAB    Send INR reminders to:   LUIS EDUARDO MACK    Comments:    Contact patient at [phone] until back in [place]. Management by Exception       Anticoagulation Care Providers     Provider Role Specialty Phone number    Chavez Molina MD Referring Internal Medicine [phone]

## 2020-05-14 ENCOUNTER — TELEPHONE (OUTPATIENT)
Dept: INTERNAL MEDICINE | Facility: CLINIC | Age: 80
End: 2020-05-14

## 2020-05-14 ENCOUNTER — TRANSFERRED RECORDS (OUTPATIENT)
Dept: HEALTH INFORMATION MANAGEMENT | Facility: CLINIC | Age: 80
End: 2020-05-14

## 2020-05-14 DIAGNOSIS — I48.0 PAROXYSMAL ATRIAL FIBRILLATION (H): ICD-10-CM

## 2020-05-14 DIAGNOSIS — Z79.01 LONG TERM CURRENT USE OF ANTICOAGULANTS WITH INR GOAL OF 2.0-3.0: ICD-10-CM

## 2020-05-14 DIAGNOSIS — I48.21 PERMANENT ATRIAL FIBRILLATION (H): ICD-10-CM

## 2020-05-14 LAB — INR PPP: 2.5 (ref 0.9–1.1)

## 2020-05-14 NOTE — TELEPHONE ENCOUNTER
ANTICOAGULATION MANAGEMENT     Patient Name:  Mariya Rowe  Date:  2020    ASSESSMENT /SUBJECTIVE:    Today's INR result of 2.5 is therapeutic. Goal INR of 2.0-3.0      Warfarin dose taken: Warfarin taken as previously instructed    Diet: No new diet changes affecting INR    Medication changes/ interactions: No new medications/supplements affecting INR    Previous INR: Supratherapeutic     S/S of bleeding or thromboembolism: No    New injury or illness: No    Upcoming surgery, procedure or cardioversion: No    Additional findings: None      PLAN:    Spoke with Mariya regarding INR result and instructed:     Warfarin Dosing Instructions: Continue your current warfarin dose      2.5 mg every Mon, Wed, Sat; 5 mg all other days         Instructed patient to follow up no later than: 2 weeks  Patient to recheck with home meter    Education provided: Target INR goal and significance of current INR result      Mariya verbalizes understanding and agrees to warfarin dosing plan.    Instructed to call the Anticoagulation Clinic for any changes, questions or concerns. (#[phone])        OBJECTIVE:  INR   Date Value Ref Range Status   2020 2.5 (A) 0.90 - 1.10 Final             Anticoagulation Summary  As of 2020    INR goal:   2.0-3.0   TTR:   65.0 % (1 y)   INR used for dosin.5 (2020)   Warfarin maintenance plan:   2.5 mg (5 mg x 0.5) every Mon, Wed, Sat; 5 mg (5 mg x 1) all other days   Full warfarin instructions:   2.5 mg every Mon, Wed, Sat; 5 mg all other days   Weekly warfarin total:   27.5 mg   Plan last modified:   Ijeoma Concepcion RN (2020)   Next INR check:   2020   Priority:   High   Target end date:   Indefinite    Indications    Long term current use of anticoagulants with INR goal of 2.0-3.0 [Z79.01]  Atrial fibrillation (H) [I48.91]  Permanent atrial fibrillation [I48.21]             Anticoagulation Episode Summary     INR check location:       Preferred lab:   EXTERNAL  LAB    Send INR reminders to:   LUIS EDUARDO MACK    Comments:    Contact patient at [phone] until back in [place]. Management by Exception       Anticoagulation Care Providers     Provider Role Specialty Phone number    Chavez Molina MD Referring Internal Medicine [phone]

## 2020-05-26 ENCOUNTER — TELEPHONE (OUTPATIENT)
Dept: INTERNAL MEDICINE | Facility: CLINIC | Age: 80
End: 2020-05-26

## 2020-05-26 DIAGNOSIS — I48.0 PAROXYSMAL ATRIAL FIBRILLATION (H): ICD-10-CM

## 2020-05-26 DIAGNOSIS — I48.21 PERMANENT ATRIAL FIBRILLATION (H): ICD-10-CM

## 2020-05-26 DIAGNOSIS — Z79.01 LONG TERM CURRENT USE OF ANTICOAGULANTS WITH INR GOAL OF 2.0-3.0: ICD-10-CM

## 2020-05-26 LAB — INR PPP: 2.1 (ref 0.9–1.1)

## 2020-05-26 NOTE — TELEPHONE ENCOUNTER
ANTICOAGULATION  MANAGEMENT-Patient Home Monitoring Result    Assessment     Therapeutic INR result of 2.1 . Goal range 2.0-3.0. Received via fax from [employer] home INR monitoring company.        Previous INR was therapeutic    Mariya was last contacted by phone: 2020    Plan     Per home monitoring agreement with patient, patient was NOT contacted regarding therapeutic result today.  Patient is to continue current dose and continue to check INR with home monitor per protocol.  ?       OBJECTIVE    INR   Date Value Ref Range Status   2020 2.1 (A) 0.90 - 1.10 Final       ASSESSMENT / PLAN      Anticoagulation Summary  As of 2020    INR goal:   2.0-3.0   TTR:   67.9 % (1 y)   INR used for dosin.1 (2020)   Warfarin maintenance plan:   2.5 mg (5 mg x 0.5) every Mon, Wed, Sat; 5 mg (5 mg x 1) all other days   Full warfarin instructions:   2.5 mg every Mon, Wed, Sat; 5 mg all other days   Weekly warfarin total:   27.5 mg   No change documented:   Destinee Christine, RN   Plan last modified:   Ijeoma Concepcion RN (2020)   Next INR check:   2020   Priority:   High   Target end date:   Indefinite    Indications    Long term current use of anticoagulants with INR goal of 2.0-3.0 [Z79.01]  Atrial fibrillation (H) [I48.91]  Permanent atrial fibrillation [I48.21]             Anticoagulation Episode Summary     INR check location:       Preferred lab:   EXTERNAL LAB    Send INR reminders to:   LUIS EDUARDO MACK    Comments:    Contact patient at [phone] until back in [place]. Management by Exception       Anticoagulation Care Providers     Provider Role Specialty Phone number    Chavez Molina MD Referring Internal Medicine [phone]            Destinee Christine, RN - [facility]

## 2020-06-10 ENCOUNTER — DOCUMENTATION ONLY (OUTPATIENT)
Dept: INTERNAL MEDICINE | Facility: CLINIC | Age: 80
End: 2020-06-10

## 2020-06-10 LAB — INR PPP: 2.1 (ref 0.9–1.1)

## 2020-06-23 ENCOUNTER — ANTICOAGULATION THERAPY VISIT (OUTPATIENT)
Dept: NURSING | Facility: CLINIC | Age: 80
End: 2020-06-23

## 2020-06-23 DIAGNOSIS — Z79.01 LONG TERM CURRENT USE OF ANTICOAGULANTS WITH INR GOAL OF 2.0-3.0: ICD-10-CM

## 2020-06-23 DIAGNOSIS — I48.21 PERMANENT ATRIAL FIBRILLATION (H): ICD-10-CM

## 2020-06-23 DIAGNOSIS — I48.0 PAROXYSMAL ATRIAL FIBRILLATION (H): ICD-10-CM

## 2020-06-23 LAB — INR PPP: 2.8 (ref 0.9–1.1)

## 2020-06-23 NOTE — PROGRESS NOTES
ANTICOAGULATION  MANAGEMENT-Patient Home Monitoring Result    Assessment     Therapeutic INR result of 2.8 . Goal range 2.0-3.0. Received via fax from [employer] home INR monitoring company.        Previous INR was therapeutic    Mariya was last contacted by phone: 2020    Plan     Per home monitoring agreement with patient, patient was NOT contacted regarding therapeutic result today.  Patient is to continue current dose and continue to check INR with home monitor per protocol.  ?       OBJECTIVE    INR   Date Value Ref Range Status   2020 2.8 (A) 0.90 - 1.10 Final       ASSESSMENT / PLAN  No question data found.  Anticoagulation Summary  As of 2020    INR goal:   2.0-3.0   TTR:   73.3 % (1 y)   INR used for dosin.8 (2020)   Warfarin maintenance plan:   2.5 mg (5 mg x 0.5) every Mon, Wed, Sat; 5 mg (5 mg x 1) all other days   Full warfarin instructions:   2.5 mg every Mon, Wed, Sat; 5 mg all other days   Weekly warfarin total:   27.5 mg   No change documented:   Destinee Christine RN   Plan last modified:   Ijeoma Concepcion RN (2020)   Next INR check:   2020   Priority:   High   Target end date:   Indefinite    Indications    Long term current use of anticoagulants with INR goal of 2.0-3.0 [Z79.01]  Atrial fibrillation (H) [I48.91]  Permanent atrial fibrillation (H) [I48.21]             Anticoagulation Episode Summary     INR check location:       Preferred lab:   EXTERNAL LAB    Send INR reminders to:   LUIS EDUARDO MACK    Comments:    Contact patient at [phone] until back in [place]. Management by Exception       Anticoagulation Care Providers     Provider Role Specialty Phone number    Chavez Molina MD Referring Internal Medicine [phone]          Destinee Christine, RN - [facility]

## 2020-07-07 ENCOUNTER — TRANSFERRED RECORDS (OUTPATIENT)
Dept: HEALTH INFORMATION MANAGEMENT | Facility: CLINIC | Age: 80
End: 2020-07-07

## 2020-07-08 ENCOUNTER — ANTICOAGULATION THERAPY VISIT (OUTPATIENT)
Dept: INTERNAL MEDICINE | Facility: CLINIC | Age: 80
End: 2020-07-08

## 2020-07-08 DIAGNOSIS — Z79.01 LONG TERM CURRENT USE OF ANTICOAGULANTS WITH INR GOAL OF 2.0-3.0: ICD-10-CM

## 2020-07-08 DIAGNOSIS — I48.21 PERMANENT ATRIAL FIBRILLATION (H): ICD-10-CM

## 2020-07-08 DIAGNOSIS — I48.0 PAROXYSMAL ATRIAL FIBRILLATION (H): ICD-10-CM

## 2020-07-08 LAB — INR PPP: 2.5 (ref 0.9–1.1)

## 2020-07-08 NOTE — PROGRESS NOTES
ANTICOAGULATION  MANAGEMENT-Patient Home Monitoring Result    Assessment     Therapeutic INR result of 2.5 . Goal range 2.0-3.0. Received via fax from [employer] home INR monitoring company.        Previous INR was therapeutic    Mariya was last contacted by phone: 2020    Plan     Per home monitoring agreement with patient, patient was NOT contacted regarding therapeutic result today.  Patient is to continue current dose and continue to check INR with home monitor per protocol.  ?       OBJECTIVE    INR   Date Value Ref Range Status   2020 2.5 (A) 0.90 - 1.10 Final       ASSESSMENT / PLAN  No question data found.  Anticoagulation Summary  As of 2020    INR goal:   2.0-3.0   TTR:   73.4 % (1 y)   INR used for dosin.5 (2020)   Warfarin maintenance plan:   2.5 mg (5 mg x 0.5) every Mon, Wed, Sat; 5 mg (5 mg x 1) all other days   Full warfarin instructions:   2.5 mg every Mon, Wed, Sat; 5 mg all other days   Weekly warfarin total:   27.5 mg   Plan last modified:   Ijeoma Concecpion RN (2020)   Next INR check:   2020   Priority:   High   Target end date:   Indefinite    Indications    Long term current use of anticoagulants with INR goal of 2.0-3.0 [Z79.01]  Atrial fibrillation (H) [I48.91]  Permanent atrial fibrillation (H) [I48.21]             Anticoagulation Episode Summary     INR check location:       Preferred lab:   EXTERNAL LAB    Send INR reminders to:   LUIS EDUARDO MACK    Comments:    Contact patient at [phone] until back in [place]. Management by Exception       Anticoagulation Care Providers     Provider Role Specialty Phone number    Chavez Molina MD Referring Internal Medicine [phone]

## 2020-07-21 ENCOUNTER — TELEPHONE (OUTPATIENT)
Dept: INTERNAL MEDICINE | Facility: CLINIC | Age: 80
End: 2020-07-21

## 2020-07-21 DIAGNOSIS — Z79.01 LONG TERM CURRENT USE OF ANTICOAGULANTS WITH INR GOAL OF 2.0-3.0: ICD-10-CM

## 2020-07-21 DIAGNOSIS — I48.21 PERMANENT ATRIAL FIBRILLATION (H): ICD-10-CM

## 2020-07-21 DIAGNOSIS — I48.0 PAROXYSMAL ATRIAL FIBRILLATION (H): ICD-10-CM

## 2020-07-21 LAB — INR PPP: 2.5 (ref 0.9–1.1)

## 2020-07-21 NOTE — TELEPHONE ENCOUNTER
ANTICOAGULATION  MANAGEMENT-Patient Home Monitoring Result    Assessment     Therapeutic INR result of 2.5 . Goal range 2.0-3.0. Received via fax from [employer] home INR monitoring company.        Previous INR was therapeutic    Mariya was last contacted by phone: 20    Plan     Per home monitoring agreement with patient, patient was NOT contacted regarding therapeutic result today.  Patient is to continue current dose and continue to check INR with home monitor per protocol.  ?       OBJECTIVE    INR   Date Value Ref Range Status   2020 2.5 (A) 0.90 - 1.10 Final       ASSESSMENT / PLAN      Anticoagulation Summary  As of 2020    INR goal:   2.0-3.0   TTR:   75.8 % (1 y)   INR used for dosin.5 (2020)   Warfarin maintenance plan:   2.5 mg (5 mg x 0.5) every Mon, Wed, Sat; 5 mg (5 mg x 1) all other days   Full warfarin instructions:   2.5 mg every Mon, Wed, Sat; 5 mg all other days   Weekly warfarin total:   27.5 mg   Plan last modified:   Ijeoma Concepcion RN (2020)   Next INR check:   2020   Priority:   High   Target end date:   Indefinite    Indications    Long term current use of anticoagulants with INR goal of 2.0-3.0 [Z79.01]  Atrial fibrillation (H) [I48.91]  Permanent atrial fibrillation (H) [I48.21]             Anticoagulation Episode Summary     INR check location:       Preferred lab:   EXTERNAL LAB    Send INR reminders to:   LUIS EDUARDO MACK    Comments:    Contact patient at [phone] until back in [place]. Management by Exception       Anticoagulation Care Providers     Provider Role Specialty Phone number    Chavez Molina MD Referring Internal Medicine [phone]

## 2020-08-04 ENCOUNTER — DOCUMENTATION ONLY (OUTPATIENT)
Dept: INTERNAL MEDICINE | Facility: CLINIC | Age: 80
End: 2020-08-04

## 2020-08-04 DIAGNOSIS — Z79.01 LONG TERM CURRENT USE OF ANTICOAGULANTS WITH INR GOAL OF 2.0-3.0: ICD-10-CM

## 2020-08-04 DIAGNOSIS — I48.0 PAROXYSMAL ATRIAL FIBRILLATION (H): ICD-10-CM

## 2020-08-04 DIAGNOSIS — I48.21 PERMANENT ATRIAL FIBRILLATION (H): ICD-10-CM

## 2020-08-04 LAB — INR PPP: 2.7 (ref 0.9–1.1)

## 2020-08-04 NOTE — PROGRESS NOTES
ANTICOAGULATION  MANAGEMENT-Patient Home Monitoring Result    Assessment     Therapeutic INR result of 2.7 . Goal range 2.0-3.0. Received via fax from [employer] home INR monitoring company.        Previous INR was therapeutic    Mariya was last contacted by phone: 5/14    Plan     Per home monitoring agreement with patient, patient was NOT contacted regarding therapeutic result today.  Patient is to continue current dose and continue to check INR with home monitor per protocol.  ?       OBJECTIVE    INR   Date Value Ref Range Status   07/21/2020 2.5 (A) 0.90 - 1.10 Final       ASSESSMENT / PLAN  No question data found.      Mary Webber, RN, BSN, PHN

## 2020-08-19 ENCOUNTER — ANTICOAGULATION THERAPY VISIT (OUTPATIENT)
Dept: INTERNAL MEDICINE | Facility: CLINIC | Age: 80
End: 2020-08-19

## 2020-08-19 DIAGNOSIS — I48.0 PAROXYSMAL ATRIAL FIBRILLATION (H): ICD-10-CM

## 2020-08-19 DIAGNOSIS — Z79.01 LONG TERM CURRENT USE OF ANTICOAGULANTS WITH INR GOAL OF 2.0-3.0: ICD-10-CM

## 2020-08-19 DIAGNOSIS — I48.21 PERMANENT ATRIAL FIBRILLATION (H): ICD-10-CM

## 2020-08-19 LAB — INR PPP: 3.7 (ref 0.9–1.1)

## 2020-08-19 NOTE — PROGRESS NOTES
ANTICOAGULATION MANAGEMENT     Patient Name:  Mariya Rowe  Date:  8/19/2020    ASSESSMENT /SUBJECTIVE:    Today's INR result of 3.7 is supratherapeutic. Goal INR of 2.0-3.0      Warfarin dose taken: Warfarin taken as previously instructed    Diet: Decreased greens/vitamin K intake may be affecting INR - reports has been out of lettuce and broccoli, could not give me clear answer if she would be able to get more or add back into her diet, just that they were out    Medication changes/ interactions: No new medications/supplements affecting INR    Previous INR: Therapeutic     S/S of bleeding or thromboembolism: No    New injury or illness: No    Upcoming surgery, procedure or cardioversion: No    Additional findings: None      PLAN:    Spoke with Mariya regarding INR result and instructed:     Warfarin Dosing Instructions: Hold warfarin today only then change your warfarin dose to 5 mg every Sun, Tue, Thu; 2.5 mg all other days    Instructed patient to follow up no later than: 2 weeks  Patient to recheck with home meter    Education provided: Importance of consistent vitamin K intake, Impact of vitamin K foods on INR and Target INR goal and significance of current INR result      Mariya verbalizes understanding and agrees to warfarin dosing plan.    Instructed to call the Anticoagulation Clinic for any changes, questions or concerns. (#[phone])        Nahomy Sam RN      OBJECTIVE:  Recent labs: (last 7 days)     08/19/20   INR 3.7*         No question data found.  Anticoagulation Summary  As of 8/19/2020    INR goal:   2.0-3.0   TTR:   75.4 % (1 y)   INR used for dosing:   3.7! (8/19/2020)   Warfarin maintenance plan:   5 mg (5 mg x 1) every Sun, Tue, Thu; 2.5 mg (5 mg x 0.5) all other days   Full warfarin instructions:   8/19: Hold; Otherwise 5 mg every Sun, Tue, Thu; 2.5 mg all other days   Weekly warfarin total:   25 mg   Plan last modified:   Nahomy Sam, RN (8/19/2020)   Next INR check:   9/2/2020    Priority:   High   Target end date:   Indefinite    Indications    Long term current use of anticoagulants with INR goal of 2.0-3.0 [Z79.01]  Atrial fibrillation (H) [I48.91]  Permanent atrial fibrillation (H) [I48.21]             Anticoagulation Episode Summary     INR check location:       Preferred lab:   EXTERNAL LAB    Send INR reminders to:   LUIS EDUARDO MACK    Comments:    Contact patient at [phone] until back in [place]. Management by Exception       Anticoagulation Care Providers     Provider Role Specialty Phone number    Chavez Molina MD Referring Internal Medicine [phone]

## 2020-09-01 ENCOUNTER — DOCUMENTATION ONLY (OUTPATIENT)
Dept: NURSING | Facility: CLINIC | Age: 80
End: 2020-09-01

## 2020-09-01 DIAGNOSIS — Z79.01 LONG TERM CURRENT USE OF ANTICOAGULANTS WITH INR GOAL OF 2.0-3.0: ICD-10-CM

## 2020-09-01 DIAGNOSIS — I48.21 PERMANENT ATRIAL FIBRILLATION (H): ICD-10-CM

## 2020-09-01 DIAGNOSIS — I48.0 PAROXYSMAL ATRIAL FIBRILLATION (H): ICD-10-CM

## 2020-09-01 LAB — INR PPP: 3 (ref 0.9–1.1)

## 2020-09-01 NOTE — PROGRESS NOTES
ANTICOAGULATION  MANAGEMENT-Patient Home Monitoring Result    Assessment     Therapeutic INR result of 3.0 . Goal range 2.0-3.0. Received via fax from [employer] home INR monitoring company.        Previous INR was therapeutic    Mariya was last contacted by phone: 8/19/2020    Plan     Per home monitoring agreement with patient, patient was NOT contacted regarding therapeutic result today.  Patient is to continue current dose and continue to check INR with home monitor per protocol.  ?       OBJECTIVE    INR   Date Value Ref Range Status   09/01/2020 3.0 (A) 0.90 - 1.10 Final       ASSESSMENT / PLAN  No question data found.  Anticoagulation Summary  As of 9/1/2020    INR goal:   2.0-3.0   TTR:   71.7 % (1 y)   INR used for dosing:   3.0 (9/1/2020)   Warfarin maintenance plan:   5 mg (5 mg x 1) every Sun, Tue, Thu; 2.5 mg (5 mg x 0.5) all other days   Full warfarin instructions:   5 mg every Sun, Tue, Thu; 2.5 mg all other days   Weekly warfarin total:   25 mg   No change documented:   Destinee Christine RN   Plan last modified:   Nahomy Sam RN (8/19/2020)   Next INR check:   9/15/2020   Priority:   High   Target end date:   Indefinite    Indications    Long term current use of anticoagulants with INR goal of 2.0-3.0 [Z79.01]  Atrial fibrillation (H) [I48.91]  Permanent atrial fibrillation (H) [I48.21]             Anticoagulation Episode Summary     INR check location:       Preferred lab:   EXTERNAL LAB    Send INR reminders to:   LUIS EDUARDO MACK    Comments:    Contact patient at [phone] until back in [place]. Management by Exception       Anticoagulation Care Providers     Provider Role Specialty Phone number    Chavez Molina MD Referring Internal Medicine [phone]          Destinee Christine, RN - [facility]

## 2020-09-08 ENCOUNTER — DOCUMENTATION ONLY (OUTPATIENT)
Dept: INTERNAL MEDICINE | Facility: CLINIC | Age: 80
End: 2020-09-08

## 2020-09-08 ENCOUNTER — TRANSFERRED RECORDS (OUTPATIENT)
Dept: HEALTH INFORMATION MANAGEMENT | Facility: CLINIC | Age: 80
End: 2020-09-08

## 2020-09-08 DIAGNOSIS — Z79.01 LONG TERM CURRENT USE OF ANTICOAGULANTS WITH INR GOAL OF 2.0-3.0: ICD-10-CM

## 2020-09-08 DIAGNOSIS — I48.0 PAROXYSMAL ATRIAL FIBRILLATION (H): ICD-10-CM

## 2020-09-08 DIAGNOSIS — I48.21 PERMANENT ATRIAL FIBRILLATION (H): ICD-10-CM

## 2020-09-08 LAB — INR PPP: 2.3 (ref 0.9–1.1)

## 2020-09-08 NOTE — PROGRESS NOTES
ANTICOAGULATION  MANAGEMENT-Patient Home Monitoring Result    Assessment     Therapeutic INR result of 2.3 . Goal range 2.0-3.0. Received via fax from [employer] home INR monitoring company.        Previous INR was therapeutic    Mariya was last contacted by phone: 20    Plan     Per home monitoring agreement with patient, patient was NOT contacted regarding therapeutic result today.  Patient is to continue current dose and continue to check INR with home monitor per protocol.  ?       OBJECTIVE    INR   Date Value Ref Range Status   2020 2.3 (A) 0.90 - 1.10 Final       ASSESSMENT / PLAN  No question data found.  Anticoagulation Summary  As of 2020    INR goal:   2.0-3.0   TTR:   71.7 % (1 y)   INR used for dosin.3 (2020)   Warfarin maintenance plan:   5 mg (5 mg x 1) every Sun, Tue, Thu; 2.5 mg (5 mg x 0.5) all other days   Full warfarin instructions:   5 mg every Sun, Tue, Thu; 2.5 mg all other days   Weekly warfarin total:   25 mg   Plan last modified:   Nahomy Sam RN (2020)   Next INR check:   2020   Priority:   High   Target end date:   Indefinite    Indications    Long term current use of anticoagulants with INR goal of 2.0-3.0 [Z79.01]  Atrial fibrillation (H) [I48.91]  Permanent atrial fibrillation (H) [I48.21]             Anticoagulation Episode Summary     INR check location:       Preferred lab:   EXTERNAL LAB    Send INR reminders to:   LUIS EDUARDO MACK    Comments:    Contact patient at [phone] until back in [place]. Management by Exception       Anticoagulation Care Providers     Provider Role Specialty Phone number    Chavez Molina MD Referring Internal Medicine [phone]

## 2020-09-09 NOTE — TELEPHONE ENCOUNTER
Phone call to patient to inquire as to the reason for her visit with Dr. Kenyon this coming Friday. She is not having any issues but simply had questions related to her being in persistent afib. I told her that sotalol was stopped because it was no longer effective in keeping her out of afib. Therefore, he placed her on beta blocker for rate control and her holter showed that she had good rate control. She states that she is feeling fine and is not aware of the afib. She asked if going to high elevations in [place] will be a problem because she read on [place] website that afib patient should not go to high altitude. I told her that Dr. Kenyon was fine with this but she should keep hydrated. She was fine with the answers to her questions and felt reassured. We therefore agreed that I will remove her from the schedule and see her next August. Lino  
no

## 2020-09-15 ENCOUNTER — TRANSFERRED RECORDS (OUTPATIENT)
Dept: HEALTH INFORMATION MANAGEMENT | Facility: CLINIC | Age: 80
End: 2020-09-15

## 2020-09-18 DIAGNOSIS — I48.91 ATRIAL FIBRILLATION (H): ICD-10-CM

## 2020-09-18 RX ORDER — WARFARIN SODIUM 5 MG/1
TABLET ORAL
Qty: 72 TABLET | Refills: 0 | Status: SHIPPED | OUTPATIENT
Start: 2020-09-18 | End: 2020-12-09

## 2020-09-18 NOTE — TELEPHONE ENCOUNTER
"Requested Prescriptions   Pending Prescriptions Disp Refills     warfarin ANTICOAGULANT (COUMADIN) 5 MG tablet 72 tablet 1     Sig: TAKE 1/2 TABLET BY MOUTH ON WED, SAT - AND TAKE 1 TABLET ALL OTHER DAYS OR AS DIRECTED BY INR CLINIC       Vitamin K Antagonists Failed - 9/18/2020  2:25 PM        Failed - Recent (12 mo) or future (30 days) visit within the authorizing provider's specialty     Patient has had an office visit with the authorizing provider or a provider within the authorizing providers department within the previous 12 mos or has a future within next 30 days. See \"Patient Info\" tab in inbasket, or \"Choose Columns\" in Meds & Orders section of the refill encounter.              Failed - INR is within goal in the past 6 weeks     Confirm INR is within goal in the past 6 weeks.     Recent Labs   Lab Test 09/08/20   INR 2.3*                       Passed - Medication is active on med list        Passed - Patient is 18 years of age or older        Passed - Patient is not pregnant        Passed - No positive pregnancy on file in past 12 months           Last office visit 8/27/19.    Next 5 appointments (look out 90 days)    Nov 20, 2020  9:00 AM CST  PHYSICAL with Chavez Molina MD  [facility] ([facility] [address]  [facility] [address] [phone]        Warfarin dosing is managed by INR Clinic.  Prescription approved per [facility] Refill Protocol.  Vira Crawford RN    "

## 2020-09-22 ENCOUNTER — DOCUMENTATION ONLY (OUTPATIENT)
Dept: INTERNAL MEDICINE | Facility: CLINIC | Age: 80
End: 2020-09-22

## 2020-09-22 ENCOUNTER — TRANSFERRED RECORDS (OUTPATIENT)
Dept: HEALTH INFORMATION MANAGEMENT | Facility: CLINIC | Age: 80
End: 2020-09-22

## 2020-09-22 DIAGNOSIS — I48.0 PAROXYSMAL ATRIAL FIBRILLATION (H): ICD-10-CM

## 2020-09-22 DIAGNOSIS — Z79.01 LONG TERM CURRENT USE OF ANTICOAGULANTS WITH INR GOAL OF 2.0-3.0: ICD-10-CM

## 2020-09-22 DIAGNOSIS — I48.21 PERMANENT ATRIAL FIBRILLATION (H): ICD-10-CM

## 2020-09-22 LAB — INR PPP: 2.5 (ref 0.9–1.1)

## 2020-09-22 NOTE — PROGRESS NOTES
ANTICOAGULATION  MANAGEMENT-Patient Home Monitoring Result    Assessment     Therapeutic INR result of 2.5 . Goal range 2.0-3.0. Received via fax from [employer] home INR monitoring company.        Previous INR was therapeutic    Mariya was last contacted by phone:     Plan     Per home monitoring agreement with patient, patient was NOT contacted regarding therapeutic result today.  Patient is to continue current dose and continue to check INR with home monitor per protocol.  ?       OBJECTIVE    INR   Date Value Ref Range Status   2020 2.5 (A) 0.90 - 1.10 Final       ASSESSMENT / PLAN  No question data found.  Anticoagulation Summary  As of 2020    INR goal:   2.0-3.0   TTR:   72.0 % (1 y)   INR used for dosin.5 (2020)   Warfarin maintenance plan:   5 mg (5 mg x 1) every Sun, Tue, Thu; 2.5 mg (5 mg x 0.5) all other days   Full warfarin instructions:   5 mg every Sun, Tue, Thu; 2.5 mg all other days   Weekly warfarin total:   25 mg   Plan last modified:   Nahomy Sam RN (2020)   Next INR check:   10/6/2020   Priority:   High   Target end date:   Indefinite    Indications    Long term current use of anticoagulants with INR goal of 2.0-3.0 [Z79.01]  Atrial fibrillation (H) [I48.91]  Permanent atrial fibrillation (H) [I48.21]             Anticoagulation Episode Summary     INR check location:       Preferred lab:   EXTERNAL LAB    Send INR reminders to:   LUIS EDUARDO MACK    Comments:    Contact patient at [phone] until back in [place]. Management by Exception       Anticoagulation Care Providers     Provider Role Specialty Phone number    Chavez Molina MD Referring Internal Medicine [phone]

## 2020-10-06 ENCOUNTER — ANTICOAGULATION THERAPY VISIT (OUTPATIENT)
Dept: INTERNAL MEDICINE | Facility: CLINIC | Age: 80
End: 2020-10-06

## 2020-10-06 DIAGNOSIS — I48.0 PAROXYSMAL ATRIAL FIBRILLATION (H): ICD-10-CM

## 2020-10-06 DIAGNOSIS — Z79.01 LONG TERM CURRENT USE OF ANTICOAGULANTS WITH INR GOAL OF 2.0-3.0: ICD-10-CM

## 2020-10-06 DIAGNOSIS — I48.21 PERMANENT ATRIAL FIBRILLATION (H): ICD-10-CM

## 2020-10-06 LAB — INR PPP: 3.3 (ref 0.9–1.1)

## 2020-10-06 NOTE — PROGRESS NOTES
ANTICOAGULATION MANAGEMENT     Patient Name:  Mariya Rowe  Date:  10/6/2020    ASSESSMENT /SUBJECTIVE:    Today's INR result of 3.3 is supratherapeutic. Goal INR of 2.0-3.0      Warfarin dose taken: More warfarin taken than planned which may be affecting INR    Diet: No new diet changes affecting INR    Medication changes/ interactions: No new medications/supplements affecting INR    Previous INR: Therapeutic     S/S of bleeding or thromboembolism: No    New injury or illness: No    Upcoming surgery, procedure or cardioversion: No    Additional findings: None      PLAN:    Telephone call with Mariya regarding INR result and instructed:     Warfarin Dosing Instructions: Continue your current warfarin dose 5 mg every Sun, Tue, Thu; 2.5 mg AOD    Instructed patient to follow up no later than: 2 weeks  Patient to recheck with home meter    Education provided: Target INR goal and significance of current INR result, Importance of following up for INR monitoring at instructed interval and Importance of taking warfarin as instructed      Mariya verbalizes understanding and agrees to warfarin dosing plan.    Instructed to call the Anticoagulation Clinic for any changes, questions or concerns. (#[phone])        Nahomy Sam RN      OBJECTIVE:  Recent labs: (last 7 days)     10/06/20   INR 3.3*         No question data found.  Anticoagulation Summary  As of 10/6/2020    INR goal:  2.0-3.0   TTR:  73.3 % (1 y)   INR used for dosing:  3.3 (10/6/2020)   Warfarin maintenance plan:  5 mg (5 mg x 1) every Sun, Tue, Thu; 2.5 mg (5 mg x 0.5) all other days   Full warfarin instructions:  5 mg every Sun, Tue, Thu; 2.5 mg all other days   Weekly warfarin total:  25 mg   No change documented:  Nahomy Sam, RN   Plan last modified:  Nahomy Sam RN (8/19/2020)   Next INR check:  10/20/2020   Priority:  High   Target end date:  Indefinite    Indications    Long term current use of anticoagulants with INR goal of 2.0-3.0  [Z79.01]  Atrial fibrillation (H) [I48.91]  Permanent atrial fibrillation (H) [I48.21]             Anticoagulation Episode Summary     INR check location:      Preferred lab:  EXTERNAL LAB    Send INR reminders to:  LUIS EDUARDO MACK    Comments:        Anticoagulation Care Providers     Provider Role Specialty Phone number    Chavez Molina MD Referring Internal Medicine [phone]

## 2020-10-14 ENCOUNTER — ANTICOAGULATION THERAPY VISIT (OUTPATIENT)
Dept: INTERNAL MEDICINE | Facility: CLINIC | Age: 80
End: 2020-10-14

## 2020-10-14 DIAGNOSIS — I48.21 PERMANENT ATRIAL FIBRILLATION (H): ICD-10-CM

## 2020-10-14 DIAGNOSIS — I48.0 PAROXYSMAL ATRIAL FIBRILLATION (H): ICD-10-CM

## 2020-10-14 DIAGNOSIS — Z79.01 LONG TERM CURRENT USE OF ANTICOAGULANTS WITH INR GOAL OF 2.0-3.0: ICD-10-CM

## 2020-10-14 LAB — INR PPP: 3.2 (ref 0.9–1.1)

## 2020-10-14 NOTE — PROGRESS NOTES
ANTICOAGULATION MANAGEMENT     Patient Name:  Mariya Rowe  Date:  10/14/2020    ASSESSMENT /SUBJECTIVE:    Today's INR result of 3.2 is supratherapeutic. Goal INR of 2.0-3.0      Warfarin dose taken: Warfarin taken as instructed    Diet: No new diet changes affecting INR    Medication changes/ interactions: No new medications/supplements affecting INR    Previous INR: Supratherapeutic     S/S of bleeding or thromboembolism: No    New injury or illness: No    Upcoming surgery, procedure or cardioversion: No    Additional findings: None      PLAN:    Telephone call with Mariya regarding INR result and instructed:     Warfarin Dosing Instructions: Change your warfarin dose to 5 mg on sun/thur and 2.5 mg all other days    Instructed patient to follow up no later than: 1 week  Patient to recheck with home meter    Education provided: None required      Mariya verbalizes understanding and agrees to warfarin dosing plan.    Instructed to call the Anticoagulation Clinic for any changes, questions or concerns. (#[phone])        Thais Castro RN      OBJECTIVE:  Recent labs: (last 7 days)     10/13/20   INR 3.2*         No question data found.  Anticoagulation Summary  As of 10/14/2020    INR goal:  2.0-3.0   TTR:  71.9 % (1 y)   INR used for dosing:  3.2 (10/13/2020)   Warfarin maintenance plan:  5 mg (5 mg x 1) every Sun, Tue, Thu; 2.5 mg (5 mg x 0.5) all other days   Full warfarin instructions:  5 mg every Sun, Tue, Thu; 2.5 mg all other days   Weekly warfarin total:  25 mg   Plan last modified:  Nahomy Sam RN (8/19/2020)   Next INR check:  10/21/2020   Priority:  Maintenance   Target end date:  Indefinite    Indications    Long term current use of anticoagulants with INR goal of 2.0-3.0 [Z79.01]  Atrial fibrillation (H) [I48.91]  Permanent atrial fibrillation (H) [I48.21]             Anticoagulation Episode Summary     INR check location:      Preferred lab:  EXTERNAL LAB    Send INR reminders to:  LUIS EDUARDO  FREDERICK    Comments:        Anticoagulation Care Providers     Provider Role Specialty Phone number    Chavez Molina MD Referring Internal Medicine [phone]

## 2020-10-20 ENCOUNTER — DOCUMENTATION ONLY (OUTPATIENT)
Dept: INTERNAL MEDICINE | Facility: CLINIC | Age: 80
End: 2020-10-20

## 2020-10-20 DIAGNOSIS — I48.0 PAROXYSMAL ATRIAL FIBRILLATION (H): ICD-10-CM

## 2020-10-20 DIAGNOSIS — I48.21 PERMANENT ATRIAL FIBRILLATION (H): ICD-10-CM

## 2020-10-20 DIAGNOSIS — Z79.01 LONG TERM CURRENT USE OF ANTICOAGULANTS WITH INR GOAL OF 2.0-3.0: ICD-10-CM

## 2020-10-20 LAB — INR PPP: 2.5 (ref 0.9–1.1)

## 2020-10-20 NOTE — PROGRESS NOTES
ANTICOAGULATION  MANAGEMENT-Patient Home Monitoring Result    Assessment     Therapeutic INR result of 2.5 . Goal range 2.0-3.0. Received via fax from [employer] home INR monitoring company.        Previous INR was therapeutic    Mariya was last contacted by phone: 10/13/2020    Plan     Per home monitoring agreement with patient, patient was NOT contacted regarding therapeutic result today.  Patient is to continue current dose and continue to check INR with home monitor per protocol.  ?       OBJECTIVE    INR   Date Value Ref Range Status   10/20/2020 2.5 (A) 0.90 - 1.10 Final       ASSESSMENT / PLAN  No question data found.  Anticoagulation Summary  As of 10/20/2020    INR goal:  2.0-3.0   TTR:  73.1 % (1 y)   INR used for dosin.5 (10/20/2020)   Warfarin maintenance plan:  5 mg (5 mg x 1) every Sun, Thu; 2.5 mg (5 mg x 0.5) all other days   Full warfarin instructions:  5 mg every Sun, Thu; 2.5 mg all other days   Weekly warfarin total:  22.5 mg   No change documented:  Destinee Christine RN   Plan last modified:  Thais Castro RN (10/14/2020)   Next INR check:  10/27/2020   Priority:  Maintenance   Target end date:  Indefinite    Indications    Long term current use of anticoagulants with INR goal of 2.0-3.0 [Z79.01]  Atrial fibrillation (H) [I48.91]  Permanent atrial fibrillation (H) [I48.21]             Anticoagulation Episode Summary     INR check location:      Preferred lab:  EXTERNAL LAB    Send INR reminders to:  LUIS EDUARDO MACK    Comments:        Anticoagulation Care Providers     Provider Role Specialty Phone number    Chavez Molina MD Referring Internal Medicine [phone]          Destinee Christine RN - [facility]

## 2020-11-03 ENCOUNTER — ANTICOAGULATION THERAPY VISIT (OUTPATIENT)
Dept: NURSING | Facility: CLINIC | Age: 80
End: 2020-11-03

## 2020-11-03 ENCOUNTER — DOCUMENTATION ONLY (OUTPATIENT)
Dept: INTERNAL MEDICINE | Facility: CLINIC | Age: 80
End: 2020-11-03

## 2020-11-03 DIAGNOSIS — Z79.01 LONG TERM CURRENT USE OF ANTICOAGULANTS WITH INR GOAL OF 2.0-3.0: ICD-10-CM

## 2020-11-03 DIAGNOSIS — I48.0 PAROXYSMAL ATRIAL FIBRILLATION (H): ICD-10-CM

## 2020-11-03 DIAGNOSIS — I48.21 PERMANENT ATRIAL FIBRILLATION (H): ICD-10-CM

## 2020-11-03 LAB — INR PPP: 3.3 (ref 0.9–1.1)

## 2020-11-03 NOTE — PROGRESS NOTES
ANTICOAGULATION MANAGEMENT     Patient Name:  Mariya Rowe  Date:  11/3/2020    ASSESSMENT /SUBJECTIVE:    Today's INR result of 3.3 is supratherapeutic. Goal INR of 2.0-3.0      Warfarin dose taken: Warfarin taken as instructed    Diet: No new diet changes affecting INR    Medication changes/ interactions: No new medications/supplements affecting INR    Previous INR: Therapeutic     S/S of bleeding or thromboembolism: No    New injury or illness: No    Upcoming surgery, procedure or cardioversion: No    Additional findings: Patient reports she has been taking 5 mg 3 days week instead of 2      PLAN:    Telephone call with Mariya regarding INR result and instructed:     Warfarin Dosing Instructions: Continue your current warfarin dose 5 mg and Sun/Thu and 2.5 ROW    Instructed patient to follow up no later than: 1 week  Patient to recheck with home meter    Education provided: Target INR goal and significance of current INR result      Mariya verbalizes understanding and agrees to warfarin dosing plan.    Instructed to call the Anticoagulation Clinic for any changes, questions or concerns. (#[phone])        Destinee Christine RN      OBJECTIVE:  Recent labs: (last 7 days)     11/03/20  1144   INR 3.3*         No question data found.  Anticoagulation Summary  As of 11/3/2020    INR goal:  2.0-3.0   TTR:  72.5 % (1 y)   INR used for dosing:  3.3 (11/3/2020)   Warfarin maintenance plan:  5 mg (5 mg x 1) every Sun, Thu; 2.5 mg (5 mg x 0.5) all other days   Full warfarin instructions:  5 mg every Sun, Thu; 2.5 mg all other days   Weekly warfarin total:  22.5 mg   No change documented:  Destinee Christine RN   Plan last modified:  Thais Castro RN (10/14/2020)   Next INR check:  11/17/2020   Priority:  Maintenance   Target end date:  Indefinite    Indications    Long term current use of anticoagulants with INR goal of 2.0-3.0 [Z79.01]  Atrial fibrillation (H) [I48.91]  Permanent atrial fibrillation (H) [I48.21]              Anticoagulation Episode Summary     INR check location:      Preferred lab:  EXTERNAL LAB    Send INR reminders to:  LUIS EDUARDO MACK    Comments:        Anticoagulation Care Providers     Provider Role Specialty Phone number    Chavez Molina MD Referring Internal Medicine [phone]

## 2020-11-17 ENCOUNTER — ANTICOAGULATION THERAPY VISIT (OUTPATIENT)
Dept: INTERNAL MEDICINE | Facility: CLINIC | Age: 80
End: 2020-11-17

## 2020-11-17 DIAGNOSIS — I48.21 PERMANENT ATRIAL FIBRILLATION (H): ICD-10-CM

## 2020-11-17 DIAGNOSIS — Z79.01 LONG TERM CURRENT USE OF ANTICOAGULANTS WITH INR GOAL OF 2.0-3.0: ICD-10-CM

## 2020-11-17 DIAGNOSIS — I48.0 PAROXYSMAL ATRIAL FIBRILLATION (H): ICD-10-CM

## 2020-11-17 LAB — INR PPP: 3.4 (ref 0.9–1.1)

## 2020-11-17 NOTE — PROGRESS NOTES
ANTICOAGULATION MANAGEMENT     Patient Name:  Mariya Rowe  Date:  11/17/2020    ASSESSMENT /SUBJECTIVE:    Today's INR result of 3.4 is supratherapeutic. Goal INR of 2.0-3.0      Warfarin dose taken: Warfarin taken as instructed    Diet: No new diet changes affecting INR    Medication changes/ interactions: No new medications/supplements affecting INR    Previous INR: Supratherapeutic     S/S of bleeding or thromboembolism: No    New injury or illness: No    Upcoming surgery, procedure or cardioversion: No    Additional findings: Patient states she checked her INR last and it was 2.1 on Tuesday. She didn't report this. She states she went back to taking 1 full tab x 1 day per week.       PLAN:    Telephone call with Mariya regarding INR result and instructed:     Warfarin Dosing Instructions: Change your warfarin dose to 2.5 mg daily    Instructed patient to follow up no later than: 1 week  Patient to recheck with home meter    Education provided: Importance of taking warfarin as instructed, Monitoring for bleeding signs and symptoms and Monitoring for clotting signs and symptoms      Mariya verbalizes understanding and agrees to warfarin dosing plan.    Instructed to call the Anticoagulation Clinic for any changes, questions or concerns. (#[phone])        Anita Palmer RN      OBJECTIVE:  Recent labs: (last 7 days)     11/17/20   INR 3.4*         No question data found.  Anticoagulation Summary  As of 11/17/2020    INR goal:  2.0-3.0   TTR:  68.8 % (1 y)   INR used for dosing:  3.4 (11/17/2020)   Warfarin maintenance plan:  5 mg (5 mg x 1) every Thu; 2.5 mg (5 mg x 0.5) all other days   Full warfarin instructions:  11/19: 2.5 mg; Otherwise 5 mg every Thu; 2.5 mg all other days   Weekly warfarin total:  20 mg   Plan last modified:  Anita Palmer RN (11/17/2020)   Next INR check:  12/1/2020   Priority:  Maintenance   Target end date:  Indefinite    Indications    Long term current use of anticoagulants  with INR goal of 2.0-3.0 [Z79.01]  Atrial fibrillation (H) [I48.91]  Permanent atrial fibrillation (H) [I48.21]             Anticoagulation Episode Summary     INR check location:      Preferred lab:  EXTERNAL LAB    Send INR reminders to:  LUIS EDUARDO MACK    Comments:        Anticoagulation Care Providers     Provider Role Specialty Phone number    Chavez Molina MD Referring Internal Medicine [phone]

## 2020-11-24 ENCOUNTER — ANTICOAGULATION THERAPY VISIT (OUTPATIENT)
Dept: NURSING | Facility: CLINIC | Age: 80
End: 2020-11-24

## 2020-11-24 ENCOUNTER — TRANSFERRED RECORDS (OUTPATIENT)
Dept: HEALTH INFORMATION MANAGEMENT | Facility: CLINIC | Age: 80
End: 2020-11-24

## 2020-11-24 DIAGNOSIS — Z79.01 LONG TERM CURRENT USE OF ANTICOAGULANTS WITH INR GOAL OF 2.0-3.0: ICD-10-CM

## 2020-11-24 DIAGNOSIS — I48.21 PERMANENT ATRIAL FIBRILLATION (H): ICD-10-CM

## 2020-11-24 DIAGNOSIS — I48.0 PAROXYSMAL ATRIAL FIBRILLATION (H): ICD-10-CM

## 2020-11-24 LAB — INR PPP: 1.9 (ref 0.9–1.1)

## 2020-11-24 NOTE — PROGRESS NOTES
ANTICOAGULATION MANAGEMENT     Patient Name:  Mariya Rowe  Date:  2020    ASSESSMENT /SUBJECTIVE:    Today's INR result of 1.9 is subtherapeutic. Goal INR of 2.0-3.0      Warfarin dose taken: Less warfarin taken than planned which may be affecting INR    Diet: No new diet changes affecting INR    Medication changes/ interactions: No new medications/supplements affecting INR    Previous INR: Supratherapeutic     S/S of bleeding or thromboembolism: No    New injury or illness: No    Upcoming surgery, procedure or cardioversion: No    Additional findings: None      PLAN:    Telephone call with Mariya regarding INR result and instructed:     Warfarin Dosing Instructions: Continue your current warfarin dose 5 mg Thu and 2.5 mg ROW    Instructed patient to follow up no later than: 1 week  Patient to recheck with home meter    Education provided: Target INR goal and significance of current INR result and Importance of taking warfarin as instructed      Mariya verbalizes understanding and agrees to warfarin dosing plan.    Instructed to call the Anticoagulation Clinic for any changes, questions or concerns. (#[phone])        Destinee Christine RN      OBJECTIVE:  Recent labs: (last 7 days)     20  1459   INR 1.9*         No question data found.  Anticoagulation Summary  As of 2020    INR goal:  2.0-3.0   TTR:  68.2 % (1 y)   INR used for dosin.9 (2020)   Warfarin maintenance plan:  5 mg (5 mg x 1) every Thu; 2.5 mg (5 mg x 0.5) all other days   Full warfarin instructions:  5 mg every Thu; 2.5 mg all other days   Weekly warfarin total:  20 mg   Plan last modified:  Anita Palmer RN (2020)   Next INR check:  2020   Priority:  Maintenance   Target end date:  Indefinite    Indications    Long term current use of anticoagulants with INR goal of 2.0-3.0 [Z79.01]  Atrial fibrillation (H) [I48.91]  Permanent atrial fibrillation (H) [I48.21]             Anticoagulation Episode Summary      INR check location:      Preferred lab:  EXTERNAL LAB    Send INR reminders to:  LUIS EDUARDO MACK    Comments:        Anticoagulation Care Providers     Provider Role Specialty Phone number    Chavez Molina MD Referring Internal Medicine [phone]

## 2020-12-01 ENCOUNTER — TRANSFERRED RECORDS (OUTPATIENT)
Dept: HEALTH INFORMATION MANAGEMENT | Facility: CLINIC | Age: 80
End: 2020-12-01

## 2020-12-01 ENCOUNTER — ANTICOAGULATION THERAPY VISIT (OUTPATIENT)
Dept: INTERNAL MEDICINE | Facility: CLINIC | Age: 80
End: 2020-12-01

## 2020-12-01 DIAGNOSIS — I48.0 PAROXYSMAL ATRIAL FIBRILLATION (H): ICD-10-CM

## 2020-12-01 DIAGNOSIS — Z79.01 LONG TERM CURRENT USE OF ANTICOAGULANTS WITH INR GOAL OF 2.0-3.0: ICD-10-CM

## 2020-12-01 DIAGNOSIS — I48.21 PERMANENT ATRIAL FIBRILLATION (H): ICD-10-CM

## 2020-12-01 LAB — INR PPP: 2.6 (ref 0.9–1.1)

## 2020-12-01 NOTE — PROGRESS NOTES
ANTICOAGULATION  MANAGEMENT-Patient Home Monitoring Result    Assessment     Therapeutic INR result of 2.6 . Goal range 2.0-3.0. Received via fax from [employer] home INR monitoring company.        Previous INR was therapeutic    Mariya was last contacted by phone:     Plan     Per home monitoring agreement with patient, patient was NOT contacted regarding therapeutic result today.  Patient is to continue current dose and continue to check INR with home monitor per protocol.  ?       OBJECTIVE    INR   Date Value Ref Range Status   2020 2.6 (A) 0.90 - 1.10 Final       ASSESSMENT / PLAN  No question data found.  Anticoagulation Summary  As of 2020    INR goal:  2.0-3.0   TTR:  67.9 % (1 y)   INR used for dosin.6 (2020)   Warfarin maintenance plan:  5 mg (5 mg x 1) every Thu; 2.5 mg (5 mg x 0.5) all other days   Full warfarin instructions:  5 mg every Thu; 2.5 mg all other days   Weekly warfarin total:  20 mg   No change documented:  Anita Palmer RN   Plan last modified:  Anita Palmer RN (2020)   Next INR check:  2020   Priority:  Maintenance   Target end date:  Indefinite    Indications    Long term current use of anticoagulants with INR goal of 2.0-3.0 [Z79.01]  Atrial fibrillation (H) [I48.91]  Permanent atrial fibrillation (H) [I48.21]             Anticoagulation Episode Summary     INR check location:      Preferred lab:  EXTERNAL LAB    Send INR reminders to:  LUIS EDUARDO MACK    Comments:        Anticoagulation Care Providers     Provider Role Specialty Phone number    Chavez Molina MD Referring Internal Medicine [phone]

## 2020-12-07 DIAGNOSIS — E78.5 HYPERLIPIDEMIA WITH TARGET LDL LESS THAN 130: ICD-10-CM

## 2020-12-07 DIAGNOSIS — R00.2 PALPITATIONS: ICD-10-CM

## 2020-12-07 DIAGNOSIS — I48.91 ATRIAL FIBRILLATION (H): ICD-10-CM

## 2020-12-08 ENCOUNTER — ANTICOAGULATION THERAPY VISIT (OUTPATIENT)
Dept: NURSING | Facility: CLINIC | Age: 80
End: 2020-12-08

## 2020-12-08 ENCOUNTER — TRANSFERRED RECORDS (OUTPATIENT)
Dept: HEALTH INFORMATION MANAGEMENT | Facility: CLINIC | Age: 80
End: 2020-12-08

## 2020-12-08 DIAGNOSIS — I48.21 PERMANENT ATRIAL FIBRILLATION (H): ICD-10-CM

## 2020-12-08 DIAGNOSIS — Z79.01 LONG TERM CURRENT USE OF ANTICOAGULANTS WITH INR GOAL OF 2.0-3.0: ICD-10-CM

## 2020-12-08 DIAGNOSIS — I48.0 PAROXYSMAL ATRIAL FIBRILLATION (H): ICD-10-CM

## 2020-12-08 LAB — INR PPP: 3.4 (ref 0.9–1.1)

## 2020-12-08 NOTE — PROGRESS NOTES
Anticoagulation Management    Unable to reach Mariya today.    Today's INR result of 3.4 is supratherapeutic (goal INR of 2.0-3.0).  Result received from: Home Monitor    Follow up required to confirm warfarin dose taken and assess for changes    Left message to hold warfarin tonight.     Plan: Hold warfarin tonight then resume maintenance dose and recheck INR in 1 week    Anticoagulation clinic to follow up    Destinee Christine RN

## 2020-12-08 NOTE — TELEPHONE ENCOUNTER
Routing refill request to provider for review/approval because:  Drug interaction warning  Has future appointment scheduled

## 2020-12-09 RX ORDER — WARFARIN SODIUM 5 MG/1
TABLET ORAL
Qty: 72 TABLET | Refills: 1 | Status: SHIPPED | OUTPATIENT
Start: 2020-12-09 | End: 2021-04-14

## 2020-12-09 NOTE — PROGRESS NOTES
ANTICOAGULATION MANAGEMENT     Patient Name:  Mariya Rowe  Date:  12/9/2020    ASSESSMENT /SUBJECTIVE:    Today's INR result of 3.4 is supratherapeutic. Goal INR of 2.0-3.0      Warfarin dose taken: More warfarin taken than planned which may be affecting INR    Diet: No new diet changes affecting INR    Medication changes/ interactions: No new medications/supplements affecting INR    Previous INR: Therapeutic     S/S of bleeding or thromboembolism: No    New injury or illness: No    Upcoming surgery, procedure or cardioversion: No    Additional findings: None      PLAN:    Telephone call with Mariya regarding INR result and instructed:     Warfarin Dosing Instructions: Continue your current warfarin dose 5 mg Thu and 2.5 mg ROW    Instructed patient to follow up no later than: 1 week  Patient to recheck with home meter    Education provided: Target INR goal and significance of current INR result      Mariya verbalizes understanding and agrees to warfarin dosing plan.    Instructed to call the Anticoagulation Clinic for any changes, questions or concerns. (#[phone])        Destinee Christine RN      OBJECTIVE:  Recent labs: (last 7 days)     12/08/20  1608   INR 3.4*         No question data found.  Anticoagulation Summary  As of 12/8/2020    INR goal:  2.0-3.0   TTR:  66.8 % (1 y)   INR used for dosing:  3.4 (12/8/2020)   Warfarin maintenance plan:  5 mg (5 mg x 1) every Thu; 2.5 mg (5 mg x 0.5) all other days   Full warfarin instructions:  12/8: Hold; Otherwise 5 mg every Thu; 2.5 mg all other days   Weekly warfarin total:  20 mg   Plan last modified:  Anita Palmer RN (11/17/2020)   Next INR check:  12/15/2020   Priority:  Maintenance   Target end date:  Indefinite    Indications    Long term current use of anticoagulants with INR goal of 2.0-3.0 [Z79.01]  Atrial fibrillation (H) [I48.91]  Permanent atrial fibrillation (H) [I48.21]             Anticoagulation Episode Summary     INR check location:       Preferred lab:  EXTERNAL LAB    Send INR reminders to:  LUIS EDUARDO MACK    Comments:        Anticoagulation Care Providers     Provider Role Specialty Phone number    Chavez Molina MD Referring Internal Medicine [phone]

## 2020-12-09 NOTE — TELEPHONE ENCOUNTER
Warfarin Prescription approved per [facility] Refill Protocol.  Elena Love RN   [facility]  [place], [place], [place]

## 2020-12-10 ENCOUNTER — DOCUMENTATION ONLY (OUTPATIENT)
Dept: LAB | Facility: CLINIC | Age: 80
End: 2020-12-10

## 2020-12-10 DIAGNOSIS — I48.91 ATRIAL FIBRILLATION, UNSPECIFIED TYPE (H): Primary | ICD-10-CM

## 2020-12-10 DIAGNOSIS — E78.5 HYPERLIPIDEMIA WITH TARGET LDL LESS THAN 130: ICD-10-CM

## 2020-12-10 DIAGNOSIS — Z79.899 MEDICATION MANAGEMENT: ICD-10-CM

## 2020-12-11 RX ORDER — ROSUVASTATIN CALCIUM 5 MG/1
5 TABLET, COATED ORAL DAILY
Qty: 90 TABLET | Refills: 0 | Status: SHIPPED | OUTPATIENT
Start: 2020-12-11 | End: 2021-03-16

## 2020-12-11 RX ORDER — METOPROLOL TARTRATE 25 MG/1
TABLET, FILM COATED ORAL
Qty: 180 TABLET | Refills: 0 | Status: SHIPPED | OUTPATIENT
Start: 2020-12-11 | End: 2021-03-16

## 2020-12-15 ENCOUNTER — TRANSFERRED RECORDS (OUTPATIENT)
Dept: HEALTH INFORMATION MANAGEMENT | Facility: CLINIC | Age: 80
End: 2020-12-15

## 2020-12-15 ENCOUNTER — ANTICOAGULATION THERAPY VISIT (OUTPATIENT)
Dept: INTERNAL MEDICINE | Facility: CLINIC | Age: 80
End: 2020-12-15

## 2020-12-15 DIAGNOSIS — I48.0 PAROXYSMAL ATRIAL FIBRILLATION (H): ICD-10-CM

## 2020-12-15 DIAGNOSIS — I48.21 PERMANENT ATRIAL FIBRILLATION (H): ICD-10-CM

## 2020-12-15 DIAGNOSIS — Z79.01 LONG TERM CURRENT USE OF ANTICOAGULANTS WITH INR GOAL OF 2.0-3.0: ICD-10-CM

## 2020-12-15 LAB — INR PPP: 1.8 (ref 0.9–1.1)

## 2020-12-15 NOTE — PROGRESS NOTES
Incoming fax from [employer] home monitoring company    Date of INR 12/15    INR result 1.8

## 2020-12-15 NOTE — PROGRESS NOTES
ANTICOAGULATION MANAGEMENT     Patient Name:  Mariya Rowe  Date:  12/15/2020    ASSESSMENT /SUBJECTIVE:    Today's INR result of 1.8 is subtherapeutic. Goal INR of 2.0-3.0      Warfarin dose taken: Warfarin taken as instructed    Diet: No new diet changes affecting INR    Medication changes/ interactions: No new medications/supplements affecting INR    Previous INR: Supratherapeutic     S/S of bleeding or thromboembolism: No    New injury or illness: No    Upcoming surgery, procedure or cardioversion: No    Additional findings: None      PLAN:    Telephone call with Mariya regarding INR result and instructed:     Warfarin Dosing Instructions: 5 mg boost dose today then continue your current warfarin dose of 5 mg Thu; 2.5 mg all other days    Instructed patient to follow up no later than: 1 week  Patient to recheck with home meter    Education provided: Monitoring for bleeding signs and symptoms and Monitoring for clotting signs and symptoms      Mariya verbalizes understanding and agrees to warfarin dosing plan.    Instructed to call the Anticoagulation Clinic for any changes, questions or concerns. (#[phone])        Anita Palmer RN      OBJECTIVE:  Recent labs: (last 7 days)     20  1608 12/15/20   INR 3.4* 1.8*         No question data found.  Anticoagulation Summary  As of 12/15/2020    INR goal:  2.0-3.0   TTR:  66.9 % (1 y)   INR used for dosin.8 (12/15/2020)   Warfarin maintenance plan:  5 mg (5 mg x 1) every Thu; 2.5 mg (5 mg x 0.5) all other days   Full warfarin instructions:  12/15: 5 mg; Otherwise 5 mg every Thu; 2.5 mg all other days   Weekly warfarin total:  20 mg   Plan last modified:  Anita Palmer, RN (2020)   Next INR check:  2020   Priority:  Maintenance   Target end date:  Indefinite    Indications    Long term current use of anticoagulants with INR goal of 2.0-3.0 [Z79.01]  Atrial fibrillation (H) [I48.91]  Permanent atrial fibrillation (H) [I48.21]              Anticoagulation Episode Summary     INR check location:      Preferred lab:  EXTERNAL LAB    Send INR reminders to:  LUIS EDUARDO MACK    Comments:        Anticoagulation Care Providers     Provider Role Specialty Phone number    Chavez Molina MD Referring Internal Medicine [phone]

## 2020-12-16 ENCOUNTER — HOSPITAL ENCOUNTER (OUTPATIENT)
Dept: MAMMOGRAPHY | Facility: CLINIC | Age: 80
Discharge: HOME | End: 2020-12-16
Attending: INTERNAL MEDICINE | Admitting: INTERNAL MEDICINE
Payer: COMMERCIAL

## 2020-12-16 DIAGNOSIS — Z12.31 VISIT FOR SCREENING MAMMOGRAM: ICD-10-CM

## 2020-12-16 PROCEDURE — 77067 SCR MAMMO BI INCL CAD: CPT

## 2020-12-22 ENCOUNTER — TRANSFERRED RECORDS (OUTPATIENT)
Dept: HEALTH INFORMATION MANAGEMENT | Facility: CLINIC | Age: 80
End: 2020-12-22

## 2020-12-22 ENCOUNTER — ANTICOAGULATION THERAPY VISIT (OUTPATIENT)
Dept: INTERNAL MEDICINE | Facility: CLINIC | Age: 80
End: 2020-12-22

## 2020-12-22 DIAGNOSIS — I48.21 PERMANENT ATRIAL FIBRILLATION (H): ICD-10-CM

## 2020-12-22 DIAGNOSIS — E78.5 HYPERLIPIDEMIA WITH TARGET LDL LESS THAN 130: ICD-10-CM

## 2020-12-22 DIAGNOSIS — Z79.01 LONG TERM CURRENT USE OF ANTICOAGULANTS WITH INR GOAL OF 2.0-3.0: ICD-10-CM

## 2020-12-22 DIAGNOSIS — I48.91 ATRIAL FIBRILLATION, UNSPECIFIED TYPE (H): ICD-10-CM

## 2020-12-22 DIAGNOSIS — Z79.899 MEDICATION MANAGEMENT: ICD-10-CM

## 2020-12-22 LAB
ALBUMIN SERPL-MCNC: 3.6 G/DL (ref 3.4–5)
ALP SERPL-CCNC: 92 U/L (ref 40–150)
ALT SERPL W P-5'-P-CCNC: 27 U/L (ref 0–50)
ANION GAP SERPL CALCULATED.3IONS-SCNC: 2 MMOL/L (ref 3–14)
AST SERPL W P-5'-P-CCNC: 21 U/L (ref 0–45)
BILIRUB SERPL-MCNC: 1.2 MG/DL (ref 0.2–1.3)
BUN SERPL-MCNC: 16 MG/DL (ref 7–30)
CALCIUM SERPL-MCNC: 9.6 MG/DL (ref 8.5–10.1)
CHLORIDE SERPL-SCNC: 106 MMOL/L (ref 94–109)
CHOLEST SERPL-MCNC: 155 MG/DL
CO2 SERPL-SCNC: 31 MMOL/L (ref 20–32)
CREAT SERPL-MCNC: 0.86 MG/DL (ref 0.52–1.04)
ERYTHROCYTE [DISTWIDTH] IN BLOOD BY AUTOMATED COUNT: 13.4 % (ref 10–15)
GFR SERPL CREATININE-BSD FRML MDRD: 64 ML/MIN/{1.73_M2}
GLUCOSE SERPL-MCNC: 94 MG/DL (ref 70–99)
HCT VFR BLD AUTO: 46.4 % (ref 35–47)
HDLC SERPL-MCNC: 65 MG/DL
HGB BLD-MCNC: 15.1 G/DL (ref 11.7–15.7)
INR PPP: 1.7 (ref 0.9–1.1)
LDLC SERPL CALC-MCNC: 64 MG/DL
MCH RBC QN AUTO: 31.2 PG (ref 26.5–33)
MCHC RBC AUTO-ENTMCNC: 32.5 G/DL (ref 31.5–36.5)
MCV RBC AUTO: 96 FL (ref 78–100)
NONHDLC SERPL-MCNC: 90 MG/DL
PLATELET # BLD AUTO: 219 10E9/L (ref 150–450)
POTASSIUM SERPL-SCNC: 4 MMOL/L (ref 3.4–5.3)
PROT SERPL-MCNC: 7.6 G/DL (ref 6.8–8.8)
RBC # BLD AUTO: 4.84 10E12/L (ref 3.8–5.2)
SODIUM SERPL-SCNC: 139 MMOL/L (ref 133–144)
TRIGL SERPL-MCNC: 128 MG/DL
TSH SERPL DL<=0.005 MIU/L-ACNC: 3.28 MU/L (ref 0.4–4)
WBC # BLD AUTO: 7.1 10E9/L (ref 4–11)

## 2020-12-22 PROCEDURE — 36415 COLL VENOUS BLD VENIPUNCTURE: CPT | Performed by: INTERNAL MEDICINE

## 2020-12-22 PROCEDURE — 80053 COMPREHEN METABOLIC PANEL: CPT | Performed by: INTERNAL MEDICINE

## 2020-12-22 PROCEDURE — 80061 LIPID PANEL: CPT | Performed by: INTERNAL MEDICINE

## 2020-12-22 PROCEDURE — 84443 ASSAY THYROID STIM HORMONE: CPT | Performed by: INTERNAL MEDICINE

## 2020-12-22 PROCEDURE — 85027 COMPLETE CBC AUTOMATED: CPT | Performed by: INTERNAL MEDICINE

## 2020-12-22 NOTE — PROGRESS NOTES
ANTICOAGULATION MANAGEMENT     Patient Name:  Mariya Rowe  Date:  2020    ASSESSMENT /SUBJECTIVE:    Today's INR result of 1.7 is subtherapeutic. Goal INR of 2.0-3.0      Warfarin dose taken: Warfarin taken as instructed    Diet: No new diet changes affecting INR    Medication changes/ interactions: No new medications/supplements affecting INR    Previous INR: Subtherapeutic     S/S of bleeding or thromboembolism: No    New injury or illness: No    Upcoming surgery, procedure or cardioversion: No    Additional findings: None      PLAN:    Telephone call with Mariya regarding INR result and instructed:     Warfarin Dosing Instructions: 5 mg tonight then change your warfarin dose to 5 mg on sun/thur and 2.5 mg all other days due to patient having 22.5 mg last week and dropping, patient increasing by 5 mg this week  . (12.5 % change)    Instructed patient to follow up no later than: 1 week  Patient to recheck with home meter    Education provided: Monitoring for clotting signs and symptoms      Mariya verbalizes understanding and agrees to warfarin dosing plan.    Instructed to call the Anticoagulation Clinic for any changes, questions or concerns. (#[phone])        Thais Castro RN      OBJECTIVE:  Recent labs: (last 7 days)     20   INR 1.7*         No question data found.  Anticoagulation Summary  As of 2020    INR goal:  2.0-3.0   TTR:  66.9 % (1 y)   INR used for dosin.7 (2020)   Warfarin maintenance plan:  5 mg (5 mg x 1) every Thu; 2.5 mg (5 mg x 0.5) all other days   Full warfarin instructions:  5 mg every Thu; 2.5 mg all other days   Weekly warfarin total:  20 mg   Plan last modified:  Anita Palmer RN (2020)   Next INR check:  2020   Priority:  Maintenance   Target end date:  Indefinite    Indications    Long term current use of anticoagulants with INR goal of 2.0-3.0 [Z79.01]  Atrial fibrillation (H) [I48.91]  Permanent atrial fibrillation (H)  [I48.21]             Anticoagulation Episode Summary     INR check location:      Preferred lab:  EXTERNAL LAB    Send INR reminders to:  LUIS EDUARDO MACK    Comments:        Anticoagulation Care Providers     Provider Role Specialty Phone number    Chavez Molina MD Referring Internal Medicine [phone]

## 2020-12-29 ENCOUNTER — ANTICOAGULATION THERAPY VISIT (OUTPATIENT)
Dept: INTERNAL MEDICINE | Facility: CLINIC | Age: 80
End: 2020-12-29

## 2020-12-29 ENCOUNTER — TRANSFERRED RECORDS (OUTPATIENT)
Dept: HEALTH INFORMATION MANAGEMENT | Facility: CLINIC | Age: 80
End: 2020-12-29

## 2020-12-29 DIAGNOSIS — Z79.01 LONG TERM CURRENT USE OF ANTICOAGULANTS WITH INR GOAL OF 2.0-3.0: ICD-10-CM

## 2020-12-29 DIAGNOSIS — I48.0 PAROXYSMAL ATRIAL FIBRILLATION (H): ICD-10-CM

## 2020-12-29 DIAGNOSIS — I48.21 PERMANENT ATRIAL FIBRILLATION (H): ICD-10-CM

## 2020-12-29 LAB — INR PPP: 3 (ref 0.9–1.1)

## 2020-12-29 NOTE — PROGRESS NOTES
ANTICOAGULATION MANAGEMENT     Patient Name:  Mariya Rowe  Date:  12/29/2020    ASSESSMENT /SUBJECTIVE:    Today's INR result of 3.0 is therapeutic. Goal INR of 2.0-3.0      Warfarin dose taken: Warfarin taken as instructed    Diet: No new diet changes affecting INR    Medication changes/ interactions: No new medications/supplements affecting INR    Previous INR: Subtherapeutic     S/S of bleeding or thromboembolism: No    New injury or illness: No    Upcoming surgery, procedure or cardioversion: No    Additional findings: Patient's INR sub now at top end of range. Will do slight reduction in weekly coumadin dosing      PLAN:    Telephone call with Mariya regarding INR result and instructed:     Warfarin Dosing Instructions: Change your warfarin dose to 5 mg Sun Thu; 2.5 mg all other days  . (3% % change)    Instructed patient to follow up no later than: 1 week  Patient to recheck with home meter    Education provided: Monitoring for bleeding signs and symptoms and Monitoring for clotting signs and symptoms      Mariya verbalizes understanding and agrees to warfarin dosing plan.    Instructed to call the Anticoagulation Clinic for any changes, questions or concerns. (#[phone])        Anita Palmer, GILA      OBJECTIVE:  Recent labs: (last 7 days)     12/29/20   INR 3.0*         No question data found.  Anticoagulation Summary  As of 12/29/2020    INR goal:  2.0-3.0   TTR:  68.1 % (1 y)   INR used for dosing:  3.0 (12/29/2020)   Warfarin maintenance plan:  5 mg (5 mg x 1) every Sun, Thu; 2.5 mg (5 mg x 0.5) all other days   Full warfarin instructions:  5 mg every Sun, Thu; 2.5 mg all other days   Weekly warfarin total:  22.5 mg   Plan last modified:  Anita Palmer RN (12/29/2020)   Next INR check:     Priority:  Maintenance   Target end date:  Indefinite    Indications    Long term current use of anticoagulants with INR goal of 2.0-3.0 [Z79.01]  Atrial fibrillation (H) [I48.91]  Permanent atrial  fibrillation (H) [I48.21]             Anticoagulation Episode Summary     INR check location:      Preferred lab:  EXTERNAL LAB    Send INR reminders to:  LUIS EDUARDO MACK    Comments:        Anticoagulation Care Providers     Provider Role Specialty Phone number    Chavez Molina MD Referring Internal Medicine [phone]

## 2021-01-05 ENCOUNTER — TRANSFERRED RECORDS (OUTPATIENT)
Dept: HEALTH INFORMATION MANAGEMENT | Facility: CLINIC | Age: 81
End: 2021-01-05

## 2021-01-05 ENCOUNTER — ANTICOAGULATION THERAPY VISIT (OUTPATIENT)
Dept: NURSING | Facility: CLINIC | Age: 81
End: 2021-01-05

## 2021-01-05 DIAGNOSIS — Z79.01 LONG TERM CURRENT USE OF ANTICOAGULANTS WITH INR GOAL OF 2.0-3.0: ICD-10-CM

## 2021-01-05 DIAGNOSIS — I48.0 PAROXYSMAL ATRIAL FIBRILLATION (H): ICD-10-CM

## 2021-01-05 DIAGNOSIS — I48.21 PERMANENT ATRIAL FIBRILLATION (H): ICD-10-CM

## 2021-01-05 LAB — INR PPP: 2 (ref 0.9–1.1)

## 2021-01-05 NOTE — PROGRESS NOTES
ANTICOAGULATION  MANAGEMENT-Patient Home Monitoring Result    Assessment     Therapeutic INR result of 2.0 . Goal range 2.0-3.0. Received via fax from [employer] home INR monitoring company.        Previous INR was therapeutic    Mariya was last contacted by phone:     Plan     Per home monitoring agreement with patient, patient was NOT contacted regarding therapeutic result today.  Patient is to continue current dose and continue to check INR with home monitor per protocol.  ?       OBJECTIVE    INR   Date Value Ref Range Status   2021 2.0 (A) 0.90 - 1.10 Final       ASSESSMENT / PLAN  No question data found.  Anticoagulation Summary  As of 2021    INR goal:  2.0-3.0   TTR:  68.2 % (1 y)   INR used for dosin.0 (2021)   Warfarin maintenance plan:  5 mg (5 mg x 1) every Sun, Thu; 2.5 mg (5 mg x 0.5) all other days   Full warfarin instructions:  5 mg every Sun, Thu; 2.5 mg all other days   Weekly warfarin total:  22.5 mg   No change documented:  Destinee Christine, RN   Plan last modified:  Anita Palmer RN (2020)   Next INR check:  2021   Priority:  Maintenance   Target end date:  Indefinite    Indications    Long term current use of anticoagulants with INR goal of 2.0-3.0 [Z79.01]  Atrial fibrillation (H) [I48.91]  Permanent atrial fibrillation (H) [I48.21]             Anticoagulation Episode Summary     INR check location:      Preferred lab:  EXTERNAL LAB    Send INR reminders to:  LUIS EDUARDO MACK    Comments:        Anticoagulation Care Providers     Provider Role Specialty Phone number    Chavez Molina MD Referring Internal Medicine [phone]          Destinee Christine, RN - [facility]

## 2021-01-14 ENCOUNTER — HEALTH MAINTENANCE LETTER (OUTPATIENT)
Age: 81
End: 2021-01-14

## 2021-01-15 ENCOUNTER — TRANSFERRED RECORDS (OUTPATIENT)
Dept: HEALTH INFORMATION MANAGEMENT | Facility: CLINIC | Age: 81
End: 2021-01-15

## 2021-01-15 ENCOUNTER — ANTICOAGULATION THERAPY VISIT (OUTPATIENT)
Dept: INTERNAL MEDICINE | Facility: CLINIC | Age: 81
End: 2021-01-15

## 2021-01-15 DIAGNOSIS — I48.0 PAROXYSMAL ATRIAL FIBRILLATION (H): ICD-10-CM

## 2021-01-15 DIAGNOSIS — I48.21 PERMANENT ATRIAL FIBRILLATION (H): ICD-10-CM

## 2021-01-15 DIAGNOSIS — Z79.01 LONG TERM CURRENT USE OF ANTICOAGULANTS WITH INR GOAL OF 2.0-3.0: ICD-10-CM

## 2021-01-15 LAB — INR PPP: 4 (ref 0.9–1.1)

## 2021-01-15 NOTE — PROGRESS NOTES
Incoming fax from [employer] home monitoring company    Date of INR 1/15    INR result 4.0

## 2021-01-15 NOTE — PROGRESS NOTES
ANTICOAGULATION MANAGEMENT     Patient Name:  Mariya Rowe  Date:  1/15/2021    ASSESSMENT /SUBJECTIVE:    Today's INR result of 4.0 is supratherapeutic. Goal INR of 2.0-3.0      Warfarin dose taken: Warfarin taken as instructed    Diet: No new diet changes affecting INR. Sporadic. Pt has been dealing with passing of her  this past week.    Medication changes/ interactions: No new medications/supplements affecting INR    Previous INR: Therapeutic     S/S of bleeding or thromboembolism: No    New injury or illness: No        Additional findings:  Julio passed unexpectedly away this past week.      PLAN:    Telephone call with Mariya regarding INR result and instructed:     Warfarin Dosing Instructions: Hold today then continue your current warfarin dose of 5 mg every sun, Thu; 2.5mg all other days    Instructed patient to follow up no later than:   Patient to recheck with home meter    Education provided: Target INR goal and significance of current INR result      Mariya verbalizes understanding and agrees to warfarin dosing plan.    Instructed to call the Anticoagulation Clinic for any changes, questions or concerns. (#[phone])        Lalo Tapia RN      OBJECTIVE:  Recent labs: (last 7 days)     01/15/21   INR 4.0*         No question data found.  Anticoagulation Summary  As of 1/15/2021    INR goal:  2.0-3.0   TTR:  68.2 % (1 y)   INR used for dosin.0 (1/15/2021)   Warfarin maintenance plan:  5 mg (5 mg x 1) every Sun, Thu; 2.5 mg (5 mg x 0.5) all other days   Full warfarin instructions:  1/15: Hold; Otherwise 5 mg every Sun, Thu; 2.5 mg all other days   Weekly warfarin total:  22.5 mg   Plan last modified:  Anita Palmer RN (2020)   Next INR check:  2021   Priority:  Maintenance   Target end date:  Indefinite    Indications    Long term current use of anticoagulants with INR goal of 2.0-3.0 [Z79.01]  Atrial fibrillation (H) [I48.91]  Permanent atrial fibrillation  (H) [I48.21]             Anticoagulation Episode Summary     INR check location:      Preferred lab:  EXTERNAL LAB    Send INR reminders to:  LUIS EDUARDO MACK    Comments:        Anticoagulation Care Providers     Provider Role Specialty Phone number    Chavez Molina MD Referring Internal Medicine [phone]

## 2021-01-19 ENCOUNTER — TRANSFERRED RECORDS (OUTPATIENT)
Dept: HEALTH INFORMATION MANAGEMENT | Facility: CLINIC | Age: 81
End: 2021-01-19

## 2021-01-19 ENCOUNTER — ANTICOAGULATION THERAPY VISIT (OUTPATIENT)
Dept: INTERNAL MEDICINE | Facility: CLINIC | Age: 81
End: 2021-01-19

## 2021-01-19 DIAGNOSIS — Z79.01 LONG TERM CURRENT USE OF ANTICOAGULANTS WITH INR GOAL OF 2.0-3.0: ICD-10-CM

## 2021-01-19 DIAGNOSIS — I48.21 PERMANENT ATRIAL FIBRILLATION (H): ICD-10-CM

## 2021-01-19 DIAGNOSIS — I48.0 PAROXYSMAL ATRIAL FIBRILLATION (H): ICD-10-CM

## 2021-01-19 LAB — INR PPP: 4.3 (ref 0.9–1.1)

## 2021-01-19 NOTE — PROGRESS NOTES
ANTICOAGULATION MANAGEMENT     Patient Name:  Mariya Rowe  Date:  2021    ASSESSMENT /SUBJECTIVE:    Today's INR result of 4.3 is supratherapeutic. Goal INR of 2.0-3.0      Warfarin dose taken: Warfarin taken as instructed    Diet: No new diet changes affecting INR    Medication changes/ interactions: No new medications/supplements affecting INR    Previous INR: Supratherapeutic     S/S of bleeding or thromboembolism: No    New injury or illness: No    Upcoming surgery, procedure or cardioversion: No    Additional findings:  passed away, continue to grieve, sporadic diet, emotions.       PLAN:    Telephone call with Mariya regarding INR result and instructed:     Warfarin Dosing Instructions: Hold tonight then change your warfarin dose to 5mg every Sun; 2.5mg all other days of the week.   . (11.1 % change)    Instructed patient to follow up no later than: 1 week  Patient to recheck with home meter    Education provided: Target INR goal and significance of current INR result and Importance of therapeutic range      Mariya verbalizes understanding and agrees to warfarin dosing plan.    Instructed to call the Anticoagulation Clinic for any changes, questions or concerns. (#[phone])        Artis Dc RN      OBJECTIVE:  Recent labs: (last 7 days)     01/15/21 01/19/21   INR 4.0* 4.3*         No question data found.  Anticoagulation Summary  As of 2021    INR goal:  2.0-3.0   TTR:  67.5 % (1 y)   INR used for dosin.3 (2021)   Warfarin maintenance plan:  5 mg (5 mg x 1) every Sun, Thu; 2.5 mg (5 mg x 0.5) all other days   Full warfarin instructions:  5 mg every Sun, Thu; 2.5 mg all other days   Weekly warfarin total:  22.5 mg   Plan last modified:  Anita Palmer RN (2020)   Next INR check:     Priority:  Maintenance   Target end date:  Indefinite    Indications    Long term current use of anticoagulants with INR goal of 2.0-3.0 [Z79.01]  Atrial fibrillation (H)  [I48.91]  Permanent atrial fibrillation (H) [I48.21]             Anticoagulation Episode Summary     INR check location:      Preferred lab:  EXTERNAL LAB    Send INR reminders to:  LUIS EDUARDO MACK    Comments:        Anticoagulation Care Providers     Provider Role Specialty Phone number    Chavez Molina MD Referring Internal Medicine [phone]

## 2021-01-26 ENCOUNTER — TRANSFERRED RECORDS (OUTPATIENT)
Dept: HEALTH INFORMATION MANAGEMENT | Facility: CLINIC | Age: 81
End: 2021-01-26

## 2021-01-26 ENCOUNTER — ANTICOAGULATION THERAPY VISIT (OUTPATIENT)
Dept: INTERNAL MEDICINE | Facility: CLINIC | Age: 81
End: 2021-01-26

## 2021-01-26 DIAGNOSIS — Z79.01 LONG TERM CURRENT USE OF ANTICOAGULANTS WITH INR GOAL OF 2.0-3.0: ICD-10-CM

## 2021-01-26 DIAGNOSIS — I48.21 PERMANENT ATRIAL FIBRILLATION (H): ICD-10-CM

## 2021-01-26 DIAGNOSIS — I48.0 PAROXYSMAL ATRIAL FIBRILLATION (H): ICD-10-CM

## 2021-01-26 LAB — INR PPP: 1.7 (ref 0.9–1.1)

## 2021-01-26 NOTE — PROGRESS NOTES
ANTICOAGULATION MANAGEMENT     Patient Name:  Mariya Rowe  Date:  2021    ASSESSMENT /SUBJECTIVE:    Today's INR result of 1.7 is therapeutic. Goal INR of 2.0-3.0      Warfarin dose taken: Warfarin taken as instructed    Diet: No new diet changes affecting INR    Medication changes/ interactions: No new medications/supplements affecting INR    Previous INR: Supratherapeutic     S/S of bleeding or thromboembolism: No    New injury or illness: No    Upcoming surgery, procedure or cardioversion: No    Additional findings: None      PLAN:    Telephone call with Mariya regarding INR result and instructed:     Warfarin Dosing Instructions: Change your warfarin dose to 5 mg on tu/sat and 2.5 mg all other days  . (12.5 % change)    Instructed patient to follow up no later than: 1 week  Patient to recheck with home meter    Education provided: None required      Mariya verbalizes understanding and agrees to warfarin dosing plan.    Instructed to call the Anticoagulation Clinic for any changes, questions or concerns. (#[phone])        Thais Castro RN      OBJECTIVE:  Recent labs: (last 7 days)     21   INR 1.7*         No question data found.  Anticoagulation Summary  As of 2021    INR goal:  2.0-3.0   TTR:  66.3 % (1 y)   INR used for dosin.7 (2021)   Warfarin maintenance plan:  5 mg (5 mg x 1) every Sun; 2.5 mg (5 mg x 0.5) all other days   Full warfarin instructions:  5 mg every Sun; 2.5 mg all other days   Weekly warfarin total:  20 mg   Plan last modified:  Artis Dc RN (2021)   Next INR check:     Priority:  Maintenance   Target end date:  Indefinite    Indications    Long term current use of anticoagulants with INR goal of 2.0-3.0 [Z79.01]  Atrial fibrillation (H) [I48.91]  Permanent atrial fibrillation (H) [I48.21]             Anticoagulation Episode Summary     INR check location:      Preferred lab:  EXTERNAL LAB    Send INR reminders to:  LUIS EDUARDO MACK    Comments:         Anticoagulation Care Providers     Provider Role Specialty Phone number    Chavez Molina MD Referring Internal Medicine [phone]

## 2021-02-02 ENCOUNTER — ANTICOAGULATION THERAPY VISIT (OUTPATIENT)
Dept: INTERNAL MEDICINE | Facility: CLINIC | Age: 81
End: 2021-02-02

## 2021-02-02 ENCOUNTER — TRANSFERRED RECORDS (OUTPATIENT)
Dept: HEALTH INFORMATION MANAGEMENT | Facility: CLINIC | Age: 81
End: 2021-02-02

## 2021-02-02 DIAGNOSIS — I48.21 PERMANENT ATRIAL FIBRILLATION (H): ICD-10-CM

## 2021-02-02 DIAGNOSIS — I48.0 PAROXYSMAL ATRIAL FIBRILLATION (H): ICD-10-CM

## 2021-02-02 DIAGNOSIS — Z79.01 LONG TERM CURRENT USE OF ANTICOAGULANTS WITH INR GOAL OF 2.0-3.0: ICD-10-CM

## 2021-02-02 LAB — INR PPP: 2.1 (ref 0.9–1.1)

## 2021-02-02 NOTE — PROGRESS NOTES
ANTICOAGULATION MANAGEMENT     Patient Name:  Mariya Rowe  Date:  2021    ASSESSMENT /SUBJECTIVE:    Today's INR result of 2.1 is therapeutic. Goal INR of 2.0-3.0      Warfarin dose taken: Warfarin taken as instructed    Diet: No new diet changes affecting INR    Medication changes/ interactions: No new medications/supplements affecting INR    Previous INR: Subtherapeutic     S/S of bleeding or thromboembolism: No    New injury or illness: No    Upcoming surgery, procedure or cardioversion: No    Additional findings: None      PLAN:    Telephone call with Mariya regarding INR result and instructed:     Warfarin Dosing Instructions: Continue your current warfarin dose 5 mg Tue/Sat and 2.5 mg and ROW    Instructed patient to follow up no later than: 1 week  Patient to recheck with home meter    Education provided: Target INR goal and significance of current INR result      Mariya verbalizes understanding and agrees to warfarin dosing plan.    Instructed to call the Anticoagulation Clinic for any changes, questions or concerns. (#[phone])        Destinee Christine RN      OBJECTIVE:  Recent labs: (last 7 days)     21  1432   INR 2.1*         No question data found.  Anticoagulation Summary  As of 2021    INR goal:  2.0-3.0   TTR:  64.8 % (1 y)   INR used for dosin.1 (2021)   Warfarin maintenance plan:  5 mg (5 mg x 1) every Tue, Sat; 2.5 mg (5 mg x 0.5) all other days   Full warfarin instructions:  5 mg every Tue, Sat; 2.5 mg all other days   Weekly warfarin total:  22.5 mg   Plan last modified:  Thais Castro RN (2021)   Next INR check:  2021   Priority:  Maintenance   Target end date:  Indefinite    Indications    Long term current use of anticoagulants with INR goal of 2.0-3.0 [Z79.01]  Atrial fibrillation (H) [I48.91]  Permanent atrial fibrillation (H) [I48.21]             Anticoagulation Episode Summary     INR check location:      Preferred lab:  EXTERNAL LAB    Send INR  reminders to:  LUIS EDUARDO MACK    Comments:        Anticoagulation Care Providers     Provider Role Specialty Phone number    Chavez Molina MD Referring Internal Medicine [phone]

## 2021-02-05 ENCOUNTER — IMMUNIZATION (OUTPATIENT)
Dept: NURSING | Facility: CLINIC | Age: 81
End: 2021-02-05
Payer: COMMERCIAL

## 2021-02-05 PROCEDURE — 91300 PR COVID VAC PFIZER DIL RECON 30 MCG/0.3 ML IM: CPT

## 2021-02-05 PROCEDURE — 0001A PR COVID VAC PFIZER DIL RECON 30 MCG/0.3 ML IM: CPT

## 2021-02-09 ENCOUNTER — TRANSFERRED RECORDS (OUTPATIENT)
Dept: HEALTH INFORMATION MANAGEMENT | Facility: CLINIC | Age: 81
End: 2021-02-09

## 2021-02-09 ENCOUNTER — ANTICOAGULATION THERAPY VISIT (OUTPATIENT)
Dept: INTERNAL MEDICINE | Facility: CLINIC | Age: 81
End: 2021-02-09

## 2021-02-09 DIAGNOSIS — I48.21 PERMANENT ATRIAL FIBRILLATION (H): ICD-10-CM

## 2021-02-09 DIAGNOSIS — Z79.01 LONG TERM CURRENT USE OF ANTICOAGULANTS WITH INR GOAL OF 2.0-3.0: ICD-10-CM

## 2021-02-09 DIAGNOSIS — I48.0 PAROXYSMAL ATRIAL FIBRILLATION (H): ICD-10-CM

## 2021-02-09 LAB — INR PPP: 2.2 (ref 0.9–1.1)

## 2021-02-09 NOTE — PROGRESS NOTES
ANTICOAGULATION  MANAGEMENT-Patient Home Monitoring Result    Assessment     Therapeutic INR result of 2.2 . Goal range 2.0-3.0. Received via fax from [employer] home INR monitoring company.        Previous INR was therapeutic    Mariya was last contacted by phone: 21    Plan     Per home monitoring agreement with patient, patient was NOT contacted regarding therapeutic result today.  Patient is to continue current dose and continue to check INR with home monitor per protocol.  ?       OBJECTIVE    INR   Date Value Ref Range Status   2021 2.2 (A) 0.90 - 1.10 Final       ASSESSMENT / PLAN  No question data found.  Anticoagulation Summary  As of 2021    INR goal:  2.0-3.0   TTR:  64.8 % (1 y)   INR used for dosin.2 (2021)   Warfarin maintenance plan:  5 mg (5 mg x 1) every Tue, Sat; 2.5 mg (5 mg x 0.5) all other days   Full warfarin instructions:  5 mg every Tue, Sat; 2.5 mg all other days   Weekly warfarin total:  22.5 mg   No change documented:  Anita Palmer RN   Plan last modified:  Thais Castro RN (2021)   Next INR check:  2021   Priority:  Maintenance   Target end date:  Indefinite    Indications    Long term current use of anticoagulants with INR goal of 2.0-3.0 [Z79.01]  Atrial fibrillation (H) [I48.91]  Permanent atrial fibrillation (H) [I48.21]             Anticoagulation Episode Summary     INR check location:      Preferred lab:  EXTERNAL LAB    Send INR reminders to:  LUIS EDUARDO MACK    Comments:        Anticoagulation Care Providers     Provider Role Specialty Phone number    Chavez Molina MD Referring Internal Medicine [phone]

## 2021-02-16 ENCOUNTER — TRANSFERRED RECORDS (OUTPATIENT)
Dept: HEALTH INFORMATION MANAGEMENT | Facility: CLINIC | Age: 81
End: 2021-02-16

## 2021-02-16 ENCOUNTER — DOCUMENTATION ONLY (OUTPATIENT)
Dept: INTERNAL MEDICINE | Facility: CLINIC | Age: 81
End: 2021-02-16

## 2021-02-16 DIAGNOSIS — I48.21 PERMANENT ATRIAL FIBRILLATION (H): ICD-10-CM

## 2021-02-16 DIAGNOSIS — I48.0 PAROXYSMAL ATRIAL FIBRILLATION (H): ICD-10-CM

## 2021-02-16 DIAGNOSIS — Z79.01 LONG TERM CURRENT USE OF ANTICOAGULANTS WITH INR GOAL OF 2.0-3.0: ICD-10-CM

## 2021-02-16 LAB — INR PPP: 2.8 (ref 0.9–1.1)

## 2021-02-16 NOTE — PROGRESS NOTES
ANTICOAGULATION  MANAGEMENT-Patient Home Monitoring Result    Assessment     Therapeutic INR result of 2.8 . Goal range 2.0-3.0. Received via fax from [employer] home INR monitoring company.        Previous INR was therapeutic    Mariya was last contacted by phone: 21    Plan     Per home monitoring agreement with patient, patient was NOT contacted regarding therapeutic result today.  Patient is to continue current dose and continue to check INR with home monitor per protocol.  ?       OBJECTIVE    INR   Date Value Ref Range Status   2021 2.8 (A) 0.90 - 1.10 Final       ASSESSMENT / PLAN  No question data found.  Anticoagulation Summary  As of 2021    INR goal:  2.0-3.0   TTR:  64.8 % (1 y)   INR used for dosin.8 (2021)   Warfarin maintenance plan:  5 mg (5 mg x 1) every Tue, Sat; 2.5 mg (5 mg x 0.5) all other days   Full warfarin instructions:  5 mg every Tue, Sat; 2.5 mg all other days   Weekly warfarin total:  22.5 mg   No change documented:  Destinee Christine RN   Plan last modified:  Thais Castro RN (2021)   Next INR check:  2021   Priority:  Maintenance   Target end date:  Indefinite    Indications    Long term current use of anticoagulants with INR goal of 2.0-3.0 [Z79.01]  Atrial fibrillation (H) [I48.91]  Permanent atrial fibrillation (H) [I48.21]             Anticoagulation Episode Summary     INR check location:      Preferred lab:  EXTERNAL LAB    Send INR reminders to:  LUIS EDUARDO MACK    Comments:        Anticoagulation Care Providers     Provider Role Specialty Phone number    Chavez Molina MD Referring Internal Medicine [phone]          Destinee Christine, RN - [facility]

## 2021-02-23 ENCOUNTER — TRANSFERRED RECORDS (OUTPATIENT)
Dept: HEALTH INFORMATION MANAGEMENT | Facility: CLINIC | Age: 81
End: 2021-02-23

## 2021-02-23 ENCOUNTER — ANTICOAGULATION THERAPY VISIT (OUTPATIENT)
Dept: INTERNAL MEDICINE | Facility: CLINIC | Age: 81
End: 2021-02-23

## 2021-02-23 DIAGNOSIS — I48.21 PERMANENT ATRIAL FIBRILLATION (H): ICD-10-CM

## 2021-02-23 DIAGNOSIS — Z79.01 LONG TERM CURRENT USE OF ANTICOAGULANTS WITH INR GOAL OF 2.0-3.0: ICD-10-CM

## 2021-02-23 DIAGNOSIS — I48.0 PAROXYSMAL ATRIAL FIBRILLATION (H): ICD-10-CM

## 2021-02-23 LAB — INR PPP: 2.5 (ref 0.9–1.1)

## 2021-02-23 NOTE — PROGRESS NOTES
ANTICOAGULATION  MANAGEMENT-Patient Home Monitoring Result    Assessment     Therapeutic INR result of 2.5 . Goal range 2.0-3.0. Received via fax from [employer] home INR monitoring company.        Previous INR was therapeutic    Mariya was last contacted by phone: 21    Plan     Per home monitoring agreement with patient, patient was NOT contacted regarding therapeutic result today.  Patient is to continue current dose and continue to check INR with home monitor per protocol.  ?       OBJECTIVE    INR   Date Value Ref Range Status   2021 2.5 (A) 0.90 - 1.10 Final       ASSESSMENT / PLAN  No question data found.  Anticoagulation Summary  As of 2021    INR goal:  2.0-3.0   TTR:  64.8 % (1 y)   INR used for dosin.5 (2021)   Warfarin maintenance plan:  5 mg (5 mg x 1) every Tue, Sat; 2.5 mg (5 mg x 0.5) all other days   Full warfarin instructions:  5 mg every Tue, Sat; 2.5 mg all other days   Weekly warfarin total:  22.5 mg   No change documented:  Anita Palmer RN   Plan last modified:  Thais Castro RN (2021)   Next INR check:  3/2/2021   Priority:  Maintenance   Target end date:  Indefinite    Indications    Long term current use of anticoagulants with INR goal of 2.0-3.0 [Z79.01]  Atrial fibrillation (H) [I48.91]  Permanent atrial fibrillation (H) [I48.21]             Anticoagulation Episode Summary     INR check location:      Preferred lab:  EXTERNAL LAB    Send INR reminders to:  LUIS EDUARDO MACK    Comments:        Anticoagulation Care Providers     Provider Role Specialty Phone number    Chavez Molina MD Referring Internal Medicine [phone]

## 2021-02-24 ENCOUNTER — E-VISIT (OUTPATIENT)
Dept: INTERNAL MEDICINE | Facility: CLINIC | Age: 81
End: 2021-02-24
Payer: COMMERCIAL

## 2021-02-24 DIAGNOSIS — M25.559 HIP PAIN: Primary | ICD-10-CM

## 2021-02-24 PROCEDURE — 99207 PR NON-BILLABLE SERV PER CHARTING: CPT | Performed by: INTERNAL MEDICINE

## 2021-02-26 ENCOUNTER — IMMUNIZATION (OUTPATIENT)
Dept: NURSING | Facility: CLINIC | Age: 81
End: 2021-02-26
Attending: INTERNAL MEDICINE
Payer: COMMERCIAL

## 2021-02-26 PROCEDURE — 91300 PR COVID VAC PFIZER DIL RECON 30 MCG/0.3 ML IM: CPT

## 2021-02-26 PROCEDURE — 0002A PR COVID VAC PFIZER DIL RECON 30 MCG/0.3 ML IM: CPT

## 2021-03-03 ENCOUNTER — TRANSFERRED RECORDS (OUTPATIENT)
Dept: HEALTH INFORMATION MANAGEMENT | Facility: CLINIC | Age: 81
End: 2021-03-03

## 2021-03-03 ENCOUNTER — ANTICOAGULATION THERAPY VISIT (OUTPATIENT)
Dept: INTERNAL MEDICINE | Facility: CLINIC | Age: 81
End: 2021-03-03

## 2021-03-03 DIAGNOSIS — I48.0 PAROXYSMAL ATRIAL FIBRILLATION (H): ICD-10-CM

## 2021-03-03 DIAGNOSIS — Z79.01 LONG TERM CURRENT USE OF ANTICOAGULANTS WITH INR GOAL OF 2.0-3.0: ICD-10-CM

## 2021-03-03 DIAGNOSIS — I48.21 PERMANENT ATRIAL FIBRILLATION (H): ICD-10-CM

## 2021-03-03 LAB — INR PPP: 2.4 (ref 0.9–1.1)

## 2021-03-03 NOTE — PROGRESS NOTES
ANTICOAGULATION  MANAGEMENT-Patient Home Monitoring Result    Assessment     Therapeutic INR result of 2.4 . Goal range 2.0-3.0. Received via fax from [employer] home INR monitoring company.        Previous INR was therapeutic    Mariya was last contacted by phone: 21    Plan     Per home monitoring agreement with patient, patient was NOT contacted regarding therapeutic result today.  Patient is to continue current dose and continue to check INR with home monitor per protocol.  ?       OBJECTIVE    INR   Date Value Ref Range Status   2021 2.4 (A) 0.90 - 1.10 Final       ASSESSMENT / PLAN  No question data found.  Anticoagulation Summary  As of 3/3/2021    INR goal:  2.0-3.0   TTR:  64.7 % (1 y)   INR used for dosin.4 (3/2/2021)   Warfarin maintenance plan:  5 mg (5 mg x 1) every Tue, Sat; 2.5 mg (5 mg x 0.5) all other days   Full warfarin instructions:  5 mg every Tue, Sat; 2.5 mg all other days   Weekly warfarin total:  22.5 mg   No change documented:  Thais Castro RN   Plan last modified:  Thais Castro RN (2021)   Next INR check:  3/17/2021   Priority:  Maintenance   Target end date:  Indefinite    Indications    Long term current use of anticoagulants with INR goal of 2.0-3.0 [Z79.01]  Atrial fibrillation (H) [I48.91]  Permanent atrial fibrillation (H) [I48.21]             Anticoagulation Episode Summary     INR check location:      Preferred lab:  EXTERNAL LAB    Send INR reminders to:  LUIS EDUARDO MACK    Comments:        Anticoagulation Care Providers     Provider Role Specialty Phone number    Chavez Molina MD Referring Internal Medicine [phone]

## 2021-03-14 ENCOUNTER — HOSPITAL ENCOUNTER (EMERGENCY)
Facility: CLINIC | Age: 81
Discharge: HOME | End: 2021-03-14
Attending: NURSE PRACTITIONER | Admitting: NURSE PRACTITIONER
Payer: COMMERCIAL

## 2021-03-14 ENCOUNTER — APPOINTMENT (OUTPATIENT)
Dept: MRI IMAGING | Facility: CLINIC | Age: 81
End: 2021-03-14
Attending: NURSE PRACTITIONER
Payer: COMMERCIAL

## 2021-03-14 ENCOUNTER — APPOINTMENT (OUTPATIENT)
Dept: GENERAL RADIOLOGY | Facility: CLINIC | Age: 81
End: 2021-03-14
Attending: NURSE PRACTITIONER
Payer: COMMERCIAL

## 2021-03-14 VITALS
HEART RATE: 111 BPM | DIASTOLIC BLOOD PRESSURE: 99 MMHG | RESPIRATION RATE: 20 BRPM | TEMPERATURE: 97.7 F | OXYGEN SATURATION: 97 % | SYSTOLIC BLOOD PRESSURE: 141 MMHG

## 2021-03-14 DIAGNOSIS — K59.00 CONSTIPATION: ICD-10-CM

## 2021-03-14 DIAGNOSIS — M54.41 BILATERAL LOW BACK PAIN WITH RIGHT-SIDED SCIATICA: ICD-10-CM

## 2021-03-14 DIAGNOSIS — R33.9 URINE RETENTION: ICD-10-CM

## 2021-03-14 LAB
ALBUMIN UR-MCNC: NEGATIVE MG/DL
ANION GAP SERPL CALCULATED.3IONS-SCNC: 9 MMOL/L (ref 3–14)
APPEARANCE UR: CLEAR
BILIRUB UR QL STRIP: NEGATIVE
BUN SERPL-MCNC: 14 MG/DL (ref 7–30)
CALCIUM SERPL-MCNC: 9.5 MG/DL (ref 8.5–10.1)
CHLORIDE SERPL-SCNC: 102 MMOL/L (ref 94–109)
CO2 SERPL-SCNC: 25 MMOL/L (ref 20–32)
COLOR UR AUTO: YELLOW
CREAT SERPL-MCNC: 0.64 MG/DL (ref 0.52–1.04)
ERYTHROCYTE [DISTWIDTH] IN BLOOD BY AUTOMATED COUNT: 13.2 % (ref 10–15)
GFR SERPL CREATININE-BSD FRML MDRD: 84 ML/MIN/{1.73_M2}
GLUCOSE SERPL-MCNC: 110 MG/DL (ref 70–99)
GLUCOSE UR STRIP-MCNC: NEGATIVE MG/DL
HCT VFR BLD AUTO: 43.8 % (ref 35–47)
HGB BLD-MCNC: 14.5 G/DL (ref 11.7–15.7)
HGB UR QL STRIP: NEGATIVE
KETONES UR STRIP-MCNC: ABNORMAL MG/DL
LEUKOCYTE ESTERASE UR QL STRIP: NEGATIVE
MCH RBC QN AUTO: 31.2 PG (ref 26.5–33)
MCHC RBC AUTO-ENTMCNC: 33.1 G/DL (ref 31.5–36.5)
MCV RBC AUTO: 94 FL (ref 78–100)
MUCOUS THREADS #/AREA URNS LPF: PRESENT /LPF
NITRATE UR QL: NEGATIVE
PH UR STRIP: 6 PH (ref 5–7)
PLATELET # BLD AUTO: 307 10E9/L (ref 150–450)
POTASSIUM SERPL-SCNC: 3.6 MMOL/L (ref 3.4–5.3)
RBC # BLD AUTO: 4.65 10E12/L (ref 3.8–5.2)
RBC #/AREA URNS AUTO: 2 /HPF (ref 0–2)
SODIUM SERPL-SCNC: 136 MMOL/L (ref 133–144)
SOURCE: ABNORMAL
SP GR UR STRIP: 1.02 (ref 1–1.03)
UROBILINOGEN UR STRIP-MCNC: NORMAL MG/DL (ref 0–2)
WBC # BLD AUTO: 10.4 10E9/L (ref 4–11)
WBC #/AREA URNS AUTO: 1 /HPF (ref 0–5)

## 2021-03-14 PROCEDURE — 99285 EMERGENCY DEPT VISIT HI MDM: CPT | Mod: 25

## 2021-03-14 PROCEDURE — 250N000013 HC RX MED GY IP 250 OP 250 PS 637: Performed by: NURSE PRACTITIONER

## 2021-03-14 PROCEDURE — 81001 URINALYSIS AUTO W/SCOPE: CPT | Performed by: NURSE PRACTITIONER

## 2021-03-14 PROCEDURE — 51798 US URINE CAPACITY MEASURE: CPT

## 2021-03-14 PROCEDURE — 74019 RADEX ABDOMEN 2 VIEWS: CPT

## 2021-03-14 PROCEDURE — 72148 MRI LUMBAR SPINE W/O DYE: CPT

## 2021-03-14 PROCEDURE — 51702 INSERT TEMP BLADDER CATH: CPT

## 2021-03-14 PROCEDURE — 85027 COMPLETE CBC AUTOMATED: CPT | Performed by: NURSE PRACTITIONER

## 2021-03-14 PROCEDURE — 80048 BASIC METABOLIC PNL TOTAL CA: CPT | Performed by: NURSE PRACTITIONER

## 2021-03-14 RX ORDER — METHOCARBAMOL 750 MG/1
750 TABLET, FILM COATED ORAL 3 TIMES DAILY PRN
Qty: 21 TABLET | Refills: 0 | Status: SHIPPED | OUTPATIENT
Start: 2021-03-14 | End: 2021-03-21

## 2021-03-14 RX ORDER — METHOCARBAMOL 500 MG/1
1000 TABLET, FILM COATED ORAL ONCE
Status: COMPLETED | OUTPATIENT
Start: 2021-03-14 | End: 2021-03-14

## 2021-03-14 RX ORDER — LIDOCAINE 4 G/G
2 PATCH TOPICAL ONCE
Status: DISCONTINUED | OUTPATIENT
Start: 2021-03-14 | End: 2021-03-14 | Stop reason: HOSPADM

## 2021-03-14 RX ORDER — ACETAMINOPHEN 500 MG
1000 TABLET ORAL ONCE
Status: DISCONTINUED | OUTPATIENT
Start: 2021-03-14 | End: 2021-03-14 | Stop reason: HOSPADM

## 2021-03-14 RX ADMIN — DOCUSATE SODIUM 286 ML: 50 LIQUID ORAL at 18:05

## 2021-03-14 RX ADMIN — METHOCARBAMOL 1000 MG: 500 TABLET ORAL at 12:51

## 2021-03-14 RX ADMIN — LIDOCAINE 2 PATCH: 560 PATCH PERCUTANEOUS; TOPICAL; TRANSDERMAL at 12:51

## 2021-03-14 ASSESSMENT — ENCOUNTER SYMPTOMS
ROS GI COMMENTS: SOFT STOOLS
VOMITING: 0
BACK PAIN: 1
DIFFICULTY URINATING: 1
FEVER: 0
NAUSEA: 0
ABDOMINAL PAIN: 1

## 2021-03-14 NOTE — DISCHARGE INSTRUCTIONS
Ibuprofen or Naproxen and/or Tylenol scheduled for the next 3-5 days. 600 mg (three tabs) ibuprofen, 440 mg (two tabs) Naproxen, 650-1000 mg of Tylenol.  Ibuprofen and Tylenol are every 6 hours Naproxen is 2 times daily. Follow directions. Use OTC lidocaine patches as directed.   Ice or heat to area.  I recommend ice in the first 24-48 hours.     Use a barrier cream to protect your bottom.

## 2021-03-14 NOTE — ED PROVIDER NOTES
History   Chief Complaint:  Abdominal Pain and Back Pain    HPI   Mariya Rowe is a 80 year old female with history of chronic atrial fibrillation on Coumadin, hyperlipidemia and sick sinus syndrome who presents with back pain and abdominal pain. The patient states that for the past year she has been having right hip pain that radiates down her right leg. She went to a chiropractor twice in the past 15 days, most recently 8 days ago and began having right sided back pain. She states that this pain is very severe and that this was her first visit to a chiropractor ever. Additionally, the patient states that she has been having suprapubic abdominal pain for the last 3 days. She mentions having urinary urgency, but has difficulty going. The patient denies any nausea, vomiting and fever.     Review of Systems   Constitutional: Negative for fever.   Gastrointestinal: Positive for abdominal pain. Negative for nausea and vomiting.        Soft stools   Genitourinary: Positive for difficulty urinating and urgency.   Musculoskeletal: Positive for back pain.   All other systems reviewed and are negative.      Allergies:  Lidocaine  Atorvastatin    Medications:  Lopressor  Crestor  Coumadin    Past Medical History:    Atrial fibrillation  Atrial flutter  Chronotropic incompetence  Closed rib fracture  Hyperlipidemia  Osteoporosis   Palpitations  Patent foramen ovale  Sick sinus syndrome      Past Surgical History:    Colonoscopy x3  Cataract and corneal transplant    Family History:    Mother: cerebrovascular disease    Social History:  She presents with her daughter    Physical Exam     Patient Vitals for the past 24 hrs:   BP Temp Temp src Pulse Resp SpO2   03/14/21 1449 (!) 141/99 -- -- 111 -- 97 %   03/14/21 1258 -- -- -- -- -- 99 %   03/14/21 1257 (!) 161/112 -- -- 113 -- 98 %   03/14/21 1137 (!) 150/98 97.7  F (36.5  C) Oral 99 20 97 %       Physical Exam  General: Alert, Moderate discomfort, well kept  Eyes: PERRL,  conjunctivae pink no scleral icterus or conjunctival injection  ENT:   Moist mucus membranes, posterior oropharynx clear without erythema or exudates, No lymphadenopathy, Normal voice  Resp:  Lungs clear to auscultation bilaterally, no crackles/rubs/wheezes. Good air movement  CV:  Normal rate and rhythm, no murmurs/rubs/gallops  GI:  Suprapubic distention and tenderness. No upper abdominal tenderness.   Reevaluation: Distention resolved approximately 1400 cc yellow urine drained.   Reevaluation: After enema patient was unable to pass a stool.  I performed a rectal exam which does show large amount of firm stool in the rectum.  This was removed manually.  Patient also had skin irritation along her rectum and peritoneal area.  Skin:  Warm, dry.  No rashes or petechiae  Musculoskeletal: No peripheral edema or calf tenderness, Normal gross ROM   Neuro: Alert and oriented to person/place/time, normal sensation  Psychiatric: Normal affect, cooperative, good eye contact      Emergency Department Course   Imaging:  MR Lumbar Spine WO Contrast  1.  20% compression of the L4 superior endplate most pronounced on the  left. There is mild associated marrow edema. Findings have a late  subacute/early chronic appearance. Signal characteristics are not  consistent with an event 15 days old.  2.  Mild to moderate levoscoliosis apex L3-4.  3.  No high-grade spinal canal or neural foraminal stenosis.  Reading per radiology.    XR Abdomen 2 Views Flat and Upright  No intraperitoneal air. No dilated air-filled loops of  small bowel. Mild amount of stool scattered throughout the colon.  Mild-to-moderate scoliotic curvature of the thoracolumbar spine, apex  left.  Reading per radiology.     Laboratory:  CBC: WBC 10.4, HGB 14.5,    BMP: Glucose 110 (H) o/w WNL (Creatinine 0.64)     UA with microscopic: Ketones trace, Mucous present o/w WNL      Emergency Department Course:    Reviewed:  I reviewed nursing notes, vitals and past  medical history    Assessments:  1145 I obtained history and examined the patient as noted above.   1234 I rechecked the patient and placed the catheter for the patient as nurses were having difficulty placing catheter.    1545 I checked on the patient.  1618 I rechecked and updated the patient and her daughter.    Consults:   1542 I spoke with Dr. Hatch, neurosurgery, regarding the patient.     Interventions:  1251 4% Lidocaine 2 patches transdermal  1251 Robaxin 1000 mg PO  Tylenol 1000 mg PO    Disposition:  The patient was discharged to home.       Impression & Plan     Medical Decision Making:  Mariya Rowe is a 80 year old female who presents today for evaluation of lower back pain and abdominal discomfort.  Her examination showed a distended urinary bladder which was confirmed with ultrasound and catheter was placed significant amount of urine was drained as above.  She did not have signs of a urinary tract infection.  She had complained of back discomfort and had recently had chiropractic adjustment I obtained an MRI as above which showed no evidence of cause for her discomfort and urinary retention.  She also complained of bowel issues and I obtained x-ray which showed stool in the rectum.  We attempted an enema which was unsuccessful I then had to manually disimpact her.  I suspect that her constipation caused compression of her urethra which led to her urinary retention as there is no neurologic sign of this.  Her laboratory studies are otherwise noncontributory.  We will leave Babb catheter in place to removed by primary care provider.  She is fitted with a leg bag.  There is no indication for admission at this point.  She will be discharged to home.  She is given a prescription for methocarbamol advised to continue using over-the-counter medications to help with her back discomfort.  Return protocols were discussed.      Diagnosis:    ICD-10-CM    1. Urine retention  R33.9    2. Constipation   K59.00    3. Bilateral low back pain with right-sided sciatica  M54.41        Discharge Medications:  New Prescriptions    METHOCARBAMOL (ROBAXIN) 750 MG TABLET    Take 1 tablet (750 mg) by mouth 3 times daily as needed for muscle spasms       Scribe Disclosure:  I, Gino Emerson, am serving as a scribe at 11:40 AM on 3/14/2021 to document services personally performed by Bandar Gerardo APRN* based on my observations and the provider's statements to me.            Bandar Gerardo APRN CNP  03/14/21 1812

## 2021-03-15 ENCOUNTER — DOCUMENTATION ONLY (OUTPATIENT)
Dept: ANTICOAGULATION | Facility: CLINIC | Age: 81
End: 2021-03-15

## 2021-03-15 DIAGNOSIS — Z79.01 LONG TERM CURRENT USE OF ANTICOAGULANTS WITH INR GOAL OF 2.0-3.0: ICD-10-CM

## 2021-03-15 DIAGNOSIS — I48.21 PERMANENT ATRIAL FIBRILLATION (H): ICD-10-CM

## 2021-03-15 DIAGNOSIS — I48.0 PAROXYSMAL ATRIAL FIBRILLATION (H): ICD-10-CM

## 2021-03-15 NOTE — PROGRESS NOTES
ANTICOAGULATION  MANAGEMENT: Discharge Review    Mariya Rowe chart reviewed for anticoagulation continuity of care    Emergency room visit on 3/14/21 for Abdominal and back pain.    Discharge disposition: Home    Results:    No results for input(s): INR, [id number], AXA in the last 168 hours.  Anticoagulation inpatient management:     not applicable     Anticoagulation discharge instructions:     Warfarin dosing: home regimen continued   Bridging: No   INR goal change: No      Medication changes affecting anticoagulation: No    Additional factors affecting anticoagulation: Yes: Pain    Plan     No adjustment to anticoagulation plan needed    Patient not contacted    Anticoagulation Calendar updated    Wes Tomas RN

## 2021-03-16 ENCOUNTER — TRANSFERRED RECORDS (OUTPATIENT)
Dept: HEALTH INFORMATION MANAGEMENT | Facility: CLINIC | Age: 81
End: 2021-03-16

## 2021-03-16 ENCOUNTER — ANTICOAGULATION THERAPY VISIT (OUTPATIENT)
Dept: INTERNAL MEDICINE | Facility: CLINIC | Age: 81
End: 2021-03-16

## 2021-03-16 DIAGNOSIS — Z79.01 LONG TERM CURRENT USE OF ANTICOAGULANTS WITH INR GOAL OF 2.0-3.0: ICD-10-CM

## 2021-03-16 DIAGNOSIS — I48.21 PERMANENT ATRIAL FIBRILLATION (H): ICD-10-CM

## 2021-03-16 DIAGNOSIS — I48.0 PAROXYSMAL ATRIAL FIBRILLATION (H): ICD-10-CM

## 2021-03-16 LAB — INR PPP: 3.9 (ref 0.9–1.1)

## 2021-03-16 NOTE — PROGRESS NOTES
ANTICOAGULATION MANAGEMENT     Patient Name:  Mariya Rowe  Date:  3/16/2021    ASSESSMENT /SUBJECTIVE:    Today's INR result of 3.9 is supratherapeutic. Goal INR of 2.0-3.0      Warfarin dose taken: Warfarin taken as instructed    Diet: Decreased greens/vitamin K in diet; plans to resume previous intake    Medication changes/ interactions: No new medications/supplements affecting INR    Previous INR: Therapeutic     S/S of bleeding or thromboembolism: No    New injury or illness: Yes: Was in the ED on 3/14 for abdominal and back pain.     Upcoming surgery, procedure or cardioversion: No    Additional findings: None      PLAN:    Telephone call with Mariya regarding INR result and instructed:     Warfarin Dosing Instructions: Hold today's dose then continue your current warfarin dose of 5 mg Tue/Sat and 2.5 mg ROW    Instructed patient to follow up no later than: 1 week  Patient to recheck with home meter    Education provided: Target INR goal and significance of current INR result      Mariya verbalizes understanding and agrees to warfarin dosing plan.    Instructed to call the Anticoagulation Clinic for any changes, questions or concerns. (#[phone])        Destinee Christine RN      OBJECTIVE:  Recent labs: (last 7 days)     03/16/21  1553   INR 3.9*         No question data found.  Anticoagulation Summary  As of 3/16/2021    INR goal:  2.0-3.0   TTR:  63.2 % (1 y)   INR used for dosing:  3.9 (3/16/2021)   Warfarin maintenance plan:  5 mg (5 mg x 1) every Tue, Sat; 2.5 mg (5 mg x 0.5) all other days   Full warfarin instructions:  3/16: Hold; Otherwise 5 mg every Tue, Sat; 2.5 mg all other days   Weekly warfarin total:  22.5 mg   Plan last modified:  Thais Castro RN (1/26/2021)   Next INR check:  3/30/2021   Priority:  Maintenance   Target end date:  Indefinite    Indications    Long term current use of anticoagulants with INR goal of 2.0-3.0 [Z79.01]  Atrial fibrillation (H) [I48.91]  Permanent atrial  fibrillation (H) [I48.21]             Anticoagulation Episode Summary     INR check location:      Preferred lab:  EXTERNAL LAB    Send INR reminders to:  LUIS EDUARDO MACK    Comments:        Anticoagulation Care Providers     Provider Role Specialty Phone number    Chavez Molina MD Referring Internal Medicine [phone]

## 2021-03-16 NOTE — PROGRESS NOTES
Assessment & Plan     (R33.9) Urinary retention  (primary encounter diagnosis)  Comment: Pt had garzon placed in ER d/t urinary retention and needs removal of garzon/assessment of initial urinary retention.   Plan: UROLOGY ADULT REFERRAL            (K59.00) Constipation, unspecified constipation type  Comment: Pt notes constipation has been resolved since ER visit on 3/14. She has had daily bowel movements since.     (E78.5) Hyperlipidemia with target LDL less than 130  Comment: Continue current medication regimen. LDL at goal at 64 on 12/22/20.   Plan: rosuvastatin (CRESTOR) 5 MG tablet            (R00.2) Palpitations  Plan: metoprolol tartrate (LOPRESSOR) 25 MG tablet            (M62.81) Generalized muscle weakness  Comment: Pt notes increased generalized weakness affecting gait and ADL's. She is interested in having home PT/OT and would like a home health aide if insurance approves to help her transfer up/down stairs, etc.   Plan: HOME CARE NURSING REFERRAL            (R26.81) Gait instability  Plan: HOME CARE NURSING REFERRAL            (M81.0) Osteoporosis without current pathological fracture, unspecified osteoporosis type  Comment: Will update DEXA scan.  Plan: DX Hip/Pelvis/Spine             CONSULTATION/REFERRAL to Urology    Edith Villarreal  Nurse Practitioner Student    I have reviewed the patient's history and exam with Edith Villarreal. I have interviewed and examined the patient independently. Agree with above note including Assessment and Plan.      Chavez Molina MD,   [facility]    Patient Instructions   We'll see whether [facility] can see you sooner than what has been offered by [facility].     Someone will contact you to help arrange for a Home Health Care nurse to come into your home and make an assessment on home needs.    Refilled needed medications. Cholesterol labs looked fine in December 2020.    Bone density test ordered. Someone will call you or you  may call the clinic to schedule.     See me around the end of this year for an Annual Wellness Visit--cholesterol and labs will be due by December 2021.            Netta Meraz is a 80 year old who presents for the following health issues  accompanied by her daughter in law. Patient is being seen for an ED follow up.  HPI   Prior to ED visit on 3/14, had symptoms of urinary retention x 3 days, and constipation x 4-5 days. She usually has a bowel movement every day. Has not had issues with constipation since ED visit. She is feeling fine and was hoping to have her agrzon catheter removed today here in clinic.   Pt also c/o generalized weakness and gait instability and is interested in having a home health aide if possible. She has osteoporosis and has had significant back pain for the past year. Has seen [facility] and has completed PT in the past and did not find much relief for her back pain.       ED/UC Followup:    Facility:  [facility] Emergency Dept    Date of visit: 3/14/2021 (7 hours)    Reason for visit: Clinical Impressions       Urine retention     Constipation     Bilateral low back pain with right-sided sciatica    Current Status: Patient states that she feels week and has back pain.         Review of Systems   Constitutional: Negative for chills and fever.   HENT: Negative for ear discharge, ear pain, hearing loss, postnasal drip, rhinorrhea and sore throat.    Respiratory: Negative for choking, chest tightness, shortness of breath and wheezing.    Cardiovascular: Negative for chest pain and palpitations.   Gastrointestinal: Positive for constipation. Negative for heartburn, hematochezia, nausea and vomiting.   Genitourinary: Positive for decreased urine volume and difficulty urinating.   Neurological: Positive for weakness. Negative for dizziness, light-headedness, numbness and paresthesias.            Objective    /81   Pulse 87   Temp 97.4  F (36.3  C) (Oral)   Resp 18   Ht  "1.803 m (5' 10.98\")   Wt 76.4 kg (168 lb 6.4 oz)   SpO2 98%   BMI 23.50 kg/m      Physical Exam   GENERAL: healthy, alert and no distress  EYES: Eyes grossly normal to inspection, PERRL and conjunctivae and sclerae normal  NECK: no adenopathy, no asymmetry, masses, or scars and thyroid normal to palpation  RESP: lungs clear to auscultation - no rales, rhonchi or wheezes  CV: regular rate and rhythm, normal S1 S2, no S3 or S4, no murmur, click or rub, no peripheral edema and peripheral pulses strong  NEURO: Normal strength and tone, mentation intact and speech normal  PSYCH: mentation appears normal, affect normal/bright    Lab results reviewed in epic              "

## 2021-03-17 ENCOUNTER — TELEPHONE (OUTPATIENT)
Dept: INTERNAL MEDICINE | Facility: CLINIC | Age: 81
End: 2021-03-17

## 2021-03-17 ENCOUNTER — OFFICE VISIT (OUTPATIENT)
Dept: INTERNAL MEDICINE | Facility: CLINIC | Age: 81
End: 2021-03-17
Payer: COMMERCIAL

## 2021-03-17 VITALS
BODY MASS INDEX: 23.57 KG/M2 | SYSTOLIC BLOOD PRESSURE: 116 MMHG | RESPIRATION RATE: 18 BRPM | HEART RATE: 87 BPM | TEMPERATURE: 97.4 F | WEIGHT: 168.4 LBS | DIASTOLIC BLOOD PRESSURE: 81 MMHG | HEIGHT: 71 IN | OXYGEN SATURATION: 98 %

## 2021-03-17 DIAGNOSIS — R33.9 URINARY RETENTION: Primary | ICD-10-CM

## 2021-03-17 DIAGNOSIS — E78.5 HYPERLIPIDEMIA WITH TARGET LDL LESS THAN 130: ICD-10-CM

## 2021-03-17 DIAGNOSIS — M81.0 OSTEOPOROSIS WITHOUT CURRENT PATHOLOGICAL FRACTURE, UNSPECIFIED OSTEOPOROSIS TYPE: ICD-10-CM

## 2021-03-17 DIAGNOSIS — R00.2 PALPITATIONS: ICD-10-CM

## 2021-03-17 DIAGNOSIS — R26.81 GAIT INSTABILITY: ICD-10-CM

## 2021-03-17 DIAGNOSIS — M62.81 GENERALIZED MUSCLE WEAKNESS: ICD-10-CM

## 2021-03-17 DIAGNOSIS — K59.00 CONSTIPATION, UNSPECIFIED CONSTIPATION TYPE: ICD-10-CM

## 2021-03-17 PROCEDURE — 99214 OFFICE O/P EST MOD 30 MIN: CPT | Performed by: INTERNAL MEDICINE

## 2021-03-17 RX ORDER — ROSUVASTATIN CALCIUM 5 MG/1
5 TABLET, COATED ORAL DAILY
Qty: 90 TABLET | Refills: 2 | Status: SHIPPED | OUTPATIENT
Start: 2021-03-17 | End: 2021-11-23

## 2021-03-17 RX ORDER — METOPROLOL TARTRATE 25 MG/1
TABLET, FILM COATED ORAL
Qty: 180 TABLET | Refills: 3 | Status: SHIPPED | OUTPATIENT
Start: 2021-03-17 | End: 2022-04-08

## 2021-03-17 ASSESSMENT — MIFFLIN-ST. JEOR: SCORE: 1334.67

## 2021-03-17 ASSESSMENT — ENCOUNTER SYMPTOMS
HEMATOCHEZIA: 0
CHEST TIGHTNESS: 0
PALPITATIONS: 0
NUMBNESS: 0
WHEEZING: 0
VOMITING: 0
CHILLS: 0
NAUSEA: 0
DIZZINESS: 0
WEAKNESS: 1
RHINORRHEA: 0
DIFFICULTY URINATING: 1
SORE THROAT: 0
HEARTBURN: 0
PARESTHESIAS: 0
LIGHT-HEADEDNESS: 0
FEVER: 0
SHORTNESS OF BREATH: 0
CONSTIPATION: 1
CHOKING: 0

## 2021-03-17 NOTE — TELEPHONE ENCOUNTER
[facility] nurse calling to report that they cannot get out to patient's home until Sunday or Monday. She wants to make sure that Dr. Molina was OK with this. Nurse can be called back at [phone]. Please advise. Thanks.

## 2021-03-17 NOTE — PATIENT INSTRUCTIONS
We'll see whether [facility] can see you sooner than what has been offered by [facility].     Someone will contact you to help arrange for a Home Health Care nurse to come into your home and make an assessment on home needs.    Refilled needed medications. Cholesterol labs looked fine in December 2020.    Bone density test ordered. Someone will call you or you may call the clinic to schedule.     See me around the end of this year for an Annual Wellness Visit--cholesterol and labs will be due by December 2021.

## 2021-03-18 NOTE — TELEPHONE ENCOUNTER
Spoke with Rolando @ [facility] ([phone]).  States, due to high volume of patients in the [place] area, Sunday or Monday is the earliest patient can be seen.

## 2021-03-18 NOTE — TELEPHONE ENCOUNTER
Patient calling. Wanting Dr Molina to hurry up and call the nurse. She is wanting homecare. Ok to call pt as she wants to make sure this is ok. Ok to call and  [phone]

## 2021-03-19 NOTE — TELEPHONE ENCOUNTER
Spoke with patient today and she states she is having a hard time getting around and is hoping to be seen sooner. Advised we can accommodate in this instance and will see her tomorrow.

## 2021-03-20 ENCOUNTER — MEDICAL CORRESPONDENCE (OUTPATIENT)
Dept: HEALTH INFORMATION MANAGEMENT | Facility: CLINIC | Age: 81
End: 2021-03-20

## 2021-03-22 ENCOUNTER — DOCUMENTATION ONLY (OUTPATIENT)
Dept: CARE COORDINATION | Facility: CLINIC | Age: 81
End: 2021-03-22

## 2021-03-22 ENCOUNTER — TELEPHONE (OUTPATIENT)
Dept: INTERNAL MEDICINE | Facility: CLINIC | Age: 81
End: 2021-03-22

## 2021-03-22 NOTE — PROGRESS NOTES
[facility] now requests orders and shares plan of care/discharge summaries for some patients through [employer].  Please REPLY TO THIS MESSAGE OR ROUTE BACK TO THE AUTHOR in order to give authorization for orders when needed.  This is considered a verbal order, you will still receive a faxed copy of orders for signature.  Thank you for your assistance in improving collaboration for our patients.    ORDER    Requesting start of care home care orders.     Skilled Nursing 1 week x 2, 2 prn visits for medication reconciliation, fall prevention, CP assessment.     PT to eval/treat for fall prevention and strengthening.     OT to eval/treat for home safety assessment, adaptive needs.     SW assessment to assist with community resources and advanced care planning.     Thank you,   Cami Irwin, RN  [phone]

## 2021-03-23 ENCOUNTER — TRANSFERRED RECORDS (OUTPATIENT)
Dept: HEALTH INFORMATION MANAGEMENT | Facility: CLINIC | Age: 81
End: 2021-03-23

## 2021-03-23 ENCOUNTER — ANTICOAGULATION THERAPY VISIT (OUTPATIENT)
Dept: INTERNAL MEDICINE | Facility: CLINIC | Age: 81
End: 2021-03-23

## 2021-03-23 DIAGNOSIS — I48.0 PAROXYSMAL ATRIAL FIBRILLATION (H): ICD-10-CM

## 2021-03-23 DIAGNOSIS — Z79.01 LONG TERM CURRENT USE OF ANTICOAGULANTS WITH INR GOAL OF 2.0-3.0: ICD-10-CM

## 2021-03-23 DIAGNOSIS — I48.21 PERMANENT ATRIAL FIBRILLATION (H): ICD-10-CM

## 2021-03-23 LAB — INR PPP: 1.8 (ref 0.9–1.1)

## 2021-03-23 NOTE — PROGRESS NOTES
ANTICOAGULATION MANAGEMENT     Patient Name:  Mariya Rowe  Date:  3/23/2021    ASSESSMENT /SUBJECTIVE:    Today's INR result of 1.8 is subtherapeutic. Goal INR of 2.0-3.0      Warfarin dose taken: Warfarin taken as instructed    Diet: No new diet changes affecting INR    Medication changes/ interactions: No new medications/supplements affecting INR    Previous INR: Supratherapeutic     S/S of bleeding or thromboembolism: No    New injury or illness: No    Upcoming surgery, procedure or cardioversion: No    Additional findings: Hold from last week may be playing into the result from today (hold was on 5mg day).      PLAN:    Telephone call with Mariya regarding INR result and instructed:     Warfarin Dosing Instructions: Continue your current warfarin dose 5mg every Tue, Sat; 2.5mg all other days    Instructed patient to follow up no later than: 1 week  Patient to recheck with home meter    Education provided: Target INR goal and significance of current INR result      Mariya verbalizes understanding and agrees to warfarin dosing plan.    Instructed to call the Anticoagulation Clinic for any changes, questions or concerns. (#[phone])        Lalo Tapia RN      OBJECTIVE:  Recent labs: (last 7 days)     21   INR 1.8*         No question data found.  Anticoagulation Summary  As of 3/23/2021    INR goal:  2.0-3.0   TTR:  64.1 % (1 y)   INR used for dosin.8 (3/23/2021)   Warfarin maintenance plan:  5 mg (5 mg x 1) every Tue, Sat; 2.5 mg (5 mg x 0.5) all other days   Full warfarin instructions:  5 mg every Tue, Sat; 2.5 mg all other days   Weekly warfarin total:  22.5 mg   No change documented:  Lalo Tapia RN   Plan last modified:  hTais Castro RN (2021)   Next INR check:  3/30/2021   Priority:  Maintenance   Target end date:  Indefinite    Indications    Long term current use of anticoagulants with INR goal of 2.0-3.0 [Z79.01]  Atrial fibrillation (H) [I48.91]  Permanent atrial  fibrillation (H) [I48.21]             Anticoagulation Episode Summary     INR check location:      Preferred lab:  EXTERNAL LAB    Send INR reminders to:  LUIS EDUARDO MACK    Comments:        Anticoagulation Care Providers     Provider Role Specialty Phone number    Chavez Molina MD Referring Internal Medicine [phone]

## 2021-03-24 ENCOUNTER — TELEPHONE (OUTPATIENT)
Dept: INTERNAL MEDICINE | Facility: CLINIC | Age: 81
End: 2021-03-24

## 2021-03-25 ENCOUNTER — TELEPHONE (OUTPATIENT)
Dept: INTERNAL MEDICINE | Facility: CLINIC | Age: 81
End: 2021-03-25

## 2021-03-25 NOTE — TELEPHONE ENCOUNTER
[facility] calling for OCCUPATIONAL THERAPY verbal orders.  2/week for 2 week ,1/week for 1 week. adaptive equipment, increase independance with ADL and I ADL ,strength, endurance, activity tolerance and pain management.  [facility] phone [phone] and ok to leave message.

## 2021-03-26 NOTE — TELEPHONE ENCOUNTER
Received call at [facility] from [facility] home care nurse. Gave verbal orders:    OCCUPATIONAL THERAPY verbal orders.  2/week for 2 week ,1/week for 1 week. adaptive equipment, increase independance with ADL and I ADL ,strength, endurance, activity tolerance and pain management.    Rhoda Small RN   [facility]

## 2021-03-31 ENCOUNTER — ANTICOAGULATION THERAPY VISIT (OUTPATIENT)
Dept: INTERNAL MEDICINE | Facility: CLINIC | Age: 81
End: 2021-03-31

## 2021-03-31 ENCOUNTER — TRANSFERRED RECORDS (OUTPATIENT)
Dept: HEALTH INFORMATION MANAGEMENT | Facility: CLINIC | Age: 81
End: 2021-03-31

## 2021-03-31 ENCOUNTER — DOCUMENTATION ONLY (OUTPATIENT)
Dept: INTERNAL MEDICINE | Facility: CLINIC | Age: 81
End: 2021-03-31

## 2021-03-31 DIAGNOSIS — I48.21 PERMANENT ATRIAL FIBRILLATION (H): Primary | ICD-10-CM

## 2021-03-31 DIAGNOSIS — Z79.01 LONG TERM CURRENT USE OF ANTICOAGULANTS WITH INR GOAL OF 2.0-3.0: ICD-10-CM

## 2021-03-31 DIAGNOSIS — I48.0 PAROXYSMAL ATRIAL FIBRILLATION (H): ICD-10-CM

## 2021-03-31 DIAGNOSIS — I48.21 PERMANENT ATRIAL FIBRILLATION (H): ICD-10-CM

## 2021-03-31 LAB — INR PPP: 2.9 (ref 0.9–1.1)

## 2021-03-31 NOTE — PROGRESS NOTES
ANTICOAGULATION MANAGEMENT     Patient Name:  Mariya Rowe  Date:  3/31/2021    ASSESSMENT /SUBJECTIVE:    Today's INR result of 2.9 is therapeutic. Goal INR of 2.0-3.0      Warfarin dose taken: Warfarin taken as instructed    Diet: No new diet changes affecting INR    Medication changes/ interactions: No new medications/supplements affecting INR    Previous INR: Subtherapeutic     S/S of bleeding or thromboembolism: No    New injury or illness: No    Upcoming surgery, procedure or cardioversion: No    Additional findings: None      PLAN:    Telephone call with Mariya regarding INR result and instructed:     Warfarin Dosing Instructions: Continue your current warfarin dose 5mg every Tue, Sat; 2.5mg all other days    Instructed patient to follow up no later than: 2 weeks  Patient to recheck with home meter    Education provided: Target INR goal and significance of current INR result      Mariya verbalizes understanding and agrees to warfarin dosing plan.    Instructed to call the Anticoagulation Clinic for any changes, questions or concerns. (#[phone])        Lalo Tapia RN      OBJECTIVE:  Recent labs: (last 7 days)     21   INR 2.9*         No question data found.  Anticoagulation Summary  As of 3/31/2021    INR goal:  2.0-3.0   TTR:  65.1 % (1 y)   INR used for dosin.9 (3/31/2021)   Warfarin maintenance plan:  5 mg (5 mg x 1) every Tue, Sat; 2.5 mg (5 mg x 0.5) all other days   Full warfarin instructions:  5 mg every Tue, Sat; 2.5 mg all other days   Weekly warfarin total:  22.5 mg   No change documented:  Lalo Tapia RN   Plan last modified:  Thais Castro RN (2021)   Next INR check:  2021   Priority:  Maintenance   Target end date:  Indefinite    Indications    Long term current use of anticoagulants with INR goal of 2.0-3.0 [Z79.01]  Atrial fibrillation (H) [I48.91]  Permanent atrial fibrillation (H) [I48.21]             Anticoagulation Episode Summary     INR check  location:      Preferred lab:  EXTERNAL LAB    Send INR reminders to:  LUIS EDUARDO MACK    Comments:        Anticoagulation Care Providers     Provider Role Specialty Phone number    Chavez Molina MD Referring Internal Medicine [phone]

## 2021-04-02 ENCOUNTER — TELEPHONE (OUTPATIENT)
Dept: INTERNAL MEDICINE | Facility: CLINIC | Age: 81
End: 2021-04-02

## 2021-04-02 NOTE — TELEPHONE ENCOUNTER
[facility] now requests orders and shares plan of care/discharge summaries for some patients through [employer].  Please REPLY TO THIS MESSAGE OR ROUTE BACK TO THE AUTHOR in order to give authorization for orders when needed.  This is considered a verbal order, you will still receive a faxed copy of orders for signature.  Thank you for your assistance in improving collaboration for our patients.    ORDER      Continue SNV 1x/wk x 2 wk to f/u on back pain    Has appt to see Dr Cardenas 4/5/21

## 2021-04-08 ENCOUNTER — TELEPHONE (OUTPATIENT)
Dept: INTERNAL MEDICINE | Facility: CLINIC | Age: 81
End: 2021-04-08

## 2021-04-09 ENCOUNTER — TELEPHONE (OUTPATIENT)
Dept: INTERNAL MEDICINE | Facility: CLINIC | Age: 81
End: 2021-04-09

## 2021-04-09 NOTE — TELEPHONE ENCOUNTER
Dr. Molina previously approved home care orders for patient. Called Vivienne, verbal authorization given for home care orders per [facility] protocol.

## 2021-04-09 NOTE — TELEPHONE ENCOUNTER
Calling because they need PHYSICAL THERAPY evaluation order.  Vivienne with [facility] [phone]

## 2021-04-12 ENCOUNTER — TRANSFERRED RECORDS (OUTPATIENT)
Dept: HEALTH INFORMATION MANAGEMENT | Facility: CLINIC | Age: 81
End: 2021-04-12

## 2021-04-12 DIAGNOSIS — I48.91 ATRIAL FIBRILLATION (H): ICD-10-CM

## 2021-04-13 ENCOUNTER — TELEPHONE (OUTPATIENT)
Dept: INTERNAL MEDICINE | Facility: CLINIC | Age: 81
End: 2021-04-13

## 2021-04-13 ENCOUNTER — TRANSFERRED RECORDS (OUTPATIENT)
Dept: HEALTH INFORMATION MANAGEMENT | Facility: CLINIC | Age: 81
End: 2021-04-13

## 2021-04-13 ENCOUNTER — ANTICOAGULATION THERAPY VISIT (OUTPATIENT)
Dept: INTERNAL MEDICINE | Facility: CLINIC | Age: 81
End: 2021-04-13

## 2021-04-13 DIAGNOSIS — I48.0 PAROXYSMAL ATRIAL FIBRILLATION (H): ICD-10-CM

## 2021-04-13 DIAGNOSIS — I48.21 PERMANENT ATRIAL FIBRILLATION (H): ICD-10-CM

## 2021-04-13 DIAGNOSIS — Z79.01 LONG TERM CURRENT USE OF ANTICOAGULANTS WITH INR GOAL OF 2.0-3.0: ICD-10-CM

## 2021-04-13 LAB — INR PPP: 2.4 (ref 0.9–1.1)

## 2021-04-13 NOTE — PROGRESS NOTES
ANTICOAGULATION  MANAGEMENT-Patient Home Monitoring Result    Assessment     Therapeutic INR result of 2.4 . Goal range 2.0-3.0. Received via fax from [employer] home INR monitoring company.        Previous INR was therapeutic    Mariya was last contacted by phone: 3/16    Plan     Per home monitoring agreement with patient, patient was NOT contacted regarding therapeutic result today.  Patient is to continue current dose and continue to check INR with home monitor per protocol.  ?       OBJECTIVE    INR   Date Value Ref Range Status   2021 2.4 (A) 0.90 - 1.10 Final       ASSESSMENT / PLAN  No question data found.  Anticoagulation Summary  As of 2021    INR goal:  2.0-3.0   TTR:  65.1 % (1 y)   Prior goal:  2.0-3.0   INR used for dosin.4 (2021)   Warfarin maintenance plan:  5 mg (5 mg x 1) every Tue, Sat; 2.5 mg (5 mg x 0.5) all other days   Full warfarin instructions:  5 mg every Tue, Sat; 2.5 mg all other days   Weekly warfarin total:  22.5 mg   No change documented:  Anita Palmer RN   Plan last modified:  Thais Castro RN (2021)   Next INR check:  2021   Priority:  Maintenance   Target end date:  Indefinite    Indications    Long term current use of anticoagulants with INR goal of 2.0-3.0 [Z79.01]  Atrial fibrillation (H) [I48.91]  Permanent atrial fibrillation (H) [I48.21]             Anticoagulation Episode Summary     INR check location:      Preferred lab:  EXTERNAL LAB    Send INR reminders to:  LUIS EDUARDO MACK    Comments:        Anticoagulation Care Providers     Provider Role Specialty Phone number    Chavez Molina MD Referring Internal Medicine [phone]

## 2021-04-14 ENCOUNTER — TELEPHONE (OUTPATIENT)
Dept: INTERNAL MEDICINE | Facility: CLINIC | Age: 81
End: 2021-04-14

## 2021-04-14 DIAGNOSIS — Z53.9 DIAGNOSIS NOT YET DEFINED: Primary | ICD-10-CM

## 2021-04-14 PROCEDURE — G0180 MD CERTIFICATION HHA PATIENT: HCPCS | Performed by: INTERNAL MEDICINE

## 2021-04-14 RX ORDER — WARFARIN SODIUM 5 MG/1
TABLET ORAL
Qty: 60 TABLET | Refills: 1 | Status: SHIPPED | OUTPATIENT
Start: 2021-04-14 | End: 2021-05-05

## 2021-04-14 NOTE — TELEPHONE ENCOUNTER
"Requested Prescriptions   Pending Prescriptions Disp Refills     warfarin ANTICOAGULANT (COUMADIN) 5 MG tablet [Pharmacy Med Name: WARFARIN SODIUM 5 MG TABLET] 72 tablet 1     Sig: TAKE 1/2 TABLET BY MOUTH ONCE DAILY EXCEPT ON THURSDAYS TAKE 1 TAB OR AS DIRECTED BY INR CLINIC       Vitamin K Antagonists Failed - 4/14/2021  8:35 AM        Failed - INR is within goal in the past 6 weeks     Confirm INR is within goal in the past 6 weeks.     Recent Labs   Lab Test 04/13/21   INR 2.4*                       Passed - Recent (12 mo) or future (30 days) visit within the authorizing provider's specialty     Patient has had an office visit with the authorizing provider or a provider within the authorizing providers department within the previous 12 mos or has a future within next 30 days. See \"Patient Info\" tab in inbasket, or \"Choose Columns\" in Meds & Orders section of the refill encounter.              Passed - Medication is active on med list        Passed - Patient is 18 years of age or older        Passed - Patient is not pregnant        Passed - No positive pregnancy on file in past 12 months           Last office visit 3/17/21.  Warfarin dosing is managed by INR Clinic.  Prescription approved per [facility] Refill Protocol.  Vira Crawford RN    "

## 2021-04-16 ENCOUNTER — TELEPHONE (OUTPATIENT)
Dept: CARDIOLOGY | Facility: CLINIC | Age: 81
End: 2021-04-16

## 2021-04-16 NOTE — TELEPHONE ENCOUNTER
4/16/21 Pt called and reported she is having hip surgery on 5/6 and wondering if she needs to see Dr Kenyon prior. Explained that she needs to make pre op physical appt w PCP ( which she already does) and at that time it will be decided if a F/U w Dr Kenyon is needed.  Pt voiced understanding and agreement with plan.   Nelson 345 pm

## 2021-04-20 ENCOUNTER — OFFICE VISIT (OUTPATIENT)
Dept: INTERNAL MEDICINE | Facility: CLINIC | Age: 81
End: 2021-04-20
Payer: COMMERCIAL

## 2021-04-20 VITALS
WEIGHT: 145 LBS | OXYGEN SATURATION: 100 % | RESPIRATION RATE: 16 BRPM | SYSTOLIC BLOOD PRESSURE: 124 MMHG | DIASTOLIC BLOOD PRESSURE: 78 MMHG | TEMPERATURE: 97.4 F | HEART RATE: 85 BPM | BODY MASS INDEX: 20.3 KG/M2 | HEIGHT: 71 IN

## 2021-04-20 DIAGNOSIS — Z01.812 ENCOUNTER FOR PREOPERATIVE SCREENING LABORATORY TESTING FOR COVID-19 VIRUS: ICD-10-CM

## 2021-04-20 DIAGNOSIS — I48.21 PERMANENT ATRIAL FIBRILLATION (H): ICD-10-CM

## 2021-04-20 DIAGNOSIS — Z11.52 ENCOUNTER FOR PREOPERATIVE SCREENING LABORATORY TESTING FOR COVID-19 VIRUS: ICD-10-CM

## 2021-04-20 DIAGNOSIS — M16.10 HIP ARTHRITIS: ICD-10-CM

## 2021-04-20 DIAGNOSIS — E78.5 HYPERLIPIDEMIA WITH TARGET LDL LESS THAN 130: ICD-10-CM

## 2021-04-20 DIAGNOSIS — Z01.818 PRE-OP EXAM: Primary | ICD-10-CM

## 2021-04-20 DIAGNOSIS — Z79.01 LONG TERM CURRENT USE OF ANTICOAGULANTS WITH INR GOAL OF 2.0-3.0: ICD-10-CM

## 2021-04-20 LAB
ALBUMIN SERPL-MCNC: 3.7 G/DL (ref 3.4–5)
ALP SERPL-CCNC: 97 U/L (ref 40–150)
ALT SERPL W P-5'-P-CCNC: 35 U/L (ref 0–50)
ANION GAP SERPL CALCULATED.3IONS-SCNC: 2 MMOL/L (ref 3–14)
AST SERPL W P-5'-P-CCNC: 22 U/L (ref 0–45)
BASOPHILS # BLD AUTO: 0 10E9/L (ref 0–0.2)
BASOPHILS NFR BLD AUTO: 0.4 %
BILIRUB SERPL-MCNC: 0.9 MG/DL (ref 0.2–1.3)
BUN SERPL-MCNC: 14 MG/DL (ref 7–30)
CALCIUM SERPL-MCNC: 9.5 MG/DL (ref 8.5–10.1)
CHLORIDE SERPL-SCNC: 105 MMOL/L (ref 94–109)
CO2 SERPL-SCNC: 31 MMOL/L (ref 20–32)
CREAT SERPL-MCNC: 0.73 MG/DL (ref 0.52–1.04)
DIFFERENTIAL METHOD BLD: NORMAL
EOSINOPHIL # BLD AUTO: 0.1 10E9/L (ref 0–0.7)
EOSINOPHIL NFR BLD AUTO: 1.9 %
ERYTHROCYTE [DISTWIDTH] IN BLOOD BY AUTOMATED COUNT: 14.4 % (ref 10–15)
GFR SERPL CREATININE-BSD FRML MDRD: 78 ML/MIN/{1.73_M2}
GLUCOSE SERPL-MCNC: 96 MG/DL (ref 70–99)
HCT VFR BLD AUTO: 42.7 % (ref 35–47)
HGB BLD-MCNC: 13.9 G/DL (ref 11.7–15.7)
LYMPHOCYTES # BLD AUTO: 1.7 10E9/L (ref 0.8–5.3)
LYMPHOCYTES NFR BLD AUTO: 22.7 %
MCH RBC QN AUTO: 31.1 PG (ref 26.5–33)
MCHC RBC AUTO-ENTMCNC: 32.6 G/DL (ref 31.5–36.5)
MCV RBC AUTO: 96 FL (ref 78–100)
MONOCYTES # BLD AUTO: 0.9 10E9/L (ref 0–1.3)
MONOCYTES NFR BLD AUTO: 12.8 %
MRSA DNA SPEC QL NAA+PROBE: NEGATIVE
NEUTROPHILS # BLD AUTO: 4.5 10E9/L (ref 1.6–8.3)
NEUTROPHILS NFR BLD AUTO: 62.2 %
PLATELET # BLD AUTO: 258 10E9/L (ref 150–450)
POTASSIUM SERPL-SCNC: 3.7 MMOL/L (ref 3.4–5.3)
PROT SERPL-MCNC: 7.5 G/DL (ref 6.8–8.8)
RBC # BLD AUTO: 4.47 10E12/L (ref 3.8–5.2)
SODIUM SERPL-SCNC: 138 MMOL/L (ref 133–144)
SPECIMEN SOURCE: NORMAL
WBC # BLD AUTO: 7.3 10E9/L (ref 4–11)

## 2021-04-20 PROCEDURE — 93000 ELECTROCARDIOGRAM COMPLETE: CPT | Performed by: INTERNAL MEDICINE

## 2021-04-20 PROCEDURE — 87641 MR-STAPH DNA AMP PROBE: CPT | Performed by: INTERNAL MEDICINE

## 2021-04-20 PROCEDURE — 36415 COLL VENOUS BLD VENIPUNCTURE: CPT | Performed by: INTERNAL MEDICINE

## 2021-04-20 PROCEDURE — 85025 COMPLETE CBC W/AUTO DIFF WBC: CPT | Performed by: INTERNAL MEDICINE

## 2021-04-20 PROCEDURE — 99214 OFFICE O/P EST MOD 30 MIN: CPT | Performed by: INTERNAL MEDICINE

## 2021-04-20 PROCEDURE — 80053 COMPREHEN METABOLIC PANEL: CPT | Performed by: INTERNAL MEDICINE

## 2021-04-20 PROCEDURE — 87640 STAPH A DNA AMP PROBE: CPT | Mod: 59 | Performed by: INTERNAL MEDICINE

## 2021-04-20 ASSESSMENT — MIFFLIN-ST. JEOR: SCORE: 1227.26

## 2021-04-20 ASSESSMENT — PAIN SCALES - GENERAL: PAINLEVEL: SEVERE PAIN (6)

## 2021-04-20 NOTE — LETTER
April 27, 2021      Mariya Rowe  [address] [facility] [address]        Dear ,    We are writing to inform you of your test results.    Labs are within acceptable limits     Resulted Orders   Methicillin Resistant Staph Aureus PCR   Result Value Ref Range    Specimen Description Nares     Methicillin Resist/Sens S. aureus PCR Negative NEG^Negative      Comment:      MRSA Negative: SA Positive  MRSA target DNA not detected, presumed negative for MRSA colonization or the   number of bacteria present may be below the limit of detection.  Staphylococcus aureus target DNA detected, presumed positive for SA   colonization.  A positive test does not necessarily indicate the presence of viable   organisms.  It is, however, presumptive for the presence of SA.  This result   does not preclude MRSA nasal colonization.  FDA approved assay performed using [employer] GeneXpert(R) real-time PCR.     CBC with platelets and differential   Result Value Ref Range    WBC 7.3 4.0 - 11.0 10e9/L    RBC Count 4.47 3.8 - 5.2 10e12/L    Hemoglobin 13.9 11.7 - 15.7 g/dL    Hematocrit 42.7 35.0 - 47.0 %    MCV 96 78 - 100 fl    MCH 31.1 26.5 - 33.0 pg    MCHC 32.6 31.5 - 36.5 g/dL    RDW 14.4 10.0 - 15.0 %    Platelet Count 258 150 - 450 10e9/L    % Neutrophils 62.2 %    % Lymphocytes 22.7 %    % Monocytes 12.8 %    % Eosinophils 1.9 %    % Basophils 0.4 %    Absolute Neutrophil 4.5 1.6 - 8.3 10e9/L    Absolute Lymphocytes 1.7 0.8 - 5.3 10e9/L    Absolute Monocytes 0.9 0.0 - 1.3 10e9/L    Absolute Eosinophils 0.1 0.0 - 0.7 10e9/L    Absolute Basophils 0.0 0.0 - 0.2 10e9/L    Diff Method Automated Method    Comprehensive metabolic panel (BMP + Alb, Alk Phos, ALT, AST, Total. Bili, TP)   Result Value Ref Range    Sodium 138 133 - 144 mmol/L    Potassium 3.7 3.4 - 5.3 mmol/L    Chloride 105 94 - 109 mmol/L    Carbon Dioxide 31 20 - 32 mmol/L    Anion Gap 2 (L) 3 - 14 mmol/L    Glucose 96 70 - 99 mg/dL      Comment:       Non Fasting    Urea Nitrogen 14 7 - 30 mg/dL    Creatinine 0.73 0.52 - 1.04 mg/dL    GFR Estimate 78 >60 mL/min/[1.73_m2]      Comment:      Non  GFR Calc  Starting 12/18/2018, serum creatinine based estimated GFR (eGFR) will be   calculated using the Chronic Kidney Disease Epidemiology Collaboration   (CKD-EPI) equation.      GFR Estimate If Black >90 >60 mL/min/[1.73_m2]      Comment:       GFR Calc  Starting 12/18/2018, serum creatinine based estimated GFR (eGFR) will be   calculated using the Chronic Kidney Disease Epidemiology Collaboration   (CKD-EPI) equation.      Calcium 9.5 8.5 - 10.1 mg/dL    Bilirubin Total 0.9 0.2 - 1.3 mg/dL    Albumin 3.7 3.4 - 5.0 g/dL    Protein Total 7.5 6.8 - 8.8 g/dL    Alkaline Phosphatase 97 40 - 150 U/L    ALT 35 0 - 50 U/L    AST 22 0 - 45 U/L       If you have any questions or concerns, please call the clinic at the number listed above.       Sincerely,      Russel Hercules MD

## 2021-04-20 NOTE — PROGRESS NOTES
[place] [address]  [facility] [address]  Phone: [phone]  Primary Provider: Chavez Molina  Pre-op Performing Provider: MITZI FIORE      PREOPERATIVE EVALUATION:  Today's date: 4/20/2021    Mariya Rowe is a 80 year old female who presents for a preoperative evaluation.    Surgical Information:  Surgery/Procedure: right total hip arthroscopy  Surgery Location: [facility]  Surgeon: ZULY Roca  Surgery Date: 5/6/2021  Time of Surgery: 0910  Where patient plans to recover: At home with family  Fax number for surgical facility: Note does not need to be faxed, will be available electronically in Epic.    Type of Anesthesia Anticipated: to be determined    Assessment & Plan     The proposed surgical procedure is considered INTERMEDIATE risk.    Problem List Items Addressed This Visit        Endocrine    Hyperlipidemia with target LDL less than 130       Circulatory    Permanent atrial fibrillation (H)       Other    Long term current use of anticoagulants with INR goal of 2.0-3.0      Other Visit Diagnoses     Pre-op exam    -  Primary    Relevant Orders    EKG 12-lead complete w/read - Clinics (Completed)    Methicillin Resistant Staph Aureus PCR    CBC with platelets and differential    Comprehensive metabolic panel (BMP + Alb, Alk Phos, ALT, AST, Total. Bili, TP)    Hip arthritis        Encounter for preoperative screening laboratory testing for COVID-19 virus        Relevant Orders    Asymptomatic COVID-19 Virus (Coronavirus) by PCR        Risks and Recommendations:  The patient has the following additional risks and recommendations for perioperative complications:   - No identified additional risk factors other than previously addressed    Medication Instructions:  Patient is to take all scheduled medications on the day of surgery EXCEPT for modifications listed below:   - warfarin: Thromboembolic risk is low (<4%/year). HOLD warfarin for 5  days without bridging before procedure.    - Beta Blockers: Continue taking on the day of surgery.    RECOMMENDATION:  APPROVAL GIVEN to proceed with proposed procedure, without further diagnostic evaluation.    Subjective     HPI related to upcoming procedure:   Patient has right hip arthritis scheduled for arthroplasty.  Medical history is significant for atrial fibrillation takes metoprolol and warfarin.  Takes a statin for cholesterol.  Denies history of hypertension, coronary artery disease, heart failure, stroke, TIA.  Denies diabetes.  Denies chest pain, shortness breath, palpitation.  Never had surgery and not sure about anesthesia complication.  Denies bleeding disorder.  Denies fever, chills.  Denies urinary symptoms.  Denies Covid exposure.      Preop Questions 4/16/2021   1. Have you ever had a heart attack or stroke? No   2. Have you ever had surgery on your heart or blood vessels, such as a stent placement, a coronary artery bypass, or surgery on an artery in your head, neck, heart, or legs? No   3. Do you have chest pain with activity? No   4. Do you have a history of  heart failure? No   5. Do you currently have a cold, bronchitis or symptoms of other infection? No   6. Do you have a cough, shortness of breath, or wheezing? No   7. Do you or anyone in your family have previous history of blood clots? No   8. Do you or does anyone in your family have a serious bleeding problem such as prolonged bleeding following surgeries or cuts? No   9. Have you ever had problems with anemia or been told to take iron pills? No   10. Have you had any abnormal blood loss such as black, tarry or bloody stools, or abnormal vaginal bleeding? No   11. Have you ever had a blood transfusion? No   12. Are you willing to have a blood transfusion if it is medically needed before, during, or after your surgery? Yes   13. Have you or any of your relatives ever had problems with anesthesia? No   14. Do you have sleep apnea,  excessive snoring or daytime drowsiness? No   15. Do you have any artifical heart valves or other implanted medical devices like a pacemaker, defibrillator, or continuous glucose monitor? No   16. Do you have artificial joints? No   17. Are you allergic to latex? No       Health Care Directive:  Patient does not have a Health Care Directive or Living Will: Discussed advance care planning with patient; however, patient declined at this time.    Preoperative Review of :   reviewed - no record of controlled substances prescribed.    Review of Systems  CONSTITUTIONAL: NEGATIVE for fever, chills, change in weight  INTEGUMENTARY/SKIN: NEGATIVE for worrisome rashes, moles or lesions  EYES: NEGATIVE for vision changes or irritation  ENT/MOUTH: NEGATIVE for ear, mouth and throat problems  RESP: NEGATIVE for significant cough or SOB  BREAST: NEGATIVE for masses, tenderness or discharge  CV: NEGATIVE for chest pain, palpitations or peripheral edema  GI: NEGATIVE for nausea, abdominal pain, heartburn, or change in bowel habits  : NEGATIVE for frequency, dysuria, or hematuria  MUSCULOSKELETAL: NEGATIVE for significant arthralgias or myalgia  NEURO: NEGATIVE for weakness, dizziness or paresthesias  ENDOCRINE: NEGATIVE for temperature intolerance, skin/hair changes  HEME: NEGATIVE for bleeding problems  PSYCHIATRIC: NEGATIVE for changes in mood or affect    Patient Active Problem List    Diagnosis Date Noted     Permanent atrial fibrillation (H) 03/24/2020     Priority: Medium     Long term current use of anticoagulants with INR goal of 2.0-3.0 05/25/2016     Priority: Medium     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 Provider to review and confirm.       Atrial fibrillation (H) 09/27/2011     Priority: Medium     Advanced directives, counseling/discussion 05/03/2011     Priority: Medium     Parent voices understanding and acceptance of this advice and will call back if any further questions or concerns.         Osteoporosis 12/01/1998     Priority: Medium     improved by DEXA of 2003; then unchanged in 2004  Problem list name updated by automated process. Provider to review        Past Medical History:   Diagnosis Date     A-fib (H)      Abnormal EKG      Atrial flutter (H)      Chronotropic incompetence 8/25/2015     Closed fracture of rib of left side      Hyperlipidemia with target LDL less than 130 6/6/2014     Diagnosis updated by automated process. Provider to review and confirm.     Osteoporosis, unspecified 12/98    improved by DEXA of 2003; then unchanged in 2004     Palpitations      PFO (patent foramen ovale)      SSS (sick sinus syndrome) (H)      Past Surgical History:   Procedure Laterality Date     COLONOSCOPY  12/17/2015    11-15 mm Adenomatous polyp in cecum, repeat in 3 yrs     COLONOSCOPY  12/04/2018    One 2 mm adenomatous polyp, repeat in 5 years (12/2023)     [facility] NONSPECIFIC PROCEDURE  2/05    colonoscopy      [facility] NONSPECIFIC PROCEDURE Right 11/2016    Right cataract repair and corneal transplant     Current Outpatient Medications   Medication Sig Dispense Refill     CALCIUM 500 + D 500-200 MG-IU OR TABS 1 tablet bid  0     fluorometholone (FML LIQUIFILM) 0.1 % ophthalmic susp Place 1 drop into the right eye daily       metoprolol tartrate (LOPRESSOR) 25 MG tablet TAKE 1 TABLET BY MOUTH TWICE A  tablet 3     Multiple Vitamin (MULTI-VITAMIN PO) Take  by mouth.       rosuvastatin (CRESTOR) 5 MG tablet Take 1 tablet (5 mg) by mouth daily 90 tablet 2     warfarin ANTICOAGULANT (COUMADIN) 5 MG tablet TAKE 1/2 TABLET BY MOUTH ONCE DAILY  "EXCEPT ON Tuesday and Saturday TAKE 1 TAB OR AS DIRECTED BY INR CLINIC 60 tablet 1     ASPIRIN NOT PRESCRIBED (INTENTIONAL) Please choose reason not prescribed, below (Patient not taking: Reported on 4/20/2021) 1 each 0       Allergies   Allergen Reactions     Lidocaine Other (See Comments)     Pt lopez Hx of a reaction to Lidocaine Eye solution.     Atorvastatin Rash     LIPITOR - pt gets a rash        Social History     Tobacco Use     Smoking status: Never Smoker     Smokeless tobacco: Never Used   Substance Use Topics     Alcohol use: No     Alcohol/week: 0.0 standard drinks       History   Drug Use No         Objective     /78   Pulse 85   Temp 97.4  F (36.3  C) (Oral)   Resp 16   Ht 1.801 m (5' 10.9\")   Wt 65.8 kg (145 lb)   LMP  (LMP Unknown)   SpO2 100%   Breastfeeding No   BMI 20.28 kg/m      Physical Exam    GENERAL APPEARANCE: healthy, alert and no distress     EYES: EOMI, PERRL     HENT: ear canals and TM's normal and nose and mouth without ulcers or lesions     NECK: no adenopathy, no asymmetry, masses, or scars and thyroid normal to palpation     RESP: lungs clear to auscultation - no rales, rhonchi or wheezes     CV: regular rates and rhythm, normal S1 S2, no S3 or S4 and no murmur, click or rub     ABDOMEN:  soft, nontender, no HSM or masses and bowel sounds normal     MS: extremities normal- no gross deformities noted, no evidence of inflammation in joints, FROM in all extremities.     SKIN: no suspicious lesions or rashes     NEURO: Normal strength and tone, sensory exam grossly normal, mentation intact and speech normal     PSYCH: mentation appears normal. and affect normal/bright     LYMPHATICS: No cervical adenopathy    Recent Labs   Lab Test 04/13/21 03/31/21 03/14/21  1155 03/14/21  1155 12/22/20  0745 12/22/20  0745   HGB  --   --   --  14.5  --  15.1   PLT  --   --   --  307  --  219   INR 2.4* 2.9*   < >  --    < >  --    NA  --   --   --  136  --  139   POTASSIUM  --   --   " --  3.6  --  4.0   CR  --   --   --  0.64  --  0.86    < > = values in this interval not displayed.        Diagnostics:  Labs pending at this time.  Results will be reviewed when available.   EKG: atrial fibrillation, rate 93/min, normal axis, normal intervals, no acute ST/T changes c/w ischemia, unchanged from previous tracings    Revised Cardiac Risk Index (RCRI):  The patient has the following serious cardiovascular risks for perioperative complications:   - No serious cardiac risks = 0 points     RCRI Interpretation: 0 points: Class I (very low risk - 0.4% complication rate)    Signed Electronically by: Russel Hercules MD  Copy of this evaluation report is provided to requesting physician.

## 2021-04-20 NOTE — PATIENT INSTRUCTIONS
Hold warfarin 5 days prior to the surgery-  will be last dose of warfarin. Resume after surgery once surgeon clears on the same evening- usual dose. Check INR in 3 days and follow-up with INR clinic.    Take metoprolol on the day of surgery with sips of water.    Preparing for Your Surgery  Getting started  A nurse will call you to review your health history and instructions. They will give you an arrival time based on your scheduled surgery time.  Please be ready to share the following:    Your doctor's clinic name and phone number    Your medical, surgical and anesthesia history    A list of allergies and sensitivities    A list of medicines, including herbal treatments and over-the-counter drugs    Whether the patient has a legal guardian (ask how to send us the papers in advance)  If you have a child who's having surgery, please ask for a copy of Preparing for Your Child's Surgery.    Preparing for surgery    Within 30 days of surgery: Have a pre-op exam (sometimes called an H&P, or History and Physical). This can be done at a clinic or pre-operative center.  ? If you're having a , you may not need this exam. Talk to your care team    At your pre-op exam, talk to your care team about all medicines you take. If you need to stop any medicines before surgery, ask when to start taking them again.  ? We do this for your safety. Many medicines can make you bleed too much during surgery. Some change how well surgery (anesthesia) drugs work.    Call your insurance company to let them know you're having surgery. (If you don't have insurance, call [phone].)    Call your clinic if there's any change in your health. This includes signs of a cold or flu (sore throat, runny nose, cough, rash, fever). It also includes a scrape or scratch near the surgery site.    If you have questions on the day of surgery, call your hospital or surgery center.  Eating and drinking guidelines  For your safety: Unless  your surgeon tells you otherwise, follow the guidelines below.    Eat and drink as usual until 8 hours before surgery. After that, no food or milk.    Drink clear liquids until 2 hours before surgery. These are liquids you can see through, like water, Gatorade and Propel Water. You may also have black coffee and tea (no cream or milk).    Nothing by mouth within 2 hours of surgery. This includes gum, candy and breath mints.    If you drink, stop drinking alcohol the night before surgery.    If your care team tells you to take medicine on the morning of surgery, it's okay to take it with a sip of water.  Preventing infection    Shower or bathe the night before and morning of your surgery. Follow the instructions your clinic gave you. (If no instructions, use regular soap.)    Don't shave or clip hair near your surgery site. We'll remove the hair if needed.    Don't smoke or vape the morning of surgery. You may chew nicotine gum up to 2 hours before surgery. A nicotine patch is okay.  ? Note: Some surgeries require you to completely quit smoking and nicotine. Check with your surgeon.    Your care team will make every effort to keep you safe from infection. We will:  ? Clean our hands often with soap and water (or an alcohol-based hand rub).  ? Clean the skin at your surgery site with a special soap that kills germs.  ? Give you a special gown to keep you warm. (Cold raises the risk of infection.)  ? Wear special hair covers, masks, gowns and gloves during surgery.  ? Give antibiotic medicine, if prescribed. Not all surgeries need antibiotics.  What to bring on the day of surgery    Photo ID and insurance card    Copy of your health care directive, if you have one    Glasses and hearing aides (bring cases)  ? You can't wear contacts during surgery    Inhaler and eye drops, if you use them (tell us about these when you arrive)    CPAP machine or breathing device, if you use them    A few personal items, if spending the  night    If you have . . .  ? A pacemaker or ICD (cardiac defibrillator): Bring the ID card.  ? An implanted stimulator: Bring the remote control.  ? A legal guardian: Bring a copy of the certified (court-stamped) guardianship papers.  Please remove any jewelry, including body piercings. Leave jewelry and other valuables at home.  If you're going home the day of surgery  Important: If you don't follow the rules below, we must cancel your surgery.     Arrange for someone to drive you home after surgery. You may not drive, take a taxi or take public transportation by yourself (unless you'll have local anesthesia only).    Arrange for a responsible adult to stay with you overnight. If you don't, we may keep you in the hospital overnight, and you may need to pay the costs yourself.  Questions?   If you have any questions for your care team, list them here: _________________________________________________________________________________________________________________________________________________________________________________________________________________________________________________________________________________________________________________________  For informational purposes only. Not to replace the advice of your health care provider. Copyright   2003, 2019 [facility]. All rights reserved. Clinically reviewed by Keli Tobias MD. [email or web address] [id number] - REV 4/20.

## 2021-04-20 NOTE — H&P (VIEW-ONLY)
[place] [address]  [facility] [address]  Phone: [phone]  Primary Provider: Chavez Molina  Pre-op Performing Provider: MITZI FIORE      PREOPERATIVE EVALUATION:  Today's date: 4/20/2021    Mariya Rowe is a 80 year old female who presents for a preoperative evaluation.    Surgical Information:  Surgery/Procedure: right total hip arthroscopy  Surgery Location: [facility]  Surgeon: ZULY Roca  Surgery Date: 5/6/2021  Time of Surgery: 0910  Where patient plans to recover: At home with family  Fax number for surgical facility: Note does not need to be faxed, will be available electronically in Epic.    Type of Anesthesia Anticipated: to be determined    Assessment & Plan     The proposed surgical procedure is considered INTERMEDIATE risk.    Problem List Items Addressed This Visit        Endocrine    Hyperlipidemia with target LDL less than 130       Circulatory    Permanent atrial fibrillation (H)       Other    Long term current use of anticoagulants with INR goal of 2.0-3.0      Other Visit Diagnoses     Pre-op exam    -  Primary    Relevant Orders    EKG 12-lead complete w/read - Clinics (Completed)    Methicillin Resistant Staph Aureus PCR    CBC with platelets and differential    Comprehensive metabolic panel (BMP + Alb, Alk Phos, ALT, AST, Total. Bili, TP)    Hip arthritis        Encounter for preoperative screening laboratory testing for COVID-19 virus        Relevant Orders    Asymptomatic COVID-19 Virus (Coronavirus) by PCR        Risks and Recommendations:  The patient has the following additional risks and recommendations for perioperative complications:   - No identified additional risk factors other than previously addressed    Medication Instructions:  Patient is to take all scheduled medications on the day of surgery EXCEPT for modifications listed below:   - warfarin: Thromboembolic risk is low (<4%/year). HOLD warfarin for 5  days without bridging before procedure.    - Beta Blockers: Continue taking on the day of surgery.    RECOMMENDATION:  APPROVAL GIVEN to proceed with proposed procedure, without further diagnostic evaluation.    Subjective     HPI related to upcoming procedure:   Patient has right hip arthritis scheduled for arthroplasty.  Medical history is significant for atrial fibrillation takes metoprolol and warfarin.  Takes a statin for cholesterol.  Denies history of hypertension, coronary artery disease, heart failure, stroke, TIA.  Denies diabetes.  Denies chest pain, shortness breath, palpitation.  Never had surgery and not sure about anesthesia complication.  Denies bleeding disorder.  Denies fever, chills.  Denies urinary symptoms.  Denies Covid exposure.      Preop Questions 4/16/2021   1. Have you ever had a heart attack or stroke? No   2. Have you ever had surgery on your heart or blood vessels, such as a stent placement, a coronary artery bypass, or surgery on an artery in your head, neck, heart, or legs? No   3. Do you have chest pain with activity? No   4. Do you have a history of  heart failure? No   5. Do you currently have a cold, bronchitis or symptoms of other infection? No   6. Do you have a cough, shortness of breath, or wheezing? No   7. Do you or anyone in your family have previous history of blood clots? No   8. Do you or does anyone in your family have a serious bleeding problem such as prolonged bleeding following surgeries or cuts? No   9. Have you ever had problems with anemia or been told to take iron pills? No   10. Have you had any abnormal blood loss such as black, tarry or bloody stools, or abnormal vaginal bleeding? No   11. Have you ever had a blood transfusion? No   12. Are you willing to have a blood transfusion if it is medically needed before, during, or after your surgery? Yes   13. Have you or any of your relatives ever had problems with anesthesia? No   14. Do you have sleep apnea,  excessive snoring or daytime drowsiness? No   15. Do you have any artifical heart valves or other implanted medical devices like a pacemaker, defibrillator, or continuous glucose monitor? No   16. Do you have artificial joints? No   17. Are you allergic to latex? No       Health Care Directive:  Patient does not have a Health Care Directive or Living Will: Discussed advance care planning with patient; however, patient declined at this time.    Preoperative Review of :   reviewed - no record of controlled substances prescribed.    Review of Systems  CONSTITUTIONAL: NEGATIVE for fever, chills, change in weight  INTEGUMENTARY/SKIN: NEGATIVE for worrisome rashes, moles or lesions  EYES: NEGATIVE for vision changes or irritation  ENT/MOUTH: NEGATIVE for ear, mouth and throat problems  RESP: NEGATIVE for significant cough or SOB  BREAST: NEGATIVE for masses, tenderness or discharge  CV: NEGATIVE for chest pain, palpitations or peripheral edema  GI: NEGATIVE for nausea, abdominal pain, heartburn, or change in bowel habits  : NEGATIVE for frequency, dysuria, or hematuria  MUSCULOSKELETAL: NEGATIVE for significant arthralgias or myalgia  NEURO: NEGATIVE for weakness, dizziness or paresthesias  ENDOCRINE: NEGATIVE for temperature intolerance, skin/hair changes  HEME: NEGATIVE for bleeding problems  PSYCHIATRIC: NEGATIVE for changes in mood or affect    Patient Active Problem List    Diagnosis Date Noted     Permanent atrial fibrillation (H) 03/24/2020     Priority: Medium     Long term current use of anticoagulants with INR goal of 2.0-3.0 05/25/2016     Priority: Medium     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 Provider to review and confirm.       Atrial fibrillation (H) 09/27/2011     Priority: Medium     Advanced directives, counseling/discussion 05/03/2011     Priority: Medium     Parent voices understanding and acceptance of this advice and will call back if any further questions or concerns.         Osteoporosis 12/01/1998     Priority: Medium     improved by DEXA of 2003; then unchanged in 2004  Problem list name updated by automated process. Provider to review        Past Medical History:   Diagnosis Date     A-fib (H)      Abnormal EKG      Atrial flutter (H)      Chronotropic incompetence 8/25/2015     Closed fracture of rib of left side      Hyperlipidemia with target LDL less than 130 6/6/2014     Diagnosis updated by automated process. Provider to review and confirm.     Osteoporosis, unspecified 12/98    improved by DEXA of 2003; then unchanged in 2004     Palpitations      PFO (patent foramen ovale)      SSS (sick sinus syndrome) (H)      Past Surgical History:   Procedure Laterality Date     COLONOSCOPY  12/17/2015    11-15 mm Adenomatous polyp in cecum, repeat in 3 yrs     COLONOSCOPY  12/04/2018    One 2 mm adenomatous polyp, repeat in 5 years (12/2023)     [facility] NONSPECIFIC PROCEDURE  2/05    colonoscopy      [facility] NONSPECIFIC PROCEDURE Right 11/2016    Right cataract repair and corneal transplant     Current Outpatient Medications   Medication Sig Dispense Refill     CALCIUM 500 + D 500-200 MG-IU OR TABS 1 tablet bid  0     fluorometholone (FML LIQUIFILM) 0.1 % ophthalmic susp Place 1 drop into the right eye daily       metoprolol tartrate (LOPRESSOR) 25 MG tablet TAKE 1 TABLET BY MOUTH TWICE A  tablet 3     Multiple Vitamin (MULTI-VITAMIN PO) Take  by mouth.       rosuvastatin (CRESTOR) 5 MG tablet Take 1 tablet (5 mg) by mouth daily 90 tablet 2     warfarin ANTICOAGULANT (COUMADIN) 5 MG tablet TAKE 1/2 TABLET BY MOUTH ONCE DAILY  "EXCEPT ON Tuesday and Saturday TAKE 1 TAB OR AS DIRECTED BY INR CLINIC 60 tablet 1     ASPIRIN NOT PRESCRIBED (INTENTIONAL) Please choose reason not prescribed, below (Patient not taking: Reported on 4/20/2021) 1 each 0       Allergies   Allergen Reactions     Lidocaine Other (See Comments)     Pt lopez Hx of a reaction to Lidocaine Eye solution.     Atorvastatin Rash     LIPITOR - pt gets a rash        Social History     Tobacco Use     Smoking status: Never Smoker     Smokeless tobacco: Never Used   Substance Use Topics     Alcohol use: No     Alcohol/week: 0.0 standard drinks       History   Drug Use No         Objective     /78   Pulse 85   Temp 97.4  F (36.3  C) (Oral)   Resp 16   Ht 1.801 m (5' 10.9\")   Wt 65.8 kg (145 lb)   LMP  (LMP Unknown)   SpO2 100%   Breastfeeding No   BMI 20.28 kg/m      Physical Exam    GENERAL APPEARANCE: healthy, alert and no distress     EYES: EOMI, PERRL     HENT: ear canals and TM's normal and nose and mouth without ulcers or lesions     NECK: no adenopathy, no asymmetry, masses, or scars and thyroid normal to palpation     RESP: lungs clear to auscultation - no rales, rhonchi or wheezes     CV: regular rates and rhythm, normal S1 S2, no S3 or S4 and no murmur, click or rub     ABDOMEN:  soft, nontender, no HSM or masses and bowel sounds normal     MS: extremities normal- no gross deformities noted, no evidence of inflammation in joints, FROM in all extremities.     SKIN: no suspicious lesions or rashes     NEURO: Normal strength and tone, sensory exam grossly normal, mentation intact and speech normal     PSYCH: mentation appears normal. and affect normal/bright     LYMPHATICS: No cervical adenopathy    Recent Labs   Lab Test 04/13/21 03/31/21 03/14/21  1155 03/14/21  1155 12/22/20  0745 12/22/20  0745   HGB  --   --   --  14.5  --  15.1   PLT  --   --   --  307  --  219   INR 2.4* 2.9*   < >  --    < >  --    NA  --   --   --  136  --  139   POTASSIUM  --   --   " --  3.6  --  4.0   CR  --   --   --  0.64  --  0.86    < > = values in this interval not displayed.        Diagnostics:  Labs pending at this time.  Results will be reviewed when available.   EKG: atrial fibrillation, rate 93/min, normal axis, normal intervals, no acute ST/T changes c/w ischemia, unchanged from previous tracings    Revised Cardiac Risk Index (RCRI):  The patient has the following serious cardiovascular risks for perioperative complications:   - No serious cardiac risks = 0 points     RCRI Interpretation: 0 points: Class I (very low risk - 0.4% complication rate)    Signed Electronically by: Russel Hercules MD  Copy of this evaluation report is provided to requesting physician.

## 2021-04-25 DIAGNOSIS — Z11.59 ENCOUNTER FOR SCREENING FOR OTHER VIRAL DISEASES: ICD-10-CM

## 2021-05-03 ENCOUNTER — TRANSFERRED RECORDS (OUTPATIENT)
Dept: HEALTH INFORMATION MANAGEMENT | Facility: CLINIC | Age: 81
End: 2021-05-03

## 2021-05-03 DIAGNOSIS — Z11.59 ENCOUNTER FOR SCREENING FOR OTHER VIRAL DISEASES: ICD-10-CM

## 2021-05-03 LAB
LABORATORY COMMENT REPORT: NORMAL
SARS-COV-2 RNA RESP QL NAA+PROBE: NEGATIVE
SARS-COV-2 RNA RESP QL NAA+PROBE: NORMAL
SPECIMEN SOURCE: NORMAL
SPECIMEN SOURCE: NORMAL

## 2021-05-03 PROCEDURE — U0005 INFEC AGEN DETEC AMPLI PROBE: HCPCS | Performed by: ORTHOPAEDIC SURGERY

## 2021-05-03 PROCEDURE — U0003 INFECTIOUS AGENT DETECTION BY NUCLEIC ACID (DNA OR RNA); SEVERE ACUTE RESPIRATORY SYNDROME CORONAVIRUS 2 (SARS-COV-2) (CORONAVIRUS DISEASE [COVID-19]), AMPLIFIED PROBE TECHNIQUE, MAKING USE OF HIGH THROUGHPUT TECHNOLOGIES AS DESCRIBED BY CMS-2020-01-R: HCPCS | Performed by: ORTHOPAEDIC SURGERY

## 2021-05-04 ENCOUNTER — DOCUMENTATION ONLY (OUTPATIENT)
Dept: INTERNAL MEDICINE | Facility: CLINIC | Age: 81
End: 2021-05-04

## 2021-05-05 ENCOUNTER — ANESTHESIA EVENT (OUTPATIENT)
Dept: SURGERY | Facility: CLINIC | Age: 81
End: 2021-05-05
Payer: COMMERCIAL

## 2021-05-05 RX ORDER — WARFARIN SODIUM 5 MG/1
2.5-5 TABLET ORAL DAILY
COMMUNITY
End: 2022-01-07

## 2021-05-06 ENCOUNTER — APPOINTMENT (OUTPATIENT)
Dept: PHYSICAL THERAPY | Facility: CLINIC | Age: 81
End: 2021-05-06
Attending: ORTHOPAEDIC SURGERY
Payer: COMMERCIAL

## 2021-05-06 ENCOUNTER — APPOINTMENT (OUTPATIENT)
Dept: GENERAL RADIOLOGY | Facility: CLINIC | Age: 81
End: 2021-05-06
Attending: ORTHOPAEDIC SURGERY
Payer: COMMERCIAL

## 2021-05-06 ENCOUNTER — ANESTHESIA (OUTPATIENT)
Dept: SURGERY | Facility: CLINIC | Age: 81
End: 2021-05-06
Payer: COMMERCIAL

## 2021-05-06 ENCOUNTER — HOSPITAL ENCOUNTER (OUTPATIENT)
Facility: CLINIC | Age: 81
Discharge: HOME | End: 2021-05-07
Attending: ORTHOPAEDIC SURGERY | Admitting: ORTHOPAEDIC SURGERY
Payer: COMMERCIAL

## 2021-05-06 DIAGNOSIS — Z47.1 AFTERCARE FOLLOWING RIGHT HIP JOINT REPLACEMENT SURGERY: Primary | ICD-10-CM

## 2021-05-06 DIAGNOSIS — Z96.641 AFTERCARE FOLLOWING RIGHT HIP JOINT REPLACEMENT SURGERY: Primary | ICD-10-CM

## 2021-05-06 DIAGNOSIS — Z96.641 STATUS POST RIGHT HIP REPLACEMENT: ICD-10-CM

## 2021-05-06 LAB
ABO + RH BLD: NORMAL
ABO + RH BLD: NORMAL
BLD GP AB SCN SERPL QL: NORMAL
BLOOD BANK CMNT PATIENT-IMP: NORMAL
CREAT SERPL-MCNC: 0.76 MG/DL (ref 0.52–1.04)
GFR SERPL CREATININE-BSD FRML MDRD: 74 ML/MIN/{1.73_M2}
HGB BLD-MCNC: 14.4 G/DL (ref 11.7–15.7)
INR PPP: 1.04 (ref 0.86–1.14)
SPECIMEN EXP DATE BLD: NORMAL

## 2021-05-06 PROCEDURE — 250N000025 HC SEVOFLURANE, PER MIN: Performed by: ORTHOPAEDIC SURGERY

## 2021-05-06 PROCEDURE — 250N000011 HC RX IP 250 OP 636: Performed by: REGISTERED NURSE

## 2021-05-06 PROCEDURE — 250N000013 HC RX MED GY IP 250 OP 250 PS 637: Performed by: ORTHOPAEDIC SURGERY

## 2021-05-06 PROCEDURE — 370N000017 HC ANESTHESIA TECHNICAL FEE, PER MIN: Performed by: ORTHOPAEDIC SURGERY

## 2021-05-06 PROCEDURE — 85018 HEMOGLOBIN: CPT | Performed by: PHYSICIAN ASSISTANT

## 2021-05-06 PROCEDURE — 272N000001 HC OR GENERAL SUPPLY STERILE: Performed by: ORTHOPAEDIC SURGERY

## 2021-05-06 PROCEDURE — 360N000084 HC SURGERY LEVEL 4 W/ FLUORO, PER MIN: Performed by: ORTHOPAEDIC SURGERY

## 2021-05-06 PROCEDURE — 258N000003 HC RX IP 258 OP 636: Performed by: PHYSICIAN ASSISTANT

## 2021-05-06 PROCEDURE — C1713 ANCHOR/SCREW BN/BN,TIS/BN: HCPCS | Performed by: ORTHOPAEDIC SURGERY

## 2021-05-06 PROCEDURE — 250N000011 HC RX IP 250 OP 636: Performed by: PHYSICIAN ASSISTANT

## 2021-05-06 PROCEDURE — 36415 COLL VENOUS BLD VENIPUNCTURE: CPT | Performed by: PHYSICIAN ASSISTANT

## 2021-05-06 PROCEDURE — 250N000009 HC RX 250: Performed by: PHYSICIAN ASSISTANT

## 2021-05-06 PROCEDURE — 999N000141 HC STATISTIC PRE-PROCEDURE NURSING ASSESSMENT: Performed by: ORTHOPAEDIC SURGERY

## 2021-05-06 PROCEDURE — 272N000002 HC OR SUPPLY OTHER OPNP: Performed by: ORTHOPAEDIC SURGERY

## 2021-05-06 PROCEDURE — 258N000003 HC RX IP 258 OP 636: Performed by: ANESTHESIOLOGY

## 2021-05-06 PROCEDURE — 250N000011 HC RX IP 250 OP 636: Performed by: ORTHOPAEDIC SURGERY

## 2021-05-06 PROCEDURE — 86850 RBC ANTIBODY SCREEN: CPT | Performed by: ANESTHESIOLOGY

## 2021-05-06 PROCEDURE — 250N000013 HC RX MED GY IP 250 OP 250 PS 637: Performed by: PHYSICIAN ASSISTANT

## 2021-05-06 PROCEDURE — 82565 ASSAY OF CREATININE: CPT | Performed by: PHYSICIAN ASSISTANT

## 2021-05-06 PROCEDURE — 250N000009 HC RX 250: Performed by: REGISTERED NURSE

## 2021-05-06 PROCEDURE — 86901 BLOOD TYPING SEROLOGIC RH(D): CPT | Performed by: ANESTHESIOLOGY

## 2021-05-06 PROCEDURE — 710N000009 HC RECOVERY PHASE 1, LEVEL 1, PER MIN: Performed by: ORTHOPAEDIC SURGERY

## 2021-05-06 PROCEDURE — 85610 PROTHROMBIN TIME: CPT | Performed by: PHYSICIAN ASSISTANT

## 2021-05-06 PROCEDURE — 258N000003 HC RX IP 258 OP 636: Performed by: REGISTERED NURSE

## 2021-05-06 PROCEDURE — C1776 JOINT DEVICE (IMPLANTABLE): HCPCS | Performed by: ORTHOPAEDIC SURGERY

## 2021-05-06 PROCEDURE — 250N000009 HC RX 250: Performed by: ORTHOPAEDIC SURGERY

## 2021-05-06 PROCEDURE — 258N000001 HC RX 258: Performed by: ORTHOPAEDIC SURGERY

## 2021-05-06 PROCEDURE — 97110 THERAPEUTIC EXERCISES: CPT | Mod: GP

## 2021-05-06 PROCEDURE — 86900 BLOOD TYPING SEROLOGIC ABO: CPT | Performed by: ANESTHESIOLOGY

## 2021-05-06 PROCEDURE — 97161 PT EVAL LOW COMPLEX 20 MIN: CPT | Mod: GP

## 2021-05-06 PROCEDURE — 999N000179 XR SURGERY CARM FLUORO LESS THAN 5 MIN W STILLS

## 2021-05-06 DEVICE — IMP APEX HOLE ELIMINATOR HIP DEPUY DURALOC 1246-03-000: Type: IMPLANTABLE DEVICE | Site: HIP | Status: FUNCTIONAL

## 2021-05-06 DEVICE — IMP HEAD FEMORAL DEPUY CERAMIC 36MM +1.5MM 1365-36-310: Type: IMPLANTABLE DEVICE | Site: HIP | Status: FUNCTIONAL

## 2021-05-06 DEVICE — IMP SCR BONE CAN ACE 6.5X35MM 1217-35-500: Type: IMPLANTABLE DEVICE | Site: HIP | Status: FUNCTIONAL

## 2021-05-06 DEVICE — IMP LINER HIP DEPUY PINNACLE ALTRX 36X58MM +0 1221-36-058: Type: IMPLANTABLE DEVICE | Site: HIP | Status: FUNCTIONAL

## 2021-05-06 DEVICE — IMP SHELL ACET DEPUY PINNACLE GRIPTION 58MM 121732058: Type: IMPLANTABLE DEVICE | Site: HIP | Status: FUNCTIONAL

## 2021-05-06 DEVICE — IMP STEM FEM DEPUY ACTIS STD COLLAR TPR SZ 8MM 1010-11-080: Type: IMPLANTABLE DEVICE | Site: HIP | Status: FUNCTIONAL

## 2021-05-06 DEVICE — IMP SCR BONE CAN ACE 6.5X25MM 1217-25-500: Type: IMPLANTABLE DEVICE | Site: HIP | Status: FUNCTIONAL

## 2021-05-06 RX ORDER — HYDROMORPHONE HCL IN WATER/PF 6 MG/30 ML
0.2 PATIENT CONTROLLED ANALGESIA SYRINGE INTRAVENOUS
Status: DISCONTINUED | OUTPATIENT
Start: 2021-05-06 | End: 2021-05-07 | Stop reason: HOSPADM

## 2021-05-06 RX ORDER — DEXAMETHASONE SODIUM PHOSPHATE 4 MG/ML
INJECTION, SOLUTION INTRA-ARTICULAR; INTRALESIONAL; INTRAMUSCULAR; INTRAVENOUS; SOFT TISSUE PRN
Status: DISCONTINUED | OUTPATIENT
Start: 2021-05-06 | End: 2021-05-06

## 2021-05-06 RX ORDER — ONDANSETRON 2 MG/ML
4 INJECTION INTRAMUSCULAR; INTRAVENOUS EVERY 30 MIN PRN
Status: DISCONTINUED | OUTPATIENT
Start: 2021-05-06 | End: 2021-05-06 | Stop reason: HOSPADM

## 2021-05-06 RX ORDER — GLYCOPYRROLATE 0.2 MG/ML
INJECTION, SOLUTION INTRAMUSCULAR; INTRAVENOUS PRN
Status: DISCONTINUED | OUTPATIENT
Start: 2021-05-06 | End: 2021-05-06

## 2021-05-06 RX ORDER — SODIUM CHLORIDE, SODIUM LACTATE, POTASSIUM CHLORIDE, CALCIUM CHLORIDE 600; 310; 30; 20 MG/100ML; MG/100ML; MG/100ML; MG/100ML
INJECTION, SOLUTION INTRAVENOUS CONTINUOUS
Status: DISCONTINUED | OUTPATIENT
Start: 2021-05-06 | End: 2021-05-06 | Stop reason: HOSPADM

## 2021-05-06 RX ORDER — SODIUM CHLORIDE, SODIUM LACTATE, POTASSIUM CHLORIDE, CALCIUM CHLORIDE 600; 310; 30; 20 MG/100ML; MG/100ML; MG/100ML; MG/100ML
INJECTION, SOLUTION INTRAVENOUS CONTINUOUS
Status: DISCONTINUED | OUTPATIENT
Start: 2021-05-06 | End: 2021-05-07 | Stop reason: HOSPADM

## 2021-05-06 RX ORDER — ACETAMINOPHEN 325 MG/1
975 TABLET ORAL EVERY 8 HOURS
Status: DISCONTINUED | OUTPATIENT
Start: 2021-05-06 | End: 2021-05-07 | Stop reason: HOSPADM

## 2021-05-06 RX ORDER — MAGNESIUM HYDROXIDE 1200 MG/15ML
LIQUID ORAL PRN
Status: DISCONTINUED | OUTPATIENT
Start: 2021-05-06 | End: 2021-05-06 | Stop reason: HOSPADM

## 2021-05-06 RX ORDER — CEFAZOLIN SODIUM 1 G/3ML
1 INJECTION, POWDER, FOR SOLUTION INTRAMUSCULAR; INTRAVENOUS EVERY 8 HOURS
Status: COMPLETED | OUTPATIENT
Start: 2021-05-06 | End: 2021-05-07

## 2021-05-06 RX ORDER — NALOXONE HYDROCHLORIDE 0.4 MG/ML
0.4 INJECTION, SOLUTION INTRAMUSCULAR; INTRAVENOUS; SUBCUTANEOUS
Status: ACTIVE | OUTPATIENT
Start: 2021-05-06 | End: 2021-05-07

## 2021-05-06 RX ORDER — VANCOMYCIN HYDROCHLORIDE 1 G/20ML
INJECTION, POWDER, LYOPHILIZED, FOR SOLUTION INTRAVENOUS PRN
Status: DISCONTINUED | OUTPATIENT
Start: 2021-05-06 | End: 2021-05-06 | Stop reason: HOSPADM

## 2021-05-06 RX ORDER — CEFAZOLIN SODIUM 2 G/100ML
2 INJECTION, SOLUTION INTRAVENOUS
Status: COMPLETED | OUTPATIENT
Start: 2021-05-06 | End: 2021-05-06

## 2021-05-06 RX ORDER — CALCIUM CARBONATE 500 MG/1
500 TABLET, CHEWABLE ORAL 4 TIMES DAILY PRN
Status: DISCONTINUED | OUTPATIENT
Start: 2021-05-06 | End: 2021-05-07 | Stop reason: HOSPADM

## 2021-05-06 RX ORDER — MEPERIDINE HYDROCHLORIDE 25 MG/ML
12.5 INJECTION INTRAMUSCULAR; INTRAVENOUS; SUBCUTANEOUS EVERY 5 MIN PRN
Status: DISCONTINUED | OUTPATIENT
Start: 2021-05-06 | End: 2021-05-06 | Stop reason: HOSPADM

## 2021-05-06 RX ORDER — CELECOXIB 200 MG/1
400 CAPSULE ORAL ONCE
Status: COMPLETED | OUTPATIENT
Start: 2021-05-06 | End: 2021-05-06

## 2021-05-06 RX ORDER — ONDANSETRON 4 MG/1
4 TABLET, ORALLY DISINTEGRATING ORAL EVERY 6 HOURS PRN
Status: DISCONTINUED | OUTPATIENT
Start: 2021-05-06 | End: 2021-05-07 | Stop reason: HOSPADM

## 2021-05-06 RX ORDER — AMOXICILLIN 250 MG
1 CAPSULE ORAL 2 TIMES DAILY
Status: DISCONTINUED | OUTPATIENT
Start: 2021-05-06 | End: 2021-05-07 | Stop reason: HOSPADM

## 2021-05-06 RX ORDER — PROCHLORPERAZINE MALEATE 5 MG
5 TABLET ORAL EVERY 6 HOURS PRN
Status: DISCONTINUED | OUTPATIENT
Start: 2021-05-06 | End: 2021-05-07 | Stop reason: HOSPADM

## 2021-05-06 RX ORDER — LIDOCAINE 40 MG/G
CREAM TOPICAL
Status: DISCONTINUED | OUTPATIENT
Start: 2021-05-06 | End: 2021-05-07 | Stop reason: HOSPADM

## 2021-05-06 RX ORDER — DIPHENHYDRAMINE HCL 12.5MG/5ML
12.5 LIQUID (ML) ORAL EVERY 6 HOURS PRN
Status: DISCONTINUED | OUTPATIENT
Start: 2021-05-06 | End: 2021-05-07 | Stop reason: HOSPADM

## 2021-05-06 RX ORDER — POLYETHYLENE GLYCOL 3350 17 G/17G
17 POWDER, FOR SOLUTION ORAL DAILY
Status: DISCONTINUED | OUTPATIENT
Start: 2021-05-07 | End: 2021-05-07 | Stop reason: HOSPADM

## 2021-05-06 RX ORDER — ONDANSETRON 4 MG/1
4 TABLET, ORALLY DISINTEGRATING ORAL EVERY 30 MIN PRN
Status: DISCONTINUED | OUTPATIENT
Start: 2021-05-06 | End: 2021-05-06 | Stop reason: HOSPADM

## 2021-05-06 RX ORDER — ACETAMINOPHEN 325 MG/1
650 TABLET ORAL EVERY 4 HOURS PRN
Status: DISCONTINUED | OUTPATIENT
Start: 2021-05-09 | End: 2021-05-07 | Stop reason: HOSPADM

## 2021-05-06 RX ORDER — PROPOFOL 10 MG/ML
INJECTION, EMULSION INTRAVENOUS PRN
Status: DISCONTINUED | OUTPATIENT
Start: 2021-05-06 | End: 2021-05-06

## 2021-05-06 RX ORDER — NALOXONE HYDROCHLORIDE 0.4 MG/ML
0.2 INJECTION, SOLUTION INTRAMUSCULAR; INTRAVENOUS; SUBCUTANEOUS
Status: ACTIVE | OUTPATIENT
Start: 2021-05-06 | End: 2021-05-07

## 2021-05-06 RX ORDER — CEFAZOLIN SODIUM 2 G/100ML
2 INJECTION, SOLUTION INTRAVENOUS SEE ADMIN INSTRUCTIONS
Status: DISCONTINUED | OUTPATIENT
Start: 2021-05-06 | End: 2021-05-06 | Stop reason: HOSPADM

## 2021-05-06 RX ORDER — ROSUVASTATIN CALCIUM 5 MG/1
5 TABLET, COATED ORAL DAILY
Status: DISCONTINUED | OUTPATIENT
Start: 2021-05-06 | End: 2021-05-07 | Stop reason: HOSPADM

## 2021-05-06 RX ORDER — HYDROMORPHONE HYDROCHLORIDE 1 MG/ML
.3-.5 INJECTION, SOLUTION INTRAMUSCULAR; INTRAVENOUS; SUBCUTANEOUS EVERY 5 MIN PRN
Status: DISCONTINUED | OUTPATIENT
Start: 2021-05-06 | End: 2021-05-06 | Stop reason: HOSPADM

## 2021-05-06 RX ORDER — ACETAMINOPHEN 325 MG/1
975 TABLET ORAL 4 TIMES DAILY
Qty: 100 TABLET | Refills: 0 | Status: SHIPPED | OUTPATIENT
Start: 2021-05-06 | End: 2021-05-07

## 2021-05-06 RX ORDER — NEOSTIGMINE METHYLSULFATE 1 MG/ML
VIAL (ML) INJECTION PRN
Status: DISCONTINUED | OUTPATIENT
Start: 2021-05-06 | End: 2021-05-06

## 2021-05-06 RX ORDER — DOCUSATE SODIUM 100 MG/1
100 CAPSULE, LIQUID FILLED ORAL 2 TIMES DAILY
Status: DISCONTINUED | OUTPATIENT
Start: 2021-05-06 | End: 2021-05-07 | Stop reason: HOSPADM

## 2021-05-06 RX ORDER — METOPROLOL TARTRATE 25 MG/1
25 TABLET, FILM COATED ORAL 2 TIMES DAILY
Status: DISCONTINUED | OUTPATIENT
Start: 2021-05-06 | End: 2021-05-07 | Stop reason: HOSPADM

## 2021-05-06 RX ORDER — ONDANSETRON 2 MG/ML
4 INJECTION INTRAMUSCULAR; INTRAVENOUS EVERY 6 HOURS PRN
Status: DISCONTINUED | OUTPATIENT
Start: 2021-05-06 | End: 2021-05-07 | Stop reason: HOSPADM

## 2021-05-06 RX ORDER — BISACODYL 10 MG
10 SUPPOSITORY, RECTAL RECTAL DAILY PRN
Status: DISCONTINUED | OUTPATIENT
Start: 2021-05-06 | End: 2021-05-07 | Stop reason: HOSPADM

## 2021-05-06 RX ORDER — AMOXICILLIN 250 MG
1-2 CAPSULE ORAL 2 TIMES DAILY
Qty: 30 TABLET | Refills: 0 | Status: SHIPPED | OUTPATIENT
Start: 2021-05-06 | End: 2021-05-07

## 2021-05-06 RX ORDER — ONDANSETRON 2 MG/ML
INJECTION INTRAMUSCULAR; INTRAVENOUS PRN
Status: DISCONTINUED | OUTPATIENT
Start: 2021-05-06 | End: 2021-05-06

## 2021-05-06 RX ORDER — METOPROLOL TARTRATE 1 MG/ML
5 INJECTION, SOLUTION INTRAVENOUS EVERY 6 HOURS
Status: DISCONTINUED | OUTPATIENT
Start: 2021-05-07 | End: 2021-05-06

## 2021-05-06 RX ORDER — HYDROMORPHONE HYDROCHLORIDE 1 MG/ML
0.4 INJECTION, SOLUTION INTRAMUSCULAR; INTRAVENOUS; SUBCUTANEOUS
Status: DISCONTINUED | OUTPATIENT
Start: 2021-05-06 | End: 2021-05-07 | Stop reason: HOSPADM

## 2021-05-06 RX ORDER — OXYCODONE HYDROCHLORIDE 5 MG/1
10 TABLET ORAL EVERY 4 HOURS PRN
Status: DISCONTINUED | OUTPATIENT
Start: 2021-05-06 | End: 2021-05-07 | Stop reason: HOSPADM

## 2021-05-06 RX ORDER — METHOCARBAMOL 500 MG/1
500 TABLET, FILM COATED ORAL EVERY 6 HOURS PRN
Status: DISCONTINUED | OUTPATIENT
Start: 2021-05-06 | End: 2021-05-07 | Stop reason: HOSPADM

## 2021-05-06 RX ORDER — FENTANYL CITRATE 50 UG/ML
25-50 INJECTION, SOLUTION INTRAMUSCULAR; INTRAVENOUS
Status: DISCONTINUED | OUTPATIENT
Start: 2021-05-06 | End: 2021-05-06 | Stop reason: HOSPADM

## 2021-05-06 RX ORDER — VECURONIUM BROMIDE 1 MG/ML
INJECTION, POWDER, LYOPHILIZED, FOR SOLUTION INTRAVENOUS PRN
Status: DISCONTINUED | OUTPATIENT
Start: 2021-05-06 | End: 2021-05-06

## 2021-05-06 RX ORDER — OXYCODONE HYDROCHLORIDE 5 MG/1
5 TABLET ORAL EVERY 4 HOURS PRN
Status: DISCONTINUED | OUTPATIENT
Start: 2021-05-06 | End: 2021-05-07 | Stop reason: HOSPADM

## 2021-05-06 RX ORDER — WARFARIN SODIUM 10 MG/1
10 TABLET ORAL
Status: COMPLETED | OUTPATIENT
Start: 2021-05-06 | End: 2021-05-06

## 2021-05-06 RX ORDER — TRANEXAMIC ACID 650 MG/1
1950 TABLET ORAL ONCE
Status: COMPLETED | OUTPATIENT
Start: 2021-05-06 | End: 2021-05-06

## 2021-05-06 RX ORDER — HYDROXYZINE HYDROCHLORIDE 10 MG/1
10 TABLET, FILM COATED ORAL EVERY 6 HOURS PRN
Status: DISCONTINUED | OUTPATIENT
Start: 2021-05-06 | End: 2021-05-07 | Stop reason: HOSPADM

## 2021-05-06 RX ORDER — PREGABALIN 150 MG/1
150 CAPSULE ORAL ONCE
Status: COMPLETED | OUTPATIENT
Start: 2021-05-06 | End: 2021-05-06

## 2021-05-06 RX ORDER — FENTANYL CITRATE 50 UG/ML
INJECTION, SOLUTION INTRAMUSCULAR; INTRAVENOUS PRN
Status: DISCONTINUED | OUTPATIENT
Start: 2021-05-06 | End: 2021-05-06

## 2021-05-06 RX ORDER — KETOROLAC TROMETHAMINE 15 MG/ML
15 INJECTION, SOLUTION INTRAMUSCULAR; INTRAVENOUS EVERY 6 HOURS
Status: COMPLETED | OUTPATIENT
Start: 2021-05-06 | End: 2021-05-07

## 2021-05-06 RX ADMIN — FENTANYL CITRATE 50 MCG: 50 INJECTION, SOLUTION INTRAMUSCULAR; INTRAVENOUS at 09:40

## 2021-05-06 RX ADMIN — CEFAZOLIN 1 G: 1 INJECTION, POWDER, FOR SOLUTION INTRAMUSCULAR; INTRAVENOUS at 17:27

## 2021-05-06 RX ADMIN — SODIUM CHLORIDE, POTASSIUM CHLORIDE, SODIUM LACTATE AND CALCIUM CHLORIDE: 600; 310; 30; 20 INJECTION, SOLUTION INTRAVENOUS at 09:31

## 2021-05-06 RX ADMIN — NEOSTIGMINE METHYLSULFATE 3.5 MG: 1 INJECTION, SOLUTION INTRAVENOUS at 11:46

## 2021-05-06 RX ADMIN — CEFAZOLIN SODIUM 2 G: 2 INJECTION, SOLUTION INTRAVENOUS at 09:44

## 2021-05-06 RX ADMIN — DEXAMETHASONE SODIUM PHOSPHATE 4 MG: 4 INJECTION, SOLUTION INTRA-ARTICULAR; INTRALESIONAL; INTRAMUSCULAR; INTRAVENOUS; SOFT TISSUE at 09:55

## 2021-05-06 RX ADMIN — PHENYLEPHRINE HYDROCHLORIDE 150 MCG: 10 INJECTION INTRAVENOUS at 09:43

## 2021-05-06 RX ADMIN — PHENYLEPHRINE HYDROCHLORIDE 150 MCG: 10 INJECTION INTRAVENOUS at 10:01

## 2021-05-06 RX ADMIN — VECURONIUM BROMIDE 1 MG: 1 INJECTION, POWDER, LYOPHILIZED, FOR SOLUTION INTRAVENOUS at 11:30

## 2021-05-06 RX ADMIN — SODIUM CHLORIDE, POTASSIUM CHLORIDE, SODIUM LACTATE AND CALCIUM CHLORIDE: 600; 310; 30; 20 INJECTION, SOLUTION INTRAVENOUS at 11:35

## 2021-05-06 RX ADMIN — PHENYLEPHRINE HYDROCHLORIDE 50 MCG: 10 INJECTION INTRAVENOUS at 11:22

## 2021-05-06 RX ADMIN — FENTANYL CITRATE 50 MCG: 50 INJECTION, SOLUTION INTRAMUSCULAR; INTRAVENOUS at 10:23

## 2021-05-06 RX ADMIN — PREGABALIN 150 MG: 150 CAPSULE ORAL at 08:10

## 2021-05-06 RX ADMIN — PHENYLEPHRINE HYDROCHLORIDE 0.3 MCG/KG/MIN: 10 INJECTION INTRAVENOUS at 09:55

## 2021-05-06 RX ADMIN — DOCUSATE SODIUM 100 MG: 100 CAPSULE, LIQUID FILLED ORAL at 20:32

## 2021-05-06 RX ADMIN — PROCHLORPERAZINE EDISYLATE 5 MG: 5 INJECTION INTRAMUSCULAR; INTRAVENOUS at 16:51

## 2021-05-06 RX ADMIN — TRANEXAMIC ACID 1950 MG: 650 TABLET ORAL at 08:09

## 2021-05-06 RX ADMIN — WARFARIN SODIUM 10 MG: 10 TABLET ORAL at 17:26

## 2021-05-06 RX ADMIN — VECURONIUM BROMIDE 2 MG: 1 INJECTION, POWDER, LYOPHILIZED, FOR SOLUTION INTRAVENOUS at 10:22

## 2021-05-06 RX ADMIN — SENNOSIDES AND DOCUSATE SODIUM 1 TABLET: 8.6; 5 TABLET ORAL at 20:31

## 2021-05-06 RX ADMIN — CELECOXIB 400 MG: 200 CAPSULE ORAL at 08:10

## 2021-05-06 RX ADMIN — ONDANSETRON 4 MG: 2 INJECTION INTRAMUSCULAR; INTRAVENOUS at 16:29

## 2021-05-06 RX ADMIN — ROCURONIUM BROMIDE 35 MG: 10 INJECTION INTRAVENOUS at 09:40

## 2021-05-06 RX ADMIN — DEXMEDETOMIDINE HYDROCHLORIDE 8 MCG: 100 INJECTION, SOLUTION INTRAVENOUS at 10:36

## 2021-05-06 RX ADMIN — ONDANSETRON 4 MG: 2 INJECTION INTRAMUSCULAR; INTRAVENOUS at 11:40

## 2021-05-06 RX ADMIN — SODIUM CHLORIDE, POTASSIUM CHLORIDE, SODIUM LACTATE AND CALCIUM CHLORIDE: 600; 310; 30; 20 INJECTION, SOLUTION INTRAVENOUS at 08:09

## 2021-05-06 RX ADMIN — PHENYLEPHRINE HYDROCHLORIDE 100 MCG: 10 INJECTION INTRAVENOUS at 11:49

## 2021-05-06 RX ADMIN — METOPROLOL TARTRATE 25 MG: 25 TABLET, FILM COATED ORAL at 20:31

## 2021-05-06 RX ADMIN — HYDROMORPHONE HYDROCHLORIDE 0.5 MG: 1 INJECTION, SOLUTION INTRAMUSCULAR; INTRAVENOUS; SUBCUTANEOUS at 10:28

## 2021-05-06 RX ADMIN — VECURONIUM BROMIDE 1 MG: 1 INJECTION, POWDER, LYOPHILIZED, FOR SOLUTION INTRAVENOUS at 11:03

## 2021-05-06 RX ADMIN — PHENYLEPHRINE HYDROCHLORIDE 150 MCG: 10 INJECTION INTRAVENOUS at 09:55

## 2021-05-06 RX ADMIN — PHENYLEPHRINE HYDROCHLORIDE 50 MCG: 10 INJECTION INTRAVENOUS at 11:39

## 2021-05-06 RX ADMIN — PHENYLEPHRINE HYDROCHLORIDE 50 MCG: 10 INJECTION INTRAVENOUS at 11:30

## 2021-05-06 RX ADMIN — GLYCOPYRROLATE 0.5 MG: 0.2 INJECTION, SOLUTION INTRAMUSCULAR; INTRAVENOUS at 11:46

## 2021-05-06 RX ADMIN — PHENYLEPHRINE HYDROCHLORIDE 50 MCG: 10 INJECTION INTRAVENOUS at 11:42

## 2021-05-06 RX ADMIN — DEXMEDETOMIDINE HYDROCHLORIDE 12 MCG: 100 INJECTION, SOLUTION INTRAVENOUS at 10:31

## 2021-05-06 RX ADMIN — PHENYLEPHRINE HYDROCHLORIDE 50 MCG: 10 INJECTION INTRAVENOUS at 12:02

## 2021-05-06 RX ADMIN — ROCURONIUM BROMIDE 15 MG: 10 INJECTION INTRAVENOUS at 09:50

## 2021-05-06 RX ADMIN — PHENYLEPHRINE HYDROCHLORIDE 50 MCG: 10 INJECTION INTRAVENOUS at 11:55

## 2021-05-06 RX ADMIN — KETOROLAC TROMETHAMINE 15 MG: 15 INJECTION, SOLUTION INTRAMUSCULAR; INTRAVENOUS at 20:31

## 2021-05-06 RX ADMIN — PROPOFOL 140 MG: 10 INJECTION, EMULSION INTRAVENOUS at 09:40

## 2021-05-06 ASSESSMENT — MIFFLIN-ST. JEOR: SCORE: 1233.38

## 2021-05-06 ASSESSMENT — ENCOUNTER SYMPTOMS: DYSRHYTHMIAS: 1

## 2021-05-06 NOTE — ANESTHESIA POSTPROCEDURE EVALUATION
Patient: Mariya Rowe    Procedure(s):  RIGHT TOTAL HIP ARTHROPLASTY, DIRECT ANTERIOR APPROACH    Diagnosis:Pain in joint involving right pelvic region and thigh [M25.551]  Diagnosis Additional Information: No value filed.    Anesthesia Type:  General    Note:  Disposition: Inpatient   Postop Pain Control: Uneventful            Sign Out: Well controlled pain   PONV: No   Neuro/Psych: Uneventful            Sign Out: Acceptable/Baseline neuro status   Airway/Respiratory: Uneventful            Sign Out: Acceptable/Baseline resp. status   CV/Hemodynamics: Uneventful            Sign Out: Acceptable CV status; No obvious hypovolemia; No obvious fluid overload   Other NRE: NONE   DID A NON-ROUTINE EVENT OCCUR? No           Last vitals:  Vitals:    05/06/21 1300 05/06/21 1310 05/06/21 1320   BP: 133/86 126/84 126/84   Pulse: 87 90 92   Resp: 14 13 15   Temp:      SpO2: 98% 97% 97%       Last vitals prior to Anesthesia Care Transfer:  CRNA VITALS  5/6/2021 1149 - 5/6/2021 1249      5/6/2021             Pulse:  77    SpO2:  99 %    Resp Rate (set):  10          Electronically Signed By: Rolando Reyes MD  May 6, 2021  1:31 PM

## 2021-05-06 NOTE — ANESTHESIA CARE TRANSFER NOTE
Patient: Mariya Rowe    Procedure(s):  RIGHT TOTAL HIP ARTHROPLASTY, DIRECT ANTERIOR APPROACH    Diagnosis: Pain in joint involving right pelvic region and thigh [M25.551]  Diagnosis Additional Information: No value filed.    Anesthesia Type:   General     Note:    Oropharynx: oropharynx clear of all foreign objects and spontaneously breathing  Level of Consciousness: drowsy  Oxygen Supplementation: face mask  Level of Supplemental Oxygen (L/min / FiO2): 6  Independent Airway: airway patency satisfactory and stable  Dentition: dentition unchanged  Vital Signs Stable: post-procedure vital signs reviewed and stable  Report to RN Given: handoff report given  Patient transferred to: PACU  Comments: Neuromuscular blockade reversed after TOF 3/4, spontaneous respirations, adequate tidal volumes, followed commands to voice, oropharynx suctioned with soft flexible catheter, extubated atraumatically, extubated with suction, airway patent after extubation.  Oxygen via facemask at 6 liters per minute to PACU. Oxygen tubing connected to wall O2 in PACU, SpO2, NiBP, and EKG monitors and alarms on and functioning, Freedom Hugger warmer connected to patient gown, report on patient's clinical status given to PACU RN, RN questions answered.     Handoff Report: Identifed the Patient, Identified the Reponsible Provider, Reviewed the pertinent medical history, Discussed the surgical course, Reviewed Intra-OP anesthesia mangement and issues during anesthesia, Set expectations for post-procedure period and Allowed opportunity for questions and acknowledgement of understanding      Vitals: (Last set prior to Anesthesia Care Transfer)    143/90  HR 86  RR 14  SPO2 on 6lfm 100%    CRNA VITALS  5/6/2021 1149 - 5/6/2021 1223      5/6/2021             Pulse:  77    SpO2:  99 %    Resp Rate (set):  10        Electronically Signed By: DAVID Eric CRNA  May 6, 2021  12:23 PM

## 2021-05-06 NOTE — ANESTHESIA PROCEDURE NOTES
Airway         Procedure Start/Stop Times: 5/6/2021 9:42 AM  Staff -        Anesthesiologist:  Rolando Reyes MD       CRNA: José Miguel Causey APRN CRNA       Performed By: CRNA  Consent for Airway        Urgency: elective  Indications and Patient Condition       Indications for airway management: kaur-procedural       Induction type:intravenous       Mask difficulty assessment: 2 - vent by mask + OA or adjuvant +/- NMBA    Final Airway Details       Final airway type: endotracheal airway       Successful airway: ETT - single  Endotracheal Airway Details        ETT size (mm): 7.0       Successful intubation technique: direct laryngoscopy       DL Blade Type: MAC 3       Grade View of Cords: 1       Adjucts: stylet       Position: Right       Measured from: lips       Secured at (cm): 22       Bite block used: None    Post intubation assessment        Placement verified by: capnometry, equal breath sounds and chest rise        Number of attempts at approach: 1       Number of other approaches attempted: 0       Secured with: pink tape       Ease of procedure: easy       Dentition: Intact and Unchanged    Medication(s) Administered   Medication Administration Time: 5/6/2021 9:42 AM

## 2021-05-06 NOTE — PROGRESS NOTES
[facility]      OUTPATIENT PHYSICAL THERAPY EVALUATION  PLAN OF TREATMENT FOR OUTPATIENT REHABILITATION  (COMPLETE FOR INITIAL CLAIMS ONLY)  Patient's Last Name, First Name, M.I.  YOB: 1940  Mariya Rowe                        Provider's Name  [facility] Medical Record No.  [id number]                               Onset Date:  05/06/21   Start of Care Date:  05/06/21      Type:     _X_PT   ___OT   ___SLP Medical Diagnosis:  R direct anterior THR                        PT Diagnosis:  impaired mobiltiy   Visits from SOC:  1   _________________________________________________________________________________  Plan of Treatment/Functional Goals    Planned Interventions: bed mobility training, cryotherapy, gait training, home exercise program, patient/family education, stair training, strengthening, transfer training     Goals: See Physical Therapy Goals on Care Plan in [employer] electronic health record.    Therapy Frequency: 2x/day  Predicted Duration of Therapy Intervention: 3 days  _________________________________________________________________________________    I CERTIFY THE NEED FOR THESE SERVICES FURNISHED UNDER        THIS PLAN OF TREATMENT AND WHILE UNDER MY CARE     (Physician co-signature of this document indicates review and certification of the therapy plan).                Certification date from: 05/06/21, Certification date to: 05/09/21    Referring Physician: Dr. Sanchez            Initial Assessment        See Physical Therapy evaluation dated 05/06/21 in Epic electronic health record.

## 2021-05-06 NOTE — PROGRESS NOTES
05/06/21 1600   Quick Adds   Quick Adds Certification   Type of Visit Initial PT Evaluation   Living Environment   People in home alone   Current Living Arrangements house   Home Accessibility stairs to enter home;stairs within home   Number of Stairs, Main Entrance 2;other (see comments)  (14)   Stair Railings, Main Entrance none   Number of Stairs, Within Home, Primary other (see comments)  (14)   Stair Railings, Within Home, Primary railings safe and in good condition   Transportation Anticipated family or friend will provide   Living Environment Comments Patient lives alone but her daughter will be staying with her at discharge. She has a walker and [facility] chair to use.    Self-Care   Usual Activity Tolerance good   Current Activity Tolerance poor   Equipment Currently Used at Home cane, straight   Activity/Exercise/Self-Care Comment Patient reports she has been using a cane to ambulate due to pain in R hip.    Disability/Function   Hearing Difficulty or Deaf no   Wear Glasses or Blind yes   Vision Management glasses   Concentrating, Remembering or Making Decisions Difficulty no   Difficulty Communicating no   Difficulty Eating/Swallowing no   Fall history within last six months no   Change in Functional Status Since Onset of Current Illness/Injury yes   General Information   Onset of Illness/Injury or Date of Surgery 05/06/21   Referring Physician Dr. Sanchez   Patient/Family Therapy Goals Statement (PT) to be able to walk without pain   Pertinent History of Current Problem (include personal factors and/or comorbidities that impact the POC) Patient is 81 YO F POD 0 s/p R direct anterior THR.   Existing Precautions/Restrictions fall   Weight-Bearing Status - LUE full weight-bearing   Weight-Bearing Status - RUE full weight-bearing   Weight-Bearing Status - LLE full weight-bearing   Weight-Bearing Status - RLE weight-bearing as tolerated   General Observations Activity orders: ambulate with assist   Cognition    Orientation Status (Cognition) oriented x 4   Affect/Mental Status (Cognition) low arousal/lethargic   Follows Commands (Cognition) WNL   Pain Assessment   Patient Currently in Pain No   Integumentary/Edema   Integumentary/Edema Comments Bandage on R hip intact   Range of Motion (ROM)   ROM Quick Adds ROM deficits secondary to weakness;ROM deficits secondary to surgical procedure  (R LE)   Strength   Strength Comments Independent L SLR, Min assist for R SLR   Bed Mobility   Comment (Bed Mobility) Initiated supine to sit with min assist for R LE, but patient then dry heaving and small amount of liquid emesis. Dizziness onset with movement. Deferred progression of mobility.    Sensory Examination   Sensory Perception patient reports no sensory changes   Clinical Impression   Criteria for Skilled Therapeutic Intervention yes, treatment indicated   PT Diagnosis (PT) impaired mobiltiy   Influenced by the following impairments weakness, nausea, decreased activity tolerance, dizziness   Functional limitations due to impairments increased difficulty with functional mobility including bed mobility, transfers and gait   Clinical Presentation Evolving/Changing   Clinical Presentation Rationale post-op nausea and dizziness   Clinical Decision Making (Complexity) low complexity   Therapy Frequency (PT) 2x/day   Predicted Duration of Therapy Intervention (days/wks) 3 days   Planned Therapy Interventions (PT) bed mobility training;cryotherapy;gait training;home exercise program;patient/family education;stair training;strengthening;transfer training   Risk & Benefits of therapy have been explained evaluation/treatment results reviewed;care plan/treatment goals reviewed;risks/benefits reviewed;current/potential barriers reviewed;participants voiced agreement with care plan;participants included;patient   Clinical Impression Comments PT session limited by nausea, vomiting and dizziness - bed level activity only   PT Discharge  Planning    PT Rationale for DC Rec Patient currently significantly limited by nausea and dizziness. Patient will need to perform all mobility with min assist or less to return home with her daughter.    Therapy Certification   Start of care date 05/06/21   Certification date from 05/06/21   Certification date to 05/09/21   Medical Diagnosis R direct anterior THR   Total Evaluation Time   Total Evaluation Time (Minutes) 14

## 2021-05-06 NOTE — PLAN OF CARE
Patient vital signs are at baseline: YES  Patient able to ambulate as they were prior to admission or with assist devices provided by therapies during their stay:  NO,   Patient MUST void prior to discharge:  no  Patient able to tolerate oral intake:  NO  Pain has adequate pain control using Oral analgesics:  partially.     Arrived at 1500 from PACU.  Nauseated and dizzy during PT. Zofran and compazine with some relief. Denies pain. Bladder scanned in PACU for 23, DTV. A&OX4.

## 2021-05-06 NOTE — CONSULTS
This is a 80 year old female with PMHx of permanent atrial fibrillation on anticoagulation with coumadin, PFO, SSS with chronotropic incompetence (without indication for PPM per EP in 8/2016), dyslipidemia, and osteoarthritis who underwent right total hip arthroplasty, direct anterior approach with Dr. Sanchez. Hospitalist service was consulted for medical co-management. Vitals stable. PTA Lopressor (discontinued scheduled metoprolol ordered per primary team) and Crestor resumed. Warfarin resumed per primary team with first dose 5/6 (last dose 4/29). No acute concerns per bedside RN.     Given the above, will defer formal consult. Routine post-operative cares, IVF, DVT prophylaxis, and pain management to orthopedic service. Will check some basic lab work in the AM to assess for acute blood loss anemia given surgery. PT and OT to assess per Ortho. Bowel regimen in place while on pain regimen. No changes to PTA medication regimen at discharge unless issues arise throughout stay.  Happy to be reconsulted in the future if necessary. Appreciate consult.

## 2021-05-06 NOTE — ANESTHESIA PREPROCEDURE EVALUATION
Anesthesia Pre-Procedure Evaluation    Patient: Mariya Rowe   MRN: [id number] : 1940        Preoperative Diagnosis: Pain in joint involving right pelvic region and thigh [M25.551]   Procedure : Procedure(s):  RIGHT TOTAL HIP ARTHROPLASTY, DIRECT ANTERIOR APPROACH     Past Medical History:   Diagnosis Date     A-fib (H)      Abnormal EKG      Atrial flutter (H)      Chronotropic incompetence 2015     Closed fracture of rib of left side      Hyperlipidemia with target LDL less than 130 2014     Diagnosis updated by automated process. Provider to review and confirm.     Osteoporosis, unspecified     improved by DEXA of ; then unchanged in      Palpitations      PFO (patent foramen ovale)      SSS (sick sinus syndrome) (H)       Past Surgical History:   Procedure Laterality Date     COLONOSCOPY  2015    11-15 mm Adenomatous polyp in cecum, repeat in 3 yrs     COLONOSCOPY  2018    One 2 mm adenomatous polyp, repeat in 5 years (2023)     [facility] NONSPECIFIC PROCEDURE      colonoscopy      [facility] NONSPECIFIC PROCEDURE Right 2016    Right cataract repair and corneal transplant      Allergies   Allergen Reactions     Lidocaine Other (See Comments)     Pt lopez Hx of a reaction to Lidocaine Eye solution.     Atorvastatin Rash     LIPITOR - pt gets a rash      Social History     Tobacco Use     Smoking status: Never Smoker     Smokeless tobacco: Never Used   Substance Use Topics     Alcohol use: No     Alcohol/week: 0.0 standard drinks      Wt Readings from Last 1 Encounters:   21 66.2 kg (146 lb)        Anesthesia Evaluation   Pt has had prior anesthetic.     No history of anesthetic complications       ROS/MED HX  ENT/Pulmonary:  - neg pulmonary ROS     Neurologic:  - neg neurologic ROS     Cardiovascular: Comment: H/o PFO. SSS. Takes metoprolol.    (+) -----Taking blood thinners Pt has received instructions: dysrhythmias, a-fib and a-flutter,     METS/Exercise  Tolerance:     Hematologic:  - neg hematologic  ROS     Musculoskeletal: Comment: Osteoporosis.      GI/Hepatic:  - neg GI/hepatic ROS     Renal/Genitourinary:  - neg Renal ROS     Endo:  - neg endo ROS     Psychiatric/Substance Use:  - neg psychiatric ROS     Infectious Disease:  - neg infectious disease ROS     Malignancy:       Other:            Physical Exam    Airway  airway exam normal           Respiratory Devices and Support         Dental  no notable dental history         Cardiovascular   cardiovascular exam normal          Pulmonary   pulmonary exam normal                OUTSIDE LABS:  CBC:   Lab Results   Component Value Date    WBC 7.3 04/20/2021    WBC 10.4 03/14/2021    HGB 14.4 05/06/2021    HGB 13.9 04/20/2021    HCT 42.7 04/20/2021    HCT 43.8 03/14/2021     04/20/2021     03/14/2021     BMP:   Lab Results   Component Value Date     04/20/2021     03/14/2021    POTASSIUM 3.7 04/20/2021    POTASSIUM 3.6 03/14/2021    CHLORIDE 105 04/20/2021    CHLORIDE 102 03/14/2021    CO2 31 04/20/2021    CO2 25 03/14/2021    BUN 14 04/20/2021    BUN 14 03/14/2021    CR 0.76 05/06/2021    CR 0.73 04/20/2021    GLC 96 04/20/2021     (H) 03/14/2021     COAGS:   Lab Results   Component Value Date    INR 1.04 05/06/2021     POC: No results found for: BGM, HCG, HCGS  HEPATIC:   Lab Results   Component Value Date    ALBUMIN 3.7 04/20/2021    PROTTOTAL 7.5 04/20/2021    ALT 35 04/20/2021    AST 22 04/20/2021    ALKPHOS 97 04/20/2021    BILITOTAL 0.9 04/20/2021     OTHER:   Lab Results   Component Value Date    TONO 9.5 04/20/2021    MAG 2.3 09/21/2011    TSH 3.28 12/22/2020    CRP 3.6 05/14/2015    SED 10 05/14/2015       Anesthesia Plan    ASA Status:  3      Anesthesia Type: General.     - Airway: ETT   Induction: Intravenous.   Maintenance: Inhalation.        Consents    Anesthesia Plan(s) and associated risks, benefits, and realistic alternatives discussed. Questions answered and  patient/representative(s) expressed understanding.     - Discussed with:  Patient         Postoperative Care    Pain management: IV analgesics, Multi-modal analgesia.   PONV prophylaxis: Ondansetron (or other 5HT-3), Dexamethasone or Solumedrol     Comments:                Rolando Reyes MD

## 2021-05-07 ENCOUNTER — APPOINTMENT (OUTPATIENT)
Dept: OCCUPATIONAL THERAPY | Facility: CLINIC | Age: 81
End: 2021-05-07
Attending: ORTHOPAEDIC SURGERY
Payer: COMMERCIAL

## 2021-05-07 ENCOUNTER — DOCUMENTATION ONLY (OUTPATIENT)
Dept: INTERNAL MEDICINE | Facility: CLINIC | Age: 81
End: 2021-05-07

## 2021-05-07 ENCOUNTER — APPOINTMENT (OUTPATIENT)
Dept: GENERAL RADIOLOGY | Facility: CLINIC | Age: 81
End: 2021-05-07
Attending: ORTHOPAEDIC SURGERY
Payer: COMMERCIAL

## 2021-05-07 ENCOUNTER — APPOINTMENT (OUTPATIENT)
Dept: PHYSICAL THERAPY | Facility: CLINIC | Age: 81
End: 2021-05-07
Attending: ORTHOPAEDIC SURGERY
Payer: COMMERCIAL

## 2021-05-07 VITALS
WEIGHT: 146.2 LBS | BODY MASS INDEX: 20.47 KG/M2 | RESPIRATION RATE: 16 BRPM | OXYGEN SATURATION: 96 % | HEART RATE: 110 BPM | HEIGHT: 71 IN | SYSTOLIC BLOOD PRESSURE: 122 MMHG | DIASTOLIC BLOOD PRESSURE: 69 MMHG | TEMPERATURE: 97.7 F

## 2021-05-07 LAB
GLUCOSE BLDC GLUCOMTR-MCNC: 117 MG/DL (ref 70–99)
HGB BLD-MCNC: 11.6 G/DL (ref 11.7–15.7)
INR PPP: 1.28 (ref 0.86–1.14)

## 2021-05-07 PROCEDURE — 250N000013 HC RX MED GY IP 250 OP 250 PS 637: Performed by: PHYSICIAN ASSISTANT

## 2021-05-07 PROCEDURE — 85610 PROTHROMBIN TIME: CPT | Performed by: ORTHOPAEDIC SURGERY

## 2021-05-07 PROCEDURE — 97530 THERAPEUTIC ACTIVITIES: CPT | Mod: GP

## 2021-05-07 PROCEDURE — 97535 SELF CARE MNGMENT TRAINING: CPT | Mod: GO

## 2021-05-07 PROCEDURE — 85018 HEMOGLOBIN: CPT | Performed by: ORTHOPAEDIC SURGERY

## 2021-05-07 PROCEDURE — 97165 OT EVAL LOW COMPLEX 30 MIN: CPT | Mod: GO

## 2021-05-07 PROCEDURE — 999N000065 XR PELVIS AND HIP RIGHT 1 VIEW

## 2021-05-07 PROCEDURE — 36415 COLL VENOUS BLD VENIPUNCTURE: CPT | Performed by: ORTHOPAEDIC SURGERY

## 2021-05-07 PROCEDURE — 250N000013 HC RX MED GY IP 250 OP 250 PS 637: Performed by: ORTHOPAEDIC SURGERY

## 2021-05-07 PROCEDURE — 97116 GAIT TRAINING THERAPY: CPT | Mod: GP

## 2021-05-07 PROCEDURE — 82962 GLUCOSE BLOOD TEST: CPT

## 2021-05-07 PROCEDURE — 250N000011 HC RX IP 250 OP 636: Performed by: ORTHOPAEDIC SURGERY

## 2021-05-07 RX ORDER — ACETAMINOPHEN 500 MG
1000 TABLET ORAL EVERY 6 HOURS PRN
Qty: 100 TABLET | Refills: 0 | Status: SHIPPED | OUTPATIENT
Start: 2021-05-07

## 2021-05-07 RX ORDER — TRAMADOL HYDROCHLORIDE 50 MG/1
25-50 TABLET ORAL EVERY 6 HOURS PRN
Qty: 20 TABLET | Refills: 0 | Status: SHIPPED | OUTPATIENT
Start: 2021-05-07 | End: 2021-05-10

## 2021-05-07 RX ORDER — AMOXICILLIN 250 MG
CAPSULE ORAL
Qty: 60 TABLET | Refills: 0 | Status: SHIPPED | OUTPATIENT
Start: 2021-05-07 | End: 2021-09-21

## 2021-05-07 RX ORDER — TRAMADOL HYDROCHLORIDE 50 MG/1
50 TABLET ORAL EVERY 6 HOURS PRN
Qty: 20 TABLET | Refills: 0 | Status: SHIPPED | OUTPATIENT
Start: 2021-05-07 | End: 2021-05-07

## 2021-05-07 RX ORDER — OXYCODONE HYDROCHLORIDE 5 MG/1
TABLET ORAL
Qty: 30 TABLET | Refills: 0 | Status: SHIPPED | OUTPATIENT
Start: 2021-05-07 | End: 2021-05-07

## 2021-05-07 RX ADMIN — ACETAMINOPHEN 975 MG: 325 TABLET, FILM COATED ORAL at 15:37

## 2021-05-07 RX ADMIN — METOPROLOL TARTRATE 25 MG: 25 TABLET, FILM COATED ORAL at 09:47

## 2021-05-07 RX ADMIN — KETOROLAC TROMETHAMINE 15 MG: 15 INJECTION, SOLUTION INTRAMUSCULAR; INTRAVENOUS at 15:37

## 2021-05-07 RX ADMIN — CEFAZOLIN 1 G: 1 INJECTION, POWDER, FOR SOLUTION INTRAMUSCULAR; INTRAVENOUS at 02:00

## 2021-05-07 RX ADMIN — POLYETHYLENE GLYCOL 3350 17 G: 17 POWDER, FOR SOLUTION ORAL at 09:46

## 2021-05-07 RX ADMIN — ACETAMINOPHEN 975 MG: 325 TABLET, FILM COATED ORAL at 09:46

## 2021-05-07 RX ADMIN — KETOROLAC TROMETHAMINE 15 MG: 15 INJECTION, SOLUTION INTRAMUSCULAR; INTRAVENOUS at 09:47

## 2021-05-07 RX ADMIN — SENNOSIDES AND DOCUSATE SODIUM 1 TABLET: 8.6; 5 TABLET ORAL at 09:46

## 2021-05-07 RX ADMIN — ACETAMINOPHEN 975 MG: 325 TABLET, FILM COATED ORAL at 00:05

## 2021-05-07 RX ADMIN — DOCUSATE SODIUM 100 MG: 100 CAPSULE, LIQUID FILLED ORAL at 09:46

## 2021-05-07 RX ADMIN — KETOROLAC TROMETHAMINE 15 MG: 15 INJECTION, SOLUTION INTRAMUSCULAR; INTRAVENOUS at 01:57

## 2021-05-07 ASSESSMENT — MIFFLIN-ST. JEOR: SCORE: 1234.29

## 2021-05-07 NOTE — PROGRESS NOTES
Patient vital signs are at baseline: Yes  Patient able to ambulate as they were prior to admission or with assist devices provided by therapies during their stay:  Yes  Patient MUST void prior to discharge:  Yes  Patient able to tolerate oral intake:  Yes  Pain has adequate pain control using Oral analgesics:  Yes, Pt denies pain except for numbness on (R) hip.

## 2021-05-07 NOTE — PROGRESS NOTES
ANTICOAGULATION  MANAGEMENT: Discharge Review    Mariya Rowe chart reviewed for anticoagulation continuity of care    Hospital Admission on 5/6/21 for Right total hip.    Discharge disposition: Home    Results:    Recent labs: (last 7 days)     05/06/21  0729 05/07/21  0625   INR 1.04 1.28*     Anticoagulation inpatient management:     home regimen continued    Anticoagulation discharge instructions:     Warfarin dosing: home regimen continued   Bridging: No   INR goal change: No      Medication changes affecting anticoagulation: No    Additional factors affecting anticoagulation: No    Plan     No adjustment to anticoagulation plan needed    Patient not contacted    No adjustment to Anticoagulation Calendar was required    Artis Dc RN

## 2021-05-07 NOTE — PROGRESS NOTES
Oriented x 4. Denies pain except for some numbness on (R) hip. Slightly forgetful, Ortho PA notified. Dressing on (R) hip CDI. Discharge instructions reviewed with pt and her daughter and all questions answered appropriately . Discharging to home with daughter via pvt ride.

## 2021-05-07 NOTE — PLAN OF CARE
Date/Time 5/6/21 [id number]    Trauma/Ortho/Medical (Choose one) Ortho    Diagnosis: S/P R hip replacement  POD#: 1  Mental Status: A & O x 4, forgetful  Activity/dangle: A x 1 GB/W  Diet: Regular  Pain: Denies  Babb/Voiding: Voiding in bedpan  Tele/Restraints/Iso: NA  02/LDA: L PIV SL  D/C Date: Possibly discharging today if pain is well controlled  Other Info: . CMS intact. Dressing CDI. VSS on RA. Denies nausea. PT following.

## 2021-05-07 NOTE — PLAN OF CARE
Patient vital signs are at baseline: YES  Patient able to ambulate as they were prior to admission or with assist devices provided by therapies during their stay:  NO  Patient MUST void prior to discharge:  No  Patient able to tolerate oral intake:  NO  Pain has adequate pain control using Oral analgesics:  partially.     Pt VSS on RA. Has not attempted to get out of bed d/t nausea, however moves well in bed. CMS intact. Dressing CDI, w/ ice. Voiding adequately in bedpan. Denies pain. PIV SL. Plan to discharge tomorrow pending progress.

## 2021-05-07 NOTE — PLAN OF CARE
Occupational Therapy Discharge Summary    Reason for therapy discharge:    All goals and outcomes met, no further needs identified.    Progress towards therapy goal(s). See goals on Care Plan in [facility] electronic health record for goal details.  Goals met    Therapy recommendation(s):    No further therapy is recommended.

## 2021-05-07 NOTE — PLAN OF CARE
[facility]      OUTPATIENT OCCUPATIONAL THERAPY  EVALUATION  PLAN OF TREATMENT FOR OUTPATIENT REHABILITATION  (COMPLETE FOR INITIAL CLAIMS ONLY)  Patient's Last Name, First Name, M.I.  YOB: 1940  Mariya Rowe                          Provider's Name  [facility] Medical Record No.  [id number]                               Onset Date:  05/06/21   Start of Care Date:  05/06/21     Type:     ___PT   _X_OT   ___SLP Medical Diagnosis:  Impaired ADLs and functional mobility                        OT Diagnosis:  Impaired ADLs, IADLs, Mobiity   Visits from SOC:  1   _________________________________________________________________________________  Plan of Treatment/Functional Goals    Planned Interventions: ADL retraining, ROM, strengthening, home program guidelines, progressive activity/exercise, risk factor education   Goals: See Occupational Therapy Goals on Care Plan in [employer] electronic health record.    Therapy Frequency: 1x eval and treat  Predicted Duration of Therapy Intervention: 1 Day   _________________________________________________________________________________    I CERTIFY THE NEED FOR THESE SERVICES FURNISHED UNDER        THIS PLAN OF TREATMENT AND WHILE UNDER MY CARE     (Physician co-signature of this document indicates review and certification of the therapy plan).                Certification date from: 05/06/21, Certification date to: 05/07/21    Referring Physician: Manjinder Sanchez MD            Initial Assessment        See Occupational Therapy evaluation dated 05/06/21 in Epic electronic health record.

## 2021-05-07 NOTE — PROGRESS NOTES
"Orthopedic Surgery  5/7/2021  POD #1    S: Patient voices no complaints today.     O: Blood pressure 110/68, pulse 84, temperature 97.4  F (36.3  C), temperature source Oral, resp. rate 16, height 1.803 m (5' 11\"), weight 66.3 kg (146 lb 3.2 oz), SpO2 96 %, not currently breastfeeding.  Lab Results   Component Value Date    HGB 11.6 05/07/2021     Neurovascularly intact.  Calves are negative bilaterally, both soft and nontender.  The dressing is C/D/I.      A: Ms. Rowe is doing well status post Procedure(s):  RIGHT TOTAL HIP ARTHROPLASTY, DIRECT ANTERIOR APPROACH.    P: No dressing change, patient to remove outer dressing on POD3, leaving mesh adhesive in place.  Continue physical therapy. Discharge to home today.    No post op xray ordered, has been ordered.  Manjinder Galvez  [phone]    "

## 2021-05-07 NOTE — PROGRESS NOTES
05/07/21 0954   Quick Adds   Type of Visit Initial Occupational Therapy Evaluation   Living Environment   People in home alone   Current Living Arrangements house   Home Accessibility stairs to enter home;stairs within home   Number of Stairs, Main Entrance 2;other (see comments)   Stair Railings, Main Entrance none   Number of Stairs, Within Home, Primary other (see comments)  (14)   Stair Railings, Within Home, Primary railings safe and in good condition   Transportation Anticipated family or friend will provide   Living Environment Comments Pt lives alone in two story home, bedroom and bathroom on second floor, daughter and family will be staying with her for several days    Self-Care   Usual Activity Tolerance moderate   Current Activity Tolerance fair   Regular Exercise No   Equipment Currently Used at Home raised toilet seat;shower chair;grab bar, tub/shower;grab bar, toilet;other (see comments);cane, straight  (Has reacher, sock aide )   Activity/Exercise/Self-Care Comment Patient is independent - mod I at baseline for self cares and IADLs   Disability/Function   Hearing Difficulty or Deaf no   Wear Glasses or Blind yes   Vision Management glasses   Fall history within last six months no   General Information   Onset of Illness/Injury or Date of Surgery 05/06/21   Referring Physician Manjinder Sanchez MD   Patient/Family Therapy Goal Statement (OT) Return home and return to baseline for ADLs/IADLs   Additional Occupational Profile Info/Pertinent History of Current Problem Patient is 79 YO F s/p R direct anterior THR.   Existing Precautions/Restrictions fall   Right Lower Extremity (Weight-bearing Status) weight-bearing as tolerated (WBAT)   Cognitive Status Examination   Orientation Status orientation to person, place and time   Sensory   Sensory Quick Adds No deficits were identified   Pain Assessment   Patient Currently in Pain   (Pt reports pain is well controlled)   Range of Motion Comprehensive    Comment, General Range of Motion R LE AROM limited secondary to surgical procedure; L LE WNL; B UE WNL   Strength Comprehensive (MMT)   Comment, General Manual Muscle Testing (MMT) Assessment R LE strength limited secondary to surigcal procedure; L LE WNL; B UE WNL   Coordination   Upper Extremity Coordination No deficits were identified   Bed Mobility   Bed Mobility supine-sit;sit-supine   Supine-Sit [place] (Bed Mobility) supervision   Sit-Supine [place] (Bed Mobility) supervision   Transfers   Transfers sit-stand transfer;toilet transfer   Sit-Stand Transfer   Sit-Stand [place] (Transfers) supervision   Assistive Device (Sit-Stand Transfers) walker, front-wheeled   Toilet Transfer   Type (Toilet Transfer) sit-stand;stand-sit   [place] Level (Toilet Transfer) modified independence   Assistive Device (Toilet Transfer) grab bars/safety frame;walker, front-wheeled   Balance   Balance Assessment no deficits were identified   Activities of Daily Living   BADL Assessment/Intervention upper body dressing;lower body dressing;toileting   Upper Body Dressing Assessment/Training   [place] Level (Upper Body Dressing) modified independence   Position (Upper Body Dressing) edge of bed sitting   Lower Body Dressing Assessment/Training   [place] Level (Lower Body Dressing) supervision   Assistive Devices (Lower Body Dressing) sock-aid   Position (Lower Body Dressing) edge of bed sitting   Toileting   [place] Level (Toileting) modified independence   Assistive Devices (Toileting) grab bar, toilet   Clinical Impression   Criteria for Skilled Therapeutic Interventions Met (OT) yes;meets criteria;skilled treatment is necessary   OT Diagnosis Impaired ADLs, IADLs, Mobiity   OT Problem List-Impairments impacting ADL activity tolerance impaired;range of motion (ROM);strength   Assessment of Occupational Performance 1-3 Performance Deficits   Identified Performance Deficits ADL, IADL, Mobility    Planned Therapy Interventions (OT) ADL retraining;ROM;strengthening;home program guidelines;progressive activity/exercise;risk factor education   Clinical Decision Making Complexity (OT) low complexity   Therapy Frequency (OT) 1x eval and treat   Predicted Duration of Therapy 1 Day    Risk & Benefits of therapy have been explained evaluation/treatment results reviewed;care plan/treatment goals reviewed;risks/benefits reviewed   OT Discharge Planning    OT Rationale for DC Rec Planned discharge home with 24 hr support from friends/family as needed. Pt currently SBA emerging to mod I for transfer and fxl mobility.    OT Brief overview of current status  SBA emerging mod I for fxl mobility and self cares   Therapy Certification   Start of Care Date 05/06/21   Certification date from 05/06/21   Certification date to 05/07/21   Medical Diagnosis Impaired ADLs and functional mobility   Total Evaluation Time (Minutes)   Total Evaluation Time (Minutes) 10

## 2021-05-07 NOTE — PLAN OF CARE
Physical Therapy Discharge Summary    Reason for therapy discharge:    Discharged to home.  All goals and outcomes met, no further needs identified.    Progress towards therapy goal(s). See goals on Care Plan in [facility] electronic health record for goal details.  Goals met    Therapy recommendation(s):    Continue home exercise program.

## 2021-05-11 ENCOUNTER — TRANSFERRED RECORDS (OUTPATIENT)
Dept: HEALTH INFORMATION MANAGEMENT | Facility: CLINIC | Age: 81
End: 2021-05-11

## 2021-05-11 ENCOUNTER — ANTICOAGULATION THERAPY VISIT (OUTPATIENT)
Dept: INTERNAL MEDICINE | Facility: CLINIC | Age: 81
End: 2021-05-11

## 2021-05-11 DIAGNOSIS — I48.91 ATRIAL FIBRILLATION (H): ICD-10-CM

## 2021-05-11 DIAGNOSIS — I48.21 PERMANENT ATRIAL FIBRILLATION (H): ICD-10-CM

## 2021-05-11 DIAGNOSIS — Z79.01 LONG TERM CURRENT USE OF ANTICOAGULANTS WITH INR GOAL OF 2.0-3.0: ICD-10-CM

## 2021-05-11 LAB — INR PPP: 3.1 (ref 0.9–1.1)

## 2021-05-11 NOTE — PROGRESS NOTES
ANTICOAGULATION MANAGEMENT     Patient Name:  Mariya Rowe  Date:  5/11/2021    ASSESSMENT /SUBJECTIVE:    Today's INR result of 3.1 is supratherapeutic. Goal INR of 2.0-3.0      Warfarin dose taken: Warfarin recently held for 5 days which may be affecting INR    Diet: No new diet changes affecting INR    Medication changes/ interactions: No new medications/supplements affecting INR    Previous INR: Subtherapeutic     S/S of bleeding or thromboembolism: No    New injury or illness: No    Upcoming surgery, procedure or cardioversion: No    Additional findings:    Hip surgery on 5/6 with 5 day hold and >ed dosing post op.      PLAN:    Telephone call with Mariya regarding INR result and instructed:     Warfarin Dosing Instructions: 2.5 mg tonight then continue your current warfarin dose of 5 mg T Sat; 2.5 mg ROW    Instructed patient to follow up no later than: 1 week  Patient to recheck with home meter    Education provided: None required      Mariya verbalizes understanding and agrees to warfarin dosing plan.    Instructed to call the Anticoagulation Clinic for any changes, questions or concerns. (#[phone])        Ana Camarena RN      OBJECTIVE:  Recent labs: (last 7 days)     05/06/21  0729 05/07/21  0625 05/11/21   INR 1.04 1.28* 3.1*         No question data found.  Anticoagulation Summary  As of 5/11/2021    INR goal:  2.0-3.0   TTR:  65.3 % (1 y)   INR used for dosing:  3.1 (5/11/2021)   Warfarin maintenance plan:  5 mg (5 mg x 1) every Tue, Sat; 2.5 mg (5 mg x 0.5) all other days   Full warfarin instructions:  5/11: 2.5 mg; Otherwise 5 mg every Tue, Sat; 2.5 mg all other days   Weekly warfarin total:  22.5 mg   Plan last modified:  Thais Castro RN (1/26/2021)   Next INR check:  5/18/2021   Priority:  Maintenance   Target end date:  Indefinite    Indications    Long term current use of anticoagulants with INR goal of 2.0-3.0 [Z79.01]  Atrial fibrillation (H) [I48.91]  Permanent atrial  fibrillation (H) [I48.21]             Anticoagulation Episode Summary     INR check location:      Preferred lab:  EXTERNAL LAB    Send INR reminders to:  LUIS EDUARDO MACK    Comments:        Anticoagulation Care Providers     Provider Role Specialty Phone number    Chavez Molina MD Referring Internal Medicine [phone]

## 2021-05-15 NOTE — OP NOTE
DATE OF SERVICE:  5/6/2021    SURGEON  MARTIN NUR M.D.    ASSISTANT  Manuel Mcconnell PA-C      PREOPERATIVE DIAGNOSIS   Right hip osteoarthritis, failed to respond to conservative management.    POSTOPERATIVE DIAGNOSIS   Right hip osteoarthritis, failed to respond to conservative management.    TITLE OF PROCEDURE   Right total hip arthroplasty, Depuy uncemented components, direct anterior approach.    PROCEDURE  The patient was brought to the operating room and after satisfactory anesthesia was placed on the [place] table. The right  lower extremity was then prepped and draped in the usual sterile fashion. Image intensification was utilized during the case for component positioning as well as leg length assessment. An incision was made just lateral to the ASIS and coursing toward the proximal femur. Dissection was carried down to the TFL. The fascia overlying the TFL was incised and dissection was carried down to the interval between TFL and rectus femoris. This was identified. A lateral cobra retractor was placed followed by a medial cobra around the femoral neck. The leash vessels, anterior circumflex, was identified and was coagulated. The underlying fascia was released. Dissection was carried down to the hip capsule. Capsule was identified and a capsulotomy was performed in a T-type fashion first along the intertrochanteric line and then secondarily up along the femoral neck and head, finally completed by releasing along the saddle of the trochanter. The capsular edges were tagged for later repair. The hip was then externally rotated and medial capsular release was performed such that the lesser trochanter could be palpated. Following this, the femoral neck was osteotomized as per the preoperative plan. The femoral head was removed with a corkscrew without difficulty. The acetabulum was exposed and was reamed sequentially up to 58 mm. This was reamed under both direct visualization as well  as the aid of image intensification. A 58 mm Interior Gription cup was impacted into place in approximately 40 to 45 degrees of abduction, and 15 degrees of anteversion. Three screws were placed and this gave excellent fixation. The 36 mm neutral liner was impacted into place.     Attention was then directed to the femur. The hook was placed underneath the proximal aspect of the femur between the trochanteric ridge and gluteus charo. The hip was then brought into external rotation, adduction, and extension. The femur was then carefully elevated using the appropriate releases off the inside of the greater trochanter. Once the femur was elevated, a starter broach was placed followed by sequential broaches. The broaches were performed up to size 8 Actis, which gave excellent torsinal as well as axial stability. Trial reduction was performed with a standard offset +1.5mm head. The hip was reduced and with hip reduction the combined anteversion looked excellent. The hip was brought into full extension and external rotation. There was no evidence of instability. As well, x-rays were printed and compared with the opposite side and found to have good length and restoration of offset.  It was felt that an additional stem size could not be placed.   The hip was then dislocated and then the proximal femur was then brought back up into the proximal aspect of the wound. The real size 8 actis stem, standard offset was impacted into place. Again this gave excellent torsion as well as axial stability. The real +1.5mm ceramic biolox head was impacted into place and the hip was reduced again. The image intensification confirmed excellent position of the components.   A 3 minute dilute betadine soak was performed.  The wound was thoroughly irrigated. One gram of vancomycin powder was placed deep and superficial prior to closure.  The capsule was closed with interrupted 0 Vicryl suture and tissues infiltrated with toradol/marcaine  mixture. The tensor fascia was closed with a running 0 Stratafix suture. The subcutaneous layer was closed with interrupted 2-0 Vicryl, 2-0 Stratafix, and 3-0 subcuticular monocryl was placed followed by a mesh dressing with skin glue. Sterile dressing was applied. The patient left the operating room in satisfactory condition. Patient received 1 gm of tranexamic acid pre-op and at closure.    A skilled first assistant was necessary for this procedure for assistance with patient positioning, prepping, draping, surgical visualization, performance of the repair, wound closure, and application of the dressing.  The assistant was present for the entire procedure.

## 2021-05-18 ENCOUNTER — TRANSFERRED RECORDS (OUTPATIENT)
Dept: HEALTH INFORMATION MANAGEMENT | Facility: CLINIC | Age: 81
End: 2021-05-18

## 2021-05-19 ENCOUNTER — ANTICOAGULATION THERAPY VISIT (OUTPATIENT)
Dept: INTERNAL MEDICINE | Facility: CLINIC | Age: 81
End: 2021-05-19

## 2021-05-19 DIAGNOSIS — Z79.01 LONG TERM CURRENT USE OF ANTICOAGULANTS WITH INR GOAL OF 2.0-3.0: ICD-10-CM

## 2021-05-19 DIAGNOSIS — I48.91 ATRIAL FIBRILLATION (H): ICD-10-CM

## 2021-05-19 DIAGNOSIS — I48.21 PERMANENT ATRIAL FIBRILLATION (H): ICD-10-CM

## 2021-05-19 LAB — INR PPP: 3.1 (ref 0.9–1.1)

## 2021-05-19 NOTE — PROGRESS NOTES
Incoming fax from [employer]    Date of INR 5/19    INR result 3.1

## 2021-05-19 NOTE — PROGRESS NOTES
ANTICOAGULATION MANAGEMENT     Patient Name:  Mariya Rowe  Date:  5/19/2021    ASSESSMENT /SUBJECTIVE:    Today's INR result of 3.1 is supratherapeutic. Goal INR of 2.0-3.0      Warfarin dose taken: Warfarin taken as instructed    Diet: No new diet changes affecting INR    Medication changes/ interactions: No new medications/supplements affecting INR    Previous INR: Supratherapeutic     S/S of bleeding or thromboembolism: No    New injury or illness: No    Upcoming surgery, procedure or cardioversion: No    Additional findings: None      PLAN:    Telephone call with Mariya regarding INR result and instructed:     Warfarin Dosing Instructions: Change your warfarin dose to 5 mg Sat and 2.5 mg ROW  . (11.1 % change)    Instructed patient to follow up no later than: 1 week  Patient to recheck with home meter    Education provided: Target INR goal and significance of current INR result      Mariya verbalizes understanding and agrees to warfarin dosing plan.    Instructed to call the Anticoagulation Clinic for any changes, questions or concerns. (#[phone])        Destinee Christine RN      OBJECTIVE:  Recent labs: (last 7 days)     05/19/21   INR 3.1*         No question data found.  Anticoagulation Summary  As of 5/19/2021    INR goal:  2.0-3.0   TTR:  63.1 % (1 y)   INR used for dosing:  3.1 (5/19/2021)   Warfarin maintenance plan:  5 mg (5 mg x 1) every Tue, Sat; 2.5 mg (5 mg x 0.5) all other days   Full warfarin instructions:  5 mg every Tue, Sat; 2.5 mg all other days   Weekly warfarin total:  22.5 mg   No change documented:  Destinee Christine RN   Plan last modified:  Thais Castro RN (1/26/2021)   Next INR check:  5/26/2021   Priority:  Maintenance   Target end date:  Indefinite    Indications    Long term current use of anticoagulants with INR goal of 2.0-3.0 [Z79.01]  Atrial fibrillation (H) [I48.91]  Permanent atrial fibrillation (H) [I48.21]             Anticoagulation Episode Summary     INR check  location:      Preferred lab:  EXTERNAL LAB    Send INR reminders to:  LUIS EDUARDO MACK    Comments:        Anticoagulation Care Providers     Provider Role Specialty Phone number    Chavez Molina MD Referring Internal Medicine [phone]

## 2021-05-26 ENCOUNTER — TRANSFERRED RECORDS (OUTPATIENT)
Dept: HEALTH INFORMATION MANAGEMENT | Facility: CLINIC | Age: 81
End: 2021-05-26

## 2021-05-26 ENCOUNTER — ANTICOAGULATION THERAPY VISIT (OUTPATIENT)
Dept: INTERNAL MEDICINE | Facility: CLINIC | Age: 81
End: 2021-05-26

## 2021-05-26 DIAGNOSIS — I48.91 ATRIAL FIBRILLATION (H): ICD-10-CM

## 2021-05-26 DIAGNOSIS — I48.21 PERMANENT ATRIAL FIBRILLATION (H): ICD-10-CM

## 2021-05-26 DIAGNOSIS — Z79.01 LONG TERM CURRENT USE OF ANTICOAGULANTS WITH INR GOAL OF 2.0-3.0: ICD-10-CM

## 2021-05-26 LAB — INR PPP: 2.4 (ref 0.9–1.1)

## 2021-05-26 NOTE — PROGRESS NOTES
ANTICOAGULATION MANAGEMENT     Patient Name:  Mariya Rowe  Date:  2021    ASSESSMENT /SUBJECTIVE:    Today's INR result of 2.4 is therapeutic. Goal INR of 2.0-3.0      Warfarin dose taken: Warfarin taken as instructed    Diet: No new diet changes affecting INR    Medication changes/ interactions: No new medications/supplements affecting INR    Previous INR: Supratherapeutic     S/S of bleeding or thromboembolism: No    New injury or illness: No    Upcoming surgery, procedure or cardioversion: No    Additional findings: None      PLAN:    Telephone call with Mariya regarding INR result and instructed:     Warfarin Dosing Instructions: Continue your current warfarin dose 5mg every Sat; 2.5mg all other days    Instructed patient to follow up no later than: 2 weeks  Patient to recheck with home meter    Education provided: Target INR goal and significance of current INR result      Mariya verbalizes understanding and agrees to warfarin dosing plan.    Instructed to call the Anticoagulation Clinic for any changes, questions or concerns. (#[phone])        Lalo Tapia RN      OBJECTIVE:  Recent labs: (last 7 days)     21   INR 2.4*         No question data found.  Anticoagulation Summary  As of 2021    INR goal:  2.0-3.0   TTR:  63.0 % (1 y)   INR used for dosin.4 (2021)   Warfarin maintenance plan:  5 mg (5 mg x 1) every Sat; 2.5 mg (5 mg x 0.5) all other days   Full warfarin instructions:  5 mg every Sat; 2.5 mg all other days   Weekly warfarin total:  20 mg   No change documented:  Lalo Tapia RN   Plan last modified:  Destinee Christine RN (2021)   Next INR check:  2021   Priority:  Maintenance   Target end date:  Indefinite    Indications    Long term current use of anticoagulants with INR goal of 2.0-3.0 [Z79.01]  Atrial fibrillation (H) [I48.91]  Permanent atrial fibrillation (H) [I48.21]             Anticoagulation Episode Summary     INR check location:      Preferred  lab:  EXTERNAL LAB    Send INR reminders to:  LUIS EDUARDO MACK    Comments:        Anticoagulation Care Providers     Provider Role Specialty Phone number    Chavez Molina MD Referring Internal Medicine [phone]

## 2021-05-28 ENCOUNTER — TRANSFERRED RECORDS (OUTPATIENT)
Dept: HEALTH INFORMATION MANAGEMENT | Facility: CLINIC | Age: 81
End: 2021-05-28

## 2021-06-02 ENCOUNTER — TRANSFERRED RECORDS (OUTPATIENT)
Dept: HEALTH INFORMATION MANAGEMENT | Facility: CLINIC | Age: 81
End: 2021-06-02

## 2021-06-02 ENCOUNTER — ANTICOAGULATION THERAPY VISIT (OUTPATIENT)
Dept: INTERNAL MEDICINE | Facility: CLINIC | Age: 81
End: 2021-06-02

## 2021-06-02 DIAGNOSIS — Z79.01 LONG TERM CURRENT USE OF ANTICOAGULANTS WITH INR GOAL OF 2.0-3.0: ICD-10-CM

## 2021-06-02 DIAGNOSIS — I48.21 PERMANENT ATRIAL FIBRILLATION (H): ICD-10-CM

## 2021-06-02 DIAGNOSIS — I48.91 ATRIAL FIBRILLATION (H): ICD-10-CM

## 2021-06-02 LAB — INR PPP: 2.4 (ref 0.9–1.1)

## 2021-06-02 NOTE — PROGRESS NOTES
ANTICOAGULATION  MANAGEMENT-Patient Home Monitoring Result    Assessment     Therapeutic INR result of 2.4 . Goal range 2.0-3.0. Received via fax from [employer] home INR monitoring company.        Previous INR was therapeutic    Mariya was last contacted by phone:     Plan     Per home monitoring agreement with patient, patient was NOT contacted regarding therapeutic result today.  Patient is to continue current dose and continue to check INR with home monitor per protocol.  ?       OBJECTIVE    INR   Date Value Ref Range Status   2021 2.4 (A) 0.90 - 1.10 Final       ASSESSMENT / PLAN  No question data found.  Anticoagulation Summary  As of 2021    INR goal:  2.0-3.0   TTR:  63.0 % (1 y)   INR used for dosin.4 (2021)   Warfarin maintenance plan:  5 mg (5 mg x 1) every Sat; 2.5 mg (5 mg x 0.5) all other days   Full warfarin instructions:  5 mg every Sat; 2.5 mg all other days   Weekly warfarin total:  20 mg   No change documented:  Thais Castro RN   Plan last modified:  Destinee Christine RN (2021)   Next INR check:  2021   Priority:  Maintenance   Target end date:  Indefinite    Indications    Long term current use of anticoagulants with INR goal of 2.0-3.0 [Z79.01]  Atrial fibrillation (H) [I48.91]  Permanent atrial fibrillation (H) [I48.21]             Anticoagulation Episode Summary     INR check location:      Preferred lab:  EXTER[facility] LAB    Send INR reminders to:  LUIS EDUARDO MACK    Comments:        Anticoagulation Care Providers     Provider Role Specialty Phone number    Chavez Molina MD Referring Internal Medicine [phone]

## 2021-06-16 ENCOUNTER — TRANSFERRED RECORDS (OUTPATIENT)
Dept: HEALTH INFORMATION MANAGEMENT | Facility: CLINIC | Age: 81
End: 2021-06-16

## 2021-06-16 LAB — INR PPP: 2.2 (ref 0.9–1.1)

## 2021-06-17 ENCOUNTER — DOCUMENTATION ONLY (OUTPATIENT)
Dept: INTERNAL MEDICINE | Facility: CLINIC | Age: 81
End: 2021-06-17

## 2021-06-17 DIAGNOSIS — Z79.01 LONG TERM CURRENT USE OF ANTICOAGULANTS WITH INR GOAL OF 2.0-3.0: ICD-10-CM

## 2021-06-17 DIAGNOSIS — I48.21 PERMANENT ATRIAL FIBRILLATION (H): ICD-10-CM

## 2021-06-17 DIAGNOSIS — I48.91 ATRIAL FIBRILLATION (H): ICD-10-CM

## 2021-06-17 NOTE — PROGRESS NOTES
ANTICOAGULATION  MANAGEMENT-Patient Home Monitoring Result    Assessment     Therapeutic INR result of 2.2 . Goal range 2.0-3.0. Received via fax from [employer] home INR monitoring company.        Previous INR was therapeutic    Mariya was last contacted by phone: 2021    Plan     Per home monitoring agreement with patient, patient was NOT contacted regarding therapeutic result today.  Patient is to continue current dose and continue to check INR with home monitor per protocol.  ?       OBJECTIVE    INR   Date Value Ref Range Status   2021 2.2 (A) 0.90 - 1.10 Final       ASSESSMENT / PLAN  No question data found.  Anticoagulation Summary  As of 2021    INR goal:  2.0-3.0   TTR:  62.9 % (1 y)   INR used for dosin.2 (2021)   Warfarin maintenance plan:  5 mg (5 mg x 1) every Sat; 2.5 mg (5 mg x 0.5) all other days   Full warfarin instructions:  5 mg every Sat; 2.5 mg all other days   Weekly warfarin total:  20 mg   No change documented:  Lalo Tapia RN   Plan last modified:  Destinee Christine RN (2021)   Next INR check:  2021   Priority:  Maintenance   Target end date:  Indefinite    Indications    Long term current use of anticoagulants with INR goal of 2.0-3.0 [Z79.01]  Atrial fibrillation (H) [I48.91]  Permanent atrial fibrillation (H) [I48.21]             Anticoagulation Episode Summary     INR check location:      Preferred lab:  EXTERNAL LAB    Send INR reminders to:  LUIS EDUARDO MACK    Comments:        Anticoagulation Care Providers     Provider Role Specialty Phone number    Chavez Molina MD Referring Internal Medicine [phone]

## 2021-06-24 ENCOUNTER — TRANSFERRED RECORDS (OUTPATIENT)
Dept: HEALTH INFORMATION MANAGEMENT | Facility: CLINIC | Age: 81
End: 2021-06-24

## 2021-06-24 ENCOUNTER — ANTICOAGULATION THERAPY VISIT (OUTPATIENT)
Dept: INTERNAL MEDICINE | Facility: CLINIC | Age: 81
End: 2021-06-24

## 2021-06-24 DIAGNOSIS — Z79.01 LONG TERM CURRENT USE OF ANTICOAGULANTS WITH INR GOAL OF 2.0-3.0: ICD-10-CM

## 2021-06-24 DIAGNOSIS — I48.21 PERMANENT ATRIAL FIBRILLATION (H): ICD-10-CM

## 2021-06-24 DIAGNOSIS — I48.0 PAROXYSMAL ATRIAL FIBRILLATION (H): ICD-10-CM

## 2021-06-24 LAB — INR PPP: 1.8 (ref 0.9–1.1)

## 2021-06-24 NOTE — PROGRESS NOTES
Incoming fax from [employer] home monitoring company    Date of INR 6/24    INR result 1.8

## 2021-06-24 NOTE — PROGRESS NOTES
qANTICOAGULATION MANAGEMENT     Mariya Rowe 80 year old female is on warfarin with subtherapeutic INR result. (Goal INR 2.0-3.0)    Recent labs: (last 7 days)     06/24/21   INR 1.8*       ASSESSMENT     Source(s): Patient/Caregiver Call       Warfarin doses taken: Warfarin taken as instructed    Diet: Increased greens/vitamin K in diet; plans to resume previous intake. Pt had extra lettuce this past week but plans to go back to her normal intake.    New illness, injury, or hospitalization: No    Medication/supplement changes: None noted    Signs or symptoms of bleeding or clotting: No    Previous INR: Therapeutic last 2(+) visits    Additional findings: None     PLAN     Recommended plan for temporary change(s) affecting INR     Dosing Instructions: Booster dose then continue your current warfarin dose with next INR in 1 week       Summary  As of 6/24/2021    Full warfarin instructions:  6/24: 5 mg; Otherwise 5 mg every Sat; 2.5 mg all other days   Next INR check:  6/30/2021             Telephone call with Mariya whom verbalizes understanding and agrees to plan    Patient to recheck with home meter    Education provided: Target INR goal and significance of current INR result    Plan made per [facility] anticoagulation protocol    Lalo Tapia RN  Anticoagulation Clinic  6/24/2021    _______________________________________________________________________     Anticoagulation Episode Summary     Current INR goal:  2.0-3.0   TTR:  61.9 % (1 y)   Target end date:  Indefinite   Send INR reminders to:  [facility]    Indications    Long term current use of anticoagulants with INR goal of 2.0-3.0 [Z79.01]  Atrial fibrillation (H) [I48.91]  Permanent atrial fibrillation (H) [I48.21]           Comments:           Anticoagulation Care Providers     Provider Role Specialty Phone number    Chavez Molina MD Referring Internal Medicine [phone]

## 2021-06-29 ENCOUNTER — TRANSFERRED RECORDS (OUTPATIENT)
Dept: HEALTH INFORMATION MANAGEMENT | Facility: CLINIC | Age: 81
End: 2021-06-29

## 2021-06-30 ENCOUNTER — TRANSFERRED RECORDS (OUTPATIENT)
Dept: HEALTH INFORMATION MANAGEMENT | Facility: CLINIC | Age: 81
End: 2021-06-30

## 2021-06-30 ENCOUNTER — ANTICOAGULATION THERAPY VISIT (OUTPATIENT)
Dept: INTERNAL MEDICINE | Facility: CLINIC | Age: 81
End: 2021-06-30

## 2021-06-30 DIAGNOSIS — I48.0 PAROXYSMAL ATRIAL FIBRILLATION (H): ICD-10-CM

## 2021-06-30 DIAGNOSIS — Z79.01 LONG TERM CURRENT USE OF ANTICOAGULANTS WITH INR GOAL OF 2.0-3.0: ICD-10-CM

## 2021-06-30 DIAGNOSIS — I48.21 PERMANENT ATRIAL FIBRILLATION (H): ICD-10-CM

## 2021-06-30 LAB — INR PPP: 1.7 (ref 0.9–1.1)

## 2021-06-30 NOTE — PROGRESS NOTES
ANTICOAGULATION MANAGEMENT     Mariya Rowe 80 year old female is on warfarin with subtherapeutic INR result. (Goal INR 2.0-3.0)    Recent labs: (last 7 days)     06/30/21   INR 1.7*       ASSESSMENT     Source(s): Patient/Caregiver Call       Warfarin doses taken: Warfarin taken as instructed    Diet: Increased greens/vitamin K in diet; plans to resume previous intake    New illness, injury, or hospitalization: No    Medication/supplement changes: None noted    Signs or symptoms of bleeding or clotting: No    Previous INR: Subtherapeutic    Additional findings: None     PLAN     Recommended plan for temporary change(s) affecting INR     Dosing Instructions: Booster dose then continue your current warfarin dose with next INR in 1 week       Summary  As of 6/30/2021    Full warfarin instructions:  6/30: 7.5 mg; Otherwise 5 mg every Wed, Sat; 2.5 mg all other days   Next INR check:  7/7/2021             Telephone call with Mariya whom verbalizes understanding and agrees to plan    Patient to recheck with home meter    Education provided: Please call back if any changes to your diet, medications or how you've been taking warfarin and Importance of consistent vitamin K intake    Plan made per [facility] anticoagulation protocol    Anita Palmer, RN  Anticoagulation Clinic  6/30/2021    _______________________________________________________________________     Anticoagulation Episode Summary     Current INR goal:  2.0-3.0   TTR:  60.3 % (1 y)   Target end date:  Indefinite   Send INR reminders to:  EVELYN FREDERICK    Indications    Long term current use of anticoagulants with INR goal of 2.0-3.0 [Z79.01]  Atrial fibrillation (H) [I48.91]  Permanent atrial fibrillation (H) [I48.21]           Comments:           Anticoagulation Care Providers     Provider Role Specialty Phone number    Chavez Molina MD Referring Internal Medicine [phone]

## 2021-07-07 ENCOUNTER — TRANSFERRED RECORDS (OUTPATIENT)
Dept: HEALTH INFORMATION MANAGEMENT | Facility: CLINIC | Age: 81
End: 2021-07-07

## 2021-07-07 ENCOUNTER — ANTICOAGULATION THERAPY VISIT (OUTPATIENT)
Dept: INTERNAL MEDICINE | Facility: CLINIC | Age: 81
End: 2021-07-07

## 2021-07-07 DIAGNOSIS — I48.21 PERMANENT ATRIAL FIBRILLATION (H): ICD-10-CM

## 2021-07-07 DIAGNOSIS — Z79.01 LONG TERM CURRENT USE OF ANTICOAGULANTS WITH INR GOAL OF 2.0-3.0: ICD-10-CM

## 2021-07-07 DIAGNOSIS — I48.0 PAROXYSMAL ATRIAL FIBRILLATION (H): ICD-10-CM

## 2021-07-07 LAB — INR PPP: 1.8 (ref 0.9–1.1)

## 2021-07-07 NOTE — PROGRESS NOTES
ANTICOAGULATION MANAGEMENT     Mariya Rowe 80 year old female is on warfarin with subtherapeutic INR result. (Goal INR 2.0-3.0)    Recent labs: (last 7 days)     07/07/21   INR 1.8*       ASSESSMENT     Source(s): Patient/Caregiver Call       Warfarin doses taken: Warfarin taken as instructed    Diet: Had some cabbage this past week but did not have much more vit K than usual.    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    Medication/supplement changes: None noted    Signs or symptoms of bleeding or clotting: No    Previous INR: Subtherapeutic    Additional findings: None     PLAN     Recommended plan for no diet, medication or health factor changes affecting INR     Dosing Instructions:  Increase your warfarin dose (11% change) with next INR in 1 week       Summary  As of 7/7/2021    Full warfarin instructions:  5 mg every Mon, Wed, Sat; 2.5 mg all other days   Next INR check:  7/14/2021             Telephone call with Mariya who verbalizes understanding and agrees to plan    Patient to recheck with home meter    Education provided: Target INR goal and significance of current INR result    Plan made based on [facility] protocol. Today's result calls for 5-10% increase. Smallest increase with tablet size on hand is 11%.    Lalo Tapia RN  Anticoagulation Clinic  7/7/2021    _______________________________________________________________________     Anticoagulation Episode Summary     Current INR goal:  2.0-3.0   TTR:  58.4 % (1 y)   Target end date:  Indefinite   Send INR reminders to:  LUIS EDUARDO MACK    Indications    Long term current use of anticoagulants with INR goal of 2.0-3.0 [Z79.01]  Atrial fibrillation (H) [I48.91]  Permanent atrial fibrillation (H) [I48.21]           Comments:           Anticoagulation Care Providers     Provider Role Specialty Phone number    Chavez Molina MD Referring Internal Medicine [phone]

## 2021-07-12 ENCOUNTER — TRANSFERRED RECORDS (OUTPATIENT)
Dept: HEALTH INFORMATION MANAGEMENT | Facility: CLINIC | Age: 81
End: 2021-07-12

## 2021-07-16 ENCOUNTER — ANTICOAGULATION THERAPY VISIT (OUTPATIENT)
Dept: INTERNAL MEDICINE | Facility: CLINIC | Age: 81
End: 2021-07-16

## 2021-07-16 ENCOUNTER — TRANSFERRED RECORDS (OUTPATIENT)
Dept: HEALTH INFORMATION MANAGEMENT | Facility: CLINIC | Age: 81
End: 2021-07-16

## 2021-07-16 DIAGNOSIS — I48.91 ATRIAL FIBRILLATION (H): ICD-10-CM

## 2021-07-16 DIAGNOSIS — I48.21 PERMANENT ATRIAL FIBRILLATION (H): ICD-10-CM

## 2021-07-16 DIAGNOSIS — Z79.01 LONG TERM CURRENT USE OF ANTICOAGULANTS WITH INR GOAL OF 2.0-3.0: Primary | ICD-10-CM

## 2021-07-16 LAB — INR PPP: 2.4 (ref 2–3)

## 2021-07-16 NOTE — PROGRESS NOTES
"Anticoagulation Management    Unable to reach patient  today.    Today's INR result of 2.4 is therapeutic (goal INR of 2.0-3.0).  Result received from: Home Monitor    Follow up required to confirm warfarin dose taken and assess for changes patient \"manage by exception\" was out of range last week.     LVM to call clinic back.      Anticoagulation clinic to follow up    Vira Vargas RN    "

## 2021-07-16 NOTE — PROGRESS NOTES
Incoming fax    Receieved on 07/16/21    From [facility]    INR 2.4       Mary Webber RN, BSN, PHN

## 2021-07-16 NOTE — PROGRESS NOTES
ANTICOAGULATION MANAGEMENT     Mariya Rowe 80 year old female is on warfarin with therapeutic INR result. (Goal INR 2.0-3.0)    Recent labs: (last 7 days)     07/16/21  0000   INR 2.4       ASSESSMENT     Per protocol, INR nurse called patient to go over today's INR, any changes and to remind patient that if next INR in range, patient will not receive a call and will go back to being contacted only when out of range or every 3 months.   Today, 7/16, INR nurse unable to get a hold of patient. LVM requesting call back to clinic with any changes.      PLAN     Recommended plan continue maintenance. Please call with any changes.     Dosing Instructions: Continue your current warfarin dose with next INR in 1 week       Summary  As of 7/16/2021    Full warfarin instructions:  5 mg every Mon, Wed, Sat; 2.5 mg all other days   Next INR check:  7/30/2021             Left VM to continue maintenance and to inform INR back in range--no detailed info. left.     Patient to recheck with home meter    Education provided: Please call back if any changes to your diet, medications or how you've been taking warfarin    Plan made per [facility] anticoagulation protocol    Vira Vargas, RN  Anticoagulation Clinic  7/16/2021    _______________________________________________________________________     Anticoagulation Episode Summary     Current INR goal:  2.0-3.0   TTR:  57.5 % (1 y)   Target end date:  Indefinite   Send INR reminders to:  [facility] EDUARDO MACK    Indications    Long term current use of anticoagulants with INR goal of 2.0-3.0 [Z79.01]  Atrial fibrillation (H) [I48.91]  Permanent atrial fibrillation (H) [I48.21]           Comments:           Anticoagulation Care Providers     Provider Role Specialty Phone number    Chavez Molina MD Referring Internal Medicine [phone]

## 2021-07-28 ENCOUNTER — ANTICOAGULATION THERAPY VISIT (OUTPATIENT)
Dept: INTERNAL MEDICINE | Facility: CLINIC | Age: 81
End: 2021-07-28

## 2021-07-28 ENCOUNTER — TRANSFERRED RECORDS (OUTPATIENT)
Dept: HEALTH INFORMATION MANAGEMENT | Facility: CLINIC | Age: 81
End: 2021-07-28

## 2021-07-28 DIAGNOSIS — Z79.01 LONG TERM CURRENT USE OF ANTICOAGULANTS WITH INR GOAL OF 2.0-3.0: Primary | ICD-10-CM

## 2021-07-28 DIAGNOSIS — I48.91 ATRIAL FIBRILLATION (H): ICD-10-CM

## 2021-07-28 DIAGNOSIS — I48.21 PERMANENT ATRIAL FIBRILLATION (H): ICD-10-CM

## 2021-07-28 LAB — INR (EXTERNAL): 2 (ref 0.9–1.1)

## 2021-07-28 NOTE — PROGRESS NOTES
ANTICOAGULATION  MANAGEMENT-Patient Home Monitoring Result    Assessment     Therapeutic INR result of 2.0 . Goal range 2.0-3.0. Received via fax from [employer] home INR monitoring company.        Previous INR was therapeutic    Mariya was last contacted by phone: 21    Plan     Per home monitoring agreement with patient, patient was NOT contacted regarding therapeutic result today.  Patient is to continue current dose and continue to check INR with home monitor per protocol.  ?       OBJECTIVE    INR (External)   Date Value Ref Range Status   2021 2.0 (A) 0.9 - 1.1 Final       ASSESSMENT / PLAN  No question data found.  Anticoagulation Summary  As of 2021    INR goal:  2.0-3.0   TTR:  57.6 % (1 y)   INR used for dosin.0 (2021)   Warfarin maintenance plan:  5 mg (5 mg x 1) every Mon, Wed, Sat; 2.5 mg (5 mg x 0.5) all other days   Full warfarin instructions:  5 mg every Mon, Wed, Sat; 2.5 mg all other days   Weekly warfarin total:  25 mg   Plan last modified:  Lalo Tapia RN (2021)   Next INR check:  2021   Priority:  Maintenance   Target end date:  Indefinite    Indications    Long term current use of anticoagulants with INR goal of 2.0-3.0 [Z79.01]  Atrial fibrillation (H) [I48.91]  Permanent atrial fibrillation (H) [I48.21]             Anticoagulation Episode Summary     INR check location:      Preferred lab:  EXTERNAL LAB    Send INR reminders to:  LUIS EDUARDO MACK    Comments:        Anticoagulation Care Providers     Provider Role Specialty Phone number    Chavez Molina MD Referring Internal Medicine [phone]

## 2021-08-10 ENCOUNTER — TRANSFERRED RECORDS (OUTPATIENT)
Dept: HEALTH INFORMATION MANAGEMENT | Facility: CLINIC | Age: 81
End: 2021-08-10

## 2021-08-17 LAB — INR (EXTERNAL): 1.9 (ref 0.8–1.1)

## 2021-08-18 ENCOUNTER — DOCUMENTATION ONLY (OUTPATIENT)
Dept: INTERNAL MEDICINE | Facility: CLINIC | Age: 81
End: 2021-08-18

## 2021-08-22 ENCOUNTER — TRANSFERRED RECORDS (OUTPATIENT)
Dept: HEALTH INFORMATION MANAGEMENT | Facility: CLINIC | Age: 81
End: 2021-08-22

## 2021-08-25 ENCOUNTER — TELEPHONE (OUTPATIENT)
Dept: ANTICOAGULATION | Facility: CLINIC | Age: 81
End: 2021-08-25

## 2021-08-25 NOTE — TELEPHONE ENCOUNTER
ANTICOAGULATION     Mariya Rowe is overdue for INR check.      Spoke with Mariya instructed to test INR with home meter and call results to home monitoring company as soon as possible.    Artis Dc RN

## 2021-08-31 ENCOUNTER — ANTICOAGULATION THERAPY VISIT (OUTPATIENT)
Dept: INTERNAL MEDICINE | Facility: CLINIC | Age: 81
End: 2021-08-31

## 2021-08-31 ENCOUNTER — TRANSFERRED RECORDS (OUTPATIENT)
Dept: HEALTH INFORMATION MANAGEMENT | Facility: CLINIC | Age: 81
End: 2021-08-31

## 2021-08-31 DIAGNOSIS — Z79.01 LONG TERM CURRENT USE OF ANTICOAGULANTS WITH INR GOAL OF 2.0-3.0: Primary | ICD-10-CM

## 2021-08-31 DIAGNOSIS — I48.21 PERMANENT ATRIAL FIBRILLATION (H): ICD-10-CM

## 2021-08-31 DIAGNOSIS — I48.91 ATRIAL FIBRILLATION (H): ICD-10-CM

## 2021-08-31 LAB — INR (EXTERNAL): 1.6 (ref 0.8–1.1)

## 2021-08-31 NOTE — PROGRESS NOTES
Incoming fax from [employer] home monitoring company    Date of INR 8/31    INR result 1.6

## 2021-08-31 NOTE — PROGRESS NOTES
ANTICOAGULATION MANAGEMENT     Mariya Rowe 81 year old female is on warfarin with subtherapeutic INR result. (Goal INR 2.0-3.0)    Recent labs: (last 7 days)     08/31/21  1332   INR 1.6*       ASSESSMENT     Source(s): Chart Review and Patient/Caregiver Call       Warfarin doses taken: Warfarin taken as instructed    Diet: Increased greens/vitamin K in diet; plans to resume previous intake    New illness, injury, or hospitalization: No    Medication/supplement changes: None noted    Signs or symptoms of bleeding or clotting: No    Previous INR: Subtherapeutic    Additional findings: States 2 weeks ago her INR 1.9 and it wasnt reported by home monitor     PLAN     Recommended plan for ongoing change(s) affecting INR     Dosing Instructions: Booster dose then Increase your warfarin dose (10% change) with next INR in 9 days       Summary  As of 8/31/2021    Full warfarin instructions:  8/31: 5 mg; Otherwise 2.5 mg every Sun, Tue, Thu; 5 mg all other days   Next INR check:  9/9/2021             Telephone call with Mariya who verbalizes understanding and agrees to plan    Patient to recheck with home meter    Education provided: Please call back if any changes to your diet, medications or how you've been taking warfarin    Plan made per [facility] anticoagulation protocol    Tona Love RN  Anticoagulation Clinic  8/31/2021    _______________________________________________________________________     Anticoagulation Episode Summary     Current INR goal:  2.0-3.0   TTR:  54.5 % (1 y)   Target end date:  Indefinite   Send INR reminders to:  [facility] ED[facility] M[facility]    Indications    Long term current use of anticoagulants with INR goal of 2.0-3.0 [Z79.01]  Atrial fibrillation (H) [I48.91]  Permanent atrial fibrillation (H) [I48.21]           Comments:           Anticoagulation Care Providers     Provider Role Specialty Phone number    Chavez Molina MD Referring Internal Medicine [phone]

## 2021-09-15 ENCOUNTER — ANTICOAGULATION THERAPY VISIT (OUTPATIENT)
Dept: INTERNAL MEDICINE | Facility: CLINIC | Age: 81
End: 2021-09-15

## 2021-09-15 ENCOUNTER — TRANSFERRED RECORDS (OUTPATIENT)
Dept: HEALTH INFORMATION MANAGEMENT | Facility: CLINIC | Age: 81
End: 2021-09-15

## 2021-09-15 DIAGNOSIS — Z79.01 LONG TERM CURRENT USE OF ANTICOAGULANTS WITH INR GOAL OF 2.0-3.0: Primary | ICD-10-CM

## 2021-09-15 DIAGNOSIS — I48.91 ATRIAL FIBRILLATION (H): ICD-10-CM

## 2021-09-15 DIAGNOSIS — I48.21 PERMANENT ATRIAL FIBRILLATION (H): ICD-10-CM

## 2021-09-15 LAB — INR (EXTERNAL): 1.8 (ref 0.9–1.1)

## 2021-09-15 NOTE — PROGRESS NOTES
ANTICOAGULATION MANAGEMENT     Mariya Rowe 81 year old female is on warfarin with subtherapeutic INR result. (Goal INR 2.0-3.0)    Recent labs: (last 7 days)     09/15/21  1238   INR 1.8*       ASSESSMENT     Source(s): Chart Review and Patient/Caregiver Call       Warfarin doses taken: Less warfarin taken than planned which may be affecting INR and Missed dose(s) may be affecting INR. Patient misunderstood previous dosing plan and did not increase Saturday's dose to 5 mg.    Diet: No new diet changes identified    New illness, injury, or hospitalization: No    Medication/supplement changes: None noted    Signs or symptoms of bleeding or clotting: No    Previous INR: Subtherapeutic    Additional findings: INR is climbing despite missed doses.     PLAN     Recommended plan for temporary change(s) affecting INR     Dosing Instructions: Resume increased warfarin dose of 27.5 mg weekly with next INR in 1 week to ensure level does not climb outside of range.    Summary  As of 9/15/2021    Full warfarin instructions:  2.5 mg every Sun, Tue, Thu; 5 mg all other days   Next INR check:  9/22/2021             Telephone call with Mariya who verbalizes understanding and agrees to plan and who agrees to plan and repeated back plan correctly    Patient to recheck with home meter    Education provided: Please call back if any changes to your diet, medications or how you've been taking warfarin, Monitoring for bleeding signs and symptoms, Monitoring for clotting signs and symptoms and Importance of notifying clinic for changes in medications; a sooner lab recheck maybe needed.    Plan made per [facility] anticoagulation protocol    Sparkle Poon, RN  Anticoagulation Clinic  9/15/2021    _______________________________________________________________________     Anticoagulation Episode Summary     Current INR goal:  2.0-3.0   TTR:  50.8 % (1 y)   Target end date:  Indefinite   Send INR reminders to:  LU[facility] EDUARDO Bates     Long term current use of anticoagulants with INR goal of 2.0-3.0 [Z79.01]  Atrial fibrillation (H) [I48.91]  Permanent atrial fibrillation (H) [I48.21]           Comments:           Anticoagulation Care Providers     Provider Role Specialty Phone number    Chavez Molina MD Referring Internal Medicine [phone]

## 2021-09-20 NOTE — PROGRESS NOTES
Cardiology Clinic Progress Note    Service Date: 09/21/21    Primary Cardiologist: Dr. Kenyon      Reason for Visit:  Annual     HPI:   I had the pleasure of seeing Ms. Mariya Rowe in the clinic today. she is a very pleasant 81 year old female with a past medical history notable for    1. Sick sinus syndrome with bradycardia in the past (while in normal sinus rhythm).   2. permanent Atrial fibrillation.   C.  Dyslipidemia.       In 2019, pt saw Dr. Kenyon and was doing well.      Today, she reports no CV complaints.  She had hip surgery and now has back pain therefore cannot exercise but denies chest pain, shortness of breath, dyspnea on exertion, orthopnea, PND, lower extremity edema, palpitations, dizziness.   Reports taking medications as prescribed including warfarin and denies bleeding and sign/symptoms of stroke.     ASSESSMENT AND PLAN:    Sick sinus syndrome     with bradycardia in the past (while in normal sinus rhythm).     ECG today shows afib at 92 bpm    Permanent Atrial fibrillation    For rate control she is taking metoprolol tartrate 25 mg twice daily.   Her ECG today showed Afib with ventricular rate of 91 bpm.     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  She has not recently been therapeutic since July 2021.  Her last hgb was 11.6 (5/2021).       Due to not having therapeutic INR's we discussed DOACs (Eliquis and Xarelto).   She was hesitant to change because she has had 3 doctors thold her there is no antidote for the Eliquis or Xarelto.   When asked when she was told this she stated 3 years ago.  We discussed the antidote is more available now than it had been in the past.  We also discussed guidelines recommend DOACs over warfarin for new diagnosis of atrial fibrillation.  She expressed desire to discuss with Dr. Kenyon prior to changing and she will monitor her INR's for 1 more month and if not therapeutic will call for  appt with Dr. Kenyon to discuss changing OAC.      Pt had multiple questions regarding antibiotics prior to dental work for her hip replacement and NSAIDS.   She wanted Dr. Kenyon' approval prior to starting an antibiotic.  I discussed he would approve the one time dose of antibiotic prior to dental work.   She then asked about NSAID and appeared to get the antibiotic and NSAID confused.  We discussed ibuprofen/Advil is not recommended when taking OAC.      Plan:    Call if INR's do not normalize in the therapeutic range and discuss DOAC with Dr. Kenyon    Otherwise follow up in 1 year with 24 hour Holter prior.     Follow orthopaedic medicine recommendation for antibiotic use.     Please call the clinic if questions or problems arise      Thank you for the opportunity to participate in this pleasant patient's care. We would be happy to see her sooner if needed for any concerns in the meantime.          DAVID Barragan [facility]  Text Page  (M-F 8:00 am - 4:30 pm)    Orders this Visit:  Orders Placed This Encounter   Procedures     Follow-Up with Electrophysiologist     EKG 12-lead complete w/read - Clinics     Holter Monitor 24 hour Adult Pediatric     No orders of the defined types were placed in this encounter.    Medications Discontinued During This Encounter   Medication Reason     senna-docusate (SENOKOT-S/PERICOLACE) 8.6-50 MG tablet Not filled/taken by Patient     Encounter Diagnosis   Name Primary?     Permanent atrial fibrillation (H)        CURRENT MEDICATIONS:  Current Outpatient Medications   Medication Sig Dispense Refill     acetaminophen (TYLENOL) 500 MG tablet Take 2 tablets (1,000 mg) by mouth every 6 hours as needed for pain 100 tablet 0     CALCIUM-VITAMIN D PO Take 1 tablet by mouth 2 times daily       fluorometholone (FML LIQUIFILM) 0.1 % ophthalmic susp Place 1 drop into the right eye daily       metoprolol tartrate (LOPRESSOR) 25 MG tablet TAKE 1 TABLET BY MOUTH TWICE A DAY (Patient  taking differently: Take 25 mg by mouth 2 times daily TAKE 1 TABLET BY MOUTH TWICE A DAY) 180 tablet 3     Multiple Vitamin (MULTI-VITAMIN PO) Take 1 tablet by mouth daily        rosuvastatin (CRESTOR) 5 MG tablet Take 1 tablet (5 mg) by mouth daily 90 tablet 2     warfarin ANTICOAGULANT (COUMADIN) 5 MG tablet Take 2.5-5 mg by mouth daily Takes  5mg on Tuesdays and Saturdays  2.5mg the rest of the week (Mon, Wed, Thurs, Fri, Sun)         ALLERGIES  Allergies   Allergen Reactions     Lidocaine Other (See Comments)     Pt lopez Hx of a reaction to Lidocaine Eye solution.     Atorvastatin Rash     LIPITOR - pt gets a rash       PAST MEDICAL, SURGICAL, FAMILY HISTORY:  History was reviewed and updated as needed, see medical record.    SOCIAL HISTORY:  Social History     Socioeconomic History     Marital status:      Spouse name: Julio     Number of children: 2     Years of education: Not on file     Highest education level: Associate degree: occupational, technical, or vocational program   Occupational History     Occupation:      Comment: Retired at age 63   Tobacco Use     Smoking status: Never Smoker     Smokeless tobacco: Never Used   Substance and Sexual Activity     Alcohol use: No     Alcohol/week: 0.0 standard drinks     Drug use: No     Sexual activity: Yes     Partners: Male   Other Topics Concern     Parent/sibling w/ CABG, MI or angioplasty before 65F 55M? Not Asked      Service Not Asked     Blood Transfusions Not Asked     Caffeine Concern No     Occupational Exposure Not Asked     Hobby Hazards Not Asked     Sleep Concern No     Stress Concern Not Asked     Weight Concern Not Asked     Special Diet No     Back Care Not Asked     Exercise No     Comment: some walking cleaning     Bike Helmet Not Asked     Seat Belt Not Asked     Self-Exams Not Asked   Social History Narrative    Exercise bike or walking 3-4x/week.     Spends 5 months/year in [place], [place].    2 children, both in  "[place]. 4 grandchildren.      Social Determinants of Health     Financial Resource Strain:      Difficulty of Paying Living Expenses:    Food Insecurity:      Worried About Running Out of Food in the Last Year:      Ran Out of Food in the Last Year:    Transportation Needs:      Lack of Transportation (Medical):      Lack of Transportation (Non-Medical):    Physical Activity:      Days of Exercise per Week:      Minutes of Exercise per Session:    Stress:      Feeling of Stress :    Social Connections:      Frequency of Communication with Friends and Family:      Frequency of Social Gatherings with Friends and Family:      Attends Alevism Services:      Active Member of Clubs or Organizations:      Attends Club or Organization Meetings:      Marital Status:    Intimate Partner Violence:      Fear of Current or Ex-Partner:      Emotionally Abused:      Physically Abused:      Sexually Abused:        Review of Systems:  Skin:  Negative     Eyes:  Positive for glasses  ENT:  Negative    Respiratory:  Negative    Cardiovascular:  Negative    Gastroenterology: Negative    Genitourinary:  Negative    Musculoskeletal:  Positive for back pain  Neurologic:  Negative    Psychiatric:  Negative    Heme/Lymph/Imm:  Positive for easy bruising  Endocrine:  Negative       Physical Exam:  Vitals: /80 (BP Location: Right arm, Patient Position: Sitting, Cuff Size: Adult Regular)   Pulse (!) 43   Ht 1.803 m (5' 11\")   Wt 65.3 kg (144 lb)   LMP  (LMP Unknown)   SpO2 98%   BMI 20.08 kg/m     Wt Readings from Last 4 Encounters:   09/21/21 65.3 kg (144 lb)   05/07/21 66.3 kg (146 lb 3.2 oz)   04/20/21 65.8 kg (145 lb)   03/17/21 76.4 kg (168 lb 6.4 oz)     CONSTITUTIONAL: Appears her stated age, well nourished, and in no acute distress.  HEENT: Pupils equal, round. Sclerae nonicteric.    NECK: Supple, no masses appreciated.  C/V:  irregular rhythm  RESP: Respirations are unlabored. Lungs are clear to auscultation " bilaterally without wheezing, rales, or rhonchi.  GI: Abdomen soft, non-tender, non-distended.  EXTREM:  No clubbing, cyanosis, or lower extremity edema bilaterally.   NEURO: Alert and oriented, cooperative. Gait steady. No gross focal deficits.   PSYCH: Affect appropriate. Sightly confused Responds to questions appropriately.  SKIN: Warm and dry. No apparent rashes or bruising.     Recent Lab Results:  LIPID RESULTS:  Lab Results   Component Value Date    CHOL 155 12/22/2020    HDL 65 12/22/2020    LDL 64 12/22/2020    TRIG 128 12/22/2020    CHOLHDLRATIO 2.5 06/16/2015     LIVER ENZYME RESULTS:  Lab Results   Component Value Date    AST 22 04/20/2021    ALT 35 04/20/2021     CBC RESULTS:  Lab Results   Component Value Date    WBC 7.3 04/20/2021    RBC 4.47 04/20/2021    HGB 11.6 (L) 05/07/2021    HCT 42.7 04/20/2021    MCV 96 04/20/2021    MCH 31.1 04/20/2021    MCHC 32.6 04/20/2021    RDW 14.4 04/20/2021     04/20/2021     BMP RESULTS:  Lab Results   Component Value Date     04/20/2021    POTASSIUM 3.7 04/20/2021    CHLORIDE 105 04/20/2021    CO2 31 04/20/2021    ANIONGAP 2 (L) 04/20/2021    GLC 96 04/20/2021    BUN 14 04/20/2021    CR 0.76 05/06/2021    GFRESTIMATED 74 05/06/2021    GFRESTBLACK 86 05/06/2021    TONO 9.5 04/20/2021      INR RESULTS:  Lab Results   Component Value Date    INR 1.8 (A) 09/15/2021    INR 1.6 (A) 08/31/2021    INR 2.4 07/16/2021    INR 1.8 (A) 07/07/2021       CC  Yelena Kenyon MD  [address]  [place] [id number]    This note was completed in part using Dragon voice recognition software. Although reviewed after completion, some word and grammatical errors may occur.

## 2021-09-21 ENCOUNTER — ANTICOAGULATION THERAPY VISIT (OUTPATIENT)
Dept: INTERNAL MEDICINE | Facility: CLINIC | Age: 81
End: 2021-09-21

## 2021-09-21 ENCOUNTER — TRANSFERRED RECORDS (OUTPATIENT)
Dept: HEALTH INFORMATION MANAGEMENT | Facility: CLINIC | Age: 81
End: 2021-09-21

## 2021-09-21 ENCOUNTER — OFFICE VISIT (OUTPATIENT)
Dept: CARDIOLOGY | Facility: CLINIC | Age: 81
End: 2021-09-21
Attending: INTERNAL MEDICINE
Payer: COMMERCIAL

## 2021-09-21 VITALS
BODY MASS INDEX: 20.16 KG/M2 | HEART RATE: 43 BPM | OXYGEN SATURATION: 98 % | WEIGHT: 144 LBS | HEIGHT: 71 IN | DIASTOLIC BLOOD PRESSURE: 80 MMHG | SYSTOLIC BLOOD PRESSURE: 128 MMHG

## 2021-09-21 DIAGNOSIS — I48.21 PERMANENT ATRIAL FIBRILLATION (H): Primary | ICD-10-CM

## 2021-09-21 DIAGNOSIS — I48.21 PERMANENT ATRIAL FIBRILLATION (H): ICD-10-CM

## 2021-09-21 DIAGNOSIS — Z79.01 LONG TERM CURRENT USE OF ANTICOAGULANTS WITH INR GOAL OF 2.0-3.0: Primary | ICD-10-CM

## 2021-09-21 DIAGNOSIS — Z79.01 ANTICOAGULATED ON WARFARIN: ICD-10-CM

## 2021-09-21 DIAGNOSIS — J32.0 OAC (ORO-ANTRAL COMMUNICATION): ICD-10-CM

## 2021-09-21 LAB — INR (EXTERNAL): 2 (ref 2–3)

## 2021-09-21 PROCEDURE — 99213 OFFICE O/P EST LOW 20 MIN: CPT | Performed by: NURSE PRACTITIONER

## 2021-09-21 PROCEDURE — 93000 ELECTROCARDIOGRAM COMPLETE: CPT | Performed by: NURSE PRACTITIONER

## 2021-09-21 ASSESSMENT — MIFFLIN-ST. JEOR: SCORE: 1219.31

## 2021-09-21 NOTE — PROGRESS NOTES
ANTICOAGULATION MANAGEMENT     Mariya Rowe 81 year old female is on warfarin with therapeutic INR result. (Goal INR 2.0-3.0)    Recent labs: (last 7 days)     09/21/21  1652   INR 2       ASSESSMENT     Source(s): Chart Review and Patient/Caregiver Call       Warfarin doses taken: Warfarin taken as instructed    Diet: No new diet changes identified    New illness, injury, or hospitalization: No    Medication/supplement changes: None noted    Signs or symptoms of bleeding or clotting: No    Previous INR: Subtherapeutic    Additional findings: None     PLAN     Recommended plan for no diet, medication or health factor changes affecting INR     Dosing Instructions: Continue your current warfarin dose with next INR in 2 weeks       Summary  As of 9/21/2021    Full warfarin instructions:  2.5 mg every Sun, Tue, Thu; 5 mg all other days   Next INR check:  9/28/2021             Telephone call with Mariya who verbalizes understanding and agrees to plan and who agrees to plan and repeated back plan correctly    Patient to recheck with home meter    Education provided: Please call back if any changes to your diet, medications or how you've been taking warfarin    Plan made per [facility] anticoagulation protocol    Vira Vargas, RN  Anticoagulation Clinic  9/21/2021    _______________________________________________________________________     Anticoagulation Episode Summary     Current INR goal:  2.0-3.0   TTR:  49.1 % (1 y)   Target end date:  Indefinite   Send INR reminders to:  LUIS EDUARDO MACK    Indications    Long term current use of anticoagulants with INR goal of 2.0-3.0 [Z79.01]  Atrial fibrillation (H) [I48.91]  Permanent atrial fibrillation (H) [I48.21]           Comments:           Anticoagulation Care Providers     Provider Role Specialty Phone number    Chavez Molina MD Referring Internal Medicine [phone]

## 2021-09-21 NOTE — PATIENT INSTRUCTIONS
If your INRs do not normalize consider starting Eliquis 5 mg twice daily or Xarelto.   I would recommend calling your insurance company for the price and if they are expensive we will ask for a Tier exemption.    If you feel you need to talk with Dr Kenyon prior.      You can take the 1 time dose of antibiotic before your dental surgery.      Ibuprofen or Advil is not recommended when you take blood thinners such as warfarin, Eliquis or Xarelto    If you have problems or questions please call the RNs (Imelda Tanner, & Romi) at [phone]

## 2021-09-21 NOTE — LETTER
9/21/2021    Chavez Molina MD, MD  [address] [facility] [address]  [facility] [address]    RE: Mariya Rowe       Dear Colleague,    I had the pleasure of seeing Mariya Rowe in the [facility].        Cardiology Clinic Progress Note    Service Date: 09/21/21    Primary Cardiologist: Dr. eKnyon      Reason for Visit:  Annual     HPI:   I had the pleasure of seeing Ms. Mariya Rowe in the clinic today. she is a very pleasant 81 year old female with a past medical history notable for    1. Sick sinus syndrome with bradycardia in the past (while in normal sinus rhythm).   2. permanent Atrial fibrillation.   C.  Dyslipidemia.       In 2019, pt saw Dr. Kenyon and was doing well.      Today, she reports no CV complaints.  She had hip surgery and now has back pain therefore cannot exercise but denies chest pain, shortness of breath, dyspnea on exertion, orthopnea, PND, lower extremity edema, palpitations, dizziness.   Reports taking medications as prescribed including warfarin and denies bleeding and sign/symptoms of stroke.     ASSESSMENT AND PLAN:    Sick sinus syndrome     with bradycardia in the past (while in normal sinus rhythm).     ECG today shows afib at 92 bpm    Permanent Atrial fibrillation    For rate control she is taking metoprolol tartrate 25 mg twice daily.   Her ECG today showed Afib with ventricular rate of 91 bpm.     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  She has not recently been therapeutic since July 2021.  Her last hgb was 11.6 (5/2021).       Due to not having therapeutic INR's we discussed DOACs (Eliquis and Xarelto).   She was hesitant to change because she has had 3 doctors thold her there is no antidote for the Eliquis or Xarelto.   When asked when she was told this she stated 3 years ago.  We discussed the antidote is more available now than it had been in the past.   We also discussed guidelines recommend DOACs over warfarin for new diagnosis of atrial fibrillation.  She expressed desire to discuss with Dr. Kenyon prior to changing and she will monitor her INR's for 1 more month and if not therapeutic will call for appt with Dr. Kenyon to discuss changing OAC.      Pt had multiple questions regarding antibiotics prior to dental work for her hip replacement and NSAIDS.   She wanted Dr. Kenyon' approval prior to starting an antibiotic.  I discussed he would approve the one time dose of antibiotic prior to dental work.   She then asked about NSAID and appeared to get the antibiotic and NSAID confused.  We discussed ibuprofen/Advil is not recommended when taking OAC.      Plan:    Call if INR's do not normalize in the therapeutic range and discuss DOAC with Dr. Kenyon    Otherwise follow up in 1 year with 24 hour Holter prior.     Follow orthopaedic medicine recommendation for antibiotic use.     Please call the clinic if questions or problems arise      Thank you for the opportunity to participate in this pleasant patient's care. We would be happy to see her sooner if needed for any concerns in the meantime.          DAVID Barragan CNP  [facility]  Text Page  (M-F 8:00 am - 4:30 pm)    Orders this Visit:  Orders Placed This Encounter   Procedures     Follow-Up with Electrophysiologist     EKG 12-lead complete w/read - Clinics     Holter Monitor 24 hour Adult Pediatric     No orders of the defined types were placed in this encounter.    Medications Discontinued During This Encounter   Medication Reason     senna-docusate (SENOKOT-S/PERICOLACE) 8.6-50 MG tablet Not filled/taken by Patient     Encounter Diagnosis   Name Primary?     Permanent atrial fibrillation (H)        CURRENT MEDICATIONS:  Current Outpatient Medications   Medication Sig Dispense Refill     acetaminophen (TYLENOL) 500 MG tablet Take 2 tablets (1,000 mg) by mouth every 6 hours as needed for pain 100 tablet 0      CALCIUM-VITAMIN D PO Take 1 tablet by mouth 2 times daily       fluorometholone (FML LIQUIFILM) 0.1 % ophthalmic susp Place 1 drop into the right eye daily       metoprolol tartrate (LOPRESSOR) 25 MG tablet TAKE 1 TABLET BY MOUTH TWICE A DAY (Patient taking differently: Take 25 mg by mouth 2 times daily TAKE 1 TABLET BY MOUTH TWICE A DAY) 180 tablet 3     Multiple Vitamin (MULTI-VITAMIN PO) Take 1 tablet by mouth daily        rosuvastatin (CRESTOR) 5 MG tablet Take 1 tablet (5 mg) by mouth daily 90 tablet 2     warfarin ANTICOAGULANT (COUMADIN) 5 MG tablet Take 2.5-5 mg by mouth daily Takes  5mg on Tuesdays and Saturdays  2.5mg the rest of the week (Mon, Wed, Thurs, Fri, Sun)         ALLERGIES  Allergies   Allergen Reactions     Lidocaine Other (See Comments)     Pt lopez Hx of a reaction to Lidocaine Eye solution.     Atorvastatin Rash     LIPITOR - pt gets a rash       PAST MEDICAL, SURGICAL, FAMILY HISTORY:  History was reviewed and updated as needed, see medical record.    SOCIAL HISTORY:  Social History     Socioeconomic History     Marital status:      Spouse name: Julio     Number of children: 2     Years of education: Not on file     Highest education level: Associate degree: occupational, technical, or vocational program   Occupational History     Occupation:      Comment: Retired at age 63   Tobacco Use     Smoking status: Never Smoker     Smokeless tobacco: Never Used   Substance and Sexual Activity     Alcohol use: No     Alcohol/week: 0.0 standard drinks     Drug use: No     Sexual activity: Yes     Partners: Male   Other Topics Concern     Parent/sibling w/ CABG, MI or angioplasty before 65F 55M? Not Asked      Service Not Asked     Blood Transfusions Not Asked     Caffeine Concern No     Occupational Exposure Not Asked     Hobby Hazards Not Asked     Sleep Concern No     Stress Concern Not Asked     Weight Concern Not Asked     Special Diet No     Back Care Not Asked  "    Exercise No     Comment: some walking cleaning     Bike Helmet Not Asked     Seat Belt Not Asked     Self-Exams Not Asked   Social History Narrative    Exercise bike or walking 3-4x/week.     Spends 5 months/year in [place], [place].    2 children, both in [place], [place]. 4 grandchildren.      Social Determinants of Health     Financial Resource Strain:      Difficulty of Paying Living Expenses:    Food Insecurity:      Worried About Running Out of Food in the Last Year:      Ran Out of Food in the Last Year:    Transportation Needs:      Lack of Transportation (Medical):      Lack of Transportation (Non-Medical):    Physical Activity:      Days of Exercise per Week:      Minutes of Exercise per Session:    Stress:      Feeling of Stress :    Social Connections:      Frequency of Communication with Friends and Family:      Frequency of Social Gatherings with Friends and Family:      Attends [employer] Services:      Active Member of Clubs or Organizations:      Attends Club or Organization Meetings:      Marital Status:    Intimate Partner Violence:      Fear of Current or Ex-Partner:      Emotionally Abused:      Physically Abused:      Sexually Abused:        Review of Systems:  Skin:  Negative     Eyes:  Positive for glasses  ENT:  Negative    Respiratory:  Negative    Cardiovascular:  Negative    Gastroenterology: Negative    Genitourinary:  Negative    Musculoskeletal:  Positive for back pain  Neurologic:  Negative    Psychiatric:  Negative    Heme/Lymph/Imm:  Positive for easy bruising  Endocrine:  Negative       Physical Exam:  Vitals: /80 (BP Location: Right arm, Patient Position: Sitting, Cuff Size: Adult Regular)   Pulse (!) 43   Ht 1.803 m (5' 11\")   Wt 65.3 kg (144 lb)   LMP  (LMP Unknown)   SpO2 98%   BMI 20.08 kg/m     Wt Readings from Last 4 Encounters:   09/21/21 65.3 kg (144 lb)   05/07/21 66.3 kg (146 lb 3.2 oz)   04/20/21 65.8 kg (145 lb)   03/17/21 76.4 kg (168 lb 6.4 oz) "     CONSTITUTIONAL: Appears her stated age, well nourished, and in no acute distress.  HEENT: Pupils equal, round. Sclerae nonicteric.    NECK: Supple, no masses appreciated.  C/V:  irregular rhythm  RESP: Respirations are unlabored. Lungs are clear to auscultation bilaterally without wheezing, rales, or rhonchi.  GI: Abdomen soft, non-tender, non-distended.  EXTREM:  No clubbing, cyanosis, or lower extremity edema bilaterally.   NEURO: Alert and oriented, cooperative. Gait steady. No gross focal deficits.   PSYCH: Affect appropriate. Sightly confused Responds to questions appropriately.  SKIN: Warm and dry. No apparent rashes or bruising.     Recent Lab Results:  LIPID RESULTS:  Lab Results   Component Value Date    CHOL 155 12/22/2020    HDL 65 12/22/2020    LDL 64 12/22/2020    TRIG 128 12/22/2020    CHOLHDLRATIO 2.5 06/16/2015     LIVER ENZYME RESULTS:  Lab Results   Component Value Date    AST 22 04/20/2021    ALT 35 04/20/2021     CBC RESULTS:  Lab Results   Component Value Date    WBC 7.3 04/20/2021    RBC 4.47 04/20/2021    HGB 11.6 (L) 05/07/2021    HCT 42.7 04/20/2021    MCV 96 04/20/2021    MCH 31.1 04/20/2021    MCHC 32.6 04/20/2021    RDW 14.4 04/20/2021     04/20/2021     BMP RESULTS:  Lab Results   Component Value Date     04/20/2021    POTASSIUM 3.7 04/20/2021    CHLORIDE 105 04/20/2021    CO2 31 04/20/2021    ANIONGAP 2 (L) 04/20/2021    GLC 96 04/20/2021    BUN 14 04/20/2021    CR 0.76 05/06/2021    GFRESTIMATED 74 05/06/2021    GFRESTBLACK 86 05/06/2021    TONO 9.5 04/20/2021      INR RESULTS:  Lab Results   Component Value Date    INR 1.8 (A) 09/15/2021    INR 1.6 (A) 08/31/2021    INR 2.4 07/16/2021    INR 1.8 (A) 07/07/2021       CC  Yelena Kenyon MD  [address] [facility] [address]  [place] [address]    This note was completed in part using Dragon voice recognition software. Although reviewed after completion, some word and grammatical errors may occur.      Thank you for  allowing me to participate in the care of your patient.      Sincerely,     DAVID Barragan [facility]  cc:   Yelena Kenyon MD  [address]  [place] [id number]

## 2021-09-24 DIAGNOSIS — I48.21 PERMANENT ATRIAL FIBRILLATION (H): Primary | ICD-10-CM

## 2021-10-04 ENCOUNTER — TRANSFERRED RECORDS (OUTPATIENT)
Dept: HEALTH INFORMATION MANAGEMENT | Facility: CLINIC | Age: 81
End: 2021-10-04

## 2021-10-04 ENCOUNTER — ANTICOAGULATION THERAPY VISIT (OUTPATIENT)
Dept: INTERNAL MEDICINE | Facility: CLINIC | Age: 81
End: 2021-10-04

## 2021-10-04 DIAGNOSIS — Z79.01 LONG TERM CURRENT USE OF ANTICOAGULANTS WITH INR GOAL OF 2.0-3.0: Primary | ICD-10-CM

## 2021-10-04 DIAGNOSIS — I48.91 ATRIAL FIBRILLATION (H): ICD-10-CM

## 2021-10-04 DIAGNOSIS — I48.21 PERMANENT ATRIAL FIBRILLATION (H): ICD-10-CM

## 2021-10-04 LAB — INR (EXTERNAL): 2.2 (ref 0.9–1.1)

## 2021-10-04 NOTE — PROGRESS NOTES
ANTICOAGULATION  MANAGEMENT-Home Monitor Managed by Exception    Mariya Rowe 81 year old female is on warfarin with therapeutic INR result. (Goal INR 2.0-3.0)    Recent labs: (last 7 days)     10/04/21  1449   INR 2.2*         Previous INR was Therapeutic    Medication, diet, health changes since last INR:chart reviewed; none idientified    Contacted within the last 12 weeks by phone on 09/21/2021      PAO Meraz was NOT contacted regarding therapeutic result today per home monitoring policy manage by exception agreement.   Current warfarin dose is to be continued:     Summary  As of 10/4/2021    Full warfarin instructions:  2.5 mg every Sun, Tue, Thu; 5 mg all other days   Next INR check:  10/18/2021           ?   Lalo Tapia RN  Anticoagulation Clinic  10/4/2021    _______________________________________________________________________     Anticoagulation Episode Summary     Current INR goal:  2.0-3.0   TTR:  50.0 % (1 y)   Target end date:  Indefinite   Send INR reminders to:  LUIS EDUARDO MACK    Indications    Long term current use of anticoagulants with INR goal of 2.0-3.0 [Z79.01]  Atrial fibrillation (H) [I48.91]  Permanent atrial fibrillation (H) [I48.21]           Comments:           Anticoagulation Care Providers     Provider Role Specialty Phone number    Chavez Molina MD Referring Internal Medicine [phone]

## 2021-10-19 ENCOUNTER — TRANSFERRED RECORDS (OUTPATIENT)
Dept: HEALTH INFORMATION MANAGEMENT | Facility: CLINIC | Age: 81
End: 2021-10-19
Payer: COMMERCIAL

## 2021-10-22 ENCOUNTER — TRANSFERRED RECORDS (OUTPATIENT)
Dept: HEALTH INFORMATION MANAGEMENT | Facility: CLINIC | Age: 81
End: 2021-10-22
Payer: COMMERCIAL

## 2021-10-22 ENCOUNTER — ANTICOAGULATION THERAPY VISIT (OUTPATIENT)
Dept: INTERNAL MEDICINE | Facility: CLINIC | Age: 81
End: 2021-10-22

## 2021-10-22 DIAGNOSIS — Z79.01 LONG TERM CURRENT USE OF ANTICOAGULANTS WITH INR GOAL OF 2.0-3.0: Primary | ICD-10-CM

## 2021-10-22 DIAGNOSIS — I48.91 ATRIAL FIBRILLATION (H): ICD-10-CM

## 2021-10-22 DIAGNOSIS — I48.21 PERMANENT ATRIAL FIBRILLATION (H): ICD-10-CM

## 2021-10-22 LAB — INR (EXTERNAL): 1.9 (ref 2–3)

## 2021-10-22 NOTE — PROGRESS NOTES
ANTICOAGULATION MANAGEMENT     Mariya Rowe 81 year old female is on warfarin with subtherapeutic INR result. (Goal INR 2.0-3.0)    Recent labs: (last 7 days)     10/22/21  1454   INR 1.9*       ASSESSMENT     Source(s): Chart Review and Patient/Caregiver Call       Warfarin doses taken: Warfarin taken as instructed    Diet: No new diet changes identified    New illness, injury, or hospitalization: No    Medication/supplement changes: None noted    Signs or symptoms of bleeding or clotting: No    Previous INR: Therapeutic last 2(+) visits    Additional findings: None     PLAN     Recommended plan for no diet, medication or health factor changes affecting INR     Dosing Instructions: Booster dose then continue your current warfarin dose with next INR in 2 weeks       Summary  As of 10/22/2021    Full warfarin instructions:  2.5 mg every Sun, Tue, Thu; 5 mg all other days   Next INR check:  11/5/2021             Telephone call with Mariya who verbalizes understanding and agrees to plan    Patient to recheck with home meter    Education provided: Contact [phone]  with any changes, questions or concerns.     Plan made per [facility] anticoagulation protocol    Mary Duncan RN  Anticoagulation Clinic  10/22/2021    _______________________________________________________________________     Anticoagulation Episode Summary     Current INR goal:  2.0-3.0   TTR:  51.4 % (1 y)   Target end date:  Indefinite   [facility]    Indications    Long term current use of anticoagulants with INR goal of 2.0-3.0 [Z79.01]  Atrial fibrillation (H) [I48.91]  Permanent atrial fibrillation (H) [I48.21]           Comments:           Anticoagulation Care Providers     Provider Role Specialty Phone number    Chavez Molina MD Referring Internal Medicine [phone]          Mary Webber RN, BSN, PHN  Anticoagulation Nurse  [phone]

## 2021-10-23 ENCOUNTER — HEALTH MAINTENANCE LETTER (OUTPATIENT)
Age: 81
End: 2021-10-23

## 2021-11-10 ENCOUNTER — TRANSFERRED RECORDS (OUTPATIENT)
Dept: HEALTH INFORMATION MANAGEMENT | Facility: CLINIC | Age: 81
End: 2021-11-10
Payer: COMMERCIAL

## 2021-11-10 ENCOUNTER — ANTICOAGULATION THERAPY VISIT (OUTPATIENT)
Dept: INTERNAL MEDICINE | Facility: CLINIC | Age: 81
End: 2021-11-10
Payer: COMMERCIAL

## 2021-11-10 DIAGNOSIS — I48.91 ATRIAL FIBRILLATION (H): ICD-10-CM

## 2021-11-10 DIAGNOSIS — I48.21 PERMANENT ATRIAL FIBRILLATION (H): ICD-10-CM

## 2021-11-10 DIAGNOSIS — Z79.01 LONG TERM CURRENT USE OF ANTICOAGULANTS WITH INR GOAL OF 2.0-3.0: Primary | ICD-10-CM

## 2021-11-10 LAB — INR (EXTERNAL): 2.5 (ref 0.9–1.1)

## 2021-11-10 NOTE — PROGRESS NOTES
ANTICOAGULATION MANAGEMENT     Mariya Rowe 81 year old female is on warfarin with therapeutic INR result. (Goal INR 2.0-3.0)    Recent labs: (last 7 days)     11/10/21  1111   INR 2.5*       ASSESSMENT     Source(s): Patient/Caregiver Call       Warfarin doses taken: Warfarin taken as instructed    Diet: No new diet changes identified    New illness, injury, or hospitalization: No    Medication/supplement changes: None noted    Signs or symptoms of bleeding or clotting: No    Previous INR: Subtherapeutic    Additional findings: Back to manage by exception if next result is in range.     PLAN     Recommended plan for no diet, medication or health factor changes affecting INR     Dosing Instructions: Continue your current warfarin dose with next INR in 2 weeks       Summary  As of 11/10/2021    Full warfarin instructions:  2.5 mg every Sun, Tue, Thu; 5 mg all other days   Next INR check:  11/24/2021             Telephone call with Mariya who verbalizes understanding and agrees to plan    Patient to recheck with home meter    Education provided: Goal range and significance of current result    Plan made per [facility] anticoagulation protocol    Lalo Tapia RN  Anticoagulation Clinic  11/10/2021    _______________________________________________________________________     Anticoagulation Episode Summary     Current INR goal:  2.0-3.0   TTR:  53.7 % (1 y)   Target end date:  Indefinite   Send INR reminders to:  [facility]    Indications    Long term current use of anticoagulants with INR goal of 2.0-3.0 [Z79.01]  Atrial fibrillation (H) [I48.91]  Permanent atrial fibrillation (H) [I48.21]           Comments:  [employer] home meter. Manage by exception.         Anticoagulation Care Providers     Provider Role Specialty Phone number    Chavez Molina MD Referring Internal Medicine [phone]

## 2021-12-01 ENCOUNTER — DOCUMENTATION ONLY (OUTPATIENT)
Dept: INTERNAL MEDICINE | Facility: CLINIC | Age: 81
End: 2021-12-01
Payer: COMMERCIAL

## 2021-12-01 ENCOUNTER — ANTICOAGULATION THERAPY VISIT (OUTPATIENT)
Dept: INTERNAL MEDICINE | Facility: CLINIC | Age: 81
End: 2021-12-01
Payer: COMMERCIAL

## 2021-12-01 ENCOUNTER — TRANSFERRED RECORDS (OUTPATIENT)
Dept: HEALTH INFORMATION MANAGEMENT | Facility: CLINIC | Age: 81
End: 2021-12-01
Payer: COMMERCIAL

## 2021-12-01 DIAGNOSIS — I48.21 PERMANENT ATRIAL FIBRILLATION (H): ICD-10-CM

## 2021-12-01 DIAGNOSIS — Z79.01 LONG TERM CURRENT USE OF ANTICOAGULANTS WITH INR GOAL OF 2.0-3.0: Primary | ICD-10-CM

## 2021-12-01 LAB — INR (EXTERNAL): 2.2 (ref 0.9–1.1)

## 2021-12-01 NOTE — PROGRESS NOTES
Incoming fax from [employer] home monitoring company    Date of result 12/1    INR result 2.2

## 2021-12-29 ENCOUNTER — DOCUMENTATION ONLY (OUTPATIENT)
Dept: INTERNAL MEDICINE | Facility: CLINIC | Age: 81
End: 2021-12-29
Payer: COMMERCIAL

## 2022-01-05 ENCOUNTER — TRANSFERRED RECORDS (OUTPATIENT)
Dept: HEALTH INFORMATION MANAGEMENT | Facility: CLINIC | Age: 82
End: 2022-01-05
Payer: COMMERCIAL

## 2022-01-05 ENCOUNTER — DOCUMENTATION ONLY (OUTPATIENT)
Dept: INTERNAL MEDICINE | Facility: CLINIC | Age: 82
End: 2022-01-05
Payer: COMMERCIAL

## 2022-01-05 ENCOUNTER — ANTICOAGULATION THERAPY VISIT (OUTPATIENT)
Dept: INTERNAL MEDICINE | Facility: CLINIC | Age: 82
End: 2022-01-05
Payer: COMMERCIAL

## 2022-01-05 LAB — INR HOME MONITORING: 2.3 (ref 2–3)

## 2022-01-05 NOTE — PROGRESS NOTES
ANTICOAGULATION  MANAGEMENT-Home Monitor Managed by Exception    Mariya Rowe 81 year old female is on warfarin with therapeutic INR result. (Goal INR 2.0-3.0)    Recent labs: (last 7 days)     01/05/22  0000   INR 2.30         Previous INR was Therapeutic    Medication, diet, health changes since last INR:chart reviewed; none identified    Contacted within the last 12 weeks by phone on 11/15      PAO Meraz was NOT contacted regarding therapeutic result today per home monitoring policy manage by exception agreement.   Current warfarin dose is to be continued:     Summary  As of 1/5/2022    Full warfarin instructions:  2.5 mg every Sun, Tue, Thu; 5 mg all other days   Next INR check:             ?   Vira Vargas, RN  Anticoagulation Clinic  1/5/2022    _______________________________________________________________________     Anticoagulation Episode Summary     Current INR goal:  2.0-3.0   TTR:  60.5 % (1 y)   Target end date:  Indefinite   Send INR reminders to:  [facility]    Indications    Long term current use of anticoagulants with INR goal of 2.0-3.0 [Z79.01]  Atrial fibrillation (H) [I48.91]  Permanent atrial fibrillation (H) [I48.21]           Comments:  [employer] home meter. Manage by exception.         Anticoagulation Care Providers     Provider Role Specialty Phone number    Chavez Molina MD Referring Internal Medicine [phone]

## 2022-01-05 NOTE — PROGRESS NOTES
ANTICOAGULATION     Mariya KUHN Soledad is overdue for INR check.      Left message  reminding patient to check INR with their home meter and call results to the home monitoring company as soon as possible.     Thais Castro RN

## 2022-01-26 ENCOUNTER — DOCUMENTATION ONLY (OUTPATIENT)
Dept: INTERNAL MEDICINE | Facility: CLINIC | Age: 82
End: 2022-01-26
Payer: COMMERCIAL

## 2022-01-26 DIAGNOSIS — I48.21 PERMANENT ATRIAL FIBRILLATION (H): ICD-10-CM

## 2022-01-26 DIAGNOSIS — Z79.01 LONG TERM CURRENT USE OF ANTICOAGULANTS WITH INR GOAL OF 2.0-3.0: Primary | ICD-10-CM

## 2022-01-26 DIAGNOSIS — I48.91 ATRIAL FIBRILLATION (H): ICD-10-CM

## 2022-01-26 LAB — INR (EXTERNAL): 2.5 (ref 0.9–1.1)

## 2022-01-26 NOTE — PROGRESS NOTES
ANTICOAGULATION  MANAGEMENT-Home Monitor Managed by Exception    Mariya Rowe 81 year old female is on warfarin with therapeutic INR result. (Goal INR 2.0-3.0)    No results for input(s): INR in the last 168 hours.      Previous INR was Therapeutic    Medication, diet, health changes since last INR:chart reviewed; none identified    Contacted within the last 12 weeks by phone on 11/15          PLAN     Mariya was NOT contacted regarding therapeutic result today per home monitoring policy manage by exception agreement.   Current warfarin dose is to be continued:     Summary  As of 1/26/2022    Full warfarin instructions:  2.5 mg every Sun, Tue, Thu; 5 mg all other days   Next INR check:  2/2/2022           ?   Thais Castro RN  Anticoagulation Clinic  1/26/2022    _______________________________________________________________________     Anticoagulation Episode Summary     Current INR goal:  2.0-3.0   TTR:  64.2 % (1 y)   Target end date:  Indefinite   Send INR reminders to:  [facility]    Indications    Long term current use of anticoagulants with INR goal of 2.0-3.0 [Z79.01]  Atrial fibrillation (H) [I48.91]  Permanent atrial fibrillation (H) [I48.21]           Comments:  [employer] home meter. Manage by exception.         Anticoagulation Care Providers     Provider Role Specialty Phone number    Chavez Molina MD Referring Internal Medicine [phone]

## 2022-01-27 ENCOUNTER — DOCUMENTATION ONLY (OUTPATIENT)
Dept: INTERNAL MEDICINE | Facility: CLINIC | Age: 82
End: 2022-01-27
Payer: COMMERCIAL

## 2022-01-27 DIAGNOSIS — I48.21 PERMANENT ATRIAL FIBRILLATION (H): ICD-10-CM

## 2022-01-27 DIAGNOSIS — I48.91 ATRIAL FIBRILLATION (H): ICD-10-CM

## 2022-01-27 DIAGNOSIS — Z79.01 LONG TERM CURRENT USE OF ANTICOAGULANTS WITH INR GOAL OF 2.0-3.0: Primary | ICD-10-CM

## 2022-01-27 LAB — INR HOME MONITORING: 2.3 (ref 2–3)

## 2022-01-27 NOTE — PROGRESS NOTES
ANTICOAGULATION  MANAGEMENT-Home Monitor Managed by Exception    Mariya Rowe 81 year old female is on warfarin with therapeutic INR result. (Goal INR 2.0-3.0)    Recent labs: (last 7 days)     01/27/22  0000   INR 2.30         Previous INR was Therapeutic    Medication, diet, health changes since last INR:chart reviewed; none identified    Contacted within the last 12 weeks by phone on 11/15          PLAN     Mariya was NOT contacted regarding therapeutic result today per home monitoring policy manage by exception agreement.   Current warfarin dose is to be continued:     Summary  As of 1/27/2022    Full warfarin instructions:  2.5 mg every Sun, Tue, Thu; 5 mg all other days   Next INR check:  2/10/2022           ?   Thais Castro RN  Anticoagulation Clinic  1/27/2022    _______________________________________________________________________     Anticoagulation Episode Summary     Current INR goal:  2.0-3.0   TTR:  64.4 % (1 y)   Target end date:  Indefinite   Send INR reminders to:  [facility]    Indications    Long term current use of anticoagulants with INR goal of 2.0-3.0 [Z79.01]  Atrial fibrillation (H) [I48.91]  Permanent atrial fibrillation (H) [I48.21]           Comments:  [employer] home meter. Manage by exception.         Anticoagulation Care Providers     Provider Role Specialty Phone number    Chavez Molina MD Referring Internal Medicine [phone]

## 2022-02-10 ENCOUNTER — DOCUMENTATION ONLY (OUTPATIENT)
Dept: ANTICOAGULATION | Facility: CLINIC | Age: 82
End: 2022-02-10
Payer: COMMERCIAL

## 2022-02-10 DIAGNOSIS — I48.21 PERMANENT ATRIAL FIBRILLATION (H): ICD-10-CM

## 2022-02-10 DIAGNOSIS — I48.91 ATRIAL FIBRILLATION (H): ICD-10-CM

## 2022-02-10 DIAGNOSIS — Z79.01 LONG TERM CURRENT USE OF ANTICOAGULANTS WITH INR GOAL OF 2.0-3.0: Primary | ICD-10-CM

## 2022-02-10 LAB — INR HOME MONITORING: 2.2 (ref 2–3)

## 2022-02-10 NOTE — PROGRESS NOTES
ANTICOAGULATION  MANAGEMENT-Home Monitor Managed by Exception    Mariya Rowe 81 year old female is on warfarin with therapeutic INR result. (Goal INR 2.0-3.0)    Recent labs: (last 7 days)     02/10/22  0000   INR 2.20         Previous INR was Therapeutic    Medication, diet, health changes since last INR:chart reviewed; none identified    Contacted within the last 12 weeks by phone on 11/15/21      PLAN     Mariya was NOT contacted regarding therapeutic result today per home monitoring policy manage by exception agreement.   Current warfarin dose is to be continued:       ?   Artis Dc RN  Anticoagulation Clinic  2/10/2022    _______________________________________________________________________     Anticoagulation Episode Summary     Current INR goal:  2.0-3.0   TTR:  65.7 % (1 y)   Target end date:  Indefinite   Send INR reminders to:  [facility]    Indications    Long term current use of anticoagulants with INR goal of 2.0-3.0 [Z79.01]  Atrial fibrillation (H) [I48.91]  Permanent atrial fibrillation (H) [I48.21]           Comments:  [employer] home meter. Manage by exception.         Anticoagulation Care Providers     Provider Role Specialty Phone number    Chavez Molina MD Referring Internal Medicine [phone]

## 2022-02-12 ENCOUNTER — HEALTH MAINTENANCE LETTER (OUTPATIENT)
Age: 82
End: 2022-02-12

## 2022-02-14 ENCOUNTER — TELEPHONE (OUTPATIENT)
Dept: INTERNAL MEDICINE | Facility: CLINIC | Age: 82
End: 2022-02-14
Payer: COMMERCIAL

## 2022-02-27 LAB — INR HOME MONITORING: 2.2 (ref 2–3)

## 2022-02-28 ENCOUNTER — ANTICOAGULATION THERAPY VISIT (OUTPATIENT)
Dept: ANTICOAGULATION | Facility: CLINIC | Age: 82
End: 2022-02-28
Payer: COMMERCIAL

## 2022-02-28 DIAGNOSIS — I48.21 PERMANENT ATRIAL FIBRILLATION (H): ICD-10-CM

## 2022-02-28 DIAGNOSIS — Z79.01 LONG TERM CURRENT USE OF ANTICOAGULANTS WITH INR GOAL OF 2.0-3.0: Primary | ICD-10-CM

## 2022-02-28 DIAGNOSIS — I48.91 ATRIAL FIBRILLATION (H): ICD-10-CM

## 2022-02-28 NOTE — PROGRESS NOTES
ANTICOAGULATION MANAGEMENT     Mariya Rowe 81 year old female is on warfarin with therapeutic INR result. (Goal INR 2.0-3.0)    Recent labs: (last 7 days)     02/27/22  0000   INR 2.20       ASSESSMENT       Source(s): Chart Review and Patient/Caregiver Call       Warfarin doses taken: Warfarin taken as instructed    Diet: No new diet changes identified    New illness, injury, or hospitalization: No    Medication/supplement changes: None noted    Signs or symptoms of bleeding or clotting: No    Previous INR: Therapeutic last 2(+) visits    Additional findings: Resume manage by exception.  Patient was due for a 12 week follow up call       PLAN     Recommended plan for no diet, medication or health factor changes affecting INR     Dosing Instructions: Continue your current warfarin dose with next INR in 2 weeks       Summary  As of 2/28/2022    Full warfarin instructions:  2.5 mg every Sun, Tue, Thu; 5 mg all other days   Next INR check:  3/14/2022             Telephone call with Mariya who verbalizes understanding and agrees to plan    Patient to recheck with home meter    Education provided: Please call back if any changes to your diet, medications or how you've been taking warfarin    Plan made per [facility] anticoagulation protocol    Carlo Donnelly, RN  Anticoagulation Clinic  2/28/2022    _______________________________________________________________________     Anticoagulation Episode Summary     Current INR goal:  2.0-3.0   TTR:  65.6 % (1 y)   Target end date:  Indefinite   Send INR reminders to:  [facility]    Indications    Long term current use of anticoagulants with INR goal of 2.0-3.0 [Z79.01]  Atrial fibrillation (H) [I48.91]  Permanent atrial fibrillation (H) [I48.21]           Comments:  [employer] home meter. Manage by exception.         Anticoagulation Care Providers     Provider Role Specialty Phone number    Chavez Molina MD Referring Internal Medicine [phone]

## 2022-03-15 ENCOUNTER — TELEPHONE (OUTPATIENT)
Dept: CARDIOLOGY | Facility: CLINIC | Age: 82
End: 2022-03-15
Payer: COMMERCIAL

## 2022-03-15 NOTE — TELEPHONE ENCOUNTER
"Patient called today requesting permission from Dr. Kenyon to take antibiotic for dental work. She states that Dr. Kenyon has recommended that she \"only take tylenol\".       Per Callie's note from 9/2021, \"Pt had multiple questions regarding antibiotics prior to dental work for her hip replacement and NSAIDS.   She wanted Dr. Kenyon' approval prior to starting an antibiotic.  I discussed he would approve the one time dose of antibiotic prior to dental work.   She then asked about NSAID and appeared to get the antibiotic and NSAID confused.  We discussed ibuprofen/Advil is not recommended when taking OAC.:    Clarified again with patient that she can take an antibiotic prior to dental work if prescribed. Patient states understanding.       "

## 2022-03-18 ENCOUNTER — DOCUMENTATION ONLY (OUTPATIENT)
Dept: ANTICOAGULATION | Facility: CLINIC | Age: 82
End: 2022-03-18
Payer: COMMERCIAL

## 2022-03-18 DIAGNOSIS — I48.91 ATRIAL FIBRILLATION (H): ICD-10-CM

## 2022-03-18 DIAGNOSIS — I48.21 PERMANENT ATRIAL FIBRILLATION (H): ICD-10-CM

## 2022-03-18 DIAGNOSIS — Z79.01 LONG TERM CURRENT USE OF ANTICOAGULANTS WITH INR GOAL OF 2.0-3.0: Primary | ICD-10-CM

## 2022-03-18 LAB — INR HOME MONITORING: 2.3 (ref 2–3)

## 2022-03-18 NOTE — PROGRESS NOTES
ANTICOAGULATION  MANAGEMENT-Home Monitor Managed by Exception    Mariya Rowe 81 year old female is on warfarin with therapeutic INR result. (Goal INR 2.0-3.0)    Recent labs: (last 7 days)     03/18/22  0000   INR 2.30         Previous INR was Therapeutic    Medication, diet, health changes since last INR:chart reviewed; none identified    Contacted within the last 12 weeks by phone on 2/28      PLAN     Mariya was NOT contacted regarding therapeutic result today per home monitoring policy manage by exception agreement.   Current warfarin dose is to be continued:     Summary  As of 3/18/2022    Full warfarin instructions:  2.5 mg every Sun, Tue, Thu; 5 mg all other days   Next INR check:  4/1/2022           ?   Jaky Gonzalez RN  Anticoagulation Clinic  3/18/2022    _______________________________________________________________________     Anticoagulation Episode Summary     Current INR goal:  2.0-3.0   TTR:  68.5 % (1 y)   Target end date:  Indefinite   Send INR reminders to:  [facility]    Indications    Long term current use of anticoagulants with INR goal of 2.0-3.0 [Z79.01]  Atrial fibrillation (H) [I48.91]  Permanent atrial fibrillation (H) [I48.21]           Comments:  [employer] home meter. Manage by exception.         Anticoagulation Care Providers     Provider Role Specialty Phone number    Chavez Molina MD Referring Internal Medicine [phone]

## 2022-03-24 ENCOUNTER — TELEPHONE (OUTPATIENT)
Dept: INTERNAL MEDICINE | Facility: CLINIC | Age: 82
End: 2022-03-24
Payer: COMMERCIAL

## 2022-03-24 DIAGNOSIS — H91.93 BILATERAL HEARING LOSS, UNSPECIFIED HEARING LOSS TYPE: Primary | ICD-10-CM

## 2022-03-28 ENCOUNTER — TRANSFERRED RECORDS (OUTPATIENT)
Dept: HEALTH INFORMATION MANAGEMENT | Facility: CLINIC | Age: 82
End: 2022-03-28
Payer: COMMERCIAL

## 2022-04-05 ENCOUNTER — DOCUMENTATION ONLY (OUTPATIENT)
Dept: INTERNAL MEDICINE | Facility: CLINIC | Age: 82
End: 2022-04-05
Payer: COMMERCIAL

## 2022-04-05 DIAGNOSIS — Z79.01 LONG TERM CURRENT USE OF ANTICOAGULANTS WITH INR GOAL OF 2.0-3.0: Primary | ICD-10-CM

## 2022-04-05 DIAGNOSIS — I48.21 PERMANENT ATRIAL FIBRILLATION (H): ICD-10-CM

## 2022-04-05 NOTE — PROGRESS NOTES
ANTICOAGULATION MANAGEMENT      Mariya Rowe due for annual renewal of referral to anticoagulation monitoring. Order pended for your review and signature.      ANTICOAGULATION SUMMARY      Warfarin indication(s)     Atrial fibrillation    Heart valve present?  NO       Current goal range   INR: 2.0-3.0     Goal appropriate for indication? Yes, INR 2-3 appropriate for hx of DVT, PE, hypercoagulable state, Afib, LVAD, or bileaflet AVR without risk factors     Current duration of therapy Indefinite/long term therapy   Time in Therapeutic Range (TTR)  (Goal > 60%) 67.8%       Office visit with referring provider's group within last year yes on 4/20/21       Anita Palmer, RN

## 2022-04-08 ENCOUNTER — DOCUMENTATION ONLY (OUTPATIENT)
Dept: ANTICOAGULATION | Facility: CLINIC | Age: 82
End: 2022-04-08
Payer: COMMERCIAL

## 2022-04-15 ENCOUNTER — DOCUMENTATION ONLY (OUTPATIENT)
Dept: ANTICOAGULATION | Facility: CLINIC | Age: 82
End: 2022-04-15
Payer: COMMERCIAL

## 2022-04-15 DIAGNOSIS — I48.91 ATRIAL FIBRILLATION (H): ICD-10-CM

## 2022-04-15 DIAGNOSIS — I48.21 PERMANENT ATRIAL FIBRILLATION (H): ICD-10-CM

## 2022-04-15 DIAGNOSIS — Z79.01 LONG TERM CURRENT USE OF ANTICOAGULANTS WITH INR GOAL OF 2.0-3.0: Primary | ICD-10-CM

## 2022-04-15 LAB — INR HOME MONITORING: 2.2 (ref 2–3)

## 2022-04-15 NOTE — PROGRESS NOTES
ANTICOAGULATION  MANAGEMENT-Home Monitor Managed by Exception    Mariya Rowe 81 year old female is on warfarin with therapeutic INR result. (Goal INR 2.0-3.0)    Recent labs: (last 7 days)     04/15/22  0000   INR 2.20         Previous INR was Therapeutic    Medication, diet, health changes since last INR:chart reviewed; none identified    Contacted within the last 12 weeks by phone on 2/28/22      PAO Meraz was NOT contacted regarding therapeutic result today per home monitoring policy manage by exception agreement.   Current warfarin dose is to be continued:     Summary  As of 4/15/2022    Full warfarin instructions:  2.5 mg every Sun, Tue, Thu; 5 mg all other days   Next INR check:  4/29/2022           ?   Mary Duncan RN  Anticoagulation Clinic  4/15/2022    _______________________________________________________________________     Anticoagulation Episode Summary     Current INR goal:  2.0-3.0   TTR:  69.4 % (1 y)   Target end date:  Indefinite   Send INR reminders to:  [facility]    Indications    Long term current use of anticoagulants with INR goal of 2.0-3.0 [Z79.01]  Atrial fibrillation (H) [I48.91]  Permanent atrial fibrillation (H) [I48.21]           Comments:  [employer] home meter. Manage by exception.         Anticoagulation Care Providers     Provider Role Specialty Phone number    Chavez Molina MD Referring Internal Medicine [phone]          Mary Webber, RN, BSN, PHN  Anticoagulation Nurse  [phone]

## 2022-04-26 ENCOUNTER — TELEPHONE (OUTPATIENT)
Dept: INTERNAL MEDICINE | Facility: CLINIC | Age: 82
End: 2022-04-26
Payer: COMMERCIAL

## 2022-04-26 DIAGNOSIS — M54.50 MIDLINE LOW BACK PAIN WITHOUT SCIATICA, UNSPECIFIED CHRONICITY: Primary | ICD-10-CM

## 2022-04-26 NOTE — TELEPHONE ENCOUNTER
Patient calls asks for recommendations for back pain. Patient states pain started after a physical therapy appointment last fall, where the therapist pressed hard against her right side and heard a pop. She saw Dr. Johnson at [facility] afterwards, he couldn't find any surgical cause of the pain. Her pain improved for a while, but now getting worse again and painful to walking around. She states pain is located at the belt-line, right now the the pain is worse on the right side, but sometimes it will be worse on the left. Pain worse when sitting for long periods of time and better when laying down. She will sometimes she get a sharp stabbing pain in her back that is limiting her walking.      Patient has been trying heat, ice and vibration without improvement. Patient asks if there is a specialist Dr. Molina would recommend she see.     Roro Lira RN  [facility]

## 2022-04-28 NOTE — TELEPHONE ENCOUNTER
Would consider either nonsurgical spine surgery clinic, or physical medicine and rehab specialist.   Would suggest the latter choice.   Order placed if she agrees.

## 2022-04-29 NOTE — TELEPHONE ENCOUNTER
Attempted to contact patient. Left voice message to call back.     Please inform patient that Dr. Molina placed for Spine clinic and they will contact patient to schedule appointment. If she doesn't hear from anyone within 2 business days, please call [phone].    Roro Lira RN  [facility]

## 2022-04-29 NOTE — TELEPHONE ENCOUNTER
Call to patient. Patient informed of Dr. Molina's below response. Patient verbalized understanding. Patient would prefer a non-surgical spine surgery clinic referral.     Routing to provider to please place referral.   Thank you!      Gianna DENIS RN   [facility]

## 2022-05-03 ENCOUNTER — DOCUMENTATION ONLY (OUTPATIENT)
Dept: INTERNAL MEDICINE | Facility: CLINIC | Age: 82
End: 2022-05-03
Payer: COMMERCIAL

## 2022-05-03 DIAGNOSIS — I48.21 PERMANENT ATRIAL FIBRILLATION (H): ICD-10-CM

## 2022-05-03 DIAGNOSIS — Z79.01 LONG TERM CURRENT USE OF ANTICOAGULANTS WITH INR GOAL OF 2.0-3.0: Primary | ICD-10-CM

## 2022-05-03 LAB — INR HOME MONITORING: 3 (ref 2–3)

## 2022-05-03 NOTE — TELEPHONE ENCOUNTER
Call to patient. Patient informed of spine referral information. Patient verbalized understanding.     Gianna DENIS RN   [facility]

## 2022-05-03 NOTE — PROGRESS NOTES
ANTICOAGULATION  MANAGEMENT-Home Monitor Managed by Exception    Mariya Rowe 81 year old female is on warfarin with therapeutic INR result. (Goal INR 2.0-3.0)    Recent labs: (last 7 days)     05/03/22  0000   INR 3.00         Previous INR was Therapeutic    Medication, diet, health changes since last INR:chart reviewed; none identified    Contacted within the last 12 weeks by phone on 2/28      PLAN     Mariya was NOT contacted regarding therapeutic result today per home monitoring policy manage by exception agreement.   Current warfarin dose is to be continued:     Summary  As of 5/3/2022    Full warfarin instructions:  2.5 mg every Sun, Tue, Thu; 5 mg all other days   Next INR check:  5/17/2022           ?   Vira Vargas, RN  Anticoagulation Clinic  5/3/2022    _______________________________________________________________________     Anticoagulation Episode Summary     Current INR goal:  2.0-3.0   TTR:  73.0 % (1 y)   Target end date:  Indefinite   Send INR reminders to:  [facility]    Indications    Long term current use of anticoagulants with INR goal of 2.0-3.0 [Z79.01]  Atrial fibrillation (H) [I48.91]  Permanent atrial fibrillation (H) [I48.21]           Comments:  [employer] home meter. Manage by exception.         Anticoagulation Care Providers     Provider Role Specialty Phone number    Chavez Molina MD Referring Internal Medicine [phone]

## 2022-05-13 ENCOUNTER — DOCUMENTATION ONLY (OUTPATIENT)
Dept: ANTICOAGULATION | Facility: CLINIC | Age: 82
End: 2022-05-13
Payer: COMMERCIAL

## 2022-05-13 DIAGNOSIS — I48.21 PERMANENT ATRIAL FIBRILLATION (H): ICD-10-CM

## 2022-05-13 DIAGNOSIS — Z79.01 LONG TERM CURRENT USE OF ANTICOAGULANTS WITH INR GOAL OF 2.0-3.0: Primary | ICD-10-CM

## 2022-05-13 DIAGNOSIS — I48.91 ATRIAL FIBRILLATION (H): ICD-10-CM

## 2022-05-13 LAB — INR HOME MONITORING: 2.6 (ref 2–3)

## 2022-05-13 NOTE — PROGRESS NOTES
ANTICOAGULATION  MANAGEMENT-Home Monitor Managed by Exception    Mariya Rowe 81 year old female is on warfarin with therapeutic INR result. (Goal INR 2.0-3.0)    Recent labs: (last 7 days)     05/13/22  0000   INR 2.60         Previous INR was Therapeutic    Medication, diet, health changes since last INR:chart reviewed; none identified    Contacted within the last 12 weeks by phone on 2/28      PLAN     Mariya was NOT contacted regarding therapeutic result today per home monitoring policy manage by exception agreement.   Current warfarin dose is to be continued:     Summary  As of 5/13/2022    Full warfarin instructions:  2.5 mg every Sun, Tue, Thu; 5 mg all other days   Next INR check:  5/27/2022           ?   Jaky Gonzalez RN  Anticoagulation Clinic  5/13/2022    _______________________________________________________________________     Anticoagulation Episode Summary     Current INR goal:  2.0-3.0   TTR:  75.2 % (1 y)   Target end date:  Indefinite   Send INR reminders to:  [facility]    Indications    Long term current use of anticoagulants with INR goal of 2.0-3.0 [Z79.01]  Atrial fibrillation (H) [I48.91]  Permanent atrial fibrillation (H) [I48.21]           Comments:  [employer] home meter. Manage by exception.         Anticoagulation Care Providers     Provider Role Specialty Phone number    Chavez Molina MD Referring Internal Medicine [phone]

## 2022-05-24 ENCOUNTER — TRANSFERRED RECORDS (OUTPATIENT)
Dept: HEALTH INFORMATION MANAGEMENT | Facility: CLINIC | Age: 82
End: 2022-05-24
Payer: COMMERCIAL

## 2022-06-03 ENCOUNTER — TELEPHONE (OUTPATIENT)
Dept: ANTICOAGULATION | Facility: CLINIC | Age: 82
End: 2022-06-03
Payer: COMMERCIAL

## 2022-06-03 NOTE — TELEPHONE ENCOUNTER
ANTICOAGULATION     Mariya Rowe is overdue for INR check.      Spoke with Mariya instructed to test INR with home meter and call results to home monitoring company as soon as possible.    Carol Donnelly RN

## 2022-06-06 ENCOUNTER — ANTICOAGULATION THERAPY VISIT (OUTPATIENT)
Dept: ANTICOAGULATION | Facility: CLINIC | Age: 82
End: 2022-06-06
Payer: COMMERCIAL

## 2022-06-06 DIAGNOSIS — Z79.01 LONG TERM CURRENT USE OF ANTICOAGULANTS WITH INR GOAL OF 2.0-3.0: Primary | ICD-10-CM

## 2022-06-06 DIAGNOSIS — I48.21 PERMANENT ATRIAL FIBRILLATION (H): ICD-10-CM

## 2022-06-06 DIAGNOSIS — I48.91 ATRIAL FIBRILLATION (H): ICD-10-CM

## 2022-06-06 LAB
INR HOME MONITORING: 2.1 (ref 2–3)
INR HOME MONITORING: 3.2 (ref 2–3)

## 2022-06-06 NOTE — PROGRESS NOTES
ANTICOAGULATION MANAGEMENT     Mariya Rowe 81 year old female is on warfarin with supratherapeutic INR result. (Goal INR 2.0-3.0)    Recent labs: (last 7 days)     06/06/22  0000   INR 3.20*       ASSESSMENT       Source(s): Chart Review and Patient/Caregiver Call       Warfarin doses taken: Warfarin taken as instructed    Diet: Pt ate less the couple days following her wisdom teeth extraction on 05/27.    New illness, injury, or hospitalization: Yes: [place] teeth removed 05/27.    Medication/supplement changes: small amount of tylenol use following teeth extraction.    Signs or symptoms of bleeding or clotting: No    Previous INR: Therapeutic last 2(+) visits    Additional findings: None       PLAN     Recommended plan for temporary change(s) affecting INR     Dosing Instructions: partial hold then continue your current warfarin dose with next INR in 8 days       Summary  As of 6/6/2022    Full warfarin instructions:  6/6: 2.5 mg; Otherwise 2.5 mg every Sun, Tue, Thu; 5 mg all other days   Next INR check:  6/14/2022             Telephone call with Mariya who verbalizes understanding and agrees to plan    Patient to recheck with home meter    Education provided: Goal range and significance of current result and Potential interaction between warfarin and tylenol    Plan made per [facility] anticoagulation protocol    Lalo Tapia RN  Anticoagulation Clinic  6/6/2022    _______________________________________________________________________     Anticoagulation Episode Summary     Current INR goal:  2.0-3.0   TTR:  76.5 % (1 y)   Target end date:  Indefinite   Send INR reminders to:  [facility]    Indications    Long term current use of anticoagulants with INR goal of 2.0-3.0 [Z79.01]  Atrial fibrillation (H) [I48.91]  Permanent atrial fibrillation (H) [I48.21]           Comments:  [employer] home meter. Manage by exception.         Anticoagulation Care Providers     Provider Role Specialty Phone number    Jesse  Chavez AGUILAR MD [facility] Internal Medicine [phone]

## 2022-06-06 NOTE — PROGRESS NOTES
ANTICOAGULATION MANAGEMENT     Mariya Rowe 81 year old female is on warfarin with therapeutic INR result. (Goal INR 2.0-3.0)    Recent labs: (last 7 days)     05/31/22  0000   INR 2.10       ASSESSMENT       Source(s): Chart Review and Patient/Caregiver Call       Warfarin doses taken: Missed dose(s) may be affecting INR    Diet: No new diet changes identified    New illness, injury, or hospitalization: Patient had a tooth extraction on 5/27/22, and was advised to hold warfarin two days prior    Medication/supplement changes: None noted    Signs or symptoms of bleeding or clotting: No    Previous INR: Therapeutic last 2(+) visits    Additional findings: None       PLAN     Recommended plan for temporary change(s) affecting INR     Dosing Instructions: continue your current warfarin dose with next INR in 1 weeks. Patient states she will recheck her INR either today or tomorrow to ensure she stays within range.     Summary  As of 6/6/2022    Full warfarin instructions:  2.5 mg every Sun, Tue, Thu; 5 mg all other days   Next INR check:  6/6/2022             Telephone call with Mariya who verbalizes understanding and agrees to plan    Patient to recheck with home meter    Education provided: Please call back if any changes to your diet, medications or how you've been taking warfarin, Resume manage by exception with home monitor. Continue to submit INR results to home monitor company.You will only be called when your result is out of range. Please call and notify [facility] if new medication started, dose missed, signs or symptoms of bleeding or clotting, or a surgery/procedure is scheduled. and Importance of notifying clinic for changes in medications; a sooner lab recheck maybe needed.    Plan made per [facility] anticoagulation protocol    Naima Rod RN  Anticoagulation Clinic  6/6/2022    _______________________________________________________________________     Anticoagulation Episode Summary     Current INR goal:   2.0-3.0   TTR:  76.5 % (11.9 mo)   Target end date:  Indefinite   Send INR reminders to:  LUIS EDUARDO MACK    Indications    Long term current use of anticoagulants with INR goal of 2.0-3.0 [Z79.01]  Atrial fibrillation (H) [I48.91]  Permanent atrial fibrillation (H) [I48.21]           Comments:  [employer] home meter. Manage by exception.         Anticoagulation Care Providers     Provider Role Specialty Phone number    Chavez Molina MD Referring Internal Medicine [phone]

## 2022-06-13 ENCOUNTER — OFFICE VISIT (OUTPATIENT)
Dept: INTERNAL MEDICINE | Facility: CLINIC | Age: 82
End: 2022-06-13
Payer: COMMERCIAL

## 2022-06-13 VITALS
BODY MASS INDEX: 20.3 KG/M2 | DIASTOLIC BLOOD PRESSURE: 88 MMHG | HEIGHT: 71 IN | OXYGEN SATURATION: 99 % | SYSTOLIC BLOOD PRESSURE: 128 MMHG | TEMPERATURE: 98.2 F | RESPIRATION RATE: 18 BRPM | WEIGHT: 145 LBS | HEART RATE: 58 BPM

## 2022-06-13 DIAGNOSIS — Z01.818 PREOPERATIVE EXAMINATION: Primary | ICD-10-CM

## 2022-06-13 DIAGNOSIS — E78.5 HYPERLIPIDEMIA WITH TARGET LDL LESS THAN 130: ICD-10-CM

## 2022-06-13 DIAGNOSIS — H26.9 CATARACT OF LEFT EYE, UNSPECIFIED CATARACT TYPE: ICD-10-CM

## 2022-06-13 PROCEDURE — 99214 OFFICE O/P EST MOD 30 MIN: CPT | Performed by: INTERNAL MEDICINE

## 2022-06-13 ASSESSMENT — ENCOUNTER SYMPTOMS
CONSTITUTIONAL NEGATIVE: 1
GASTROINTESTINAL NEGATIVE: 1
RESPIRATORY NEGATIVE: 1
CARDIOVASCULAR NEGATIVE: 1
NEUROLOGICAL NEGATIVE: 1

## 2022-06-13 NOTE — PROGRESS NOTES
[address] [place] [address] [address] [facility] [address]  Phone: [phone]  Primary Provider: Chavez Molina  Pre-op Performing Provider: DINESH VARNER      PREOPERATIVE EVALUATION:  Today's date: 6/13/2022    Mariya Rowe is a 81 year old female who presents for a preoperative evaluation.    Surgical Information:  Surgery/Procedure: Cataract  Surgery Location: [facility]  Surgeon: Dr. Streeter  Surgery Date: 7/11/22  Time of Surgery: TBD  Where patient plans to recover: At home with family  Fax number for surgical facility:     Type of Anesthesia Anticipated: Topical    Assessment & Plan     The proposed surgical procedure is considered LOW risk.    Preoperative examination and Cataract of left eye, unspecified cataract type  At this time, I would consider the patient to be an acceptable candidate to proceed with a low risk procedure such as cataract surgery.  She has previously tolerated this procedure and anesthesia without issue.  Patient can continue all medications as currently prescribed.  No further diagnostic testing in regards to her preoperative evaluation were completed today.  Patient should follow any additional instructions as provided to her by her performing surgeon.    Hyperlipidemia with target LDL less than 130  Patient did wish to have lab work collected, she will return at a later time she is fasting.  Future lab orders for a CMP and FLP have been placed.  Until her lab work is available for review, she will continue her metoprolol and rosuvastatin as currently prescribed.  Patient had no further questions or concerns in this regard.  - Comprehensive metabolic panel (BMP + Alb, Alk Phos, ALT, AST, Total. Bili, TP); Future  - Lipid panel reflex to direct LDL Fasting; Future            Risks and Recommendations:  The patient has the following additional risks and recommendations for perioperative complications:   - No identified  additional risk factors other than previously addressed    Medication Instructions:  Patient is to take all scheduled medications on the day of surgery    RECOMMENDATION:  APPROVAL GIVEN to proceed with proposed procedure, without further diagnostic evaluation.        30 minutes spent on the date of the encounter doing chart review, history and exam, documentation and further activities per the note        Subjective     HPI related to upcoming procedure: Patient is an 81-year-old  female who presents to the clinic for preoperative examination.  She is currently scheduled to undergo cataract surgery on her left eye on July 11, 2022.  Patient has had previous cataract surgery on her right eye a few years ago.  She is also previously received anesthesia for a right hip arthroplasty.  Patient did tolerate anesthesia without issue.  Patient is not aware of any family history of anesthesia intolerance or bleeding disorders.  She has a history of atrial fibrillation, and she does take warfarin for chronic anticoagulation.  Her most recent INR was noted to be 3.2 on June 6, 2022.  Patient does take 5 mg of warfarin on Mondays, Wednesdays, and Fridays.  She takes 2.5 mg all other days of the week.  Patient is a non-smoker.  She does take metoprolol 25 mg p.o. twice per day along with 5 mg of rosuvastatin as part of her medication regimen.  Patient did wish to have future lab orders placed so that she can have fasting labs collected.  She has not had any fasting labs since 2020.    Preop Questions 4/16/2021   1. Have you ever had a heart attack or stroke? No   2. Have you ever had surgery on your heart or blood vessels, such as a stent placement, a coronary artery bypass, or surgery on an artery in your head, neck, heart, or legs? No   3. Do you have chest pain with activity? No   4. Do you have a history of  heart failure? No   5. Do you currently have a cold, bronchitis or symptoms of other infection? No   6. Do  you have a cough, shortness of breath, or wheezing? No   7. Do you or anyone in your family have previous history of blood clots? No   8. Do you or does anyone in your family have a serious bleeding problem such as prolonged bleeding following surgeries or cuts? No   9. Have you ever had problems with anemia or been told to take iron pills? No   10. Have you had any abnormal blood loss such as black, tarry or bloody stools, or abnormal vaginal bleeding? No   11. Have you ever had a blood transfusion? No   12. Are you willing to have a blood transfusion if it is medically needed before, during, or after your surgery? Yes   13. Have you or any of your relatives ever had problems with anesthesia? No   14. Do you have sleep apnea, excessive snoring or daytime drowsiness? No   15. Do you have any artifical heart valves or other implanted medical devices like a pacemaker, defibrillator, or continuous glucose monitor? No   16. Do you have artificial joints? Yes, right hip   17. Are you allergic to latex? No     Health Care Directive:  Patient does not have a Health Care Directive or Living Will: Discussed advance care planning with patient; however, patient declined at this time.    Preoperative Review of :   reviewed - no record of controlled substances prescribed.          Review of Systems   Constitutional: Negative.    HENT: Negative.    Respiratory: Negative.    Cardiovascular: Negative.    Gastrointestinal: Negative.    Genitourinary: Negative.    Neurological: Negative.          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 Provider to review and confirm.       Atrial fibrillation (H) 09/27/2011     Priority: Medium     Advanced directives, counseling/discussion 05/03/2011     Priority: Medium     Parent voices understanding and acceptance of this advice and will call back if any further questions or concerns.         Osteoporosis 12/01/1998     Priority: Medium     improved by DEXA of 2003; then unchanged in 2004  Problem list name updated by automated process. Provider to review        Past Medical History:   Diagnosis Date     A-fib (H)      Abnormal EKG      Atrial flutter (H)      Chronotropic incompetence 8/25/2015     Closed fracture of rib of left side      Hyperlipidemia with target LDL less than 130 6/6/2014     Diagnosis updated by automated process. Provider to review and confirm.     Osteoporosis, unspecified 12/98    improved by DEXA of 2003; then unchanged in 2004     Palpitations      PFO (patent foramen ovale)      SSS (sick sinus syndrome) (H)      Past Surgical History:   Procedure Laterality Date     ARTHROPLASTY HIP ANTERIOR Right 5/6/2021    Procedure: RIGHT TOTAL HIP ARTHROPLASTY, DIRECT ANTERIOR APPROACH;  Surgeon: Manjinder Sanchez MD;  Location: [facility] OR     COLONOSCOPY  12/17/2015    11-15 mm Adenomatous polyp in cecum, repeat in 3 yrs     COLONOSCOPY  12/04/2018    One 2 mm adenomatous polyp, repeat in 5 years (12/2023)     [facility] [place] NONSPECIFIC PROCEDURE  2/05    colonoscopy      [facility] [place] NONSPECIFIC PROCEDURE Right 11/2016    Right cataract repair and corneal transplant     Current Outpatient Medications   Medication Sig Dispense Refill     acetaminophen (TYLENOL) 500 MG tablet Take 2 tablets (1,000 mg) by mouth every 6 hours as needed for pain 100  "tablet 0     CALCIUM-VITAMIN D PO Take 1 tablet by mouth 2 times daily       fluorometholone (FML LIQUIFILM) 0.1 % ophthalmic susp Place 1 drop into the right eye daily       metoprolol tartrate (LOPRESSOR) 25 MG tablet TAKE 1 TABLET BY MOUTH TWICE A  tablet 0     Multiple Vitamin (MULTI-VITAMIN PO) Take 1 tablet by mouth daily        rosuvastatin (CRESTOR) 5 MG tablet Take 1 tablet (5 mg) by mouth daily Need appt with Dr Molina before additional refills. 90 tablet 0     warfarin ANTICOAGULANT (COUMADIN) 5 MG tablet Take 2.5 mg every Sun, Tue, Thu; 5 mg all other days or as directed by INR clinic 75 tablet 1       Allergies   Allergen Reactions     Lidocaine Other (See Comments)     Pt lopez Hx of a reaction to Lidocaine Eye solution.     Atorvastatin Rash     LIPITOR - pt gets a rash        Social History     Tobacco Use     Smoking status: Never Smoker     Smokeless tobacco: Never Used   Substance Use Topics     Alcohol use: No     Alcohol/week: 0.0 standard drinks       History   Drug Use No         Objective     Blood pressure 128/88, pulse 58, temperature 98.2  F (36.8  C), resp. rate 18, height 1.803 m (5' 11\"), weight 65.8 kg (145 lb), SpO2 99 %, not currently breastfeeding.        Physical Exam  Vitals and nursing note reviewed.   Constitutional:       General: She is not in acute distress.     Appearance: She is well-developed.   HENT:      Right Ear: Tympanic membrane and external ear normal.      Left Ear: Tympanic membrane and external ear normal.      Nose: Nose normal.      Mouth/Throat:      Pharynx: No oropharyngeal exudate.   Eyes:      General:         Right eye: No discharge.         Left eye: No discharge.      Conjunctiva/sclera: Conjunctivae normal.      Pupils: Pupils are equal, round, and reactive to light.   Neck:      Thyroid: No thyromegaly.      Trachea: No tracheal deviation.   Cardiovascular:      Rate and Rhythm: Normal rate and regular rhythm.      Pulses: Normal pulses.      " Heart sounds: Normal heart sounds, S1 normal and S2 normal. No murmur heard.    No friction rub. No S3 or S4 sounds.   Pulmonary:      Effort: Pulmonary effort is normal. No respiratory distress.      Breath sounds: Normal breath sounds. No wheezing or rales.   Abdominal:      General: Bowel sounds are normal.      Palpations: Abdomen is soft. There is no mass.      Tenderness: There is no abdominal tenderness.   Musculoskeletal:         General: Normal range of motion.      Cervical back: Neck supple.   Lymphadenopathy:      Cervical: No cervical adenopathy.   Skin:     General: Skin is warm and dry.      Findings: No rash.   Neurological:      Mental Status: She is alert.      Motor: No abnormal muscle tone.      Deep Tendon Reflexes: Reflexes are normal and symmetric.           Recent Labs   Lab Test 06/06/22  0000 05/31/22  0000 05/11/21  0000 05/07/21  0625 05/06/21  0729 04/20/21  1340 03/16/21  1553 03/14/21  1155   HGB  --   --   --  11.6* 14.4 13.9  --  14.5   PLT  --   --   --   --   --  258  --  307   INR 3.20* 2.10   < > 1.28* 1.04  --    < >  --    NA  --   --   --   --   --  138  --  136   POTASSIUM  --   --   --   --   --  3.7  --  3.6   CR  --   --   --   --  0.76 0.73  --  0.64    < > = values in this interval not displayed.        Diagnostics:  Labs pending at this time.  Results will be reviewed when available.   No EKG required for low risk surgery (cataract, skin procedure, breast biopsy, etc).    Revised Cardiac Risk Index (RCRI):  The patient has the following serious cardiovascular risks for perioperative complications:   - No serious cardiac risks = 0 points     RCRI Interpretation: 0 points: Class I (very low risk - 0.4% complication rate)           Signed Electronically by: Edwin Ulrich MD  Copy of this evaluation report is provided to requesting physician.

## 2022-06-20 LAB — INR HOME MONITORING: 3.1 (ref 2–3)

## 2022-06-21 ENCOUNTER — ANTICOAGULATION THERAPY VISIT (OUTPATIENT)
Dept: ANTICOAGULATION | Facility: CLINIC | Age: 82
End: 2022-06-21
Payer: COMMERCIAL

## 2022-06-21 DIAGNOSIS — Z79.01 LONG TERM CURRENT USE OF ANTICOAGULANTS WITH INR GOAL OF 2.0-3.0: Primary | ICD-10-CM

## 2022-06-21 DIAGNOSIS — I48.21 PERMANENT ATRIAL FIBRILLATION (H): ICD-10-CM

## 2022-06-21 NOTE — PROGRESS NOTES
ANTICOAGULATION MANAGEMENT     Mariya Rowe 81 year old female is on warfarin with supratherapeutic INR result. (Goal INR 2.0-3.0)    Recent labs: (last 7 days)     06/20/22  0000   INR 3.1*       ASSESSMENT       Source(s): Chart Review and Patient/Caregiver Call       Warfarin doses taken: Warfarin taken as instructed    Diet: Doesn't eat many green's    New illness, injury, or hospitalization: No    Medication/supplement changes: None noted    Signs or symptoms of bleeding or clotting: No    Previous INR: Supratherapeutic    Additional findings: Upcoming surgery/procedure 7/11 Cataract Surgery       PLAN     Recommended plan for no diet, medication or health factor changes affecting INR     Dosing Instructions: partial hold then continue your current warfarin dose with next INR in 2 weeks       Summary  As of 6/21/2022    Full warfarin instructions:  2.5 mg every Sun, Tue, Thu; 5 mg all other days   Next INR check:  7/5/2022             Telephone call with Mariya who agrees to plan and repeated back plan correctly    Patient to recheck with home meter    Education provided: Please call back if any changes to your diet, medications or how you've been taking warfarin    Plan made per [facility] anticoagulation protocol    Anita Palmer, RN  Anticoagulation Clinic  6/21/2022    _______________________________________________________________________     Anticoagulation Episode Summary     Current INR goal:  2.0-3.0   TTR:  72.9 % (1 y)   Target end date:  Indefinite   Send INR reminders to:  LUIS EDUARDO MACK    Indications    Long term current use of anticoagulants with INR goal of 2.0-3.0 [Z79.01]  Atrial fibrillation (H) (Resolved) [I48.91]  Permanent atrial fibrillation (H) [I48.21]           Comments:  [employer] home meter. Manage by exception.         Anticoagulation Care Providers     Provider Role Specialty Phone number    Chavez Molina MD Referring Internal Medicine [phone]

## 2022-07-12 ENCOUNTER — ANTICOAGULATION THERAPY VISIT (OUTPATIENT)
Dept: ANTICOAGULATION | Facility: CLINIC | Age: 82
End: 2022-07-12

## 2022-07-12 LAB — INR (EXTERNAL): 1.1 (ref 0.9–1.1)

## 2022-07-12 NOTE — PROGRESS NOTES
ANTICOAGULATION MANAGEMENT     Mariya Rowe 81 year old female is on warfarin with subtherapeutic INR result. (Goal INR 2.0-3.0)    Recent labs: (last 7 days)     07/10/22  1359   INR 1.1       ASSESSMENT       Source(s): Chart Review and Patient/Caregiver Call       Warfarin doses taken: Warfarin recently held for eye surgery on 7/11/22 which may be affecting INR. Patient did not notify this [facility] clinic of her surgery, or the instructions she received from her eye surgeon to hold her warfarin for 10 days. Writer initiated call today to remind patient that she is due to check her INR when patient reported INR from 7/10/22. When asked who gave patient instructions for dosing for 7/11/22 and 7/12/22, patient reported she was self-dosing.    Diet: No new diet changes identified    New illness, injury, or hospitalization: Yes: cataract and cornea surgery on 7/11/22    Medication/supplement changes: None noted    Signs or symptoms of bleeding or clotting: No    Previous INR: Supratherapeutic    Additional findings: None       PLAN     Recommended plan for temporary change(s) affecting INR     Dosing Instructions: booster dose then continue your current warfarin dose with next INR in 6 days       Summary  As of 7/12/2022    Full warfarin instructions:  7/12: 5 mg; Otherwise 2.5 mg every Sun, Tue, Thu; 5 mg all other days   Next INR check:  7/18/2022             Telephone call with Mariya who verbalizes understanding and agrees to plan    Patient to recheck with home meter    Education provided: Please call back if any changes to your diet, medications or how you've been taking warfarin, Importance of following up at instructed interval, Importance of taking warfarin as instructed, Importance of notifying clinic for changes in medications; a sooner lab recheck maybe needed. and Importance of notifying clinic of upcoming surgeries and procedures 2 weeks in advance    Plan made with [facility] Pharmacist Victoria Mcnamara  MOI Rod RN  Anticoagulation Clinic  7/12/2022    _______________________________________________________________________     Anticoagulation Episode Summary     Current INR goal:  2.0-3.0   TTR:  75.3 % (1 y)   Target end date:  Indefinite   Send INR reminders to:  LUIS EDUARDO MACK    Indications    Long term current use of anticoagulants with INR goal of 2.0-3.0 [Z79.01]  Atrial fibrillation (H) (Resolved) [I48.91]  Permanent atrial fibrillation (H) [I48.21]           Comments:  [employer] home meter. Manage by exception.         Anticoagulation Care Providers     Provider Role Specialty Phone number    Chavez Molina MD Referring Internal Medicine [phone]

## 2022-07-19 ENCOUNTER — TELEPHONE (OUTPATIENT)
Dept: ANTICOAGULATION | Facility: CLINIC | Age: 82
End: 2022-07-19

## 2022-07-19 ENCOUNTER — ANTICOAGULATION THERAPY VISIT (OUTPATIENT)
Dept: ANTICOAGULATION | Facility: CLINIC | Age: 82
End: 2022-07-19

## 2022-07-19 DIAGNOSIS — I48.21 PERMANENT ATRIAL FIBRILLATION (H): ICD-10-CM

## 2022-07-19 DIAGNOSIS — Z79.01 LONG TERM CURRENT USE OF ANTICOAGULANTS WITH INR GOAL OF 2.0-3.0: Primary | ICD-10-CM

## 2022-07-19 LAB — INR HOME MONITORING: 2.6 (ref 2–3)

## 2022-07-19 NOTE — TELEPHONE ENCOUNTER
ANTICOAGULATION     Mariya BAM Rowe is overdue for INR check.      Spoke with Mariya instructed to test INR with home meter and call results to home monitoring company as soon as possible.  Patient states she will check this afternoon    Carol Donnelly RN

## 2022-07-19 NOTE — PROGRESS NOTES
ANTICOAGULATION MANAGEMENT     Mariya Rowe 81 year old female is on warfarin with therapeutic INR result. (Goal INR 2.0-3.0)    Recent labs: (last 7 days)     07/19/22  0000   INR 2.6       ASSESSMENT       Source(s): Patient/Caregiver Call       Warfarin doses taken: Warfarin taken as instructed    Diet: No new diet changes identified    New illness, injury, or hospitalization: No    Medication/supplement changes: None noted    Signs or symptoms of bleeding or clotting: No    Previous INR: Subtherapeutic    Additional findings: None       PLAN     Recommended plan for no diet, medication or health factor changes affecting INR     Dosing Instructions: continue your current warfarin dose with next INR in 2 weeks       Summary  As of 7/19/2022    Full warfarin instructions:  2.5 mg every Sun, Tue, Thu; 5 mg all other days   Next INR check:  8/2/2022             Telephone call with Mariya who verbalizes understanding and agrees to plan    Patient to recheck with home meter    Education provided: Resume manage by exception with home monitor. Continue to submit INR results to home monitor company.You will only be called when your result is out of range. Please call and notify [facility] if new medication started, dose missed, signs or symptoms of bleeding or clotting, or a surgery/procedure is scheduled.    Plan made per [facility] anticoagulation protocol    Mary Duncan RN  Anticoagulation Clinic  7/19/2022    _______________________________________________________________________     Anticoagulation Episode Summary     Current INR goal:  2.0-3.0   TTR:  74.0 % (1 y)   Target end date:  Indefinite   Send INR reminders to:  LU[facility] EDUARDO MACK    Indications    Long term current use of anticoagulants with INR goal of 2.0-3.0 [Z79.01]  Atrial fibrillation (H) (Resolved) [I48.91]  Permanent atrial fibrillation (H) [I48.21]           Comments:  [employer] home meter. Manage by exception.         Anticoagulation Care Providers     Provider  Role Specialty Phone number    Chavez Molina MD Referring Internal Medicine [phone]

## 2022-08-04 ENCOUNTER — LAB (OUTPATIENT)
Dept: LAB | Facility: CLINIC | Age: 82
End: 2022-08-04
Payer: COMMERCIAL

## 2022-08-04 ENCOUNTER — DOCUMENTATION ONLY (OUTPATIENT)
Dept: ANTICOAGULATION | Facility: CLINIC | Age: 82
End: 2022-08-04

## 2022-08-04 DIAGNOSIS — I48.21 PERMANENT ATRIAL FIBRILLATION (H): ICD-10-CM

## 2022-08-04 DIAGNOSIS — Z79.01 LONG TERM CURRENT USE OF ANTICOAGULANTS WITH INR GOAL OF 2.0-3.0: Primary | ICD-10-CM

## 2022-08-04 DIAGNOSIS — E78.5 HYPERLIPIDEMIA WITH TARGET LDL LESS THAN 130: ICD-10-CM

## 2022-08-04 LAB — INR BLD: 2 (ref 0.9–1.1)

## 2022-08-04 PROCEDURE — 36415 COLL VENOUS BLD VENIPUNCTURE: CPT

## 2022-08-04 PROCEDURE — 80061 LIPID PANEL: CPT

## 2022-08-04 PROCEDURE — 85610 PROTHROMBIN TIME: CPT

## 2022-08-04 PROCEDURE — 80053 COMPREHEN METABOLIC PANEL: CPT

## 2022-08-04 NOTE — PROGRESS NOTES
ANTICOAGULATION MANAGEMENT     Mariya Rowe 81 year old female is on warfarin with therapeutic INR result. (Goal INR 2.0-3.0)    Recent labs: (last 7 days)     08/04/22  0904   INR 2.0*       ASSESSMENT       Source(s): Chart Review and Patient/Caregiver Call       Warfarin doses taken: Warfarin taken as instructed    Diet: No new diet changes identified    New illness, injury, or hospitalization: No    Medication/supplement changes: None noted    Signs or symptoms of bleeding or clotting: No    Previous INR: Therapeutic last visit; previously outside of goal range    Additional findings: None       PLAN     Recommended plan for no diet, medication or health factor changes affecting INR     Dosing Instructions: Continue your current warfarin dose with next INR in 2 weeks       Summary  As of 8/4/2022    Full warfarin instructions:  2.5 mg every Sun, Tue, Thu; 5 mg all other days   Next INR check:  8/18/2022             Telephone call with Mariya who verbalizes understanding and agrees to plan    Patient to recheck with home meter    Education provided: Goal range and significance of current result and Resume manage by exception with home monitor. Continue to submit INR results to home monitor company.You will only be called when your result is out of range. Please call and notify [facility] if new medication started, dose missed, signs or symptoms of bleeding or clotting, or a surgery/procedure is scheduled.    Plan made per [facility] anticoagulation protocol    Lalo Tapia RN  Anticoagulation Clinic  8/4/2022    _______________________________________________________________________     Anticoagulation Episode Summary     Current INR goal:  2.0-3.0   TTR:  75.8 % (1 y)   Target end date:  Indefinite   Send INR reminders to:  LUIS EDUARDO MACK    Indications    Long term current use of anticoagulants with INR goal of 2.0-3.0 [Z79.01]  Atrial fibrillation (H) (Resolved) [I48.91]  Permanent atrial fibrillation (H) [I48.21]            Comments:  [employer] home meter. Manage by exception.         Anticoagulation Care Providers     Provider Role Specialty Phone number    Chavez Molina MD Referring Internal Medicine [phone]

## 2022-08-05 LAB
ALBUMIN SERPL-MCNC: 3.6 G/DL (ref 3.4–5)
ALP SERPL-CCNC: 120 U/L (ref 40–150)
ALT SERPL W P-5'-P-CCNC: 28 U/L (ref 0–50)
ANION GAP SERPL CALCULATED.3IONS-SCNC: 10 MMOL/L (ref 3–14)
AST SERPL W P-5'-P-CCNC: 27 U/L (ref 0–45)
BILIRUB SERPL-MCNC: 1 MG/DL (ref 0.2–1.3)
BUN SERPL-MCNC: 16 MG/DL (ref 7–30)
CALCIUM SERPL-MCNC: 9.1 MG/DL (ref 8.5–10.1)
CHLORIDE BLD-SCNC: 106 MMOL/L (ref 94–109)
CHOLEST SERPL-MCNC: 152 MG/DL
CO2 SERPL-SCNC: 22 MMOL/L (ref 20–32)
CREAT SERPL-MCNC: 0.75 MG/DL (ref 0.52–1.04)
FASTING STATUS PATIENT QL REPORTED: YES
GFR SERPL CREATININE-BSD FRML MDRD: 80 ML/MIN/1.73M2
GLUCOSE BLD-MCNC: 90 MG/DL (ref 70–99)
HDLC SERPL-MCNC: 77 MG/DL
LDLC SERPL CALC-MCNC: 61 MG/DL
NONHDLC SERPL-MCNC: 75 MG/DL
POTASSIUM BLD-SCNC: 4.1 MMOL/L (ref 3.4–5.3)
PROT SERPL-MCNC: 7.2 G/DL (ref 6.8–8.8)
SODIUM SERPL-SCNC: 138 MMOL/L (ref 133–144)
TRIGL SERPL-MCNC: 71 MG/DL

## 2022-08-31 ENCOUNTER — DOCUMENTATION ONLY (OUTPATIENT)
Dept: ANTICOAGULATION | Facility: CLINIC | Age: 82
End: 2022-08-31

## 2022-09-05 LAB — INR HOME MONITORING: 2.6 (ref 2–3)

## 2022-09-06 ENCOUNTER — DOCUMENTATION ONLY (OUTPATIENT)
Dept: ANTICOAGULATION | Facility: CLINIC | Age: 82
End: 2022-09-06

## 2022-09-06 DIAGNOSIS — I48.21 PERMANENT ATRIAL FIBRILLATION (H): ICD-10-CM

## 2022-09-06 DIAGNOSIS — Z79.01 LONG TERM CURRENT USE OF ANTICOAGULANTS WITH INR GOAL OF 2.0-3.0: Primary | ICD-10-CM

## 2022-09-06 NOTE — PROGRESS NOTES
ANTICOAGULATION  MANAGEMENT-Home Monitor Managed by Exception    Mariya Rowe 82 year old female is on warfarin with therapeutic INR result. (Goal INR 2.0-3.0)    Recent labs: (last 7 days)     09/05/22  0000   INR 2.6         Previous INR was Therapeutic    Medication, diet, health changes since last INR:chart reviewed; none identified    Contacted within the last 12 weeks by phone on 08/04/2022      PAO Meraz was NOT contacted regarding therapeutic result today per home monitoring policy manage by exception agreement.   Current warfarin dose is to be continued:     Summary  As of 9/6/2022    Full warfarin instructions:  2.5 mg every Sun, Tue, Thu; 5 mg all other days   Next INR check:  9/19/2022           ?   Roro Ramsey RN  Anticoagulation Clinic  9/6/2022    _______________________________________________________________________     Anticoagulation Episode Summary     Current INR goal:  2.0-3.0   TTR:  84.8 % (1 y)   Target end date:  Indefinite   Send INR reminders to:  LUIS EDUARDO MACK    Indications    Long term current use of anticoagulants with INR goal of 2.0-3.0 [Z79.01]  Atrial fibrillation (H) (Resolved) [I48.91]  Permanent atrial fibrillation (H) [I48.21]           Comments:  [employer] home meter. Manage by exception.         Anticoagulation Care Providers     Provider Role Specialty Phone number    Chavez Molina MD Referring Internal Medicine [phone]

## 2022-09-08 ENCOUNTER — TRANSFERRED RECORDS (OUTPATIENT)
Dept: HEALTH INFORMATION MANAGEMENT | Facility: CLINIC | Age: 82
End: 2022-09-08

## 2022-09-08 DIAGNOSIS — I48.21 PERMANENT ATRIAL FIBRILLATION (H): Primary | ICD-10-CM

## 2022-09-20 ENCOUNTER — DOCUMENTATION ONLY (OUTPATIENT)
Dept: ANTICOAGULATION | Facility: CLINIC | Age: 82
End: 2022-09-20

## 2022-09-20 NOTE — PROGRESS NOTES
ANTICOAGULATION     Mariya KUHN Soledad is overdue for INR check.      Reminder letter sent Via [employer] Palmer RN

## 2022-09-21 ENCOUNTER — HOSPITAL ENCOUNTER (OUTPATIENT)
Dept: CARDIOLOGY | Facility: CLINIC | Age: 82
Discharge: HOME | End: 2022-09-21
Attending: NURSE PRACTITIONER | Admitting: NURSE PRACTITIONER
Payer: COMMERCIAL

## 2022-09-21 DIAGNOSIS — I48.21 PERMANENT ATRIAL FIBRILLATION (H): ICD-10-CM

## 2022-09-21 PROCEDURE — 93226 XTRNL ECG REC<48 HR SCAN A/R: CPT

## 2022-09-21 PROCEDURE — 93227 XTRNL ECG REC<48 HR R&I: CPT | Performed by: INTERNAL MEDICINE

## 2022-09-27 ENCOUNTER — DOCUMENTATION ONLY (OUTPATIENT)
Dept: ANTICOAGULATION | Facility: CLINIC | Age: 82
End: 2022-09-27

## 2022-09-27 DIAGNOSIS — I48.21 PERMANENT ATRIAL FIBRILLATION (H): ICD-10-CM

## 2022-09-27 DIAGNOSIS — Z79.01 LONG TERM CURRENT USE OF ANTICOAGULANTS WITH INR GOAL OF 2.0-3.0: Primary | ICD-10-CM

## 2022-09-27 LAB — INR HOME MONITORING: 2.8 (ref 2–3)

## 2022-09-27 NOTE — PROGRESS NOTES
ANTICOAGULATION     Mariya Rowe is overdue for INR check.      Left message reminding patient to check INR with their home meter and call results to the home monitoring company as soon as possible.     Naima Rod RN

## 2022-09-27 NOTE — PROGRESS NOTES
ANTICOAGULATION  MANAGEMENT-Home Monitor Managed by Exception    Mariya Rowe 82 year old female is on warfarin with therapeutic INR result. (Goal INR 2.0-3.0)    Recent labs: (last 7 days)     09/27/22  0000   INR 2.8         Previous INR was Therapeutic    Medication, diet, health changes since last INR:chart reviewed; none identified    Contacted within the last 12 weeks by phone on 8/4/22      PAO Meraz was NOT contacted regarding therapeutic result today per home monitoring policy manage by exception agreement.   Current warfarin dose is to be continued:     Summary  As of 9/27/2022    Full warfarin instructions:  2.5 mg every Sun, Tue, Thu; 5 mg all other days   Next INR check:  10/11/2022           ?   Naima Rod RN  Anticoagulation Clinic  9/27/2022    _______________________________________________________________________     Anticoagulation Episode Summary     Current INR goal:  2.0-3.0   TTR:  89.1 % (1 y)   Target end date:  Indefinite   Send INR reminders to:  LUIS EDUARDO MACK    Indications    Long term current use of anticoagulants with INR goal of 2.0-3.0 [Z79.01]  Atrial fibrillation (H) (Resolved) [I48.91]  Permanent atrial fibrillation (H) [I48.21]           Comments:  [employer] home meter. Manage by exception.         Anticoagulation Care Providers     Provider Role Specialty Phone number    Chavez Molina MD Referring Internal Medicine [phone]

## 2022-10-05 ENCOUNTER — OFFICE VISIT (OUTPATIENT)
Dept: CARDIOLOGY | Facility: CLINIC | Age: 82
End: 2022-10-05
Payer: COMMERCIAL

## 2022-10-05 VITALS
DIASTOLIC BLOOD PRESSURE: 88 MMHG | SYSTOLIC BLOOD PRESSURE: 120 MMHG | WEIGHT: 139 LBS | BODY MASS INDEX: 19.46 KG/M2 | HEART RATE: 98 BPM | OXYGEN SATURATION: 97 % | HEIGHT: 71 IN

## 2022-10-05 DIAGNOSIS — I48.21 PERMANENT ATRIAL FIBRILLATION (H): Primary | ICD-10-CM

## 2022-10-05 PROCEDURE — 99213 OFFICE O/P EST LOW 20 MIN: CPT | Performed by: INTERNAL MEDICINE

## 2022-10-05 NOTE — LETTER
10/5/2022    Chavez Molina MD, MD  [address] [facility] [address]  [facility] [address]    RE: Mariya Rowe       Dear Colleague,     I had the pleasure of seeing Mariya Rowe in the [facility].  PHYSICIAN NOTE:  This visit was completed in person at the [facility].      I had the pleasure of seeing Ms. Mariya Rowe, a delightful 82-year-old female with the following medical issues:   A.  Sick sinus syndrome with bradycardia in the past (while in normal sinus rhythm).   B.    Permanent atrial fibrillation.  She was treated with antiarrhythmic drugs for years.  Since 2017 she has been in atrial fibrillation continuously, thus treated with rate control (metoprolol) and warfarin.  [id number] of 3.    C.  Dyslipidemia.    Mariya is feeling well.  She remains unaware of AF.  Her case is interesting in that about several years ago she was really struggling with atrial fibrillation, being highly symptomatic with it.  This is why we pursued antiarrhythmic drug therapy.  However, when she developed persistent AF in 2017 (despite sotalol) she had no significant symptoms, thus we abandon rhythm control strategy.    She had no complaints today.  She has had no bleeding issues on warfarin.  She sees Dr. Molina for primary care.  No chest pain, syncope or near syncope, orthopnea or PND.    PHYSICAL EXAMINATION:  General:  in no apparent distress, slender body habitus  ENT/Mouth:  no nasal discharge.  Eyes:  normal conjunctivae.   Neck:  no thyromegaly or lymphadenopathy.  Chest/Lungs:  patient is not dyspneic.  Lungs CTA, without rales or wheezing.    Cardiovascular: Normal JVP, rate is irregular.  No gallop, murmur or rub.    Abdomen:  no abdominal distention.  Soft, negative HJR.    Extremities:  no edema  Neurologic:  alert & oriented x 3.    Vascular:  2+ carotids without bruits.  2+ radials.        DIAGNOSTIC STUDIES:  Laboratory studies: Sodium 138, potassium 4.1, creatinine 0.75,  cholesterol 152, HDL 77, LDL 61.  Holter: Recent study showed persistent AF with average rate of 90, minimum 73, maximum 141.  No significant pauses.      IMPRESSION:  1. Permanent atrial fibrillation.  Good rate control on metoprolol.  No issues with warfarin.  Stable INR in past several months.    RECOMMENDATIONS:  1. Continue current medications.  2. I discussed with Mariya that our contribution to her medical care is minimal at this point, as she has been stable for years.  However, she strongly prefers to follow-up with us.  I requested a follow-up visit with cardiology CARL in 2 years.    It was my pleasure seeing this delightful patient.  Please feel free to call with any questions.   Total time spent today, including time for chart review and documentation, was 20 minutes.      Yelena Kenyon MD, [facility]          Orders Placed This Encounter   Procedures     Follow-Up with Cardiology CARL       No orders of the defined types were placed in this encounter.      There are no discontinued medications.      Encounter Diagnosis   Name Primary?     Permanent atrial fibrillation (H) Yes       CURRENT MEDICATIONS:  Current Outpatient Medications   Medication Sig Dispense Refill     acetaminophen (TYLENOL) 500 MG tablet Take 2 tablets (1,000 mg) by mouth every 6 hours as needed for pain 100 tablet 0     CALCIUM-VITAMIN D PO Take 1 tablet by mouth 2 times daily       fluorometholone (FML LIQUIFILM) 0.1 % ophthalmic susp Place 1 drop into the right eye daily       metoprolol tartrate (LOPRESSOR) 25 MG tablet TAKE 1 TABLET BY MOUTH TWICE A  tablet 3     Multiple Vitamin (MULTI-VITAMIN PO) Take 1 tablet by mouth daily        rosuvastatin (CRESTOR) 5 MG tablet Take 1 tablet (5 mg) by mouth daily 90 tablet 0     warfarin ANTICOAGULANT (COUMADIN) 5 MG tablet Take 2.5 mg every Sun, Tue, Thu; 5 mg all other days or as directed by INR clinic 75 tablet 1       ALLERGIES     Allergies   Allergen Reactions     Lidocaine  Other (See Comments)     Pt lopez Hx of a reaction to Lidocaine Eye solution.     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 Provider to review and confirm.     Osteoporosis, unspecified 12/98    improved by DEXA of 2003; then unchanged in 2004     Palpitations      PFO (patent foramen ovale)      SSS (sick sinus syndrome) (H)        PAST SURGICAL HISTORY:  Past Surgical History:   Procedure Laterality Date     ARTHROPLASTY HIP ANTERIOR Right 5/6/2021    Procedure: RIGHT TOTAL HIP ARTHROPLASTY, DIRECT ANTERIOR APPROACH;  Surgeon: Manjinder Sanchez MD;  Location: [facility] OR     COLONOSCOPY  12/17/2015    11-15 mm Adenomatous polyp in cecum, repeat in 3 yrs     COLONOSCOPY  12/04/2018    One 2 mm adenomatous polyp, repeat in 5 years (12/2023)     Z NONSPECIFIC PROCEDURE  2/05    colonoscopy      [facility] NONSPECIFIC PROCEDURE Right 11/2016    Right cataract repair and corneal transplant     FAMILY HISTORY:  Family History   Adopted: Yes   Problem Relation Age of Onset     Cerebrovascular Disease Mother         at age 88 ;; lived to be 99     Unknown/Adopted Father         Don't know father's history     SOCIAL HISTORY:  Social History     Socioeconomic History     Marital status:      Spouse name: Julio     Number of children: 2     Years of education: None     Highest education level: Associate degree: occupational, technical, or vocational program   Occupational History     Occupation:      Comment: Retired at age 63   Tobacco Use     Smoking status: Never Smoker     Smokeless tobacco: Never Used   Substance and Sexual Activity     Alcohol use: No     Alcohol/week: 0.0 standard drinks     Drug use:  "No     Sexual activity: Yes     Partners: Male   Other Topics Concern     Caffeine Concern No     Sleep Concern No     Special Diet No     Exercise No     Comment: some walking cleaning   Social History Narrative    Exercise bike or walking 3-4x/week.     Spends 5 months/year in [place], [place].    2 children, both in [place], [place]. 4 grandchildren.        Review of Systems:  Skin:          Eyes:         ENT:         Respiratory:          Cardiovascular:         Gastroenterology:        Genitourinary:         Musculoskeletal:         Neurologic:         Psychiatric:         Heme/Lymph/Imm:         Endocrine:           Physical Exam:  Vitals: /88 (BP Location: Right arm, Patient Position: Chair, Cuff Size: Adult Regular)   Pulse 98   Ht 1.803 m (5' 11\")   Wt 63 kg (139 lb)   LMP  (LMP Unknown)   SpO2 97%   BMI 19.39 kg/m      Constitutional:           Skin:             Head:           Eyes:           Lymph:      ENT:           Neck:           Respiratory:            Cardiac:                                                          GI:           Extremities and Muscular Skeletal:                 Neurological:           Psych:         CC  No referring provider defined for this encounter.    Thank you for allowing me to participate in the care of your patient.    Sincerely,   Yelena Kenyon MD   [facility]  "

## 2022-10-05 NOTE — PROGRESS NOTES
PHYSICIAN NOTE:  This visit was completed in person at the [facility].      I had the pleasure of seeing Ms. Mariya Rowe, a delightful 82-year-old female with the following medical issues:   A.  Sick sinus syndrome with bradycardia in the past (while in normal sinus rhythm).   B.    Permanent atrial fibrillation.  She was treated with antiarrhythmic drugs for years.  Since 2017 she has been in atrial fibrillation continuously, thus treated with rate control (metoprolol) and warfarin.  [id number] of 3.    C.  Dyslipidemia.    Mariya is feeling well.  She remains unaware of AF.  Her case is interesting in that about several years ago she was really struggling with atrial fibrillation, being highly symptomatic with it.  This is why we pursued antiarrhythmic drug therapy.  However, when she developed persistent AF in 2017 (despite sotalol) she had no significant symptoms, thus we abandon rhythm control strategy.    She had no complaints today.  She has had no bleeding issues on warfarin.  She sees Dr. Molina for primary care.  No chest pain, syncope or near syncope, orthopnea or PND.    PHYSICAL EXAMINATION:  General:  in no apparent distress, slender body habitus  ENT/Mouth:  no nasal discharge.  Eyes:  normal conjunctivae.   Neck:  no thyromegaly or lymphadenopathy.  Chest/Lungs:  patient is not dyspneic.  Lungs CTA, without rales or wheezing.    Cardiovascular: Normal JVP, rate is irregular.  No gallop, murmur or rub.    Abdomen:  no abdominal distention.  Soft, negative HJR.    Extremities:  no edema  Neurologic:  alert & oriented x 3.    Vascular:  2+ carotids without bruits.  2+ radials.        DIAGNOSTIC STUDIES:  Laboratory studies: Sodium 138, potassium 4.1, creatinine 0.75, cholesterol 152, HDL 77, LDL 61.  Holter: Recent study showed persistent AF with average rate of 90, minimum 73, maximum 141.  No significant pauses.      IMPRESSION:  1. Permanent atrial fibrillation.  Good rate control  on metoprolol.  No issues with warfarin.  Stable INR in past several months.    RECOMMENDATIONS:  1. Continue current medications.  2. I discussed with Mariya that our contribution to her medical care is minimal at this point, as she has been stable for years.  However, she strongly prefers to follow-up with us.  I requested a follow-up visit with cardiology CARL in 2 years.    It was my pleasure seeing this delightful patient.  Please feel free to call with any questions.   Total time spent today, including time for chart review and documentation, was 20 minutes.      Yelena Kenyon MD, [facility]          Orders Placed This Encounter   Procedures     Follow-Up with Cardiology CARL       No orders of the defined types were placed in this encounter.      There are no discontinued medications.      Encounter Diagnosis   Name Primary?     Permanent atrial fibrillation (H) Yes       CURRENT MEDICATIONS:  Current Outpatient Medications   Medication Sig Dispense Refill     acetaminophen (TYLENOL) 500 MG tablet Take 2 tablets (1,000 mg) by mouth every 6 hours as needed for pain 100 tablet 0     CALCIUM-VITAMIN D PO Take 1 tablet by mouth 2 times daily       fluorometholone (FML LIQUIFILM) 0.1 % ophthalmic susp Place 1 drop into the right eye daily       metoprolol tartrate (LOPRESSOR) 25 MG tablet TAKE 1 TABLET BY MOUTH TWICE A  tablet 3     Multiple Vitamin (MULTI-VITAMIN PO) Take 1 tablet by mouth daily        rosuvastatin (CRESTOR) 5 MG tablet Take 1 tablet (5 mg) by mouth daily 90 tablet 0     warfarin ANTICOAGULANT (COUMADIN) 5 MG tablet Take 2.5 mg every Sun, Tue, Thu; 5 mg all other days or as directed by INR clinic 75 tablet 1       ALLERGIES     Allergies   Allergen Reactions     Lidocaine Other (See Comments)     Pt lopez Hx of a reaction to Lidocaine Eye solution.     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 Provider to review and confirm.     Osteoporosis, unspecified 12/98    improved by DEXA of 2003; then unchanged in 2004     Palpitations      PFO (patent foramen ovale)      SSS (sick sinus syndrome) (H)        PAST SURGICAL HISTORY:  Past Surgical History:   Procedure Laterality Date     ARTHROPLASTY HIP ANTERIOR Right 5/6/2021    Procedure: RIGHT TOTAL HIP ARTHROPLASTY, DIRECT ANTERIOR APPROACH;  Surgeon: Manjinder aSnchez MD;  Location: [facility] OR     COLONOSCOPY  12/17/2015    11-15 mm Adenomatous polyp in cecum, repeat in 3 yrs     COLONOSCOPY  12/04/2018    One 2 mm adenomatous polyp, repeat in 5 years (12/2023)     [facility] NONSPECIFIC PROCEDURE  2/05    colonoscopy      [facility] NONSPECIFIC PROCEDURE Right 11/2016    Right cataract repair and corneal transplant       FAMILY HISTORY:  Family History   Adopted: Yes   Problem Relation Age of Onset     Cerebrovascular Disease Mother         at age 88 ;; lived to be      Unknown/Adopted Father         Don't know father's history       SOCIAL HISTORY:  Social History     Socioeconomic History     Marital status:      Spouse name: Julio     Number of children: 2     Years of education: None     Highest education level: Associate degree: occupational, technical, or vocational program   Occupational History     Occupation:      Comment: Retired at age 63   Tobacco Use     Smoking status: Never Smoker     Smokeless tobacco: Never Used   Substance and Sexual Activity     Alcohol use: No     Alcohol/week: 0.0 standard drinks     Drug use: No     Sexual activity: Yes     Partners: Male   Other Topics Concern     Caffeine Concern No     Sleep Concern No     Special Diet No     Exercise No     Comment: some walking cleaning   Social History Narrative     "Exercise bike or walking 3-4x/week.     Spends 5 months/year in [place], [place].    2 children, both in [place], [place]. 4 grandchildren.        Review of Systems:  Skin:          Eyes:         ENT:         Respiratory:          Cardiovascular:         Gastroenterology:        Genitourinary:         Musculoskeletal:         Neurologic:         Psychiatric:         Heme/Lymph/Imm:         Endocrine:           Physical Exam:  Vitals: /88 (BP Location: Right arm, Patient Position: Chair, Cuff Size: Adult Regular)   Pulse 98   Ht 1.803 m (5' 11\")   Wt 63 kg (139 lb)   LMP  (LMP Unknown)   SpO2 97%   BMI 19.39 kg/m      Constitutional:           Skin:             Head:           Eyes:           Lymph:      ENT:           Neck:           Respiratory:            Cardiac:                                                           GI:           Extremities and Muscular Skeletal:                 Neurological:           Psych:           CC  No referring provider defined for this encounter.              "

## 2022-10-10 ENCOUNTER — HEALTH MAINTENANCE LETTER (OUTPATIENT)
Age: 82
End: 2022-10-10

## 2022-10-16 DIAGNOSIS — I48.91 UNSPECIFIED ATRIAL FIBRILLATION (H): ICD-10-CM

## 2022-10-16 DIAGNOSIS — E78.5 HYPERLIPIDEMIA WITH TARGET LDL LESS THAN 130: ICD-10-CM

## 2022-10-18 ENCOUNTER — DOCUMENTATION ONLY (OUTPATIENT)
Dept: ANTICOAGULATION | Facility: CLINIC | Age: 82
End: 2022-10-18

## 2022-10-18 ENCOUNTER — HOSPITAL ENCOUNTER (OUTPATIENT)
Dept: MAMMOGRAPHY | Facility: CLINIC | Age: 82
Discharge: HOME | End: 2022-10-18
Attending: INTERNAL MEDICINE | Admitting: INTERNAL MEDICINE
Payer: COMMERCIAL

## 2022-10-18 DIAGNOSIS — I48.21 PERMANENT ATRIAL FIBRILLATION (H): ICD-10-CM

## 2022-10-18 DIAGNOSIS — Z12.31 VISIT FOR SCREENING MAMMOGRAM: ICD-10-CM

## 2022-10-18 DIAGNOSIS — Z79.01 LONG TERM CURRENT USE OF ANTICOAGULANTS WITH INR GOAL OF 2.0-3.0: Primary | ICD-10-CM

## 2022-10-18 LAB — INR HOME MONITORING: 2.5 (ref 2–3)

## 2022-10-18 PROCEDURE — 77067 SCR MAMMO BI INCL CAD: CPT

## 2022-10-18 NOTE — PROGRESS NOTES
ANTICOAGULATION  MANAGEMENT-Home Monitor Managed by Exception    Mariya Rowe 82 year old female is on warfarin with therapeutic INR result. (Goal INR 2.0-3.0)    Recent labs: (last 7 days)     10/18/22  0000   INR 2.5         Previous INR was Therapeutic    Medication, diet, health changes since last INR:chart reviewed; none identified    Contacted within the last 12 weeks by phone on 8/4/22      PAO Meraz was NOT contacted regarding therapeutic result today per home monitoring policy manage by exception agreement.   Current warfarin dose is to be continued:     Summary  As of 10/18/2022    Full warfarin instructions:  2.5 mg every Sun, Tue, Thu; 5 mg all other days   Next INR check:  11/1/2022           ?   Naima Rod RN  Anticoagulation Clinic  10/18/2022    _______________________________________________________________________     Anticoagulation Episode Summary     Current INR goal:  2.0-3.0   TTR:  89.6 % (1 y)   Target end date:  Indefinite   Send INR reminders to:  [employer]    Indications    Long term current use of anticoagulants with INR goal of 2.0-3.0 [Z79.01]  Atrial fibrillation (H) (Resolved) [I48.91]  Permanent atrial fibrillation (H) [I48.21]           Comments:  [employer] home meter. Manage by exception.         Anticoagulation Care Providers     Provider Role Specialty Phone number    Chavez Molina MD Referring Internal Medicine [phone]

## 2022-10-19 RX ORDER — WARFARIN SODIUM 5 MG/1
TABLET ORAL
Qty: 90 TABLET | Refills: 1 | Status: SHIPPED | OUTPATIENT
Start: 2022-10-19 | End: 2023-08-03

## 2022-10-19 RX ORDER — ROSUVASTATIN CALCIUM 5 MG/1
TABLET, COATED ORAL
Qty: 90 TABLET | Refills: 2 | Status: SHIPPED | OUTPATIENT
Start: 2022-10-19 | End: 2023-07-14

## 2022-10-19 NOTE — TELEPHONE ENCOUNTER
ANTICOAGULATION MANAGEMENT:  Medication Refill    Anticoagulation Summary  As of 10/18/2022    Warfarin maintenance plan:  2.5 mg (5 mg x 0.5) every Sun, Tue, Thu; 5 mg (5 mg x 1) all other days   Next INR check:  11/1/2022   Target end date:  Indefinite    Indications    Long term current use of anticoagulants with INR goal of 2.0-3.0 [Z79.01]  Atrial fibrillation (H) (Resolved) [I48.91]  Permanent atrial fibrillation (H) [I48.21]             Anticoagulation Care Providers     Provider Role Specialty Phone number    Chavez Molina MD Referring Internal Medicine [phone]          Visit with referring provider/group within last year: Yes    [facility] referral signed within last year: Yes    Mariya meets all criteria for refill (current [facility] referral, office visit with referring provider/group in last year, lab monitoring up to date or not exceeding 2 weeks overdue). Rx instructions and quantity supplied updated to match patient's current dosing plan. Warfarin 90 day supply with 1 refill granted per [facility] protocol     Anita Palmer, RN  Anticoagulation Clinic

## 2022-10-19 NOTE — TELEPHONE ENCOUNTER
Rosuvastatin 5 mg tabs  Prescription approved per [facility] Refill Protocol.  LOV 06/2022

## 2022-10-19 NOTE — TELEPHONE ENCOUNTER
Warfarin  Routing refill request to provider for review/approval because:  Managed by INR Clinic

## 2022-11-08 ENCOUNTER — DOCUMENTATION ONLY (OUTPATIENT)
Dept: ANTICOAGULATION | Facility: CLINIC | Age: 82
End: 2022-11-08

## 2022-11-10 ENCOUNTER — ANTICOAGULATION THERAPY VISIT (OUTPATIENT)
Dept: ANTICOAGULATION | Facility: CLINIC | Age: 82
End: 2022-11-10

## 2022-11-10 DIAGNOSIS — Z79.01 LONG TERM CURRENT USE OF ANTICOAGULANTS WITH INR GOAL OF 2.0-3.0: Primary | ICD-10-CM

## 2022-11-10 DIAGNOSIS — I48.21 PERMANENT ATRIAL FIBRILLATION (H): ICD-10-CM

## 2022-11-10 LAB — INR HOME MONITORING: 2.2 (ref 2–3)

## 2022-11-14 NOTE — PROGRESS NOTES
ANTICOAGULATION  MANAGEMENT-Home Monitor Managed by Exception    Mariya Rowe 81 year old female is on warfarin with therapeutic INR result. (Goal INR 2.0-3.0)    Recent labs: (last 7 days)     12/01/21  1645   INR 2.2*         Previous INR was Therapeutic    Medication, diet, health changes since last INR:chart reviewed; none identified    Contacted within the last 12 weeks by phone on 11/15/21      PLAN     Mariya was NOT contacted regarding therapeutic result today per home monitoring policy manage by exception agreement.   Current warfarin dose is to be continued:     Summary  As of 12/1/2021    Full warfarin instructions:  2.5 mg every Sun, Tue, Thu; 5 mg all other days   Next INR check:             ?   Anita Palmer RN  Anticoagulation Clinic  12/1/2021    _______________________________________________________________________     Anticoagulation Episode Summary     Current INR goal:  2.0-3.0   TTR:  56.6 % (1 y)   Target end date:  Indefinite   Send INR reminders to:  [facility]    Indications    Long term current use of anticoagulants with INR goal of 2.0-3.0 [Z79.01]  Atrial fibrillation (H) [I48.91]  Permanent atrial fibrillation (H) [I48.21]           Comments:  [employer] home meter. Manage by exception.         Anticoagulation Care Providers     Provider Role Specialty Phone number    Chavez Molina MD Referring Internal Medicine [phone]             [facility]    Progress Note  Andrea Quevedo 63 y o  male MRN: [id number]  @ Encounter: [id number]      1  Opioid use disorder, severe, in sustained remission, dependence (HCC)  Assessment & Plan:  Continue Suboxone 8/2mg BID  Continue therapy with [facility]  F/u one month            Support Services  Case Management and Certified  are following  Patient was referred to the [facility] Certified Addiction Counselors: Yes  The patient is actively engaged with the [facility] Certified Addiction Counselor’s psychotherapy plan: Yes    buprenorphine-naloxone      PDMP reviewed:     PDMP Review       Value Time User    PDMP Reviewed  Yes 11/14/2022  3:16 PM Chris Ohs, DO          SUBJECTIVE  Patient doing well on suboxone       Drug cravings: no  Motivation to continue treatment: yes       Current Outpatient Medications:   •  ACCU-CHEK FASTCLIX LANCETS MISC, , Disp: , Rfl:   •  acetaminophen (TYLENOL) 500 mg tablet, Take 1 tablet (500 mg total) by mouth every 6 (six) hours as needed for mild pain for up to 7 days, Disp: 24 tablet, Rfl: 0  •  aspirin (ECOTRIN LOW STRENGTH) 81 mg EC tablet, Take 1 tablet (81 mg total) by mouth daily, Disp: 90 tablet, Rfl: 3  •  atorvastatin (LIPITOR) 20 mg tablet, TAKE 1 TABLET(20 MG) BY MOUTH DAILY AT BEDTIME, Disp: 30 tablet, Rfl: 1  •  Blood Glucose Monitoring Suppl (ACCU-CHEK GUIDE) w/Device KIT, , Disp: , Rfl:   •  buprenorphine-naloxone (Suboxone) 8-2 mg, Place 1 Film (8 mg total) under the tongue 2 (two) times a day, Disp: 60 Film, Rfl: 0  •  glucose blood (Accu-Chek Guide) test strip, Use 1 each 3 (three) times a day Test as directed, Disp: 100 each, Rfl: 5  •  ibuprofen (MOTRIN) 400 mg tablet, Take 1 tablet (400 mg total) by mouth every 6 (six) hours as needed for mild pain for up to 7 days, Disp: 24 tablet, Rfl: 0  •  insulin glargine (Basaglar KwikPen) 100 units/mL injection pen, Inject 15 Units under the skin daily at bedtime, Disp: 15 mL, Rfl: 3  •  Insulin Pen Needle (BD Pen Needle Cely 2nd Gen) 32G X 4 MM MISC, Inject under the skin daily, Disp: 100 each, Rfl: 3  •  lisinopril (ZESTRIL) 2 5 mg tablet, TAKE 1 TABLET(2 5 MG) BY MOUTH DAILY, Disp: 30 tablet, Rfl: 3  •  metFORMIN (GLUCOPHAGE) 1000 MG tablet, TAKE 1 TABLET(1000 MG) BY MOUTH TWICE DAILY WITH MEALS, Disp: 60 tablet, Rfl: 3  •  naloxone (NARCAN) 4 mg/0 1 mL nasal spray, Administer 1 spray into a nostril  If no response after 2-3 minutes, give another dose in the other nostril using a new spray  (Patient not taking: Reported on 10/17/2022), Disp: 1 each, Rfl: 1  •  naproxen (Naprosyn) 500 mg tablet, Take 1 tablet (500 mg total) by mouth 2 (two) times a day with meals for 7 days, Disp: 14 tablet, Rfl: 0  •  sodium chloride, PF, 0 9 %, 10 mL by Intracatheter route every 8 (eight) hours  PICC Line flush  10ml NSS flush before IV infusion and after IV infusion  10ml flush to opposite port once a day  Indications: FLUSHING (Patient not taking: Reported on 10/17/2022), Disp: , Rfl:     Objective     There were no vitals filed for this visit  Physical Exam  Vitals and nursing note reviewed  Constitutional:       Appearance: Normal appearance  HENT:      Head: Normocephalic and atraumatic  Nose: Nose normal       Mouth/Throat:      Mouth: Mucous membranes are moist    Eyes:      Pupils: Pupils are equal, round, and reactive to light  Cardiovascular:      Rate and Rhythm: Normal rate  Pulmonary:      Effort: Pulmonary effort is normal    Abdominal:      General: Abdomen is flat  Musculoskeletal:         General: Normal range of motion  Cervical back: Normal range of motion  Skin:     General: Skin is warm  Neurological:      General: No focal deficit present  Mental Status: He is alert and oriented to person, place, and time  Psychiatric:         Mood and Affect: Mood normal          Behavior: Behavior normal          Thought Content:  Thought content normal  Judgment: Judgment normal               Lab Results:   Results from last 6 Months   Lab Units 08/29/22  0845   WBC Thousand/uL 11 59*   HEMOGLOBIN g/dL 13 0   HEMATOCRIT % 40 2   PLATELETS Thousands/uL 300      Results from last 6 Months   Lab Units 08/29/22  0845 08/22/22  0900 08/17/22  0657 08/08/22  0637 08/05/22  2155   POTASSIUM mmol/L  --   --  4 7   < > 5 1   CHLORIDE mmol/L  --   --  101   < > 94*   CO2 mmol/L  --   --  30   < > 29   BUN mg/dL  --   --  13   < > 15   CREATININE mg/dL 0 93   < > 0 75   < > 0 85   CALCIUM mg/dL  --   --  9 7   < > 9 1   ALBUMIN g/dL  --   --   --   --  3 5   ALK PHOS U/L  --   --   --   --  85   ALT U/L  --   --   --   --  32   AST U/L  --   --   --   --  43    < > = values in this interval not displayed  Results from last 6 Months   Lab Units 08/08/22  1234   HEP B S AG  Non-reactive   HEP C AB  High Reactive*   HEP B C IGM  Non-reactive     Results from last 6 Months   Lab Units 08/07/22  1328   RAPID HIV 1X2  Non-Reactive   HIV-1 P24 AG  Non-Reactive                 Counseling / Coordination of Care  Total time spent today 15 minutes  Greater than 50% of total time was spent with the patient and / or family counseling and / or coordination of care       Franc Guillenies, DO

## 2022-12-07 ENCOUNTER — DOCUMENTATION ONLY (OUTPATIENT)
Dept: ANTICOAGULATION | Facility: CLINIC | Age: 82
End: 2022-12-07

## 2022-12-08 ENCOUNTER — DOCUMENTATION ONLY (OUTPATIENT)
Dept: ANTICOAGULATION | Facility: CLINIC | Age: 82
End: 2022-12-08

## 2022-12-08 DIAGNOSIS — Z79.01 LONG TERM CURRENT USE OF ANTICOAGULANTS WITH INR GOAL OF 2.0-3.0: Primary | ICD-10-CM

## 2022-12-08 DIAGNOSIS — I48.21 PERMANENT ATRIAL FIBRILLATION (H): ICD-10-CM

## 2022-12-08 LAB — INR HOME MONITORING: 3 (ref 2–3)

## 2022-12-08 NOTE — PROGRESS NOTES
ANTICOAGULATION  MANAGEMENT-Home Monitor Managed by Exception    Mariya Rowe 82 year old female is on warfarin with therapeutic INR result. (Goal INR 2.0-3.0)    Recent labs: (last 7 days)     12/08/22  0000   INR 3.0         Previous INR was Therapeutic    Medication, diet, health changes since last INR:chart reviewed; none identified    Contacted within the last 12 weeks by phone on 11/10/22      PAO Meraz was NOT contacted regarding therapeutic result today per home monitoring policy manage by exception agreement.   Current warfarin dose is to be continued:     Summary  As of 12/8/2022    Full warfarin instructions:  2.5 mg every Sun, Tue, Thu; 5 mg all other days; Starting 12/8/2022   Next INR check:  12/22/2022           ?   Thais Castro RN  Anticoagulation Clinic  12/8/2022    _______________________________________________________________________     Anticoagulation Episode Summary     Current INR goal:  2.0-3.0   TTR:  91.6 % (1 y)   Target end date:  Indefinite   Send INR reminders to:  [facility]    Indications    Long term current use of anticoagulants with INR goal of 2.0-3.0 [Z79.01]  Atrial fibrillation (H) (Resolved) [I48.91]  Permanent atrial fibrillation (H) [I48.21]           Comments:  [employer] home meter. Manage by exception.         Anticoagulation Care Providers     Provider Role Specialty Phone number    Chavez Molina MD Referring Internal Medicine [phone]

## 2022-12-28 ENCOUNTER — DOCUMENTATION ONLY (OUTPATIENT)
Dept: ANTICOAGULATION | Facility: CLINIC | Age: 82
End: 2022-12-28

## 2022-12-28 DIAGNOSIS — I48.21 PERMANENT ATRIAL FIBRILLATION (H): ICD-10-CM

## 2022-12-28 DIAGNOSIS — Z79.01 LONG TERM CURRENT USE OF ANTICOAGULANTS WITH INR GOAL OF 2.0-3.0: Primary | ICD-10-CM

## 2022-12-28 LAB — INR HOME MONITORING: 2.4 (ref 2–3)

## 2022-12-28 NOTE — PROGRESS NOTES
ANTICOAGULATION  MANAGEMENT-Home Monitor Managed by Exception    Mariya Rowe 82 year old female is on warfarin with therapeutic INR result. (Goal INR 2.0-3.0)    Recent labs: (last 7 days)     12/28/22  0000   INR 2.4         Previous INR was Therapeutic    Medication, diet, health changes since last INR:chart reviewed; none identified    Contacted within the last 12 weeks by phone on 11/10      PLAN     Mariya was NOT contacted regarding therapeutic result today per home monitoring policy manage by exception agreement.   Current warfarin dose is to be continued:     Summary  As of 12/28/2022    Full warfarin instructions:  2.5 mg every Sun, Tue, Thu; 5 mg all other days   Next INR check:  1/11/2023           ?   Vira Vargas RN  Anticoagulation Clinic  12/28/2022    _______________________________________________________________________     Anticoagulation Episode Summary     Current INR goal:  2.0-3.0   TTR:  91.6 % (1 y)   Target end date:  Indefinite   Send INR reminders to:  [facility]    Indications    Long term current use of anticoagulants with INR goal of 2.0-3.0 [Z79.01]  Atrial fibrillation (H) (Resolved) [I48.91]  Permanent atrial fibrillation (H) [I48.21]           Comments:  [employer] home meter. Manage by exception.         Anticoagulation Care Providers     Provider Role Specialty Phone number    Chavez Molina MD Referring Internal Medicine [phone]

## 2023-01-19 ENCOUNTER — TELEPHONE (OUTPATIENT)
Dept: INTERNAL MEDICINE | Facility: CLINIC | Age: 83
End: 2023-01-19
Payer: COMMERCIAL

## 2023-01-19 DIAGNOSIS — Z78.0 POST-MENOPAUSAL: Primary | ICD-10-CM

## 2023-01-19 NOTE — TELEPHONE ENCOUNTER
Pt needs new order for Dexa bone scan. She states her previous one  before she could get it scheduled.     Last OV with Dr Ulrich on 22.     Pended Dexa scan.

## 2023-01-20 ENCOUNTER — DOCUMENTATION ONLY (OUTPATIENT)
Dept: ANTICOAGULATION | Facility: CLINIC | Age: 83
End: 2023-01-20
Payer: COMMERCIAL

## 2023-01-20 DIAGNOSIS — I48.21 PERMANENT ATRIAL FIBRILLATION (H): ICD-10-CM

## 2023-01-20 DIAGNOSIS — Z79.01 LONG TERM CURRENT USE OF ANTICOAGULANTS WITH INR GOAL OF 2.0-3.0: Primary | ICD-10-CM

## 2023-01-20 LAB — INR HOME MONITORING: 2.3 (ref 2–3)

## 2023-01-20 NOTE — PROGRESS NOTES
ANTICOAGULATION  MANAGEMENT-Home Monitor Managed by Exception    Mariya Rowe 82 year old female is on warfarin with therapeutic INR result. (Goal INR 2.0-3.0)    Recent labs: (last 7 days)     01/20/23  0000   INR 2.3         Previous INR was Therapeutic    Medication, diet, health changes since last INR:chart reviewed; none identified    Contacted within the last 12 weeks by phone on 11/10/23      PAO Meraz was NOT contacted regarding therapeutic result today per home monitoring policy manage by exception agreement.   Current warfarin dose is to be continued:     Summary  As of 1/20/2023    Full warfarin instructions:  2.5 mg every Sun, Tue, Thu; 5 mg all other days   Next INR check:  2/3/2023           ?   Naima Rod RN  Anticoagulation Clinic  1/20/2023    _______________________________________________________________________     Anticoagulation Episode Summary     Current INR goal:  2.0-3.0   TTR:  91.6 % (1 y)   Target end date:  Indefinite   Send INR reminders to:  [employer] [facility]    Indications    Long term current use of anticoagulants with INR goal of 2.0-3.0 [Z79.01]  Atrial fibrillation (H) (Resolved) [I48.91]  Permanent atrial fibrillation (H) [I48.21]           Comments:  [employer] home meter. Manage by exception.         Anticoagulation Care Providers     Provider Role Specialty Phone number    Chavez Molina MD Referring Internal Medicine [phone]

## 2023-02-10 ENCOUNTER — DOCUMENTATION ONLY (OUTPATIENT)
Dept: ANTICOAGULATION | Facility: CLINIC | Age: 83
End: 2023-02-10
Payer: COMMERCIAL

## 2023-02-10 NOTE — PROGRESS NOTES
ANTICOAGULATION     Mariya KUHN Soledad is overdue for INR check.      [employer] message sent.    Naima Rod RN

## 2023-02-11 LAB — INR HOME MONITORING: 2.9 (ref 2–3)

## 2023-02-13 ENCOUNTER — ANTICOAGULATION THERAPY VISIT (OUTPATIENT)
Dept: ANTICOAGULATION | Facility: CLINIC | Age: 83
End: 2023-02-13
Payer: COMMERCIAL

## 2023-02-13 DIAGNOSIS — I48.21 PERMANENT ATRIAL FIBRILLATION (H): ICD-10-CM

## 2023-02-13 DIAGNOSIS — Z79.01 LONG TERM CURRENT USE OF ANTICOAGULANTS WITH INR GOAL OF 2.0-3.0: Primary | ICD-10-CM

## 2023-02-13 NOTE — PROGRESS NOTES
ANTICOAGULATION MANAGEMENT     Mariya Rowe 82 year old female is on warfarin with therapeutic INR result. (Goal INR 2.0-3.0)    Recent labs: (last 7 days)     02/11/23  0000   INR 2.9       ASSESSMENT       Source(s): Chart Review and Patient/Caregiver Call       Warfarin doses taken: Less warfarin taken than planned which may be affecting INR. Patient only took a 1/2 dose on 2/11/23.    Diet: Increased greens/vitamin K in diet; plans to resume previous intake. Patient reports she believes this is why her INR was high on 2/11/23. Writer educated patient that if she is having more greens than normal, her INR should decrease. Patient verbalized understanding.    New illness, injury, or hospitalization: No    Medication/supplement changes: None noted    Signs or symptoms of bleeding or clotting: No    Previous INR: Therapeutic last 2(+) visits    Additional findings: None       PLAN     Recommended plan for temporary change(s) affecting INR     Dosing Instructions: Continue your current warfarin dose with next INR in 2 weeks       Summary  As of 2/13/2023    Full warfarin instructions:  2.5 mg every Sun, Tue, Thu; 5 mg all other days   Next INR check:  2/27/2023             Telephone call with Mariya who verbalizes understanding and agrees to plan    Patient to recheck with home meter    Education provided:     Please call back if any changes to your diet, medications or how you've been taking warfarin    Dietary considerations: importance of consistent vitamin K intake and impact of vitamin K foods on INR    Contact [phone]  with any changes, questions or concerns.     Plan made per [facility] anticoagulation protocol    Naima Rod RN  Anticoagulation Clinic  2/13/2023    _______________________________________________________________________     Anticoagulation Episode Summary     Current INR goal:  2.0-3.0   TTR:  91.6 % (1 y)   Target end date:  Indefinite   Send INR reminders to:  [facility]     Indications    Long term current use of anticoagulants with INR goal of 2.0-3.0 [Z79.01]  Atrial fibrillation (H) (Resolved) [I48.91]  Permanent atrial fibrillation (H) [I48.21]           Comments:  [employer] home meter. Manage by exception.         Anticoagulation Care Providers     Provider Role Specialty Phone number    Chavez Molina MD Referring Internal Medicine [phone]

## 2023-03-04 LAB — INR HOME MONITORING: 2.5 (ref 2–3)

## 2023-03-06 ENCOUNTER — DOCUMENTATION ONLY (OUTPATIENT)
Dept: ANTICOAGULATION | Facility: CLINIC | Age: 83
End: 2023-03-06
Payer: COMMERCIAL

## 2023-03-06 ENCOUNTER — ANTICOAGULATION THERAPY VISIT (OUTPATIENT)
Dept: ANTICOAGULATION | Facility: CLINIC | Age: 83
End: 2023-03-06
Payer: COMMERCIAL

## 2023-03-06 DIAGNOSIS — I48.91 ATRIAL FIBRILLATION, UNSPECIFIED TYPE (H): ICD-10-CM

## 2023-03-06 DIAGNOSIS — Z79.01 LONG TERM CURRENT USE OF ANTICOAGULANTS WITH INR GOAL OF 2.0-3.0: Primary | ICD-10-CM

## 2023-03-06 DIAGNOSIS — I48.21 PERMANENT ATRIAL FIBRILLATION (H): ICD-10-CM

## 2023-03-06 NOTE — PROGRESS NOTES
ANTICOAGULATION MANAGEMENT     Mariya Rowe 82 year old female is on warfarin with therapeutic INR result. (Goal INR 2.0-3.0)    Recent labs: (last 7 days)     03/04/23  0000   INR 2.5       ASSESSMENT       Source(s): Chart Review and Patient/Caregiver Call       Warfarin doses taken: Warfarin taken as instructed    Diet: No new diet changes identified    New illness, injury, or hospitalization: No    Medication/supplement changes: None noted    Signs or symptoms of bleeding or clotting: No    Previous INR: Therapeutic last 2(+) visits    Additional findings: None         PLAN     Recommended plan for no diet, medication or health factor changes affecting INR     Dosing Instructions: Continue your current warfarin dose with next INR in 2 weeks       Summary  As of 3/6/2023    Full warfarin instructions:  2.5 mg every Sun, Tue, Thu; 5 mg all other days   Next INR check:  3/20/2023             Telephone call with Mariya who verbalizes understanding and agrees to plan    Patient to recheck with home meter    Education provided:     Please call back if any changes to your diet, medications or how you've been taking warfarin    Symptom monitoring: monitoring for bleeding signs and symptoms and monitoring for clotting signs and symptoms    Resume manage by exception with home monitor. Continue to submit INR results to home monitor company.You will only be called when your result is out of range. Please call and notify [facility] if new medication started, dose missed, signs or symptoms of bleeding or clotting, or a surgery/procedure is scheduled.    Contact [phone]  with any changes, questions or concerns.     Plan made per [facility] anticoagulation protocol    Naima Rod RN  Anticoagulation Clinic  3/6/2023    _______________________________________________________________________     Anticoagulation Episode Summary     Current INR goal:  2.0-3.0   TTR:  91.6 % (1 y)   Target end date:  Indefinite   Send INR reminders to:   [facility]    Indications    Long term current use of anticoagulants with INR goal of 2.0-3.0 [Z79.01]  Atrial fibrillation (H) (Resolved) [I48.91]  Permanent atrial fibrillation (H) [I48.21]           Comments:  [employer] home meter. Manage by exception.         Anticoagulation Care Providers     Provider Role Specialty Phone number    Chavez Molina MD Referring Internal Medicine [phone]

## 2023-03-06 NOTE — PROGRESS NOTES
ANTICOAGULATION CLINIC REFERRAL RENEWAL REQUEST       An annual renewal order is required for all patients referred to [facility].?  Please review and sign the pended referral order for Mariya Rowe.       ANTICOAGULATION SUMMARY      Warfarin indication(s)   Atrial Fibrillation    Mechanical heart valve present?  NO       Current goal range   INR: 2.0-3.0     Goal appropriate for indication? Goal INR 2-3, standard for indication(s) above     Time in Therapeutic Range (TTR)  (Goal > 60%) 92%       Office visit with referring provider's group within last year yes on 6/13/22       Naima Rod RN  [facility]

## 2023-03-09 ENCOUNTER — TRANSFERRED RECORDS (OUTPATIENT)
Dept: HEALTH INFORMATION MANAGEMENT | Facility: CLINIC | Age: 83
End: 2023-03-09

## 2023-03-23 ENCOUNTER — TRANSFERRED RECORDS (OUTPATIENT)
Dept: HEALTH INFORMATION MANAGEMENT | Facility: CLINIC | Age: 83
End: 2023-03-23

## 2023-03-25 ENCOUNTER — HEALTH MAINTENANCE LETTER (OUTPATIENT)
Age: 83
End: 2023-03-25

## 2023-03-27 NOTE — PROGRESS NOTES
Most recent visit and latest labs faxed  PTA medications updated by Medication Scribe prior to surgery via phone call with patient        Comments:    Medication history sources: Patient, Surescripts, H&P and Patient's home med list  In the past week, patient estimated taking medication this percent of the time: Greater than 90%  Adherence assessment: N/A Not Observed    Significant changes made to the medication list:  None      Additional medication history information:   None    The information provided in this note is only as accurate as the sources available at the time of update(s)      Prior to Admission medications    Medication Sig Last Dose Taking? Auth Provider   CALCIUM-VITAMIN D PO Take 1 tablet by mouth 2 times daily 5/2/2021 Yes Reported, Patient   fluorometholone (FML LIQUIFILM) 0.1 % ophthalmic susp Place 1 drop into the right eye daily  at AM Yes Reported, Patient   metoprolol tartrate (LOPRESSOR) 25 MG tablet TAKE 1 TABLET BY MOUTH TWICE A DAY  Patient taking differently: Take 25 mg by mouth 2 times daily TAKE 1 TABLET BY MOUTH TWICE A DAY  Yes Chavez Molina MD   Multiple Vitamin (MULTI-VITAMIN PO) Take 1 tablet by mouth daily  5/2/2021 Yes Reported, Patient   rosuvastatin (CRESTOR) 5 MG tablet Take 1 tablet (5 mg) by mouth daily 5/2/2021 Yes Chavez Molina MD   warfarin ANTICOAGULANT (COUMADIN) 5 MG tablet Take 2.5-5 mg by mouth daily Takes  5mg on Tuesdays and Saturdays  2.5mg the rest of the week (Mon, Wed, Thurs, Fri, Sun) 4/29/2021 Yes Reported, Patient   ASPIRIN NOT PRESCRIBED (INTENTIONAL) Please choose reason not prescribed, below  Patient not taking: Reported on 4/20/2021   Chavez Molina MD        transfer training/strengthening/bed mobility training/balance training/ADL retraining

## 2023-04-01 ENCOUNTER — TRANSFERRED RECORDS (OUTPATIENT)
Dept: MULTI SPECIALTY CLINIC | Facility: CLINIC | Age: 83
End: 2023-04-01

## 2023-04-01 LAB — RETINOPATHY: NORMAL

## 2023-04-04 ENCOUNTER — DOCUMENTATION ONLY (OUTPATIENT)
Dept: ANTICOAGULATION | Facility: CLINIC | Age: 83
End: 2023-04-04
Payer: COMMERCIAL

## 2023-04-04 ENCOUNTER — TRANSFERRED RECORDS (OUTPATIENT)
Dept: HEALTH INFORMATION MANAGEMENT | Facility: CLINIC | Age: 83
End: 2023-04-04

## 2023-04-11 ENCOUNTER — ANTICOAGULATION THERAPY VISIT (OUTPATIENT)
Dept: ANTICOAGULATION | Facility: CLINIC | Age: 83
End: 2023-04-11
Payer: COMMERCIAL

## 2023-04-11 DIAGNOSIS — I48.21 PERMANENT ATRIAL FIBRILLATION (H): ICD-10-CM

## 2023-04-11 DIAGNOSIS — I48.91 ATRIAL FIBRILLATION, UNSPECIFIED TYPE (H): ICD-10-CM

## 2023-04-11 DIAGNOSIS — Z79.01 LONG TERM CURRENT USE OF ANTICOAGULANTS WITH INR GOAL OF 2.0-3.0: Primary | ICD-10-CM

## 2023-04-11 LAB — INR HOME MONITORING: 3.7 (ref 2–3)

## 2023-04-11 NOTE — PROGRESS NOTES
ANTICOAGULATION MANAGEMENT     Mariya Rowe 82 year old female is on warfarin with supratherapeutic INR result. (Goal INR 2.0-3.0)    Recent labs: (last 7 days)     04/11/23  0000   INR 3.7*       ASSESSMENT       Source(s): Chart Review and Patient/Caregiver Call       Warfarin doses taken: Warfarin taken as instructed    Diet: Decreased greens/vitamin K in diet; plans to resume previous intake    New illness, injury, or hospitalization: Yes: back pain causing her decreased activity and only eating what is easy to prepare - per patient she was told that it might take at least 2 months for back pain to resolve or healed    Medication/supplement changes: Tylenol ES as needed use which may be increasing INR today    Signs or symptoms of bleeding or clotting: No    Previous INR: Therapeutic last 2(+) visits    Additional findings: None         PLAN     Recommended plan for temporary change(s) and ongoing change(s) affecting INR     Dosing Instructions: hold dose then decrease your warfarin dose ( 9% change) with next INR in 1 week       Summary  As of 4/11/2023    Full warfarin instructions:  4/11: Hold; Otherwise 5 mg every Mon, Wed, Fri; 2.5 mg all other days   Next INR check:  4/25/2023             Telephone call with Mariya who verbalizes understanding and agrees to plan    Patient to recheck with home meter    Education provided:     Taking warfarin: Importance of taking warfarin as instructed    Goal range and lab monitoring: goal range and significance of current result, Importance of therapeutic range and Importance of following up at instructed interval    Interaction IS anticipated between warfarin and Tylenol    Impact of decrease activity and pain on INR    Plan made per [facility] anticoagulation protocol    Naima Urrutia RN  Anticoagulation Clinic  4/11/2023    _______________________________________________________________________     Anticoagulation Episode Summary     Current INR goal:  2.0-3.0   TTR:   85.6 % (1 y)   Target end date:  Indefinite   Send INR reminders to:  [facility]    Indications    Long term current use of anticoagulants with INR goal of 2.0-3.0 [Z79.01]  Atrial fibrillation (H) (Resolved) [I48.91]  Permanent atrial fibrillation (H) [I48.21]  Atrial fibrillation  unspecified type (H) [I48.91]           Comments:  [employer] home meter. Manage by exception.         Anticoagulation Care Providers     Provider Role Specialty Phone number    Chavez Molina MD Referring Internal Medicine [phone]    Edwin Ulrich MD Referring Internal Medicine [phone]

## 2023-04-22 LAB — INR HOME MONITORING: 2.6 (ref 2–3)

## 2023-04-24 ENCOUNTER — ANTICOAGULATION THERAPY VISIT (OUTPATIENT)
Dept: ANTICOAGULATION | Facility: CLINIC | Age: 83
End: 2023-04-24
Payer: COMMERCIAL

## 2023-04-24 DIAGNOSIS — I48.21 PERMANENT ATRIAL FIBRILLATION (H): ICD-10-CM

## 2023-04-24 DIAGNOSIS — Z79.01 LONG TERM CURRENT USE OF ANTICOAGULANTS WITH INR GOAL OF 2.0-3.0: Primary | ICD-10-CM

## 2023-04-24 DIAGNOSIS — I48.91 ATRIAL FIBRILLATION, UNSPECIFIED TYPE (H): ICD-10-CM

## 2023-04-24 NOTE — PROGRESS NOTES
ANTICOAGULATION MANAGEMENT     Mariya Rowe 82 year old female is on warfarin with therapeutic INR result. (Goal INR 2.0-3.0)    Recent labs: (last 7 days)     04/22/23  0000   INR 2.6       ASSESSMENT       Source(s): Chart Review    Previous INR was Supratherapeutic    Medication, diet, health changes since last INR chart reviewed; none identified             PLAN     Recommended plan for no diet, medication or health factor changes affecting INR     Dosing Instructions: Continue your current warfarin dose with next INR in 2 weeks       Summary  As of 4/24/2023    Full warfarin instructions:  5 mg every Mon, Wed, Fri; 2.5 mg all other days   Next INR check:  5/8/2023             Detailed voice message left for Mariya with dosing instructions and follow up date.     Patient to recheck with home meter    Education provided:     Please call back if any changes to your diet, medications or how you've been taking warfarin    Symptom monitoring: monitoring for bleeding signs and symptoms and monitoring for clotting signs and symptoms    Resume manage by exception with home monitor. Continue to submit INR results to home monitor company.You will only be called when your result is out of range. Please call and notify [facility] if new medication started, dose missed, signs or symptoms of bleeding or clotting, or a surgery/procedure is scheduled.    Contact [phone]  with any changes, questions or concerns.     Plan made per [facility] anticoagulation protocol    Naima Rod RN  Anticoagulation Clinic  4/24/2023    _______________________________________________________________________     Anticoagulation Episode Summary     Current INR goal:  2.0-3.0   TTR:  83.6 % (1 y)   Target end date:  Indefinite   Send INR reminders to:  [facility]    Indications    Long term current use of anticoagulants with INR goal of 2.0-3.0 [Z79.01]  Atrial fibrillation (H) (Resolved) [I48.91]  Permanent atrial fibrillation (H)  [I48.21]  Atrial fibrillation  unspecified type (H) [I48.91]           Comments:  [employer] home meter. Manage by exception.         Anticoagulation Care Providers     Provider Role Specialty Phone number    Chavez Molina MD Referring Internal Medicine [phone]    Edwin Ulrich MD Referring Internal Medicine [phone]

## 2023-05-15 ENCOUNTER — DOCUMENTATION ONLY (OUTPATIENT)
Dept: ANTICOAGULATION | Facility: CLINIC | Age: 83
End: 2023-05-15
Payer: COMMERCIAL

## 2023-05-15 LAB — INR HOME MONITORING: 2.1 (ref 2–3)

## 2023-05-15 NOTE — PROGRESS NOTES
ANTICOAGULATION     Mariya KUHN Soledad is overdue for INR check.     [employer] message sent     Naima Rod RN

## 2023-05-16 ENCOUNTER — DOCUMENTATION ONLY (OUTPATIENT)
Dept: ANTICOAGULATION | Facility: CLINIC | Age: 83
End: 2023-05-16
Payer: COMMERCIAL

## 2023-05-16 DIAGNOSIS — I48.21 PERMANENT ATRIAL FIBRILLATION (H): ICD-10-CM

## 2023-05-16 DIAGNOSIS — I48.91 ATRIAL FIBRILLATION, UNSPECIFIED TYPE (H): ICD-10-CM

## 2023-05-16 DIAGNOSIS — Z79.01 LONG TERM CURRENT USE OF ANTICOAGULANTS WITH INR GOAL OF 2.0-3.0: Primary | ICD-10-CM

## 2023-05-16 NOTE — PROGRESS NOTES
ANTICOAGULATION  MANAGEMENT-Home Monitor Managed by Exception    Mariya Rowe 82 year old female is on warfarin with therapeutic INR result. (Goal INR 2.0-3.0)    Recent labs: (last 7 days)     05/15/23  0000   INR 2.1         Previous INR was Therapeutic    Medication, diet, health changes since last INR:chart reviewed; none identified    Contacted within the last 12 weeks by phone on 4/24/23      PLAN     Mariya was NOT contacted regarding therapeutic result today per home monitoring policy manage by exception agreement.   Current warfarin dose is to be continued:     Summary  As of 5/16/2023    Full warfarin instructions:  5 mg every Mon, Wed, Fri; 2.5 mg all other days   Next INR check:  5/30/2023           ?   Anita Palmer, RN  Anticoagulation Clinic  5/16/2023    _______________________________________________________________________     Anticoagulation Episode Summary     Current INR goal:  2.0-3.0   TTR:  83.6 % (1 y)   Target end date:  Indefinite   Send INR reminders to:  [facility]IT[facility]    Indications    Long term current use of anticoagulants with INR goal of 2.0-3.0 [Z79.01]  Atrial fibrillation (H) (Resolved) [I48.91]  Permanent atrial fibrillation (H) [I48.21]  Atrial fibrillation  unspecified type (H) [I48.91]           Comments:  [employer] home meter. Manage by exception.         Anticoagulation Care Providers     Provider Role Specialty Phone number    Chavez Molina MD Referring Internal Medicine [phone]    Edwin Ulrich MD Referring Internal Medicine [phone]

## 2023-06-06 ENCOUNTER — DOCUMENTATION ONLY (OUTPATIENT)
Dept: ANTICOAGULATION | Facility: CLINIC | Age: 83
End: 2023-06-06
Payer: COMMERCIAL

## 2023-06-06 NOTE — PROGRESS NOTES
ANTICOAGULATION     Mariya KUHN Soledad is overdue for INR check.      [employer] message sent     Naima Rod RN

## 2023-06-11 LAB — INR HOME MONITORING: 3 (ref 2–3)

## 2023-06-12 ENCOUNTER — DOCUMENTATION ONLY (OUTPATIENT)
Dept: ANTICOAGULATION | Facility: CLINIC | Age: 83
End: 2023-06-12
Payer: COMMERCIAL

## 2023-06-12 DIAGNOSIS — I48.21 PERMANENT ATRIAL FIBRILLATION (H): ICD-10-CM

## 2023-06-12 DIAGNOSIS — Z79.01 LONG TERM CURRENT USE OF ANTICOAGULANTS WITH INR GOAL OF 2.0-3.0: Primary | ICD-10-CM

## 2023-06-12 DIAGNOSIS — I48.91 ATRIAL FIBRILLATION, UNSPECIFIED TYPE (H): ICD-10-CM

## 2023-06-12 NOTE — PROGRESS NOTES
ANTICOAGULATION  MANAGEMENT-Home Monitor Managed by Exception    Mariya Rowe 82 year old female is on warfarin with therapeutic INR result. (Goal INR 2.0-3.0)    Recent labs: (last 7 days)     06/11/23  0000   INR 3.0         Previous INR was Therapeutic    Medication, diet, health changes since last INR:chart reviewed; none identified    Contacted within the last 12 weeks by phone on 4/24/23      PAO Meraz was NOT contacted regarding therapeutic result today per home monitoring policy manage by exception agreement.   Current warfarin dose is to be continued:     Summary  As of 6/12/2023    Full warfarin instructions:  5 mg every Mon, Wed, Fri; 2.5 mg all other days   Next INR check:  6/26/2023           ?   Naima Rod RN  Anticoagulation Clinic  6/12/2023    _______________________________________________________________________     Anticoagulation Episode Summary     Current INR goal:  2.0-3.0   TTR:  85.6 % (1 y)   Target end date:  Indefinite   Send INR reminders to:  [facility] [employer]IT[facility]    Indications    Long term current use of anticoagulants with INR goal of 2.0-3.0 [Z79.01]  Atrial fibrillation (H) (Resolved) [I48.91]  Permanent atrial fibrillation (H) [I48.21]  Atrial fibrillation  unspecified type (H) [I48.91]           Comments:  [employer] home meter. Manage by exception.         Anticoagulation Care Providers     Provider Role Specialty Phone number    Chavez Molina MD Referring Internal Medicine [phone]    Edwin Ulrich MD Referring Internal Medicine [phone]

## 2023-06-20 NOTE — PROGRESS NOTES
ANTICOAGULATION MANAGEMENT     Mariya Rowe 82 year old female is on warfarin with therapeutic INR result. (Goal INR 2.0-3.0)    Recent labs: (last 7 days)     11/10/22  0000   INR 2.2       ASSESSMENT       Source(s): Chart Review and Patient/Caregiver Call       Warfarin doses taken: Warfarin taken as instructed    Diet: No new diet changes identified    New illness, injury, or hospitalization: No    Medication/supplement changes: None noted    Signs or symptoms of bleeding or clotting: No    Previous INR: Therapeutic last 2(+) visits    Additional findings: None       PLAN     Recommended plan for no diet, medication or health factor changes affecting INR     Dosing Instructions: Continue your current warfarin dose with next INR in 2 weeks       Summary  As of 11/10/2022    Full warfarin instructions:  2.5 mg every Sun, Tue, Thu; 5 mg all other days; Starting 11/10/2022   Next INR check:  11/23/2022             Telephone call with Mariya who verbalizes understanding and agrees to plan    Patient to recheck with home meter    Education provided:     Resume manage by exception with home monitor. Continue to submit INR results to home monitor company.You will only be called when your result is out of range. Please call and notify [facility] if new medication started, dose missed, signs or symptoms of bleeding or clotting, or a surgery/procedure is scheduled.    Plan made per [facility] anticoagulation protocol    Roro Ramsey RN  Anticoagulation Clinic  11/10/2022    _______________________________________________________________________     Anticoagulation Episode Summary     Current INR goal:  2.0-3.0   TTR:  91.6 % (1 y)   Target end date:  Indefinite   Send INR reminders to:  [facility] HOME MONITORING    Indications    Long term current use of anticoagulants with INR goal of 2.0-3.0 [Z79.01]  Atrial fibrillation (H) (Resolved) [I48.91]  Permanent atrial fibrillation (H) [I48.21]           Comments:  [employer]  meter. Manage by exception.         Anticoagulation Care Providers     Provider Role Specialty Phone number    Chavez Molina MD Referring Internal Medicine [phone]             Soolantra Pregnancy And Lactation Text: This medication is Pregnancy Category C. This medication is considered safe during breast feeding.

## 2023-06-23 ENCOUNTER — TRANSFERRED RECORDS (OUTPATIENT)
Dept: HEALTH INFORMATION MANAGEMENT | Facility: CLINIC | Age: 83
End: 2023-06-23
Payer: COMMERCIAL

## 2023-06-27 DIAGNOSIS — R00.2 PALPITATIONS: ICD-10-CM

## 2023-06-27 NOTE — TELEPHONE ENCOUNTER
Routing refill request to provider for review/approval because:  Patient needs to be seen because:  Patient has only had pre op exams since 8/27/19.

## 2023-06-29 ENCOUNTER — MYC MEDICAL ADVICE (OUTPATIENT)
Dept: ANTICOAGULATION | Facility: CLINIC | Age: 83
End: 2023-06-29
Payer: COMMERCIAL

## 2023-06-29 ENCOUNTER — ANTICOAGULATION THERAPY VISIT (OUTPATIENT)
Dept: ANTICOAGULATION | Facility: CLINIC | Age: 83
End: 2023-06-29
Payer: COMMERCIAL

## 2023-06-29 DIAGNOSIS — I48.21 PERMANENT ATRIAL FIBRILLATION (H): ICD-10-CM

## 2023-06-29 DIAGNOSIS — Z79.01 LONG TERM CURRENT USE OF ANTICOAGULANTS WITH INR GOAL OF 2.0-3.0: Primary | ICD-10-CM

## 2023-06-29 DIAGNOSIS — I48.91 ATRIAL FIBRILLATION, UNSPECIFIED TYPE (H): ICD-10-CM

## 2023-06-29 LAB — INR HOME MONITORING: 1.9 (ref 2–3)

## 2023-06-29 NOTE — PROGRESS NOTES
ANTICOAGULATION MANAGEMENT     Mariya Rowe 82 year old female is on warfarin with subtherapeutic INR result. (Goal INR 2.0-3.0)    Recent labs: (last 7 days)     06/29/23  0000   INR 1.9*       ASSESSMENT       Source(s): Chart Review and Patient/Caregiver Call       Warfarin doses taken: Pt has been taking 5mg MWFSat instead of MWF for the last number of weeks    Diet: Increased greens/vitamin K in diet; plans to resume previous intake    Medication/supplement changes: None noted    New illness, injury, or hospitalization: No    Signs or symptoms of bleeding or clotting: No    Previous result: Therapeutic last 2(+) visits    Additional findings: Per patient, eating rhubarb this week.           PLAN     Recommended plan for temporary change(s) affecting INR     Dosing Instructions: booster dose then continue your current warfarin dose with next INR in 1 week       Summary  As of 6/29/2023    Full warfarin instructions:  6/29: 5 mg; Otherwise 2.5 mg every Sun, Tue, Thu; 5 mg all other days   Next INR check:  7/6/2023             Telephone call with Mariya who verbalizes understanding and agrees to plan    Patient to recheck with home meter    Education provided:     Goal range and lab monitoring: goal range and significance of current result    Plan made per [facility] anticoagulation protocol    Lalo Tapia RN  Anticoagulation Clinic  6/29/2023    _______________________________________________________________________     Anticoagulation Episode Summary     Current INR goal:  2.0-3.0   TTR:  87.6 % (1 y)   Target end date:  Indefinite   Send INR reminders to:  [facility]IT[facility]    Indications    Long term current use of anticoagulants with INR goal of 2.0-3.0 [Z79.01]  Atrial fibrillation (H) (Resolved) [I48.91]  Permanent atrial fibrillation (H) [I48.21]  Atrial fibrillation  unspecified type (H) [I48.91]           Comments:  [employer] home meter. Manage by exception.         Anticoagulation Care Providers      Provider Role Specialty Phone number    Chavez Molina MD Referring Internal Medicine [phone]    Edwin Ulrich MD Referring Internal Medicine [phone]

## 2023-06-30 ENCOUNTER — ANCILLARY PROCEDURE (OUTPATIENT)
Dept: BONE DENSITY | Facility: CLINIC | Age: 83
End: 2023-06-30
Payer: COMMERCIAL

## 2023-06-30 DIAGNOSIS — Z78.0 POST-MENOPAUSAL: ICD-10-CM

## 2023-06-30 DIAGNOSIS — Z78.0 ASYMPTOMATIC MENOPAUSAL STATE: ICD-10-CM

## 2023-06-30 PROCEDURE — 77085 DXA BONE DENSITY AXL VRT FX: CPT | Performed by: INTERNAL MEDICINE

## 2023-06-30 PROCEDURE — 77081 DXA BONE DENSITY APPENDICULR: CPT | Mod: 59 | Performed by: INTERNAL MEDICINE

## 2023-06-30 RX ORDER — METOPROLOL TARTRATE 25 MG/1
TABLET, FILM COATED ORAL
Qty: 180 TABLET | Refills: 0 | Status: SHIPPED | OUTPATIENT
Start: 2023-06-30 | End: 2023-08-03

## 2023-07-03 NOTE — TELEPHONE ENCOUNTER
See [facility] encounter dated 6/29/23 for details.  Anita Palmer, RN  Anticoagulation Nurse - [facility], [place]

## 2023-07-16 LAB — INR HOME MONITORING: 2.9 (ref 2–3)

## 2023-07-17 ENCOUNTER — DOCUMENTATION ONLY (OUTPATIENT)
Dept: ANTICOAGULATION | Facility: CLINIC | Age: 83
End: 2023-07-17
Payer: COMMERCIAL

## 2023-07-17 DIAGNOSIS — I48.91 ATRIAL FIBRILLATION, UNSPECIFIED TYPE (H): ICD-10-CM

## 2023-07-17 DIAGNOSIS — I48.21 PERMANENT ATRIAL FIBRILLATION (H): ICD-10-CM

## 2023-07-17 DIAGNOSIS — Z79.01 LONG TERM CURRENT USE OF ANTICOAGULANTS WITH INR GOAL OF 2.0-3.0: Primary | ICD-10-CM

## 2023-07-17 NOTE — PROGRESS NOTES
ANTICOAGULATION  MANAGEMENT-Home Monitor Managed by Exception    Mariya Rowe 82 year old female is on warfarin with therapeutic INR result. (Goal INR 2.0-3.0)    Recent labs: (last 7 days)     07/16/23  0000   INR 2.9         Previous INR was Therapeutic    Medication, diet, health changes since last INR:chart reviewed; none identified    Contacted within the last 12 weeks by phone on 6/29/23      PLAN     Mairya was NOT contacted regarding therapeutic result today per home monitoring policy manage by exception agreement.   Current warfarin dose is to be continued:     Summary  As of 7/17/2023    Full warfarin instructions:  2.5 mg every Sun, Tue, Thu; 5 mg all other days   Next INR check:  7/31/2023           ?   Anita Palmer RN  Anticoagulation Clinic  7/17/2023    _______________________________________________________________________     Anticoagulation Episode Summary     Current INR goal:  2.0-3.0   TTR:  91.1 % (1 y)   Target end date:  Indefinite   Send INR reminders to:  [facility] HOME [facility]IT[facility]    Indications    Long term current use of anticoagulants with INR goal of 2.0-3.0 [Z79.01]  Atrial fibrillation (H) (Resolved) [I48.91]  Permanent atrial fibrillation (H) [I48.21]  Atrial fibrillation  unspecified type (H) [I48.91]           Comments:  [employer] home meter. Manage by exception.         Anticoagulation Care Providers     Provider Role Specialty Phone number    Chavez Molina MD Referring Internal Medicine [phone]    Edwin Ulrich MD Referring Internal Medicine [phone]

## 2023-08-01 ASSESSMENT — ENCOUNTER SYMPTOMS
WEAKNESS: 0
HEARTBURN: 0
EYE PAIN: 0
CONSTIPATION: 0
PARESTHESIAS: 0
MYALGIAS: 0
HEMATOCHEZIA: 0
PALPITATIONS: 0
FREQUENCY: 0
SHORTNESS OF BREATH: 0
COUGH: 0
NAUSEA: 0
HEADACHES: 0
HEMATURIA: 0
ARTHRALGIAS: 0
DIARRHEA: 0
NERVOUS/ANXIOUS: 0
CHILLS: 0
ABDOMINAL PAIN: 0
SORE THROAT: 0
JOINT SWELLING: 0
DIZZINESS: 0
FEVER: 0

## 2023-08-01 ASSESSMENT — ACTIVITIES OF DAILY LIVING (ADL): CURRENT_FUNCTION: NO ASSISTANCE NEEDED

## 2023-08-03 ENCOUNTER — OFFICE VISIT (OUTPATIENT)
Dept: INTERNAL MEDICINE | Facility: CLINIC | Age: 83
End: 2023-08-03
Payer: COMMERCIAL

## 2023-08-03 ENCOUNTER — DOCUMENTATION ONLY (OUTPATIENT)
Dept: ANTICOAGULATION | Facility: CLINIC | Age: 83
End: 2023-08-03

## 2023-08-03 VITALS
BODY MASS INDEX: 20.46 KG/M2 | OXYGEN SATURATION: 96 % | SYSTOLIC BLOOD PRESSURE: 119 MMHG | HEIGHT: 68 IN | WEIGHT: 135 LBS | TEMPERATURE: 97.8 F | RESPIRATION RATE: 16 BRPM | HEART RATE: 111 BPM | DIASTOLIC BLOOD PRESSURE: 74 MMHG

## 2023-08-03 DIAGNOSIS — E78.5 HYPERLIPIDEMIA LDL GOAL <100: ICD-10-CM

## 2023-08-03 DIAGNOSIS — I48.21 PERMANENT ATRIAL FIBRILLATION (H): ICD-10-CM

## 2023-08-03 DIAGNOSIS — R73.09 ELEVATED GLUCOSE: ICD-10-CM

## 2023-08-03 DIAGNOSIS — Z79.01 LONG TERM CURRENT USE OF ANTICOAGULANTS WITH INR GOAL OF 2.0-3.0: Primary | ICD-10-CM

## 2023-08-03 DIAGNOSIS — Z00.00 ENCOUNTER FOR MEDICARE ANNUAL WELLNESS EXAM: Primary | ICD-10-CM

## 2023-08-03 DIAGNOSIS — I48.91 ATRIAL FIBRILLATION, UNSPECIFIED TYPE (H): ICD-10-CM

## 2023-08-03 DIAGNOSIS — Z79.01 LONG TERM CURRENT USE OF ANTICOAGULANTS WITH INR GOAL OF 2.0-3.0: ICD-10-CM

## 2023-08-03 DIAGNOSIS — R00.2 PALPITATIONS: ICD-10-CM

## 2023-08-03 DIAGNOSIS — Z12.31 ENCOUNTER FOR SCREENING MAMMOGRAM FOR BREAST CANCER: ICD-10-CM

## 2023-08-03 LAB — INR HOME MONITORING: 3 (ref 2–3)

## 2023-08-03 PROCEDURE — G0439 PPPS, SUBSEQ VISIT: HCPCS | Performed by: INTERNAL MEDICINE

## 2023-08-03 PROCEDURE — 99214 OFFICE O/P EST MOD 30 MIN: CPT | Mod: 25 | Performed by: INTERNAL MEDICINE

## 2023-08-03 RX ORDER — WARFARIN SODIUM 5 MG/1
TABLET ORAL
Qty: 90 TABLET | Refills: 1 | Status: SHIPPED | OUTPATIENT
Start: 2023-08-03 | End: 2023-08-22

## 2023-08-03 RX ORDER — METOPROLOL TARTRATE 25 MG/1
25 TABLET, FILM COATED ORAL 2 TIMES DAILY
Qty: 180 TABLET | Refills: 3 | Status: SHIPPED | OUTPATIENT
Start: 2023-08-03 | End: 2024-07-13

## 2023-08-03 RX ORDER — ROSUVASTATIN CALCIUM 5 MG/1
5 TABLET, COATED ORAL DAILY
Qty: 90 TABLET | Refills: 3 | Status: SHIPPED | OUTPATIENT
Start: 2023-08-03 | End: 2024-08-05

## 2023-08-03 SDOH — HEALTH STABILITY: PHYSICAL HEALTH: ON AVERAGE, HOW MANY MINUTES DO YOU ENGAGE IN EXERCISE AT THIS LEVEL?: 10 MIN

## 2023-08-03 SDOH — ECONOMIC STABILITY: FOOD INSECURITY: WITHIN THE PAST 12 MONTHS, THE FOOD YOU BOUGHT JUST DIDN'T LAST AND YOU DIDN'T HAVE MONEY TO GET MORE.: NEVER TRUE

## 2023-08-03 SDOH — ECONOMIC STABILITY: FOOD INSECURITY: WITHIN THE PAST 12 MONTHS, YOU WORRIED THAT YOUR FOOD WOULD RUN OUT BEFORE YOU GOT MONEY TO BUY MORE.: NEVER TRUE

## 2023-08-03 SDOH — HEALTH STABILITY: PHYSICAL HEALTH: ON AVERAGE, HOW MANY DAYS PER WEEK DO YOU ENGAGE IN MODERATE TO STRENUOUS EXERCISE (LIKE A BRISK WALK)?: 4 DAYS

## 2023-08-03 SDOH — ECONOMIC STABILITY: INCOME INSECURITY: IN THE LAST 12 MONTHS, WAS THERE A TIME WHEN YOU WERE NOT ABLE TO PAY THE MORTGAGE OR RENT ON TIME?: NO

## 2023-08-03 ASSESSMENT — ENCOUNTER SYMPTOMS
PARESTHESIAS: 0
HEMATOCHEZIA: 0
PALPITATIONS: 0
CHILLS: 0
ABDOMINAL PAIN: 0
NERVOUS/ANXIOUS: 0
HEARTBURN: 0
FREQUENCY: 0
FEVER: 0
COUGH: 0
ARTHRALGIAS: 0
SORE THROAT: 0
EYE PAIN: 0
DIZZINESS: 0
NAUSEA: 0
CONSTIPATION: 0
HEMATURIA: 0
MYALGIAS: 0
HEADACHES: 0
SHORTNESS OF BREATH: 0
JOINT SWELLING: 0
DIARRHEA: 0
WEAKNESS: 0

## 2023-08-03 ASSESSMENT — SOCIAL DETERMINANTS OF HEALTH (SDOH)
WITHIN THE LAST YEAR, HAVE TO BEEN RAPED OR FORCED TO HAVE ANY KIND OF SEXUAL ACTIVITY BY YOUR PARTNER OR EX-PARTNER?: NO
HOW HARD IS IT FOR YOU TO PAY FOR THE VERY BASICS LIKE FOOD, HOUSING, MEDICAL CARE, AND HEATING?: NOT HARD AT ALL
WITHIN THE LAST YEAR, HAVE YOU BEEN HUMILIATED OR EMOTIONALLY ABUSED IN OTHER WAYS BY YOUR PARTNER OR EX-PARTNER?: NO
WITHIN THE LAST YEAR, HAVE YOU BEEN KICKED, HIT, SLAPPED, OR OTHERWISE PHYSICALLY HURT BY YOUR PARTNER OR EX-PARTNER?: NO
WITHIN THE LAST YEAR, HAVE YOU BEEN AFRAID OF YOUR PARTNER OR EX-PARTNER?: NO

## 2023-08-03 ASSESSMENT — LIFESTYLE VARIABLES
AUDIT-C TOTAL SCORE: 0
SKIP TO QUESTIONS 9-10: 1
HOW OFTEN DO YOU HAVE A DRINK CONTAINING ALCOHOL: NEVER
HOW OFTEN DO YOU HAVE SIX OR MORE DRINKS ON ONE OCCASION: NEVER
HOW MANY STANDARD DRINKS CONTAINING ALCOHOL DO YOU HAVE ON A TYPICAL DAY: PATIENT DOES NOT DRINK

## 2023-08-03 ASSESSMENT — ACTIVITIES OF DAILY LIVING (ADL): CURRENT_FUNCTION: NO ASSISTANCE NEEDED

## 2023-08-03 NOTE — PATIENT INSTRUCTIONS
"Patient Education   Personalized Prevention Plan  You are due for the preventive services outlined below.  Your care team is available to assist you in scheduling these services.  If you have already completed any of these items, please share that information with your care team to update in your medical record.  Health Maintenance Due   Topic Date Due    Pneumococcal Vaccine (2 - PCV) 09/06/2007    Diptheria Tetanus Pertussis (DTAP/TDAP/TD) Vaccine (1 - Tdap) 12/18/2008    Zoster (Shingles) Vaccine (2 of 3) 01/16/2013    COVID-19 Vaccine (3 - Pfizer series) 04/23/2021    ANNUAL REVIEW OF HM ORDERS  06/13/2023           Everything looks fine!    Refills of medications have been faxed to your pharmacy.     Schedule a future fasting \"lab only\" appointment soon.   Lab results will be available soon on [employer].    See me in a year, sooner if problems.        "

## 2023-08-03 NOTE — PROGRESS NOTES
ANTICOAGULATION  MANAGEMENT-Home Monitor Managed by Exception    Mariya Rowe 82 year old female is on warfarin with therapeutic INR result. (Goal INR 2.0-3.0)    Recent labs: (last 7 days)     08/03/23  0000   INR 3.0       Previous INR was Therapeutic  Medication, diet, health changes since last INR:chart reviewed; none identified  Contacted within the last 12 weeks by phone on 6/29/23       PAO Meraz was NOT contacted regarding therapeutic result today per home monitoring policy manage by exception agreement.   Current warfarin dose is to be continued:     Summary  As of 8/3/2023      Full warfarin instructions:  2.5 mg every Sun, Tue, Thu; 5 mg all other days   Next INR check:  8/17/2023             ?   Thais Castro RN  Anticoagulation Clinic  8/3/2023    _______________________________________________________________________     Anticoagulation Episode Summary       Current INR goal:  2.0-3.0   TTR:  91.1 % (1 y)   Target end date:  Indefinite   Send INR reminders to:  [facility]    Indications    Long term current use of anticoagulants with INR goal of 2.0-3.0 [Z79.01]  Atrial fibrillation (H) (Resolved) [I48.91]  Permanent atrial fibrillation (H) [I48.21]  Atrial fibrillation  unspecified type (H) [I48.91]             Comments:  [employer] home meter. Manage by exception.             Anticoagulation Care Providers       Provider Role Specialty Phone number    Chavez Molina MD Referring Internal Medicine [phone]    Edwin Ulrich MD Referring Internal Medicine [phone]

## 2023-08-03 NOTE — PROGRESS NOTES
"SUBJECTIVE:   Mariya is a 82 year old who presents for Preventive Visit.      8/3/2023     3:29 PM   Additional Questions   Roomed by Fatou SHEPHERD CMA     Are you in the first 12 months of your Medicare coverage?  No    Healthy Habits:     In general, how would you rate your overall health?  Fair    Frequency of exercise:  2-3 days/week    Duration of exercise:  15-30 minutes    Do you usually eat at least 4 servings of fruit and vegetables a day, include whole grains    & fiber and avoid regularly eating high fat or \"junk\" foods?  Yes    Taking medications regularly:  Yes    Medication side effects:  Not applicable    Ability to successfully perform activities of daily living:  No assistance needed    Home Safety:  No safety concerns identified    Hearing Impairment:  No hearing concerns    In the past 6 months, have you been bothered by leaking of urine?  No    In general, how would you rate your overall mental or emotional health?  Fair    Additional concerns today:  No    Have you ever done Advance Care Planning? (For example, a Health Directive, POLST, or a discussion with a medical provider or your loved ones about your wishes): No, advance care planning information given to patient to review.  Patient plans to discuss their wishes with loved ones or provider.       Fall risk  Fallen 2 or more times in the past year?: No  Any fall with injury in the past year?: No    Cognitive Screening   1) Repeat 3 items (Leader, Season, Table)    2) Clock draw: NORMAL  3) 3 item recall: Recalls 3 objects  Results: 3 items recalled: COGNITIVE IMPAIRMENT LESS LIKELY    Mini-CogTM Copyright MARTHA Olea. Licensed by the author for use in [facility]; reprinted with permission (nuno@.[facility]). All rights reserved.      Do you have sleep apnea, excessive snoring or daytime drowsiness? : no    Reviewed and updated as needed this visit by clinical staff   Tobacco  Allergies  Meds              Reviewed and updated as needed " this visit by Provider                 Social History     Tobacco Use    Smoking status: Never    Smokeless tobacco: Never   Substance Use Topics    Alcohol use: No             8/1/2023     8:25 AM   Alcohol Use   Prescreen: >3 drinks/day or >7 drinks/week? Not Applicable     Do you have a current opioid prescription? No  Do you use any other controlled substances or medications that are not prescribed by a provider? None    Eye exam with ophthalmology on this date: [facility]; April 2023    PROBLEMS TO ADD ON... In addition to the annual wellness exam, we also discussed atrial fibrillation, and hyperlipidemia.     She is tolerating current medications without side effects.   Agreed to update necessary labs.    Current providers sharing in care for this patient include:   Patient Care Team:  Chavez Molina MD as PCP - General (Internal Medicine)  Edwin Ulrich MD as Assigned PCP  Yelena Kenyon MD as Assigned Heart and Vascular Provider    The following health maintenance items are reviewed in Epic and correct as of today:  Health Maintenance   Topic Date Due    Pneumococcal Vaccine: 65+ Years (2 - PCV) 09/06/2007    DTAP/TDAP/TD IMMUNIZATION (1 - Tdap) 12/18/2008    ZOSTER IMMUNIZATION (2 of 3) 01/16/2013    MEDICARE ANNUAL WELLNESS VISIT  08/27/2020    COVID-19 Vaccine (3 - Pfizer series) 04/23/2021    ANNUAL REVIEW OF HM ORDERS  06/13/2023    ADVANCE CARE PLANNING  08/22/2023    INFLUENZA VACCINE (1) 09/01/2023    FALL RISK ASSESSMENT  08/03/2024    DEXA  06/30/2038    PHQ-2 (once per calendar year)  Completed    IPV IMMUNIZATION  Aged Out    MENINGITIS IMMUNIZATION  Aged Out    MAMMO SCREENING  Discontinued     Mammogram Screening - Patient over age 75, has elected to continue with screening.  Pertinent mammograms are reviewed under the imaging tab.    Review of Systems   Constitutional:  Negative for chills and fever.   HENT:  Negative for congestion, ear pain, hearing loss and  "sore throat.    Eyes:  Negative for pain.   Respiratory:  Negative for cough and shortness of breath.    Cardiovascular:  Negative for chest pain, palpitations and peripheral edema.   Gastrointestinal:  Negative for abdominal pain, constipation, diarrhea, heartburn, hematochezia and nausea.   Genitourinary:  Negative for frequency, genital sores, hematuria, pelvic pain, urgency and vaginal bleeding.   Musculoskeletal:  Negative for arthralgias, joint swelling and myalgias.   Skin:  Negative for rash.   Neurological:  Negative for dizziness, weakness, headaches and paresthesias.   Psychiatric/Behavioral:  Negative for mood changes. The patient is not nervous/anxious.      This document serves as a record of the services and decisions personally performed and made by Chavez Molina MD. It was created on his behalf by Janneth Henriquez, a physician assistant student. The creation of this document is based on the provider's statements to the student.  August 3, 2023     OBJECTIVE:   /74 (BP Location: Left arm, Patient Position: Sitting, Cuff Size: Adult Regular)   Pulse 111   Temp 97.8  F (36.6  C) (Oral)   Resp 16   Ht 1.735 m (5' 8.31\")   Wt 61.2 kg (135 lb)   LMP  (LMP Unknown)   SpO2 96%   Breastfeeding No   BMI 20.34 kg/m   Estimated body mass index is 20.34 kg/m  as calculated from the following:    Height as of this encounter: 1.735 m (5' 8.31\").    Weight as of this encounter: 61.2 kg (135 lb).  Physical Exam  GENERAL APPEARANCE: healthy, alert and no distress  EYES: Eyes grossly normal to inspection, PERRL and conjunctivae and sclerae normal  HENT: ear canals and TM's normal, nose and mouth without ulcers or lesions, oropharynx clear and oral mucous membranes moist  NECK: no adenopathy, no asymmetry, masses, or scars and thyroid normal to palpation  RESP: lungs clear to auscultation - no rales, rhonchi or wheezes  CV: irregularly irregular, normal S1 S2, no S3 or S4, no murmur, click or rub, no " peripheral edema and peripheral pulses strong  ABDOMEN: soft, nontender, no hepatosplenomegaly, no masses and bowel sounds normal  MS: no musculoskeletal defects are noted and gait is age appropriate without ataxia  SKIN: no suspicious lesions or rashes  NEURO: Normal strength and tone, sensory exam grossly normal, mentation intact and speech normal  PSYCH: mentation appears normal and affect normal/bright    Diagnostic Test Results:  Labs reviewed in Epic    ASSESSMENT / PLAN:   (Z00.00) Encounter for Medicare annual wellness exam  (primary encounter diagnosis)  Comment: Stable health. See Epic orders.   Plan:     (I48.21) Permanent atrial fibrillation (H)  Comment: Stable. Continue current meds and follow up with cardiology as recommended.   Plan: warfarin ANTICOAGULANT (COUMADIN) 5 MG tablet,         CBC with platelets, TSH with free T4 reflex,         OFFICE/OUTPT VISIT,EST,LEVL III          (Z79.01) Long term current use of anticoagulants with INR goal of 2.0-3.0  Comment:   Plan: warfarin ANTICOAGULANT (COUMADIN) 5 MG tablet          (R00.2) Palpitations  Comment: stable. Continue metoprolol.   Plan: metoprolol tartrate (LOPRESSOR) 25 MG tablet          (E78.5) Hyperlipidemia LDL goal <100  Comment: Will update LDL. If within target range, continue current meds.   Plan: rosuvastatin (CRESTOR) 5 MG tablet, Lipid panel        reflex to direct LDL Fasting, Comprehensive         metabolic panel (BMP + Alb, Alk Phos, ALT, AST,        Total. Bili, TP), OFFICE/OUTPT VISIT,EST,LEVL         III          (R73.09) Elevated glucose  Comment: Check A1c.  Plan: Hemoglobin A1c          (Z12.31) Encounter for screening mammogram for breast cancer  Comment:   Plan: *MA Screening Digital Bilateral     Patient has been advised of split billing requirements and indicates understanding: Yes    COUNSELING:  Reviewed preventive health counseling, as reflected in patient instructions       Regular exercise       Healthy  "diet/nutrition    She reports that she has never smoked. She has never used smokeless tobacco.    Appropriate preventive services were discussed with this patient, including applicable screening as appropriate for cardiovascular disease, diabetes, osteopenia/osteoporosis, and glaucoma.  As appropriate for age/gender, discussed screening for colorectal cancer, prostate cancer, breast cancer, and cervical cancer. Checklist reviewing preventive services available has been given to the patient.    Reviewed patients plan of care and provided an AVS. The Basic Care Plan (routine screening as documented in Health Maintenance) for Mariya meets the Care Plan requirement. This Care Plan has been established and reviewed with the Patient.          The information in this document, created by the physician assistant student for me, accurately reflects the services I personally performed and the decisions made by me. I have reviewed and approved this document for accuracy prior to leaving the patient care area.  August 3, 2023     Chavez Molina MD  [facility]    Patient Instructions   Patient Education  Everything looks fine!    Refills of medications have been faxed to your pharmacy.     Schedule a future fasting \"lab only\" appointment soon.   Lab results will be available soon on [employer].    See me in a year, sooner if problems.             "

## 2023-08-19 ENCOUNTER — NURSE TRIAGE (OUTPATIENT)
Dept: NURSING | Facility: CLINIC | Age: 83
End: 2023-08-19
Payer: COMMERCIAL

## 2023-08-19 LAB — INR HOME MONITORING: 1.7 (ref 2–3)

## 2023-08-19 NOTE — TELEPHONE ENCOUNTER
Provider Recommendation Follow Up:   Reached patient/caregiver. Informed of provider's recommendations. Patient verbalized understanding and agrees with the plan.             Patient at home INR 1.7      Reason for Disposition   Patient's symptoms are safe to treat at home per nursing judgment    Protocols used: No Protocol [id number]

## 2023-08-21 ENCOUNTER — ANTICOAGULATION THERAPY VISIT (OUTPATIENT)
Dept: ANTICOAGULATION | Facility: CLINIC | Age: 83
End: 2023-08-21
Payer: COMMERCIAL

## 2023-08-21 DIAGNOSIS — Z79.01 LONG TERM CURRENT USE OF ANTICOAGULANTS WITH INR GOAL OF 2.0-3.0: Primary | ICD-10-CM

## 2023-08-21 DIAGNOSIS — I48.21 PERMANENT ATRIAL FIBRILLATION (H): ICD-10-CM

## 2023-08-21 DIAGNOSIS — I48.91 ATRIAL FIBRILLATION, UNSPECIFIED TYPE (H): ICD-10-CM

## 2023-08-21 NOTE — PROGRESS NOTES
ANTICOAGULATION MANAGEMENT     Mariya Rowe 83 year old female is on warfarin with subtherapeutic INR result. (Goal INR 2.0-3.0)    Recent labs: (last 7 days)     08/19/23  0000   INR 1.7*       ASSESSMENT     Source(s): Chart Review and Patient/Caregiver Call     Warfarin doses taken: More warfarin taken than planned which may be affecting INR and Missed dose(s) may be affecting INR, pt reports that she missed her dose of warfarin on 8/14/23 and then after taking INR on 8/19/23 and it was low she decided to take 5mg on 8/20/23.   Diet: Increased greens/vitamin K in diet; plans to resume previous intake  Medication/supplement changes: None noted  New illness, injury, or hospitalization: No  Signs or symptoms of bleeding or clotting: No  Previous result: Therapeutic last 2(+) visits  Additional findings: None       PLAN     Recommended plan for temporary change(s) affecting INR     Dosing Instructions: Continue your current warfarin dose with next INR in 1 week       Summary  As of 8/21/2023      Full warfarin instructions:  2.5 mg every Sun, Tue, Thu; 5 mg all other days   Next INR check:  8/28/2023               Telephone call with Mariya who verbalizes understanding and agrees to plan    Patient to recheck with home meter    Education provided:   Please call back if any changes to your diet, medications or how you've been taking warfarin    Plan made per [facility] anticoagulation protocol    Artis Dc RN  Anticoagulation Clinic  8/21/2023    _______________________________________________________________________     Anticoagulation Episode Summary       Current INR goal:  2.0-3.0   TTR:  90.0 % (1 y)   Target end date:  Indefinite   Send INR reminders to:  [facility] HOME MONITOR[facility]    Indications    Long term current use of anticoagulants with INR goal of 2.0-3.0 [Z79.01]  Atrial fibrillation (H) (Resolved) [I48.91]  Permanent atrial fibrillation (H) [I48.21]  Atrial fibrillation  unspecified type (H) [I48.91]              Comments:  [employer] home meter. Manage by exception.             Anticoagulation Care Providers       Provider Role Specialty Phone number    Chavez Molina MD Referring Internal Medicine [phone]    Edwin Ulrich MD Referring Internal Medicine [phone]

## 2023-08-22 DIAGNOSIS — Z79.01 LONG TERM CURRENT USE OF ANTICOAGULANTS WITH INR GOAL OF 2.0-3.0: ICD-10-CM

## 2023-08-22 DIAGNOSIS — I48.21 PERMANENT ATRIAL FIBRILLATION (H): ICD-10-CM

## 2023-08-22 RX ORDER — WARFARIN SODIUM 5 MG/1
TABLET ORAL
Qty: 90 TABLET | Refills: 1 | Status: SHIPPED | OUTPATIENT
Start: 2023-08-22 | End: 2024-05-08

## 2023-08-22 NOTE — TELEPHONE ENCOUNTER
ANTICOAGULATION MANAGEMENT:  Medication Refill    Anticoagulation Summary  As of 8/21/2023      Warfarin maintenance plan:  2.5 mg (5 mg x 0.5) every Sun, Tue, Thu; 5 mg (5 mg x 1) all other days   Next INR check:  8/28/2023   Target end date:  Indefinite    Indications    Long term current use of anticoagulants with INR goal of 2.0-3.0 [Z79.01]  Atrial fibrillation (H) (Resolved) [I48.91]  Permanent atrial fibrillation (H) [I48.21]  Atrial fibrillation  unspecified type (H) [I48.91]                 Anticoagulation Care Providers       Provider Role Specialty Phone number    Chavez Molina MD Referring Internal Medicine [phone]    UlrichEdwin plata MD Referring Internal Medicine [phone]            Refill Criteria    Visit with referring provider/group: Meets criteria: office visit within referring provider group in the last 1 year on 8/3/23    [facility] referral signed last signed: 03/06/2023; within last year: Yes    Lab monitoring not exceeding 2 weeks overdue: Yes    Mariya meets all criteria for refill. Rx instructions and quantity supplied updated to match patient's current dosing plan. Warfarin 90 day supply with 1 refill granted per [facility] protocol     Anita Palmer RN  Anticoagulation Clinic

## 2023-08-28 ENCOUNTER — ANTICOAGULATION THERAPY VISIT (OUTPATIENT)
Dept: ANTICOAGULATION | Facility: CLINIC | Age: 83
End: 2023-08-28
Payer: COMMERCIAL

## 2023-08-28 DIAGNOSIS — I48.91 ATRIAL FIBRILLATION, UNSPECIFIED TYPE (H): ICD-10-CM

## 2023-08-28 DIAGNOSIS — I48.21 PERMANENT ATRIAL FIBRILLATION (H): ICD-10-CM

## 2023-08-28 DIAGNOSIS — Z79.01 LONG TERM CURRENT USE OF ANTICOAGULANTS WITH INR GOAL OF 2.0-3.0: Primary | ICD-10-CM

## 2023-08-28 LAB — INR HOME MONITORING: 2.2 (ref 2–3)

## 2023-08-28 NOTE — PROGRESS NOTES
ANTICOAGULATION MANAGEMENT     Mariya Rowe 83 year old female is on warfarin with therapeutic INR result. (Goal INR 2.0-3.0)    Recent labs: (last 7 days)     08/28/23  0000   INR 2.2       ASSESSMENT     Source(s): Chart Review and Patient/Caregiver Call     Warfarin doses taken: Warfarin taken as instructed  Diet: No new diet changes identified  Medication/supplement changes: None noted  New illness, injury, or hospitalization: No  Signs or symptoms of bleeding or clotting: No  Previous result: Subtherapeutic  Additional findings: None       PLAN     Recommended plan for no diet, medication or health factor changes affecting INR     Dosing Instructions: Continue your current warfarin dose with next INR in 2 weeks       Summary  As of 8/28/2023      Full warfarin instructions:  2.5 mg every Sun, Tue, Thu; 5 mg all other days   Next INR check:  9/11/2023               Telephone call with Mariya who verbalizes understanding and agrees to plan    Patient to recheck with home meter    Education provided:   Please call back if any changes to your diet, medications or how you've been taking warfarin  Resume manage by exception with home monitor. Continue to submit INR results to home monitor company.You will only be called when your result is out of range. Please call and notify [facility] if new medication started, dose missed, signs or symptoms of bleeding or clotting, or a surgery/procedure is scheduled.  Contact [phone]  with any changes, questions or concerns.     Plan made per [facility] anticoagulation protocol    Roro Ramsey, RN  Anticoagulation Clinic  8/28/2023    _______________________________________________________________________     Anticoagulation Episode Summary       Current INR goal:  2.0-3.0   TTR:  88.6 % (1 y)   Target end date:  Indefinite   Send INR reminders to:  [facility] HOME MONITORING    Indications    Long term current use of anticoagulants with INR goal of 2.0-3.0 [Z79.01]  Atrial  fibrillation (H) (Resolved) [I48.91]  Permanent atrial fibrillation (H) [I48.21]  Atrial fibrillation  unspecified type (H) [I48.91]             Comments:  [employer] home meter. Manage by exception.             Anticoagulation Care Providers       Provider Role Specialty Phone number    Chavez Molina MD Referring Internal Medicine [phone]    Edwin Ulrich MD Referring Internal Medicine [phone]

## 2023-09-12 LAB — INR HOME MONITORING: 3.7 (ref 2–3)

## 2023-09-13 ENCOUNTER — ANTICOAGULATION THERAPY VISIT (OUTPATIENT)
Dept: ANTICOAGULATION | Facility: CLINIC | Age: 83
End: 2023-09-13
Payer: COMMERCIAL

## 2023-09-13 DIAGNOSIS — I48.21 PERMANENT ATRIAL FIBRILLATION (H): ICD-10-CM

## 2023-09-13 DIAGNOSIS — I48.91 ATRIAL FIBRILLATION, UNSPECIFIED TYPE (H): ICD-10-CM

## 2023-09-13 DIAGNOSIS — Z79.01 LONG TERM CURRENT USE OF ANTICOAGULANTS WITH INR GOAL OF 2.0-3.0: Primary | ICD-10-CM

## 2023-09-13 NOTE — PROGRESS NOTES
ANTICOAGULATION MANAGEMENT     Mariya Rowe 83 year old female is on warfarin with supratherapeutic INR result. (Goal INR 2.0-3.0)    Recent labs: (last 7 days)     09/12/23  0000   INR 3.7*       ASSESSMENT     Source(s): Chart Review and Patient/Caregiver Call     Warfarin doses taken: Warfarin taken as instructed  Diet: Decreased greens/vitamin K in diet; plans to resume previous intake  Medication/supplement changes: None noted  New illness, injury, or hospitalization: No  Signs or symptoms of bleeding or clotting: No  Previous result: Therapeutic last visit; previously outside of goal range  Additional findings:  [facility] did not get INR result until 9/13 (originally from 9/12) -on her own, pt did hold her coumadin Tues 9/12        PLAN     Recommended plan for temporary change(s) affecting INR     Dosing Instructions: hold dose then continue your current warfarin dose with next INR in 1 week       Summary  As of 9/13/2023      Full warfarin instructions:  2.5 mg every Sun, Tue, Thu; 5 mg all other days   Next INR check:  9/19/2023               Telephone call with Mariya who verbalizes understanding and agrees to plan    Patient to recheck with home meter    Education provided:   None required  Goal range and lab monitoring: goal range and significance of current result, Importance of therapeutic range, and Importance of following up at instructed interval  Dietary considerations: importance of consistent vitamin K intake, impact of vitamin K foods on INR, and vitamin K content of foods  Symptom monitoring: monitoring for bleeding signs and symptoms    Plan made per [facility] anticoagulation protocol    Diane Redmond RN  Anticoagulation Clinic  9/13/2023    _______________________________________________________________________     Anticoagulation Episode Summary       Current INR goal:  2.0-3.0   TTR:  86.6 % (1 y)   Target end date:  Indefinite   Send INR reminders to:  [employer] [facility]IT[facility]    Indications     Long term current use of anticoagulants with INR goal of 2.0-3.0 [Z79.01]  Atrial fibrillation (H) (Resolved) [I48.91]  Permanent atrial fibrillation (H) [I48.21]  Atrial fibrillation  unspecified type (H) [I48.91]             Comments:  [employer] home meter. Manage by exception.             Anticoagulation Care Providers       Provider Role Specialty Phone number    Chavez Molina MD Referring Internal Medicine [phone]    Edwin Ulrich MD Referring Internal Medicine [phone]

## 2023-09-21 ENCOUNTER — LAB (OUTPATIENT)
Dept: LAB | Facility: CLINIC | Age: 83
End: 2023-09-21
Payer: COMMERCIAL

## 2023-09-21 ENCOUNTER — ANTICOAGULATION THERAPY VISIT (OUTPATIENT)
Dept: ANTICOAGULATION | Facility: CLINIC | Age: 83
End: 2023-09-21

## 2023-09-21 DIAGNOSIS — E78.5 HYPERLIPIDEMIA LDL GOAL <100: ICD-10-CM

## 2023-09-21 DIAGNOSIS — I48.91 ATRIAL FIBRILLATION, UNSPECIFIED TYPE (H): ICD-10-CM

## 2023-09-21 DIAGNOSIS — Z79.01 LONG TERM CURRENT USE OF ANTICOAGULANTS WITH INR GOAL OF 2.0-3.0: ICD-10-CM

## 2023-09-21 DIAGNOSIS — I48.21 PERMANENT ATRIAL FIBRILLATION (H): ICD-10-CM

## 2023-09-21 DIAGNOSIS — Z79.01 LONG TERM CURRENT USE OF ANTICOAGULANTS WITH INR GOAL OF 2.0-3.0: Primary | ICD-10-CM

## 2023-09-21 DIAGNOSIS — R73.09 ELEVATED GLUCOSE: ICD-10-CM

## 2023-09-21 LAB
ALBUMIN SERPL BCG-MCNC: 4 G/DL (ref 3.5–5.2)
ALP SERPL-CCNC: 78 U/L (ref 35–104)
ALT SERPL W P-5'-P-CCNC: 28 U/L (ref 0–50)
ANION GAP SERPL CALCULATED.3IONS-SCNC: 12 MMOL/L (ref 7–15)
AST SERPL W P-5'-P-CCNC: 32 U/L (ref 0–45)
BILIRUB SERPL-MCNC: 1.1 MG/DL
BUN SERPL-MCNC: 15 MG/DL (ref 8–23)
CALCIUM SERPL-MCNC: 9.5 MG/DL (ref 8.8–10.2)
CHLORIDE SERPL-SCNC: 104 MMOL/L (ref 98–107)
CHOLEST SERPL-MCNC: 157 MG/DL
CREAT SERPL-MCNC: 0.81 MG/DL (ref 0.51–0.95)
DEPRECATED HCO3 PLAS-SCNC: 25 MMOL/L (ref 22–29)
EGFRCR SERPLBLD CKD-EPI 2021: 72 ML/MIN/1.73M2
ERYTHROCYTE [DISTWIDTH] IN BLOOD BY AUTOMATED COUNT: 13.2 % (ref 10–15)
GLUCOSE SERPL-MCNC: 94 MG/DL (ref 70–99)
HBA1C MFR BLD: 5 % (ref 0–5.6)
HCT VFR BLD AUTO: 42.7 % (ref 35–47)
HDLC SERPL-MCNC: 72 MG/DL
HGB BLD-MCNC: 14.6 G/DL (ref 11.7–15.7)
INR BLD: 2.1 (ref 0.9–1.1)
LDLC SERPL CALC-MCNC: 71 MG/DL
MCH RBC QN AUTO: 31.9 PG (ref 26.5–33)
MCHC RBC AUTO-ENTMCNC: 34.2 G/DL (ref 31.5–36.5)
MCV RBC AUTO: 93 FL (ref 78–100)
NONHDLC SERPL-MCNC: 85 MG/DL
PLATELET # BLD AUTO: 200 10E3/UL (ref 150–450)
POTASSIUM SERPL-SCNC: 4.4 MMOL/L (ref 3.4–5.3)
PROT SERPL-MCNC: 6.8 G/DL (ref 6.4–8.3)
RBC # BLD AUTO: 4.58 10E6/UL (ref 3.8–5.2)
SODIUM SERPL-SCNC: 141 MMOL/L (ref 136–145)
TRIGL SERPL-MCNC: 72 MG/DL
TSH SERPL DL<=0.005 MIU/L-ACNC: 2.19 UIU/ML (ref 0.3–4.2)
WBC # BLD AUTO: 6.4 10E3/UL (ref 4–11)

## 2023-09-21 PROCEDURE — 83036 HEMOGLOBIN GLYCOSYLATED A1C: CPT

## 2023-09-21 PROCEDURE — 80053 COMPREHEN METABOLIC PANEL: CPT

## 2023-09-21 PROCEDURE — 85027 COMPLETE CBC AUTOMATED: CPT

## 2023-09-21 PROCEDURE — 84443 ASSAY THYROID STIM HORMONE: CPT

## 2023-09-21 PROCEDURE — 85610 PROTHROMBIN TIME: CPT

## 2023-09-21 PROCEDURE — 80061 LIPID PANEL: CPT

## 2023-09-21 PROCEDURE — 36415 COLL VENOUS BLD VENIPUNCTURE: CPT

## 2023-09-21 NOTE — PROGRESS NOTES
ANTICOAGULATION MANAGEMENT     Mariya Rowe 83 year old female is on warfarin with therapeutic INR result. (Goal INR 2.0-3.0)    Recent labs: (last 7 days)     09/21/23  0840   INR 2.1*       ASSESSMENT     Source(s): Chart Review and Patient/Caregiver Call     Warfarin doses taken: Warfarin taken as instructed  Diet: No new diet changes identified  Medication/supplement changes: None noted  New illness, injury, or hospitalization: No  Signs or symptoms of bleeding or clotting: No  Previous result: Supratherapeutic  Additional findings: None       PLAN     Recommended plan for no diet, medication or health factor changes affecting INR     Dosing Instructions: Continue your current warfarin dose with next INR in 1 week       Summary  As of 9/21/2023      Full warfarin instructions:  2.5 mg every Sun, Tue, Thu; 5 mg all other days   Next INR check:  9/28/2023               Telephone call with Mariya who verbalizes understanding and agrees to plan    Patient to recheck with home meter    Education provided:   Please call back if any changes to your diet, medications or how you've been taking warfarin  Resume manage by exception with home monitor. Continue to submit INR results to home monitor company.You will only be called when your result is out of range. Please call and notify [facility] if new medication started, dose missed, signs or symptoms of bleeding or clotting, or a surgery/procedure is scheduled.    Plan made per [facility] anticoagulation protocol    Carol Donnelly RN  Anticoagulation Clinic  9/21/2023    _______________________________________________________________________     Anticoagulation Episode Summary       Current INR goal:  2.0-3.0   TTR:  85.6 % (1 y)   Target end date:  Indefinite   Send INR reminders to:  [facility] MONITOR[facility]    Indications    Long term current use of anticoagulants with INR goal of 2.0-3.0 [Z79.01]  Atrial fibrillation (H) (Resolved) [I48.91]  Permanent atrial fibrillation  (H) [I48.21]  Atrial fibrillation  unspecified type (H) [I48.91]             Comments:  [employer] home meter. Manage by exception.             Anticoagulation Care Providers       Provider Role Specialty Phone number    Chavez Molina MD Referring Internal Medicine [phone]    Edwin Ulrich MD Referring Internal Medicine [phone]

## 2023-10-03 ENCOUNTER — TELEPHONE (OUTPATIENT)
Dept: ANTICOAGULATION | Facility: CLINIC | Age: 83
End: 2023-10-03
Payer: COMMERCIAL

## 2023-10-03 LAB — INR HOME MONITORING: 3.1 (ref 2–3)

## 2023-10-03 NOTE — TELEPHONE ENCOUNTER
ANTICOAGULATION     Mariya Rowe is overdue for INR check.     Spoke with Mariya who states she will test INR tomorrow.    Lalo Tapia RN

## 2023-10-04 ENCOUNTER — ANTICOAGULATION THERAPY VISIT (OUTPATIENT)
Dept: ANTICOAGULATION | Facility: CLINIC | Age: 83
End: 2023-10-04
Payer: COMMERCIAL

## 2023-10-04 DIAGNOSIS — I48.91 ATRIAL FIBRILLATION, UNSPECIFIED TYPE (H): ICD-10-CM

## 2023-10-04 DIAGNOSIS — I48.21 PERMANENT ATRIAL FIBRILLATION (H): ICD-10-CM

## 2023-10-04 DIAGNOSIS — Z79.01 LONG TERM CURRENT USE OF ANTICOAGULANTS WITH INR GOAL OF 2.0-3.0: Primary | ICD-10-CM

## 2023-10-04 NOTE — PROGRESS NOTES
ANTICOAGULATION MANAGEMENT     Mariya Rowe 83 year old female is on warfarin with supratherapeutic INR result. (Goal INR 2.0-3.0)    Recent labs: (last 7 days)     10/03/23  0000   INR 3.1*       ASSESSMENT     Source(s): Chart Review and Patient/Caregiver Call     Warfarin doses taken: Warfarin taken as instructed  Diet: Decreased greens/vitamin K in diet; plans to resume previous intake  Medication/supplement changes: None noted  New illness, injury, or hospitalization: No  Signs or symptoms of bleeding or clotting: No  Previous result: Therapeutic last visit; previously outside of goal range  Additional findings: None       PLAN     Recommended plan for temporary change(s) affecting INR     Dosing Instructions: Patient held on her own last evening. Continue your current warfarin dose with next INR in 2 weeks       Summary  As of 10/4/2023      Full warfarin instructions:  2.5 mg every Sun, Tue, Thu; 5 mg all other days   Next INR check:  10/17/2023               Telephone call with Mariya who verbalizes understanding and agrees to plan    Patient to recheck with home meter    Education provided:   Dietary considerations: importance of consistent vitamin K intake  Discussed that green will lower INR.    Plan made per [facility] anticoagulation protocol    Roro Ramsey RN  Anticoagulation Clinic  10/4/2023    _______________________________________________________________________     Anticoagulation Episode Summary       Current INR goal:  2.0-3.0   TTR:  85.2 % (1 y)   Target end date:  Indefinite   Send INR reminders to:  [facility] HOME [facility]ITOR[facility]    Indications    Long term current use of anticoagulants with INR goal of 2.0-3.0 [Z79.01]  Atrial fibrillation (H) (Resolved) [I48.91]  Permanent atrial fibrillation (H) [I48.21]  Atrial fibrillation  unspecified type (H) [I48.91]             Comments:  [employer] home meter. Manage by exception.             Anticoagulation Care Providers       Provider Role Specialty  Phone number    Chavez Molina MD Referring Internal Medicine [phone]    Edwin Ulrich MD Referring Internal Medicine [phone]

## 2023-10-16 ENCOUNTER — ANTICOAGULATION THERAPY VISIT (OUTPATIENT)
Dept: ANTICOAGULATION | Facility: CLINIC | Age: 83
End: 2023-10-16
Payer: COMMERCIAL

## 2023-10-16 DIAGNOSIS — I48.91 ATRIAL FIBRILLATION, UNSPECIFIED TYPE (H): ICD-10-CM

## 2023-10-16 DIAGNOSIS — I48.21 PERMANENT ATRIAL FIBRILLATION (H): ICD-10-CM

## 2023-10-16 DIAGNOSIS — Z79.01 LONG TERM CURRENT USE OF ANTICOAGULANTS WITH INR GOAL OF 2.0-3.0: Primary | ICD-10-CM

## 2023-10-16 LAB — INR HOME MONITORING: 2.4 (ref 2–3)

## 2023-10-16 NOTE — PROGRESS NOTES
ANTICOAGULATION MANAGEMENT     Mariya Rowe 83 year old female is on warfarin with therapeutic INR result. (Goal INR 2.0-3.0)    Recent labs: (last 7 days)     10/16/23  0000   INR 2.4       ASSESSMENT     Source(s): Chart Review and Patient/Caregiver Call     Warfarin doses taken:  Mariya is pretty certain she missed a dose of warfarin last week  Diet: Decreased greens/vitamin K in diet; plans to resume previous intake  Medication/supplement changes: None noted  New illness, injury, or hospitalization: No  Signs or symptoms of bleeding or clotting: No  Previous result: Supratherapeutic  Additional findings: None       PLAN     Recommended plan for temporary change(s) affecting INR     Dosing Instructions: Continue your current warfarin dose with next INR in 2 weeks       Summary  As of 10/16/2023      Full warfarin instructions:  2.5 mg every Sun, Tue, Thu; 5 mg all other days   Next INR check:  10/30/2023               Telephone call with Mariya who verbalizes understanding and agrees to plan    Patient to recheck with home meter    Education provided:   Resume manage by exception with home monitor. Continue to submit INR results to home monitor company.You will only be called when your result is out of range. Please call and notify [facility] if new medication started, dose missed, signs or symptoms of bleeding or clotting, or a surgery/procedure is scheduled.  Contact [phone]  with any changes, questions or concerns.     Plan made per [facility] anticoagulation protocol    Jaky Gonzalez RN  Anticoagulation Clinic  10/16/2023    _______________________________________________________________________     Anticoagulation Episode Summary       Current INR goal:  2.0-3.0   TTR:  84.7% (1 y)   Target end date:  Indefinite   Send INR reminders to:  [facility] HOME MONITORING    Indications    Long term current use of anticoagulants with INR goal of 2.0-3.0 [Z79.01]  Atrial fibrillation (H) (Resolved) [I48.91]  Permanent  atrial fibrillation (H) [I48.21]  Atrial fibrillation  unspecified type (H) [I48.91]             Comments:  [employer] home meter. Manage by exception.             Anticoagulation Care Providers       Provider Role Specialty Phone number    Chavez Molina MD Referring Internal Medicine [phone]    Edwin Ulrich MD Referring Internal Medicine [phone]

## 2023-11-02 LAB — INR HOME MONITORING: 2.3 (ref 2–3)

## 2023-11-03 ENCOUNTER — DOCUMENTATION ONLY (OUTPATIENT)
Dept: ANTICOAGULATION | Facility: CLINIC | Age: 83
End: 2023-11-03
Payer: COMMERCIAL

## 2023-11-03 DIAGNOSIS — Z79.01 LONG TERM CURRENT USE OF ANTICOAGULANTS WITH INR GOAL OF 2.0-3.0: Primary | ICD-10-CM

## 2023-11-03 DIAGNOSIS — I48.21 PERMANENT ATRIAL FIBRILLATION (H): ICD-10-CM

## 2023-11-03 DIAGNOSIS — I48.91 ATRIAL FIBRILLATION, UNSPECIFIED TYPE (H): ICD-10-CM

## 2023-11-03 NOTE — PROGRESS NOTES
ANTICOAGULATION  MANAGEMENT-Home Monitor Managed by Exception    Mariya Rowe 83 year old female is on warfarin with therapeutic INR result. (Goal INR 2.0-3.0)    Recent labs: (last 7 days)     11/01/23  0000   INR 2.3       Previous INR was Therapeutic  Medication, diet, health changes since last INR:chart reviewed; none identified  Contacted within the last 12 weeks by phone on 10/16/23      PAO Meraz was NOT contacted regarding therapeutic result today per home monitoring policy manage by exception agreement.   Current warfarin dose is to be continued:     Summary  As of 11/3/2023      Full warfarin instructions:  2.5 mg every Sun, Tue, Thu; 5 mg all other days   Next INR check:  11/15/2023             ?   Naima Rod RN  Anticoagulation Clinic  11/3/2023    _______________________________________________________________________     Anticoagulation Episode Summary       Current INR goal:  2.0-3.0   TTR:  84.7% (1 y)   Target end date:  Indefinite   Send INR reminders to:  [facility] HOME MONITOR[facility]    Indications    Long term current use of anticoagulants with INR goal of 2.0-3.0 [Z79.01]  Atrial fibrillation (H) (Resolved) [I48.91]  Permanent atrial fibrillation (H) [I48.21]  Atrial fibrillation  unspecified type (H) [I48.91]             Comments:  [employer] home meter. Manage by exception.             Anticoagulation Care Providers       Provider Role Specialty Phone number    Chavez Molina MD Referring Internal Medicine [phone]    Edwin Ulrich MD Referring Internal Medicine [phone]

## 2023-11-10 ENCOUNTER — HOSPITAL ENCOUNTER (OUTPATIENT)
Dept: MAMMOGRAPHY | Facility: CLINIC | Age: 83
Discharge: HOME | End: 2023-11-10
Attending: INTERNAL MEDICINE | Admitting: INTERNAL MEDICINE
Payer: COMMERCIAL

## 2023-11-10 DIAGNOSIS — Z12.31 ENCOUNTER FOR SCREENING MAMMOGRAM FOR BREAST CANCER: ICD-10-CM

## 2023-11-10 PROCEDURE — 77067 SCR MAMMO BI INCL CAD: CPT

## 2023-11-17 ENCOUNTER — DOCUMENTATION ONLY (OUTPATIENT)
Dept: ANTICOAGULATION | Facility: CLINIC | Age: 83
End: 2023-11-17
Payer: COMMERCIAL

## 2023-11-17 DIAGNOSIS — Z79.01 LONG TERM CURRENT USE OF ANTICOAGULANTS WITH INR GOAL OF 2.0-3.0: Primary | ICD-10-CM

## 2023-11-17 DIAGNOSIS — I48.21 PERMANENT ATRIAL FIBRILLATION (H): ICD-10-CM

## 2023-11-17 DIAGNOSIS — I48.91 ATRIAL FIBRILLATION, UNSPECIFIED TYPE (H): ICD-10-CM

## 2023-11-17 LAB — INR HOME MONITORING: 2.3 (ref 2–3)

## 2023-12-02 LAB — INR HOME MONITORING: 2.9 (ref 2–3)

## 2023-12-04 ENCOUNTER — DOCUMENTATION ONLY (OUTPATIENT)
Dept: ANTICOAGULATION | Facility: CLINIC | Age: 83
End: 2023-12-04
Payer: COMMERCIAL

## 2023-12-04 DIAGNOSIS — Z79.01 LONG TERM CURRENT USE OF ANTICOAGULANTS WITH INR GOAL OF 2.0-3.0: Primary | ICD-10-CM

## 2023-12-04 DIAGNOSIS — I48.91 ATRIAL FIBRILLATION, UNSPECIFIED TYPE (H): ICD-10-CM

## 2023-12-04 DIAGNOSIS — I48.21 PERMANENT ATRIAL FIBRILLATION (H): ICD-10-CM

## 2023-12-04 NOTE — PROGRESS NOTES
ANTICOAGULATION  MANAGEMENT-Home Monitor Managed by Exception    Mariya Rowe 83 year old female is on warfarin with therapeutic INR result. (Goal INR 2.0-3.0)    Recent labs: (last 7 days)     12/02/23  0000   INR 2.9       Previous INR was Therapeutic  Medication, diet, health changes since last INR:chart reviewed; none identified  Contacted within the last 12 weeks by phone on 10/16/23  Last [facility] referral date: 03/06/2023      PLAN     Mariya was NOT contacted regarding therapeutic result today per home monitoring policy manage by exception agreement.   Current warfarin dose is to be continued:     Summary  As of 12/4/2023      Full warfarin instructions:  2.5 mg every Sun, Tue, Thu; 5 mg all other days   Next INR check:  12/18/2023             ?   Naima Rod RN  Anticoagulation Clinic  12/4/2023    _______________________________________________________________________     Anticoagulation Episode Summary       Current INR goal:  2.0-3.0   TTR:  84.7% (1 y)   Target end date:  Indefinite   Send INR reminders to:  [facility]    Indications    Long term current use of anticoagulants with INR goal of 2.0-3.0 [Z79.01]  Atrial fibrillation (H) (Resolved) [I48.91]  Permanent atrial fibrillation (H) [I48.21]  Atrial fibrillation  unspecified type (H) [I48.91]             Comments:  [employer] home meter. Manage by exception.             Anticoagulation Care Providers       Provider Role Specialty Phone number    Chavez Molina MD Referring Internal Medicine [phone]    Edwin Ulrich MD Referring Internal Medicine [phone]

## 2023-12-15 ENCOUNTER — ANTICOAGULATION THERAPY VISIT (OUTPATIENT)
Dept: ANTICOAGULATION | Facility: CLINIC | Age: 83
End: 2023-12-15
Payer: COMMERCIAL

## 2023-12-15 DIAGNOSIS — I48.91 ATRIAL FIBRILLATION, UNSPECIFIED TYPE (H): ICD-10-CM

## 2023-12-15 DIAGNOSIS — I48.21 PERMANENT ATRIAL FIBRILLATION (H): ICD-10-CM

## 2023-12-15 DIAGNOSIS — Z79.01 LONG TERM CURRENT USE OF ANTICOAGULANTS WITH INR GOAL OF 2.0-3.0: Primary | ICD-10-CM

## 2023-12-15 LAB — INR HOME MONITORING: 2.7 (ref 2–3)

## 2023-12-15 NOTE — PROGRESS NOTES
ANTICOAGULATION  MANAGEMENT-Home Monitor Managed by Exception    Mariya Rowe 83 year old female is on warfarin with therapeutic INR result. (Goal INR 2.0-3.0)    Recent labs: (last 7 days)     12/15/23  0000   INR 2.7       Previous INR was Therapeutic  Medication, diet, health changes since last INR:chart reviewed; none identified  Contacted within the last 12 weeks by phone on 10/16/23  Last [facility] referral date: 03/06/2023      PLAN     Mariya was NOT contacted regarding therapeutic result today per home monitoring policy manage by exception agreement.   Current warfarin dose is to be continued:     Summary  As of 12/15/2023      Next INR check:               ?   Audrey Brown RN  Anticoagulation Clinic  12/15/2023    _______________________________________________________________________     Anticoagulation Episode Summary       Current INR goal:  2.0-3.0   TTR:  84.7% (1 y)   Target end date:  Indefinite   Send INR reminders to:  [facility] HOME [facility]IT[facility]    Indications    Long term current use of anticoagulants with INR goal of 2.0-3.0 [Z79.01]  Atrial fibrillation (H) (Resolved) [I48.91]  Permanent atrial fibrillation (H) [I48.21]  Atrial fibrillation  unspecified type (H) [I48.91]             Comments:  [employer] home meter. Manage by exception.             Anticoagulation Care Providers       Provider Role Specialty Phone number    Chavez Molina MD Referring Internal Medicine [phone]    UlrichEdwin plata MD Referring Internal Medicine [phone]             
left normal/right normal

## 2023-12-29 ENCOUNTER — DOCUMENTATION ONLY (OUTPATIENT)
Dept: ANTICOAGULATION | Facility: CLINIC | Age: 83
End: 2023-12-29
Payer: COMMERCIAL

## 2023-12-29 DIAGNOSIS — I48.91 ATRIAL FIBRILLATION, UNSPECIFIED TYPE (H): ICD-10-CM

## 2023-12-29 DIAGNOSIS — Z79.01 LONG TERM CURRENT USE OF ANTICOAGULANTS WITH INR GOAL OF 2.0-3.0: Primary | ICD-10-CM

## 2023-12-29 DIAGNOSIS — I48.21 PERMANENT ATRIAL FIBRILLATION (H): ICD-10-CM

## 2023-12-29 LAB — INR HOME MONITORING: 2.7 (ref 2–3)

## 2023-12-29 NOTE — PROGRESS NOTES
ANTICOAGULATION  MANAGEMENT-Home Monitor Managed by Exception    Mariya Rowe 83 year old female is on warfarin with therapeutic INR result. (Goal INR 2.0-3.0)    Recent labs: (last 7 days)     12/29/23  0000   INR 2.7       Previous INR was Therapeutic  Medication, diet, health changes since last INR:chart reviewed; none identified  Contacted within the last 12 weeks by phone on 10/16/23  Last [facility] referral date: 03/06/2023      PLAN     Mariya was NOT contacted regarding therapeutic result today per home monitoring policy manage by exception agreement.   Current warfarin dose is to be continued:     Summary  As of 12/29/2023      Next INR check:               ?   Destinee Christine RN  Anticoagulation Clinic  12/29/2023    _______________________________________________________________________     Anticoagulation Episode Summary       Current INR goal:  2.0-3.0   TTR:  84.7% (1 y)   Target end date:  Indefinite   Send INR reminders to:  [facility] HOME MONITOR[facility]    Indications    Long term current use of anticoagulants with INR goal of 2.0-3.0 [Z79.01]  Atrial fibrillation (H) (Resolved) [I48.91]  Permanent atrial fibrillation (H) [I48.21]  Atrial fibrillation  unspecified type (H) [I48.91]             Comments:  [employer] home meter. Manage by exception.             Anticoagulation Care Providers       Provider Role Specialty Phone number    Chavez Molina MD Referring Internal Medicine [phone]    UlrichEdwin plata MD Referring Internal Medicine [phone]

## 2024-01-18 ENCOUNTER — ANTICOAGULATION THERAPY VISIT (OUTPATIENT)
Dept: ANTICOAGULATION | Facility: CLINIC | Age: 84
End: 2024-01-18
Payer: COMMERCIAL

## 2024-01-18 DIAGNOSIS — I48.91 ATRIAL FIBRILLATION, UNSPECIFIED TYPE (H): ICD-10-CM

## 2024-01-18 DIAGNOSIS — Z79.01 LONG TERM CURRENT USE OF ANTICOAGULANTS WITH INR GOAL OF 2.0-3.0: Primary | ICD-10-CM

## 2024-01-18 DIAGNOSIS — I48.21 PERMANENT ATRIAL FIBRILLATION (H): ICD-10-CM

## 2024-01-18 LAB — INR HOME MONITORING: 3 (ref 2–3)

## 2024-01-18 NOTE — PROGRESS NOTES
ANTICOAGULATION MANAGEMENT     Mariya Rowe 83 year old female is on warfarin with therapeutic INR result. (Goal INR 2.0-3.0)    Recent labs: (last 7 days)     01/18/24  0000   INR 3.0       ASSESSMENT     Source(s): Chart Review and Patient/Caregiver Call     Warfarin doses taken: Warfarin taken as instructed  Diet: No new diet changes identified  Medication/supplement changes: None noted  New illness, injury, or hospitalization: No  Signs or symptoms of bleeding or clotting: No  Previous result: Therapeutic last 2(+) visits  Additional findings: None       PLAN     Recommended plan for no diet, medication or health factor changes affecting INR     Dosing Instructions: Continue your current warfarin dose with next INR in 2 weeks       Summary  As of 1/18/2024      Full warfarin instructions:  2.5 mg every Sun, Tue, Thu; 5 mg all other days   Next INR check:  2/1/2024               Telephone call with Mariya who verbalizes understanding and agrees to plan    Patient to recheck with home meter    Education provided:   Please call back if any changes to your diet, medications or how you've been taking warfarin  Resume manage by exception with home monitor. Continue to submit INR results to home monitor company.You will only be called when your result is out of range. Please call and notify [facility] if new medication started, dose missed, signs or symptoms of bleeding or clotting, or a surgery/procedure is scheduled.  Contact [phone]  with any changes, questions or concerns.     Plan made per [facility] anticoagulation protocol    Safia Morel RN  Anticoagulation Clinic  1/18/2024    _______________________________________________________________________     Anticoagulation Episode Summary       Current INR goal:  2.0-3.0   TTR:  84.7% (1 y)   Target end date:  Indefinite   Send INR reminders to:  [facility] HOME MONITORING    Indications    Long term current use of anticoagulants with INR goal of 2.0-3.0 [Z79.01]  Atrial  fibrillation (H) (Resolved) [I48.91]  Permanent atrial fibrillation (H) [I48.21]  Atrial fibrillation  unspecified type (H) [I48.91]             Comments:  [employer] home meter. Manage by exception.             Anticoagulation Care Providers       Provider Role Specialty Phone number    Chavez Molina MD Referring Internal Medicine [phone]    Edwin Ulrich MD Referring Internal Medicine [phone]

## 2024-02-08 ENCOUNTER — ANTICOAGULATION THERAPY VISIT (OUTPATIENT)
Dept: ANTICOAGULATION | Facility: CLINIC | Age: 84
End: 2024-02-08
Payer: COMMERCIAL

## 2024-02-08 DIAGNOSIS — Z79.01 LONG TERM CURRENT USE OF ANTICOAGULANTS WITH INR GOAL OF 2.0-3.0: Primary | ICD-10-CM

## 2024-02-08 DIAGNOSIS — I48.91 ATRIAL FIBRILLATION, UNSPECIFIED TYPE (H): ICD-10-CM

## 2024-02-08 DIAGNOSIS — I48.21 PERMANENT ATRIAL FIBRILLATION (H): ICD-10-CM

## 2024-02-08 LAB — INR HOME MONITORING: 3.1 (ref 2–3)

## 2024-02-08 NOTE — PROGRESS NOTES
ANTICOAGULATION MANAGEMENT     Mariya Rowe 83 year old female is on warfarin with supratherapeutic INR result. (Goal INR 2.0-3.0)    Recent labs: (last 7 days)     02/08/24  0000   INR 3.1*       ASSESSMENT     Source(s): Chart Review and Patient/Caregiver Call     Warfarin doses taken: Warfarin taken as instructed  Diet: Decreased greens/vitamin K in diet; plans to resume previous intake  Medication/supplement changes: None noted  New illness, injury, or hospitalization: No  Signs or symptoms of bleeding or clotting: No  Previous result: Therapeutic last 2(+) visits  Additional findings:  Patient is currently  n [place] and will there for about 1.5 months more prior to coming back to [place].       PLAN     Recommended plan for temporary change(s) affecting INR     Dosing Instructions: Continue your current warfarin dose with next INR in 2 weeks       Summary  As of 2/8/2024      Full warfarin instructions:  2.5 mg every Sun, Tue, Thu; 5 mg all other days   Next INR check:  2/22/2024               Telephone call with Mariya who verbalizes understanding and agrees to plan    Patient to recheck with home meter    Education provided:   Dietary considerations: importance of consistent vitamin K intake and impact of vitamin K foods on INR  Contact [phone]  with any changes, questions or concerns.     Plan made per [facility] anticoagulation protocol    Naima Urrutia RN  Anticoagulation Clinic  2/8/2024    _______________________________________________________________________     Anticoagulation Episode Summary       Current INR goal:  2.0-3.0   TTR:  79.0% (1 y)   Target end date:  Indefinite   Send INR reminders to:  [facility]    Indications    Long term current use of anticoagulants with INR goal of 2.0-3.0 [Z79.01]  Atrial fibrillation (H) (Resolved) [I48.91]  Permanent atrial fibrillation (H) [I48.21]  Atrial fibrillation  unspecified type (H) [I48.91]             Comments:  [employer] home meter. Manage by  exception.             Anticoagulation Care Providers       Provider Role Specialty Phone number    Chavez Molina MD Referring Internal Medicine [phone]    Edwin Ulrich MD Referring Internal Medicine [phone]

## 2024-03-03 LAB — INR HOME MONITORING: 2.7 (ref 2–3)

## 2024-03-04 ENCOUNTER — ANTICOAGULATION THERAPY VISIT (OUTPATIENT)
Dept: ANTICOAGULATION | Facility: CLINIC | Age: 84
End: 2024-03-04
Payer: COMMERCIAL

## 2024-03-04 DIAGNOSIS — Z79.01 LONG TERM CURRENT USE OF ANTICOAGULANTS WITH INR GOAL OF 2.0-3.0: Primary | ICD-10-CM

## 2024-03-04 DIAGNOSIS — I48.21 PERMANENT ATRIAL FIBRILLATION (H): ICD-10-CM

## 2024-03-04 DIAGNOSIS — I48.91 ATRIAL FIBRILLATION, UNSPECIFIED TYPE (H): ICD-10-CM

## 2024-03-04 NOTE — PROGRESS NOTES
ANTICOAGULATION MANAGEMENT     Mariya Rowe 83 year old female is on warfarin with therapeutic INR result. (Goal INR 2.0-3.0)    Recent labs: (last 7 days)     03/03/24  0000   INR 2.7       ASSESSMENT     Source(s): Chart Review and Patient/Caregiver Call     Warfarin doses taken: Warfarin taken as instructed  Diet: No new diet changes identified  Medication/supplement changes: None noted  New illness, injury, or hospitalization: No  Signs or symptoms of bleeding or clotting: No  Previous result: Supratherapeutic  Additional findings: None       PLAN     Recommended plan for no diet, medication or health factor changes affecting INR     Dosing Instructions: Continue your current warfarin dose with next INR in 2 weeks       Summary  As of 3/4/2024      Full warfarin instructions:  2.5 mg every Sun, Tue, Thu; 5 mg all other days   Next INR check:  3/18/2024               Telephone call with Mariya who verbalizes understanding and agrees to plan    Patient to recheck with home meter    Education provided:   Goal range and lab monitoring: goal range and significance of current result and Importance of therapeutic range  Resume manage by exception with home monitor. Continue to submit INR results to home monitor company.You will only be called when your result is out of range. Please call and notify [facility] if new medication started, dose missed, signs or symptoms of bleeding or clotting, or a surgery/procedure is scheduled.    Plan made per [facility] anticoagulation protocol    Artis Dc RN  Anticoagulation Clinic  3/4/2024    _______________________________________________________________________     Anticoagulation Episode Summary       Current INR goal:  2.0-3.0   TTR:  77.3% (1 y)   Target end date:  Indefinite   Send INR reminders to:  [facility]IT[facility]    Indications    Long term current use of anticoagulants with INR goal of 2.0-3.0 [Z79.01]  Atrial fibrillation (H) (Resolved) [I48.91]  Permanent atrial  fibrillation (H) [I48.21]  Atrial fibrillation  unspecified type (H) [I48.91]             Comments:  [employer] home meter. Manage by exception.             Anticoagulation Care Providers       Provider Role Specialty Phone number    Chavez Molina MD Referring Internal Medicine [phone]    Edwin Ulrich MD Referring Internal Medicine [phone]

## 2024-03-05 ENCOUNTER — DOCUMENTATION ONLY (OUTPATIENT)
Dept: ANTICOAGULATION | Facility: CLINIC | Age: 84
End: 2024-03-05
Payer: COMMERCIAL

## 2024-03-05 DIAGNOSIS — I48.92 ATRIAL FLUTTER (H): ICD-10-CM

## 2024-03-05 DIAGNOSIS — Z79.01 LONG TERM CURRENT USE OF ANTICOAGULANTS WITH INR GOAL OF 2.0-3.0: ICD-10-CM

## 2024-03-05 DIAGNOSIS — I48.21 PERMANENT ATRIAL FIBRILLATION (H): Primary | ICD-10-CM

## 2024-03-05 NOTE — PROGRESS NOTES
ANTICOAGULATION CLINIC REFERRAL RENEWAL REQUEST       An annual renewal order is required for all patients referred to [facility].?  Please review and sign the pended referral order for Mariya Rowe.       ANTICOAGULATION SUMMARY      Warfarin indication(s)   Atrial Fibrillation    Mechanical heart valve present?  NO       Current goal range   INR: 2.0-3.0     Goal appropriate for indication? Goal INR 2-3, standard for indication(s) above     Time in Therapeutic Range (TTR)  (Goal > 60%) 77.3%       Office visit with referring provider's group within last year yes on 8/3/23       Artis Dc RN  [facility]

## 2024-03-07 NOTE — PROGRESS NOTES
ANTICOAGULATION FOLLOW-UP CLINIC VISIT    Patient Name:  Mariya Rowe  Date:  6/19/2017  Contact Type:  Face to Face    SUBJECTIVE:     Patient Findings     Positives No Problem Findings           OBJECTIVE    INR Protime   Date Value Ref Range Status   06/19/2017 2.2 (A) 0.86 - 1.14 Final       ASSESSMENT / PLAN  INR assessment THER    Recheck INR In: 4 WEEKS    INR Location Clinic      Anticoagulation Summary as of 6/19/2017     INR goal 2.0-3.0   Today's INR 2.2   Maintenance plan 2.5 mg (5 mg x 0.5) on Wed, Sat; 5 mg (5 mg x 1) all other days   Full instructions 2.5 mg on Wed, Sat; 5 mg all other days   Weekly total 30 mg   No change documented Karla Waller, RN   Plan last modified Anjali Moss RN (1/25/2017)   Next INR check 7/17/2017   Priority INR   Target end date     Indications   Long-term (current) use of anticoagulants [Z79.01] [Z79.01]  Atrial fibrillation (H) [I48.91]         Anticoagulation Episode Summary     INR check location     Preferred lab     Send INR reminders to [facility]    Comments       Anticoagulation Care Providers     Provider Role Specialty Phone number    Chavez Molina MD Responsible Internal Medicine [phone]            See the Encounter Report to view Anticoagulation Flowsheet and Dosing Calendar (Go to Encounters tab in chart review, and find the Anticoagulation Therapy Visit)    Dosage adjustment made based on physician directed care plan.    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 Comes in today now unable to swallow his pills with water, resulting in 2 episode of emesis. He mentioned that he gets SOB during exertion but denies any exertional chest pain. As per patient he was treated at [facility] for prostate cancer and is s/p radiation therapy. He states that he has problem during walking and unable to maintain the posture. Denies any abdominal pain, chest pain, dyspnea, diarrhea, fever, chills    Surgery was consulted for air under the diaphragm seen on chest X-ray s/p PEG tube placement. Patient examined at bedside. The abdomen was soft, non-distended, and non-tender. There was no associated nausea or vomiting. The PEG tube was in place, 5 cm at the bumper. There was no resistance or associated tenderness while flushing the PEG tube. Patient continues to deny abdominal pain or nausea. Patients PEG tube continues to flush appropriately. Contrast study shows no extravasation of contrast.     PLAN:  - No acute surgical intervention  - Serial abdominal exams  - Rest of care per primary team  - Recall surgery prn    Above plan discussed with Attending Surgeon Dr. Maria, patient, and Primary team

## 2024-03-27 ENCOUNTER — ANTICOAGULATION THERAPY VISIT (OUTPATIENT)
Dept: ANTICOAGULATION | Facility: CLINIC | Age: 84
End: 2024-03-27
Payer: COMMERCIAL

## 2024-03-27 DIAGNOSIS — I48.91 ATRIAL FIBRILLATION, UNSPECIFIED TYPE (H): ICD-10-CM

## 2024-03-27 DIAGNOSIS — Z79.01 LONG TERM CURRENT USE OF ANTICOAGULANTS WITH INR GOAL OF 2.0-3.0: Primary | ICD-10-CM

## 2024-03-27 DIAGNOSIS — I48.21 PERMANENT ATRIAL FIBRILLATION (H): ICD-10-CM

## 2024-03-27 LAB — INR HOME MONITORING: 3.1 (ref 2–3)

## 2024-03-27 NOTE — PROGRESS NOTES
ANTICOAGULATION MANAGEMENT     Mariya Rowe 83 year old female is on warfarin with supratherapeutic INR result. (Goal INR 2.0-3.0)    Recent labs: (last 7 days)     03/27/24  0000   INR 3.1*       ASSESSMENT     Source(s): Chart Review  Previous INR was Therapeutic last visit; previously outside of goal range  Medication, diet, health changes since last INR chart reviewed; none identified         PLAN     Recommended plan for no diet, medication or health factor changes affecting INR     Dosing Instructions: Continue your current warfarin dose with next INR in 2 weeks       Summary  As of 3/27/2024      Full warfarin instructions:  2.5 mg every Sun, Tue, Thu; 5 mg all other days   Next INR check:  4/10/2024               Detailed voice message left for Mariya with dosing instructions and follow up date.   Sent [employer] message with dosing and follow up instructions    Patient to recheck with home meter    Education provided:   Please call back if any changes to your diet, medications or how you've been taking warfarin    Plan made per [facility] anticoagulation protocol    Artis Dc RN  Anticoagulation Clinic  3/27/2024    _______________________________________________________________________     Anticoagulation Episode Summary       Current INR goal:  2.0-3.0   TTR:  77.9% (1 y)   Target end date:  Indefinite   Send INR reminders to:  [facility]    Indications    Long term current use of anticoagulants with INR goal of 2.0-3.0 [Z79.01]  Atrial fibrillation (H) (Resolved) [I48.91]  Permanent atrial fibrillation (H) [I48.21]  Atrial fibrillation  unspecified type (H) [I48.91]             Comments:  [employer] home meter. Manage by exception.             Anticoagulation Care Providers       Provider Role Specialty Phone number    Chavez Molina MD Referring Internal Medicine [phone]    Edwin Ulrich MD Referring Internal Medicine [phone]

## 2024-04-15 LAB — INR HOME MONITORING: 3.4 (ref 2–3)

## 2024-04-16 ENCOUNTER — ANTICOAGULATION THERAPY VISIT (OUTPATIENT)
Dept: ANTICOAGULATION | Facility: CLINIC | Age: 84
End: 2024-04-16
Payer: COMMERCIAL

## 2024-04-16 DIAGNOSIS — I48.21 PERMANENT ATRIAL FIBRILLATION (H): ICD-10-CM

## 2024-04-16 DIAGNOSIS — Z79.01 LONG TERM CURRENT USE OF ANTICOAGULANTS WITH INR GOAL OF 2.0-3.0: Primary | ICD-10-CM

## 2024-04-16 DIAGNOSIS — I48.91 ATRIAL FIBRILLATION, UNSPECIFIED TYPE (H): ICD-10-CM

## 2024-04-16 NOTE — PROGRESS NOTES
ANTICOAGULATION MANAGEMENT     Mariya Rowe 83 year old female is on warfarin with supratherapeutic INR result. (Goal INR 2.0-3.0)    Recent labs: (last 7 days)     04/15/24  0000   INR 3.4*       ASSESSMENT     Source(s): Chart Review and Patient/Caregiver Call     Warfarin doses taken: Missed dose(s) may be affecting INR. Patient reports she held her warfarin dose on 3/15/24 when she saw that her INR was elevated.   Diet: No new diet changes identified  Medication/supplement changes: None noted  New illness, injury, or hospitalization: No  Signs or symptoms of bleeding or clotting: No  Previous result: Supratherapeutic  Additional findings: None       PLAN     Recommended plan for no diet, medication or health factor changes affecting INR     Dosing Instructions: decrease your warfarin dose (9% change) with next INR in 2 weeks (patient already held dose on 4/15/24).      Summary  As of 4/16/2024      Full warfarin instructions:  5 mg every Mon, Wed, Fri; 2.5 mg all other days   Next INR check:  4/30/2024               Telephone call with Mariya who agrees to plan and repeated back plan correctly    Patient to recheck with home meter    Education provided:   Please call back if any changes to your diet, medications or how you've been taking warfarin  Symptom monitoring: monitoring for bleeding signs and symptoms, monitoring for clotting signs and symptoms, and monitoring for stroke signs and symptoms  Contact [phone]  with any changes, questions or concerns.     Plan made per [facility] anticoagulation protocol    Naima Rod RN  Anticoagulation Clinic  4/16/2024    _______________________________________________________________________     Anticoagulation Episode Summary       Current INR goal:  2.0-3.0   TTR:  78.1% (1 y)   Target end date:  Indefinite   Send INR reminders to:  [facility]    Indications    Long term current use of anticoagulants with INR goal of 2.0-3.0 [Z79.01]  Atrial  fibrillation (H) (Resolved) [I48.91]  Permanent atrial fibrillation (H) [I48.21]  Atrial fibrillation  unspecified type (H) [I48.91]             Comments:  [employer] home meter. Manage by exception.             Anticoagulation Care Providers       Provider Role Specialty Phone number    Chavez Molina MD Referring Internal Medicine [phone]    Edwin Ulrich MD Referring Internal Medicine [phone]

## 2024-05-02 ENCOUNTER — MYC MEDICAL ADVICE (OUTPATIENT)
Dept: ANTICOAGULATION | Facility: CLINIC | Age: 84
End: 2024-05-02
Payer: COMMERCIAL

## 2024-05-02 NOTE — TELEPHONE ENCOUNTER
ANTICOAGULATION     Mariya KUHN Soledad is overdue for an INR check.     [employer] IP message sent     Naima Rod RN

## 2024-05-05 LAB — INR HOME MONITORING: 2.8 (ref 2–3)

## 2024-05-06 ENCOUNTER — ANTICOAGULATION THERAPY VISIT (OUTPATIENT)
Dept: ANTICOAGULATION | Facility: CLINIC | Age: 84
End: 2024-05-06
Payer: COMMERCIAL

## 2024-05-06 DIAGNOSIS — I48.21 PERMANENT ATRIAL FIBRILLATION (H): ICD-10-CM

## 2024-05-06 DIAGNOSIS — Z79.01 LONG TERM CURRENT USE OF ANTICOAGULANTS WITH INR GOAL OF 2.0-3.0: Primary | ICD-10-CM

## 2024-05-06 DIAGNOSIS — I48.91 ATRIAL FIBRILLATION, UNSPECIFIED TYPE (H): ICD-10-CM

## 2024-05-06 NOTE — PROGRESS NOTES
ANTICOAGULATION MANAGEMENT     Mariya Rowe 83 year old female is on warfarin with therapeutic INR result. (Goal INR 2.0-3.0)    Recent labs: (last 7 days)     05/03/24  0000   INR 2.8       ASSESSMENT     Source(s): Chart Review and Patient/Caregiver Call     Warfarin doses taken: Warfarin taken as instructed  Diet: No new diet changes identified  Medication/supplement changes: None noted  New illness, injury, or hospitalization: No  Signs or symptoms of bleeding or clotting: No  Previous result: Supratherapeutic  Additional findings: None       PLAN     Recommended plan for no diet, medication or health factor changes affecting INR     Dosing Instructions: Continue your current warfarin dose with next INR in 2 weeks       Summary  As of 5/6/2024      Full warfarin instructions:  5 mg every Mon, Wed, Fri; 2.5 mg all other days   Next INR check:  5/17/2024               Telephone call with Mariya who verbalizes understanding and agrees to plan    Patient to recheck with home meter    Education provided:   Please call back if any changes to your diet, medications or how you've been taking warfarin  Symptom monitoring: monitoring for bleeding signs and symptoms and monitoring for clotting signs and symptoms  Resume manage by exception with home monitor. Continue to submit INR results to home monitor company.You will only be called when your result is out of range. Please call and notify [facility] if new medication started, dose missed, signs or symptoms of bleeding or clotting, or a surgery/procedure is scheduled. Due for next call no later than: 8/4/24.  Contact [phone]  with any changes, questions or concerns.     Plan made per [facility] anticoagulation protocol    Naima Rod RN  Anticoagulation Clinic  5/6/2024    _______________________________________________________________________     Anticoagulation Episode Summary       Current INR goal:  2.0-3.0   TTR:  75.0% (1 y)   Target end date:  Indefinite   Send INR  reminders to:  [facility]IT[facility]    Indications    Long term current use of anticoagulants with INR goal of 2.0-3.0 [Z79.01]  Atrial fibrillation (H) (Resolved) [I48.91]  Permanent atrial fibrillation (H) [I48.21]  Atrial fibrillation  unspecified type (H) [I48.91]             Comments:  [employer] home meter. Manage by exception.             Anticoagulation Care Providers       Provider Role Specialty Phone number    Chavez Molina MD Referring Internal Medicine [phone]    Edwin Ulrich MD Referring Internal Medicine [phone]

## 2024-05-08 DIAGNOSIS — I48.21 PERMANENT ATRIAL FIBRILLATION (H): ICD-10-CM

## 2024-05-08 DIAGNOSIS — Z79.01 LONG TERM CURRENT USE OF ANTICOAGULANTS WITH INR GOAL OF 2.0-3.0: ICD-10-CM

## 2024-05-08 RX ORDER — WARFARIN SODIUM 5 MG/1
TABLET ORAL
Qty: 100 TABLET | Refills: 0 | Status: SHIPPED | OUTPATIENT
Start: 2024-05-08 | End: 2024-05-09

## 2024-05-09 DIAGNOSIS — Z79.01 LONG TERM CURRENT USE OF ANTICOAGULANTS WITH INR GOAL OF 2.0-3.0: ICD-10-CM

## 2024-05-09 DIAGNOSIS — I48.21 PERMANENT ATRIAL FIBRILLATION (H): ICD-10-CM

## 2024-05-09 RX ORDER — WARFARIN SODIUM 5 MG/1
TABLET ORAL
Qty: 90 TABLET | Refills: 1 | Status: SHIPPED | OUTPATIENT
Start: 2024-05-09 | End: 2024-07-08

## 2024-05-09 NOTE — TELEPHONE ENCOUNTER
ANTICOAGULATION MANAGEMENT:  Medication Refill    Anticoagulation Summary  As of 5/6/2024      Warfarin maintenance plan:  5 mg (5 mg x 1) every Mon, Wed, Fri; 2.5 mg (5 mg x 0.5) all other days   Next INR check:  5/17/2024   Target end date:  Indefinite    Indications    Long term current use of anticoagulants with INR goal of 2.0-3.0 [Z79.01]  Atrial fibrillation (H) (Resolved) [I48.91]  Permanent atrial fibrillation (H) [I48.21]  Atrial fibrillation  unspecified type (H) [I48.91]                 Anticoagulation Care Providers       Provider Role Specialty Phone number    Chavez Molina MD Referring Internal Medicine [phone]    Ojo FelizEdwin MD Referring Internal Medicine [phone]            Refill Criteria    Visit with referring provider/group: Meets criteria: office visit within referring provider group in the last 1 year on 8/3/23    [facility] referral last signed: 03/12/2024; within last year: Yes    Lab monitoring not exceeding 2 weeks overdue: Yes    Mariya meets all criteria for refill. Rx instructions and quantity supplied updated to match patient's current dosing plan. Warfarin 90 day supply with 1 refill granted per [facility] protocol     Vira Crawford RN  Anticoagulation Clinic

## 2024-05-23 ENCOUNTER — DOCUMENTATION ONLY (OUTPATIENT)
Dept: ANTICOAGULATION | Facility: CLINIC | Age: 84
End: 2024-05-23
Payer: COMMERCIAL

## 2024-05-23 DIAGNOSIS — I48.91 ATRIAL FIBRILLATION, UNSPECIFIED TYPE (H): ICD-10-CM

## 2024-05-23 DIAGNOSIS — Z79.01 LONG TERM CURRENT USE OF ANTICOAGULANTS WITH INR GOAL OF 2.0-3.0: Primary | ICD-10-CM

## 2024-05-23 DIAGNOSIS — I48.21 PERMANENT ATRIAL FIBRILLATION (H): ICD-10-CM

## 2024-05-23 LAB — INR HOME MONITORING: 2.7 (ref 2–3)

## 2024-05-23 NOTE — PROGRESS NOTES
ANTICOAGULATION  MANAGEMENT-Home Monitor Managed by Exception    Mariya Rowe 83 year old female is on warfarin with therapeutic INR result. (Goal INR 2.0-3.0)    Recent labs: (last 7 days)     05/23/24  0000   INR 2.7       Previous INR was Therapeutic  Medication, diet, health changes since last INR:chart reviewed; none identified  Contacted within the last 12 weeks by phone on 5/6/24  Last [facility] referral date: 03/12/2024      PLAN     Mariya was NOT contacted regarding therapeutic result today per home monitoring policy manage by exception agreement.   Current warfarin dose is to be continued:     Summary  As of 5/23/2024      Full warfarin instructions:  5 mg every Mon, Wed, Fri; 2.5 mg all other days   Next INR check:  6/6/2024             ?   Safia Morel RN  Anticoagulation Clinic  5/23/2024    _______________________________________________________________________     Anticoagulation Episode Summary       Current INR goal:  2.0-3.0   TTR:  75.2% (1 y)   Target end date:  Indefinite   Send INR reminders to:  [facility]    Indications    Long term current use of anticoagulants with INR goal of 2.0-3.0 [Z79.01]  Atrial fibrillation (H) (Resolved) [I48.91]  Permanent atrial fibrillation (H) [I48.21]  Atrial fibrillation  unspecified type (H) [I48.91]             Comments:  [employer] home meter. Manage by exception.             Anticoagulation Care Providers       Provider Role Specialty Phone number    Chavez Molina MD Referring Internal Medicine [phone]    UlrichEdwin MD Referring Internal Medicine [phone]

## 2024-06-12 ENCOUNTER — DOCUMENTATION ONLY (OUTPATIENT)
Dept: ANTICOAGULATION | Facility: CLINIC | Age: 84
End: 2024-06-12
Payer: COMMERCIAL

## 2024-06-12 DIAGNOSIS — I48.21 PERMANENT ATRIAL FIBRILLATION (H): ICD-10-CM

## 2024-06-12 DIAGNOSIS — I48.91 ATRIAL FIBRILLATION, UNSPECIFIED TYPE (H): ICD-10-CM

## 2024-06-12 DIAGNOSIS — Z79.01 LONG TERM CURRENT USE OF ANTICOAGULANTS WITH INR GOAL OF 2.0-3.0: Primary | ICD-10-CM

## 2024-06-12 LAB — INR HOME MONITORING: 2.4 (ref 2–3)

## 2024-06-12 NOTE — PROGRESS NOTES
ANTICOAGULATION  MANAGEMENT-Home Monitor Managed by Exception    Mariya Rowe 83 year old female is on warfarin with therapeutic INR result. (Goal INR 2.0-3.0)    Recent labs: (last 7 days)     06/12/24  0000   INR 2.4       Previous INR was Therapeutic  Medication, diet, health changes since last INR:chart reviewed; none identified  Contacted within the last 12 weeks by phone on 5/6/24   Last [facility] referral date: 03/12/2024      PLAN     Mariya was NOT contacted regarding therapeutic result today per home monitoring policy manage by exception agreement.   Current warfarin dose is to be continued:     Summary  As of 6/12/2024      Full warfarin instructions:  5 mg every Mon, Wed, Fri; 2.5 mg all other days   Next INR check:  6/26/2024             ?   Thais Castro RN  Anticoagulation Clinic  6/12/2024    _______________________________________________________________________     Anticoagulation Episode Summary       Current INR goal:  2.0-3.0   TTR:  75.2% (1 y)   Target end date:  Indefinite   Send INR reminders to:  [facility]IT[facility]    Indications    Long term current use of anticoagulants with INR goal of 2.0-3.0 [Z79.01]  Atrial fibrillation (H) (Resolved) [I48.91]  Permanent atrial fibrillation (H) [I48.21]  Atrial fibrillation  unspecified type (H) [I48.91]             Comments:  [employer] home meter. Manage by exception.             Anticoagulation Care Providers       Provider Role Specialty Phone number    Chavez Molina MD Referring Internal Medicine [phone]    Edwin Ulrich MD Referring Internal Medicine [phone]

## 2024-07-02 ENCOUNTER — DOCUMENTATION ONLY (OUTPATIENT)
Dept: ANTICOAGULATION | Facility: CLINIC | Age: 84
End: 2024-07-02
Payer: COMMERCIAL

## 2024-07-02 DIAGNOSIS — Z79.01 LONG TERM CURRENT USE OF ANTICOAGULANTS WITH INR GOAL OF 2.0-3.0: Primary | ICD-10-CM

## 2024-07-02 DIAGNOSIS — I48.21 PERMANENT ATRIAL FIBRILLATION (H): ICD-10-CM

## 2024-07-02 DIAGNOSIS — I48.91 ATRIAL FIBRILLATION, UNSPECIFIED TYPE (H): ICD-10-CM

## 2024-07-02 LAB — INR HOME MONITORING: 2.4 (ref 2–3)

## 2024-07-02 NOTE — PROGRESS NOTES
ANTICOAGULATION  MANAGEMENT-Home Monitor Managed by Exception    Mariya Rowe 83 year old female is on warfarin with therapeutic INR result. (Goal INR 2.0-3.0)    Recent labs: (last 7 days)     07/02/24  0000   INR 2.4       Previous INR was Therapeutic  Medication, diet, health changes since last INR:chart reviewed; none identified  Contacted within the last 12 weeks by phone on 5/6/24  Last [facility] referral date: 03/12/2024      PLAN     Mariya was NOT contacted regarding therapeutic result today per home monitoring policy manage by exception agreement.   Current warfarin dose is to be continued:     Summary  As of 7/2/2024      Full warfarin instructions:  5 mg every Mon, Wed, Fri; 2.5 mg all other days   Next INR check:  7/16/2024             ?   Mary Duncan RN  Anticoagulation Clinic  7/2/2024    _______________________________________________________________________     Anticoagulation Episode Summary       Current INR goal:  2.0-3.0   TTR:  76.1% (1 y)   Target end date:  Indefinite   Send INR reminders to:  [facility]    Indications    Long term current use of anticoagulants with INR goal of 2.0-3.0 [Z79.01]  Atrial fibrillation (H) (Resolved) [I48.91]  Permanent atrial fibrillation (H) [I48.21]  Atrial fibrillation  unspecified type (H) [I48.91]             Comments:  [employer] home meter. Manage by exception.             Anticoagulation Care Providers       Provider Role Specialty Phone number    Chavez Molina MD Referring Internal Medicine [phone]    Edwin Ulrich MD Referring Internal Medicine [phone]

## 2024-07-05 ENCOUNTER — PATIENT OUTREACH (OUTPATIENT)
Dept: CARE COORDINATION | Facility: CLINIC | Age: 84
End: 2024-07-05
Payer: COMMERCIAL

## 2024-07-06 DIAGNOSIS — I48.21 PERMANENT ATRIAL FIBRILLATION (H): ICD-10-CM

## 2024-07-06 DIAGNOSIS — Z79.01 LONG TERM CURRENT USE OF ANTICOAGULANTS WITH INR GOAL OF 2.0-3.0: ICD-10-CM

## 2024-07-08 RX ORDER — WARFARIN SODIUM 5 MG/1
TABLET ORAL
Qty: 100 TABLET | Refills: 1 | Status: SHIPPED | OUTPATIENT
Start: 2024-07-08

## 2024-07-08 NOTE — TELEPHONE ENCOUNTER
ANTICOAGULATION MANAGEMENT:  Medication Refill    Anticoagulation Summary  As of 7/2/2024      Warfarin maintenance plan:  5 mg (5 mg x 1) every Mon, Wed, Fri; 2.5 mg (5 mg x 0.5) all other days   Next INR check:  7/16/2024   Target end date:  Indefinite    Indications    Long term current use of anticoagulants with INR goal of 2.0-3.0 [Z79.01]  Atrial fibrillation (H) (Resolved) [I48.91]  Permanent atrial fibrillation (H) [I48.21]  Atrial fibrillation  unspecified type (H) [I48.91]                 Anticoagulation Care Providers       Provider Role Specialty Phone number    Chavez Molina MD Referring Internal Medicine [phone]    UlrichEdwin MD Referring Internal Medicine [phone]            Refill Criteria    Visit with referring provider/group: Meets criteria: office visit within referring provider group in the last 1 year on 8/3/23    [facility] referral last signed: 03/12/2024; within last year: Yes    Lab monitoring not exceeding 2 weeks overdue: Yes    Mariya meets all criteria for refill. Rx instructions and quantity supplied updated to match patient's current dosing plan. Warfarin 90 day supply with 1 refill granted per [facility] protocol     Brittni Narvaez RN  Anticoagulation Clinic

## 2024-07-09 ENCOUNTER — TELEPHONE (OUTPATIENT)
Dept: INTERNAL MEDICINE | Facility: CLINIC | Age: 84
End: 2024-07-09
Payer: COMMERCIAL

## 2024-07-09 NOTE — TELEPHONE ENCOUNTER
ACN called and left a message for Krzysztof Alvares ( she on vacation and will be back on 7/11) if [employer] can send more test strips to the patient until the transition is made.    Will verify with Dana if [facility] works with [employer]

## 2024-07-09 NOTE — TELEPHONE ENCOUNTER
General Call    Contacts       Contact Date/Time Type Contact Phone/Fax    07/09/2024 01:58 PM CDT Phone (Incoming) Soledad Mariya KUHN (Self) [phone] (M)          Reason for Call:  needs new test strips    What are your questions or concerns:  [employer] needs a fax telling them what she needs for the test strips for her home INR monitoring machine- patient states the machine changed from [employer] to [employer]   Only has 2 strips left     Fax to:  [phone]  ATTN:  DAVIDA       Date of last appointment with provider:     Could we send this information to you in [employer] or would you prefer to receive a phone call?:   Patient would prefer a phone call   Okay to leave a detailed message?: Yes at Cell number on file:    Telephone Information:   Mobile [phone]

## 2024-07-09 NOTE — TELEPHONE ENCOUNTER
ACN called and spoke with patient and made aware that [employer] rep is not in the office .  Will update her if [facility] works with [employer] Home monitoring company

## 2024-07-10 DIAGNOSIS — R00.2 PALPITATIONS: ICD-10-CM

## 2024-07-11 NOTE — TELEPHONE ENCOUNTER
Dia called back and stated that [employer] is a third party  that patient insurance where the patient needs to call to order home monitor supply and the number to call is [phone]    She stated that she will talk to the rep that the patient talked to and get more information and will call back to update [facility] if needed.    ACN will call patient and give her the contact number to call to order supplies for her home monitor

## 2024-07-11 NOTE — TELEPHONE ENCOUNTER
Called patient earlier and she stated that she was out and about.    Tried her again and left a message to call [facility] back.

## 2024-07-12 NOTE — TELEPHONE ENCOUNTER
Patient is returning the call from yesterday, please call her back at [phone].  Agnes Renteria   [facility]  Central Scheduler

## 2024-07-12 NOTE — TELEPHONE ENCOUNTER
Spoke with patient to provide the phone number to [employer] [phone] so that patient can call to re-order supply.  Patient report that she had call this number many time and was not able to get through to someone to place the order and get a goodbye.  Pt request writer to call with assistance to go to prompts to get the live person.      Called [employer], choose option 1 and was able to reach rep to order testing supply and was provided with a point of contact for patient to call.  ([employer], [phone]).    Called patient and provide her with the above information to call and re-order her supply.  Pt written down numbers. Appreciated the help and no further questions at this time.    Safia Morel RN  [facility].

## 2024-07-13 RX ORDER — METOPROLOL TARTRATE 25 MG/1
25 TABLET, FILM COATED ORAL 2 TIMES DAILY
Qty: 180 TABLET | Refills: 0 | Status: SHIPPED | OUTPATIENT
Start: 2024-07-13

## 2024-07-19 ENCOUNTER — PATIENT OUTREACH (OUTPATIENT)
Dept: CARE COORDINATION | Facility: CLINIC | Age: 84
End: 2024-07-19
Payer: COMMERCIAL

## 2024-07-25 ENCOUNTER — DOCUMENTATION ONLY (OUTPATIENT)
Dept: ANTICOAGULATION | Facility: CLINIC | Age: 84
End: 2024-07-25
Payer: COMMERCIAL

## 2024-07-25 DIAGNOSIS — I48.91 ATRIAL FIBRILLATION, UNSPECIFIED TYPE (H): ICD-10-CM

## 2024-07-25 DIAGNOSIS — I48.21 PERMANENT ATRIAL FIBRILLATION (H): ICD-10-CM

## 2024-07-25 DIAGNOSIS — Z79.01 LONG TERM CURRENT USE OF ANTICOAGULANTS WITH INR GOAL OF 2.0-3.0: Primary | ICD-10-CM

## 2024-07-25 LAB — INR HOME MONITORING: 2.3 (ref 2–3)

## 2024-07-25 NOTE — PROGRESS NOTES
ANTICOAGULATION  MANAGEMENT-Home Monitor Managed by Exception    Mariya Rowe 83 year old female is on warfarin with therapeutic INR result. (Goal INR 2.0-3.0)    Recent labs: (last 7 days)     07/25/24  0000   INR 2.3       Previous INR was Therapeutic  Medication, diet, health changes since last INR:chart reviewed; none identified  Contacted within the last 12 weeks by phone on 5/6/24  Last [facility] referral date: 03/12/2024      PLAN     Mariya was NOT contacted regarding therapeutic result today per home monitoring policy manage by exception agreement.   Current warfarin dose is to be continued:     Summary  As of 7/25/2024      Full warfarin instructions:  5 mg every Mon, Wed, Fri; 2.5 mg all other days   Next INR check:  8/8/2024             ?   Naima Rod RN  Anticoagulation Clinic  7/25/2024    _______________________________________________________________________     Anticoagulation Episode Summary       Current INR goal:  2.0-3.0   TTR:  76.1% (1 y)   Target end date:  Indefinite   Send INR reminders to:  [employer] [facility]    Indications    Long term current use of anticoagulants with INR goal of 2.0-3.0 [Z79.01]  Atrial fibrillation (H) (Resolved) [I48.91]  Permanent atrial fibrillation (H) [I48.21]  Atrial fibrillation  unspecified type (H) [I48.91]             Comments:  [employer] home meter. Manage by exception.             Anticoagulation Care Providers       Provider Role Specialty Phone number    Chavez Molina MD Referring Internal Medicine [phone]    UlrichEdwin MD Referring Internal Medicine [phone]

## 2024-08-05 DIAGNOSIS — E78.5 HYPERLIPIDEMIA LDL GOAL <100: ICD-10-CM

## 2024-08-05 RX ORDER — ROSUVASTATIN CALCIUM 5 MG/1
5 TABLET, COATED ORAL DAILY
Qty: 90 TABLET | Refills: 0 | Status: SHIPPED | OUTPATIENT
Start: 2024-08-05 | End: 2024-10-07

## 2024-08-15 ENCOUNTER — ANTICOAGULATION THERAPY VISIT (OUTPATIENT)
Dept: ANTICOAGULATION | Facility: CLINIC | Age: 84
End: 2024-08-15
Payer: COMMERCIAL

## 2024-08-15 DIAGNOSIS — I48.21 PERMANENT ATRIAL FIBRILLATION (H): ICD-10-CM

## 2024-08-15 DIAGNOSIS — Z79.01 LONG TERM CURRENT USE OF ANTICOAGULANTS WITH INR GOAL OF 2.0-3.0: Primary | ICD-10-CM

## 2024-08-15 DIAGNOSIS — I48.91 ATRIAL FIBRILLATION, UNSPECIFIED TYPE (H): ICD-10-CM

## 2024-08-15 LAB — INR HOME MONITORING: 2.1 (ref 2–3)

## 2024-08-15 NOTE — PROGRESS NOTES
ANTICOAGULATION MANAGEMENT     Mariya Rowe 84 year old female is on warfarin with therapeutic INR result. (Goal INR 2.0-3.0)    Recent labs: (last 7 days)     08/15/24  0000   INR 2.1       ASSESSMENT     Source(s): Chart Review  Previous INR was Therapeutic last 2(+) visits  Medication, diet, health changes since last INR chart reviewed; none identified  12 weeks outreach today per manage by exception standard work.           PLAN     Recommended plan for no diet, medication or health factor changes affecting INR     Dosing Instructions: Continue your current warfarin dose with next INR in 2 weeks       Summary  As of 8/15/2024      Full warfarin instructions:  5 mg every Mon, Wed, Fri; 2.5 mg all other days   Next INR check:  8/29/2024               Detailed voice message left for Mariya with dosing instructions and follow up date.   Sent [employer] message with dosing and follow up instructions    Patient to recheck with home meter    Education provided: Please call back if any changes to your diet, medications or how you've been taking warfarin  Resume manage by exception with home monitor. Continue to submit INR results to home monitor company.You will only be called when your result is out of range. Please call and notify [facility] if new medication started, dose missed, signs or symptoms of bleeding or clotting, or a surgery/procedure is scheduled. Due for next call no later than: 11/13/24.  Contact [phone] with any changes, questions or concerns.     Plan made per [facility] anticoagulation protocol    Safia Morel RN  Anticoagulation Clinic  8/15/2024    _______________________________________________________________________     Anticoagulation Episode Summary       Current INR goal:  2.0-3.0   TTR:  76.3% (1 y)   Target end date:  Indefinite   Send INR reminders to:  [facility]    Indications    Long term current use of anticoagulants with INR goal of 2.0-3.0 [Z79.01]  Atrial fibrillation (H)  (Resolved) [I48.91]  Permanent atrial fibrillation (H) [I48.21]  Atrial fibrillation  unspecified type (H) [I48.91]             Comments:  [employer] home meter. Manage by exception.             Anticoagulation Care Providers       Provider Role Specialty Phone number    Chavez Molina MD Referring Internal Medicine [phone]    Edwin Ulrich MD Referring Internal Medicine [phone]

## 2024-08-19 DIAGNOSIS — E78.5 HYPERLIPIDEMIA LDL GOAL <100: ICD-10-CM

## 2024-08-19 RX ORDER — ROSUVASTATIN CALCIUM 5 MG/1
5 TABLET, COATED ORAL DAILY
Qty: 90 TABLET | Refills: 3 | OUTPATIENT
Start: 2024-08-19

## 2024-08-26 ENCOUNTER — DOCUMENTATION ONLY (OUTPATIENT)
Dept: ANTICOAGULATION | Facility: CLINIC | Age: 84
End: 2024-08-26
Payer: COMMERCIAL

## 2024-08-26 LAB — INR HOME MONITORING: 2.3 (ref 2–3)

## 2024-08-26 NOTE — PROGRESS NOTES
ANTICOAGULATION  MANAGEMENT-Home Monitor Managed by Exception    Mariya Rowe 84 year old female is on warfarin with therapeutic INR result. (Goal INR 2.0-3.0)    Recent labs: (last 7 days)     08/26/24  0000   INR 2.3       Previous INR was Therapeutic  Medication, diet, health changes since last INR:chart reviewed; none identified  Contacted within the last 12 weeks by phone on 8/15/24  Last [facility] referral date: 03/12/2024      PLAN     Mariay was NOT contacted regarding therapeutic result today per home monitoring policy manage by exception agreement.   Current warfarin dose is to be continued:     Summary  As of 8/26/2024      Full warfarin instructions:  5 mg every Mon, Wed, Fri; 2.5 mg all other days   Next INR check:  9/9/2024             ?   Marilu Otto RN  Anticoagulation Clinic  8/26/2024    _______________________________________________________________________     Anticoagulation Episode Summary       Current INR goal:  2.0-3.0   TTR:  78.6% (1 y)   Target end date:  Indefinite   Send INR reminders to:  [employer] [facility]    Indications    Long term current use of anticoagulants with INR goal of 2.0-3.0 [Z79.01]  Atrial fibrillation (H) (Resolved) [I48.91]  Permanent atrial fibrillation (H) [I48.21]  Atrial fibrillation  unspecified type (H) [I48.91]             Comments:  [employer] home meter. Manage by exception.             Anticoagulation Care Providers       Provider Role Specialty Phone number    Chavez Molina MD Referring Internal Medicine [phone]    Piper CityEdwin MD Referring Internal Medicine [phone]

## 2024-09-16 ENCOUNTER — MYC MEDICAL ADVICE (OUTPATIENT)
Dept: ANTICOAGULATION | Facility: CLINIC | Age: 84
End: 2024-09-16
Payer: COMMERCIAL

## 2024-09-16 NOTE — TELEPHONE ENCOUNTER
ANTICOAGULATION     Mariya KUHN Soledad is overdue for an INR check.     [employer] message sent     Naima Rod, RN  9/16/2024  Anticoagulation Clinic  [facility] for routing messages: [employer]  [facility] patient phone line: [phone]

## 2024-09-17 ENCOUNTER — ANTICOAGULATION THERAPY VISIT (OUTPATIENT)
Dept: ANTICOAGULATION | Facility: CLINIC | Age: 84
End: 2024-09-17
Payer: COMMERCIAL

## 2024-09-17 ENCOUNTER — TELEPHONE (OUTPATIENT)
Dept: ANTICOAGULATION | Facility: CLINIC | Age: 84
End: 2024-09-17
Payer: COMMERCIAL

## 2024-09-17 ENCOUNTER — TELEPHONE (OUTPATIENT)
Dept: INTERNAL MEDICINE | Facility: CLINIC | Age: 84
End: 2024-09-17
Payer: COMMERCIAL

## 2024-09-17 DIAGNOSIS — Z79.01 LONG TERM CURRENT USE OF ANTICOAGULANTS WITH INR GOAL OF 2.0-3.0: Primary | ICD-10-CM

## 2024-09-17 DIAGNOSIS — I48.91 ATRIAL FIBRILLATION, UNSPECIFIED TYPE (H): ICD-10-CM

## 2024-09-17 DIAGNOSIS — I48.21 PERMANENT ATRIAL FIBRILLATION (H): ICD-10-CM

## 2024-09-17 LAB — INR (EXTERNAL): 2 (ref 0.9–1.1)

## 2024-09-17 NOTE — TELEPHONE ENCOUNTER
FYI - Status Update    Who is Calling: patient    Update: Pt call in to report INR results, Currently at 2 needs dosing instructions     Does caller want a call/response back: Yes     Could we send this information to you in [facility] or would you prefer to receive a phone call?:   Patient would prefer a phone call   Okay to leave a detailed message?: Yes at Cell number on file:    Telephone Information:   Mobile [phone]

## 2024-09-17 NOTE — TELEPHONE ENCOUNTER
General Call    Contacts       Contact Date/Time Type Contact Phone/Fax    09/17/2024 12:13 PM CDT Phone (Incoming) Mariya Rowe (Self) [phone] (M)    09/17/2024 12:55 PM CDT Phone (Outgoing) Mariya Rowe (Self) [phone] (M)    Left Message -  9/17: tx to etienne [phone]    09/17/2024 01:16 PM CDT Phone (Incoming) Mariya Rowe (Self) [phone] (M)    09/17/2024 01:16 PM CDT Phone (Incoming) Mariya Rowe (Self) [phone] (M)     Anticoag          Reason for Call:  Anticoag    What are your questions or concerns:  Patient is returning call to ACN regarding today's INR. Please advise at the number provided.    Date of last appointment with provider: 8.3.23    Could we send this information to you in [employer] or would you prefer to receive a phone call?:   Patient would prefer a phone call   Okay to leave a detailed message?: Yes at Cell number on file:    Telephone Information:   Mobile [phone]

## 2024-09-17 NOTE — TELEPHONE ENCOUNTER
See [facility] encounter from 09/17/24     Mary Webber, RN, BSN, PHN  Anticoagulation Nurse  [phone]

## 2024-09-17 NOTE — PROGRESS NOTES
ANTICOAGULATION MANAGEMENT     Mariya Rowe 84 year old female is on warfarin with therapeutic INR result. (Goal INR 2.0-3.0)    Recent labs: (last 7 days)     09/17/24  1433   INR 2.0*       ASSESSMENT     Source(s): Chart Review and Patient/Caregiver Call     Warfarin doses taken: Warfarin taken as instructed  Diet:  eating more tomatoes as they are fresh out of the garden, not small amount of vitamin K   Medication/supplement changes: None noted  New illness, injury, or hospitalization: No  Signs or symptoms of bleeding or clotting: No  Previous result: Therapeutic last 2(+) visits  Additional findings: None       PLAN     Recommended plan for no diet, medication or health factor changes affecting INR     Dosing Instructions: Continue your current warfarin dose with next INR in 2 weeks       Summary  As of 9/17/2024      Full warfarin instructions:  5 mg every Mon, Wed, Fri; 2.5 mg all other days   Next INR check:  10/1/2024               Telephone call with Mariya who verbalizes understanding and agrees to plan, who agrees to plan and repeated back plan correctly, and will continue to call [employer] to report result and she has not been able to get through on the automated line.    Patient to recheck with home meter    Education provided: Please call back if any changes to your diet, medications or how you've been taking warfarin    Plan made per [facility] anticoagulation protocol    Mary Duncan RN  9/17/2024  Anticoagulation Clinic  [facility] for routing messages: [facility] ANTICOAG HOME MONITORING  [facility] patient phone line: [phone]        _______________________________________________________________________     Anticoagulation Episode Summary       Current INR goal:  2.0-3.0   TTR:  81.7% (1 y)   Target end date:  Indefinite   Send INR reminders to:  [facility]    Indications    Long term current use of anticoagulants with INR goal of 2.0-3.0 [Z79.01]  Atrial fibrillation (H) (Resolved)  [I48.91]  Permanent atrial fibrillation (H) [I48.21]  Atrial fibrillation  unspecified type (H) [I48.91]             Comments:  [employer] home meter. Manage by exception.             Anticoagulation Care Providers       Provider Role Specialty Phone number    Chavez Molina MD Referring Internal Medicine [phone]    Edwin Ulrich MD Referring Internal Medicine [phone]

## 2024-09-17 NOTE — TELEPHONE ENCOUNTER
Patient calls to say she submitted her result to [employer] this morning on line a couple times but was not working, also advised to call [employer].  Patient was provided with the phone number [phone] to call for any INR questions or concerns as patient had the old INR office phone number.  Karla Waller RN, BSN  Anticoagulation Clinic

## 2024-10-02 ENCOUNTER — DOCUMENTATION ONLY (OUTPATIENT)
Dept: ANTICOAGULATION | Facility: CLINIC | Age: 84
End: 2024-10-02
Payer: COMMERCIAL

## 2024-10-02 DIAGNOSIS — I48.21 PERMANENT ATRIAL FIBRILLATION (H): ICD-10-CM

## 2024-10-02 DIAGNOSIS — Z79.01 LONG TERM CURRENT USE OF ANTICOAGULANTS WITH INR GOAL OF 2.0-3.0: Primary | ICD-10-CM

## 2024-10-02 DIAGNOSIS — I48.91 ATRIAL FIBRILLATION, UNSPECIFIED TYPE (H): ICD-10-CM

## 2024-10-02 LAB — INR HOME MONITORING: 2 (ref 2–3)

## 2024-10-02 NOTE — PROGRESS NOTES
ANTICOAGULATION  MANAGEMENT-Home Monitor Managed by Exception    Mariya Rowe 84 year old female is on warfarin with therapeutic INR result. (Goal INR 2.0-3.0)    Recent labs: (last 7 days)     10/02/24  0000   INR 2.0       Previous INR was Therapeutic  Medication, diet, health changes since last INR:chart reviewed; none identified  Contacted within the last 12 weeks by phone on 9/17/24  Last [facility] referral date: 03/12/2024      PLAN     Mariya was NOT contacted regarding therapeutic result today per home monitoring policy manage by exception agreement.   Current warfarin dose is to be continued:     Summary  As of 10/2/2024      Full warfarin instructions:  5 mg every Mon, Wed, Fri; 2.5 mg all other days   Next INR check:  10/16/2024             ?   Thais Castro RN  Anticoagulation Clinic  10/2/2024    _______________________________________________________________________     Anticoagulation Episode Summary       Current INR goal:  2.0-3.0   TTR:  82.0% (1 y)   Target end date:  Indefinite   Send INR reminders to:  [facility] [employer] [facility]    Indications    Long term current use of anticoagulants with INR goal of 2.0-3.0 [Z79.01]  Atrial fibrillation (H) (Resolved) [I48.91]  Permanent atrial fibrillation (H) [I48.21]  Atrial fibrillation  unspecified type (H) [I48.91]             Comments:  [employer] home meter. Manage by exception.             Anticoagulation Care Providers       Provider Role Specialty Phone number    Chavez Molina MD Referring Internal Medicine [phone]    Edwin Ulrich MD Referring Internal Medicine [phone]

## 2024-10-07 DIAGNOSIS — E78.5 HYPERLIPIDEMIA LDL GOAL <100: ICD-10-CM

## 2024-10-07 RX ORDER — ROSUVASTATIN CALCIUM 5 MG/1
5 TABLET, COATED ORAL DAILY
Qty: 90 TABLET | Refills: 0 | Status: SHIPPED | OUTPATIENT
Start: 2024-10-07

## 2024-10-13 ENCOUNTER — HEALTH MAINTENANCE LETTER (OUTPATIENT)
Age: 84
End: 2024-10-13

## 2024-10-16 ENCOUNTER — TELEPHONE (OUTPATIENT)
Dept: INTERNAL MEDICINE | Facility: CLINIC | Age: 84
End: 2024-10-16
Payer: COMMERCIAL

## 2024-10-16 NOTE — TELEPHONE ENCOUNTER
Called and spoke to patient about scheduling AWV. Patient needs to check her schedule and will call back.    Upon callback pleas assist patient to schedule with Karoline Celestin in Oct 2024 or if patient wants to wait please schedule next available with PCP

## 2024-10-23 ENCOUNTER — ANTICOAGULATION THERAPY VISIT (OUTPATIENT)
Dept: ANTICOAGULATION | Facility: CLINIC | Age: 84
End: 2024-10-23
Payer: COMMERCIAL

## 2024-10-23 DIAGNOSIS — I48.91 ATRIAL FIBRILLATION, UNSPECIFIED TYPE (H): ICD-10-CM

## 2024-10-23 DIAGNOSIS — Z79.01 LONG TERM CURRENT USE OF ANTICOAGULANTS WITH INR GOAL OF 2.0-3.0: Primary | ICD-10-CM

## 2024-10-23 DIAGNOSIS — I48.21 PERMANENT ATRIAL FIBRILLATION (H): ICD-10-CM

## 2024-10-23 LAB — INR HOME MONITORING: 3.1 (ref 2–3)

## 2024-10-23 NOTE — PROGRESS NOTES
ANTICOAGULATION MANAGEMENT     Mariya Rowe 84 year old female is on warfarin with supratherapeutic INR result. (Goal INR 2.0-3.0)    Recent labs: (last 7 days)     10/23/24  0000   INR 3.1*       ASSESSMENT     Source(s): Chart Review and Patient/Caregiver Call     Warfarin doses taken: Warfarin taken as instructed  Diet: Decreased greens/vitamin K in diet; plans to resume previous intake  Medication/supplement changes: None noted  New illness, injury, or hospitalization: No  Signs or symptoms of bleeding or clotting: No  Previous result: Therapeutic last 2(+) visits  Additional findings: Shared clinical decision making, pt prefers to take less warfarin today since she does not currently have many greens in the home. Plans to then resume maintenance dose and resume baseline intake of greens/vitamin K        PLAN     Recommended plan for temporary change(s) affecting INR     Dosing Instructions: partial hold then continue your current warfarin dose with next INR in 2 weeks       Summary  As of 10/23/2024      Full warfarin instructions:  10/23: 2.5 mg; Otherwise 5 mg every Mon, Wed, Fri; 2.5 mg all other days   Next INR check:  11/6/2024               Telephone call with Mariya who verbalizes understanding and agrees to plan    Patient to recheck with home meter    Education provided: Goal range and lab monitoring: goal range and significance of current result and Importance of therapeutic range  Dietary considerations: importance of consistent vitamin K intake and impact of vitamin K foods on INR    Plan made per [facility] anticoagulation protocol    Artis Dc RN  10/23/2024  Anticoagulation Clinic  [employer] for routing messages: [employer] [facility]IT[facility]  [facility] patient phone line: [phone]        _______________________________________________________________________     Anticoagulation Episode Summary       Current INR goal:  2.0-3.0   TTR:  81.9% (1 y)   Target end date:  Indefinite   Send INR reminders to:   [facility]IT[facility]    Indications    Long term current use of anticoagulants with INR goal of 2.0-3.0 [Z79.01]  Atrial fibrillation (H) (Resolved) [I48.91]  Permanent atrial fibrillation (H) [I48.21]  Atrial fibrillation  unspecified type (H) [I48.91]             Comments:  [employer] home meter. Manage by exception.             Anticoagulation Care Providers       Provider Role Specialty Phone number    Chavez Molina MD Referring Internal Medicine [phone]    Edwin Ulrich MD Referring Internal Medicine [phone]

## 2024-11-11 ENCOUNTER — ANTICOAGULATION THERAPY VISIT (OUTPATIENT)
Dept: ANTICOAGULATION | Facility: CLINIC | Age: 84
End: 2024-11-11
Payer: COMMERCIAL

## 2024-11-11 DIAGNOSIS — I48.21 PERMANENT ATRIAL FIBRILLATION (H): ICD-10-CM

## 2024-11-11 DIAGNOSIS — Z79.01 LONG TERM CURRENT USE OF ANTICOAGULANTS WITH INR GOAL OF 2.0-3.0: Primary | ICD-10-CM

## 2024-11-11 DIAGNOSIS — I48.91 ATRIAL FIBRILLATION, UNSPECIFIED TYPE (H): ICD-10-CM

## 2024-11-11 LAB — INR HOME MONITORING: 2.4 (ref 2–3)

## 2024-11-11 NOTE — PROGRESS NOTES
ANTICOAGULATION MANAGEMENT     Mariya Rowe 84 year old female is on warfarin with therapeutic INR result. (Goal INR 2.0-3.0)    Recent labs: (last 7 days)     11/11/24  0000   INR 2.4       ASSESSMENT     Source(s): Chart Review  Previous INR was Supratherapeutic  Medication, diet, health changes since last INR chart reviewed; none identified         PLAN     Recommended plan for no diet, medication or health factor changes affecting INR     Dosing Instructions: Continue your current warfarin dose with next INR in 2 weeks       Summary  As of 11/11/2024      Full warfarin instructions:  5 mg every Mon, Wed, Fri; 2.5 mg all other days   Next INR check:  11/25/2024               Detailed voice message left for Mariya with dosing instructions and follow up date.     Patient to recheck with home meter    Education provided: Please call back if any changes to your diet, medications or how you've been taking warfarin  Symptom monitoring: monitoring for bleeding signs and symptoms, monitoring for clotting signs and symptoms, and monitoring for stroke signs and symptoms  Resume manage by exception with home monitor. Continue to submit INR results to home monitor company.You will only be called when your result is out of range. Please call and notify [facility] if new medication started, dose missed, signs or symptoms of bleeding or clotting, or a surgery/procedure is scheduled. Due for next call no later than: 2/9/25.    Plan made per [facility] anticoagulation protocol    Naima Rod RN  11/11/2024  Anticoagulation Clinic  [facility] for routing messages: p ANTICOAG HOME MONITORING  [facility] patient phone line: [phone]        _______________________________________________________________________     Anticoagulation Episode Summary       Current INR goal:  2.0-3.0   TTR:  81.2% (1 y)   Target end date:  Indefinite   Send INR reminders to:  [facility] HOME MONITORING    Indications    Long term current use of anticoagulants with INR  goal of 2.0-3.0 [Z79.01]  Atrial fibrillation (H) (Resolved) [I48.91]  Permanent atrial fibrillation (H) [I48.21]  Atrial fibrillation  unspecified type (H) [I48.91]             Comments:  [employer] home meter. Manage by exception.             Anticoagulation Care Providers       Provider Role Specialty Phone number    Chavez Molina MD Referring Internal Medicine [phone]    Edwin Ulrich MD Referring Internal Medicine [phone]

## 2024-11-27 ENCOUNTER — ANTICOAGULATION THERAPY VISIT (OUTPATIENT)
Dept: ANTICOAGULATION | Facility: CLINIC | Age: 84
End: 2024-11-27
Payer: COMMERCIAL

## 2024-11-27 DIAGNOSIS — Z79.01 LONG TERM CURRENT USE OF ANTICOAGULANTS WITH INR GOAL OF 2.0-3.0: Primary | ICD-10-CM

## 2024-11-27 DIAGNOSIS — I48.91 ATRIAL FIBRILLATION, UNSPECIFIED TYPE (H): ICD-10-CM

## 2024-11-27 DIAGNOSIS — I48.21 PERMANENT ATRIAL FIBRILLATION (H): ICD-10-CM

## 2024-11-27 LAB — INR HOME MONITORING: 2.8 (ref 2–3)

## 2024-11-27 RX ORDER — WARFARIN SODIUM 5 MG/1
TABLET ORAL
Qty: 100 TABLET | Refills: 0 | Status: SHIPPED | OUTPATIENT
Start: 2024-11-27

## 2024-11-27 NOTE — PROGRESS NOTES
ANTICOAGULATION MANAGEMENT     Mariya Rowe 84 year old female is on warfarin with therapeutic INR result. (Goal INR 2.0-3.0)    Recent labs: (last 7 days)     11/26/24  0000   INR 2.8       ASSESSMENT     Source(s): Chart Review and Patient/Caregiver Call     Warfarin doses taken: Warfarin taken as instructed  Diet: No new diet changes identified  Medication/supplement changes: None noted  New illness, injury, or hospitalization: No  Signs or symptoms of bleeding or clotting: No  Previous result: Therapeutic last visit; previously outside of goal range  Additional findings: Refill needed today. Mariya does NOT meet all criteria for refill: Visit with referring provider's group was >= 15 months ago. 90 day quentin fill approved; patient notified to schedule with referring provider per [facility] protocol       PLAN     Recommended plan for no diet, medication or health factor changes affecting INR     Dosing Instructions: Continue your current warfarin dose with next INR in 2 weeks       Summary  As of 11/27/2024      Full warfarin instructions:  5 mg every Mon, Wed, Fri; 2.5 mg all other days   Next INR check:  12/10/2024               Telephone call with Mariya who verbalizes understanding and agrees to plan and who agrees to plan and repeated back plan correctly    Patient to recheck with home meter    Education provided: Resume manage by exception with home monitor. Continue to submit INR results to home monitor company.You will only be called when your result is out of range and at 90 da check in. Please call and notify [facility] if new medication started, dose missed, signs or symptoms of bleeding or clotting, or a surgery/procedure is scheduled. Due for next call no later than: 2/25/25.    Plan made per [facility] anticoagulation protocol    Mary Webber RN  11/27/2024  Anticoagulation Clinic  [facility] for routing messages: [employer] [facility] HOME [facility]IT[facility]  [facility] patient phone line:  [phone]        _______________________________________________________________________     Anticoagulation Episode Summary       Current INR goal:  2.0-3.0   TTR:  81.1% (1 y)   Target end date:  Indefinite   Send INR reminders to:  [facility] HOME MONITORING    Indications    Long term current use of anticoagulants with INR goal of 2.0-3.0 [Z79.01]  Atrial fibrillation (H) (Resolved) [I48.91]  Permanent atrial fibrillation (H) [I48.21]  Atrial fibrillation  unspecified type (H) [I48.91]             Comments:  [employer] home meter. Manage by exception.             Anticoagulation Care Providers       Provider Role Specialty Phone number    Chavez Molina MD Referring Internal Medicine [phone]    Edwin Ulrich MD Referring Internal Medicine [phone]

## 2024-12-11 ENCOUNTER — DOCUMENTATION ONLY (OUTPATIENT)
Dept: ANTICOAGULATION | Facility: CLINIC | Age: 84
End: 2024-12-11
Payer: COMMERCIAL

## 2024-12-11 DIAGNOSIS — I48.21 PERMANENT ATRIAL FIBRILLATION (H): ICD-10-CM

## 2024-12-11 DIAGNOSIS — I48.91 ATRIAL FIBRILLATION, UNSPECIFIED TYPE (H): ICD-10-CM

## 2024-12-11 DIAGNOSIS — Z79.01 LONG TERM CURRENT USE OF ANTICOAGULANTS WITH INR GOAL OF 2.0-3.0: Primary | ICD-10-CM

## 2024-12-11 LAB — INR HOME MONITORING: 2.6 (ref 2–3)

## 2024-12-11 NOTE — PROGRESS NOTES
ANTICOAGULATION  MANAGEMENT-Home Monitor Managed by Exception    Mariya Rowe 84 year old female is on warfarin with therapeutic INR result. (Goal INR 2.0-3.0)    Recent labs: (last 7 days)     12/11/24  0000   INR 2.6       Previous INR was Therapeutic  Medication, diet, health changes since last INR:chart reviewed; none identified  Contacted within the last 12 weeks by phone on 11/27/24  Last [facility] referral date: 03/12/2024      PLAN     Mariya was NOT contacted regarding therapeutic result today per home monitoring policy manage by exception agreement.   Current warfarin dose is to be continued:     Summary  As of 12/11/2024      Full warfarin instructions:  5 mg every Mon, Wed, Fri; 2.5 mg all other days   Next INR check:  12/26/2024             ?   Artis Dc RN  Anticoagulation Clinic  12/11/2024    _______________________________________________________________________     Anticoagulation Episode Summary       Current INR goal:  2.0-3.0   TTR:  81.2% (1 y)   Target end date:  Indefinite   Send INR reminders to:  [facility] [employer] [facility]    Indications    Long term current use of anticoagulants with INR goal of 2.0-3.0 [Z79.01]  Atrial fibrillation (H) (Resolved) [I48.91]  Permanent atrial fibrillation (H) [I48.21]  Atrial fibrillation  unspecified type (H) [I48.91]             Comments:  [employer] home meter. Manage by exception.             Anticoagulation Care Providers       Provider Role Specialty Phone number    Chavez Molina MD Referring Internal Medicine [phone]    Edwin Ulrich MD Referring Internal Medicine [phone]

## 2024-12-26 LAB — INR HOME MONITORING: 2.4 (ref 2–3)

## 2025-01-15 ENCOUNTER — DOCUMENTATION ONLY (OUTPATIENT)
Dept: ANTICOAGULATION | Facility: CLINIC | Age: 85
End: 2025-01-15
Payer: COMMERCIAL

## 2025-01-15 DIAGNOSIS — Z79.01 LONG TERM CURRENT USE OF ANTICOAGULANTS WITH INR GOAL OF 2.0-3.0: Primary | ICD-10-CM

## 2025-01-15 DIAGNOSIS — I48.21 PERMANENT ATRIAL FIBRILLATION (H): ICD-10-CM

## 2025-01-15 DIAGNOSIS — I48.91 ATRIAL FIBRILLATION, UNSPECIFIED TYPE (H): ICD-10-CM

## 2025-01-15 LAB — INR HOME MONITORING: 2.2 (ref 2–3)

## 2025-01-15 NOTE — PROGRESS NOTES
ANTICOAGULATION  MANAGEMENT-Home Monitor Managed by Exception    Mariya Rowe 84 year old female is on warfarin with therapeutic INR result. (Goal INR 2.0-3.0)    Recent labs: (last 7 days)     01/15/25  0000   INR 2.2       Previous INR was Therapeutic  Medication, diet, health changes since last INR:chart reviewed; none identified  Contacted within the last 12 weeks by phone on 11/27/24  Due for next call no later than: 2/25/25.   Last [facility] referral date: 03/12/2024      PAO Meraz was NOT contacted regarding therapeutic result today per home monitoring policy manage by exception agreement.   Current warfarin dose is to be continued:     Summary  As of 1/15/2025      Full warfarin instructions:  5 mg every Mon, Wed, Fri; 2.5 mg all other days   Next INR check:  1/29/2025             ?   Thais Castro RN  Anticoagulation Clinic  1/15/2025    _______________________________________________________________________     Anticoagulation Episode Summary       Current INR goal:  2.0-3.0   TTR:  81.2% (1 y)   Target end date:  Indefinite   Send INR reminders to:  [employer]    Indications    Long term current use of anticoagulants with INR goal of 2.0-3.0 [Z79.01]  Atrial fibrillation (H) (Resolved) [I48.91]  Permanent atrial fibrillation (H) [I48.21]  Atrial fibrillation  unspecified type (H) [I48.91]             Comments:  [employer] home meter. Manage by exception.             Anticoagulation Care Providers       Provider Role Specialty Phone number    Chavez Molina MD Referring Internal Medicine [phone]    Edwin Ulrich MD Referring Internal Medicine [phone]

## 2025-01-30 ENCOUNTER — PATIENT OUTREACH (OUTPATIENT)
Dept: CARE COORDINATION | Facility: CLINIC | Age: 85
End: 2025-01-30
Payer: COMMERCIAL

## 2025-02-06 ENCOUNTER — DOCUMENTATION ONLY (OUTPATIENT)
Dept: ANTICOAGULATION | Facility: CLINIC | Age: 85
End: 2025-02-06
Payer: COMMERCIAL

## 2025-02-06 NOTE — PROGRESS NOTES
ANTICOAGULATION     Mariya KUHN Soledad is overdue for an INR check.     Mychart sent.    Thais Castro, RN  2/6/2025  Anticoagulation Clinic  [facility] for routing messages: [employer] [employer]  [facility] patient phone line: [phone]

## 2025-02-11 LAB — INR HOME MONITORING: 2.3 (ref 2–3)

## 2025-02-12 ENCOUNTER — DOCUMENTATION ONLY (OUTPATIENT)
Dept: ANTICOAGULATION | Facility: CLINIC | Age: 85
End: 2025-02-12
Payer: COMMERCIAL

## 2025-02-12 DIAGNOSIS — I48.91 ATRIAL FIBRILLATION, UNSPECIFIED TYPE (H): ICD-10-CM

## 2025-02-12 DIAGNOSIS — I48.21 PERMANENT ATRIAL FIBRILLATION (H): ICD-10-CM

## 2025-02-12 DIAGNOSIS — Z79.01 LONG TERM CURRENT USE OF ANTICOAGULANTS WITH INR GOAL OF 2.0-3.0: Primary | ICD-10-CM

## 2025-02-12 NOTE — PROGRESS NOTES
ANTICOAGULATION  MANAGEMENT-Home Monitor Managed by Exception    Mariya Rowe 84 year old female is on warfarin with therapeutic INR result. (Goal INR 2.0-3.0)    Recent labs: (last 7 days)     02/07/25  0000   INR 2.3       Previous INR was Therapeutic  Medication, diet, health changes since last INR:chart reviewed; none identified  Contacted within the last 12 weeks by phone on 11/27/24  Due for next call no later than: 2/25/25.   Last [facility] referral date: 03/12/2024      PAO Meraz was NOT contacted regarding therapeutic result today per home monitoring policy manage by exception agreement.   Current warfarin dose is to be continued:     Summary  As of 2/12/2025      Full warfarin instructions:  5 mg every Mon, Wed, Fri; 2.5 mg all other days   Next INR check:  2/21/2025             ?   Mary Webber RN  Anticoagulation Clinic  2/12/2025    _______________________________________________________________________     Anticoagulation Episode Summary       Current INR goal:  2.0-3.0   TTR:  88.2% (1 y)   Target end date:  Indefinite   Send INR reminders to:  [employer]    Indications    Long term current use of anticoagulants with INR goal of 2.0-3.0 [Z79.01]  Atrial fibrillation (H) (Resolved) [I48.91]  Permanent atrial fibrillation (H) [I48.21]  Atrial fibrillation  unspecified type (H) [I48.91]             Comments:  [employer] home meter. Manage by exception.             Anticoagulation Care Providers       Provider Role Specialty Phone number    Chavez Molina MD Referring Internal Medicine [phone]    Edwin Ulrich MD Referring Internal Medicine [phone]

## 2025-02-17 DIAGNOSIS — E78.5 HYPERLIPIDEMIA LDL GOAL <100: ICD-10-CM

## 2025-02-18 NOTE — TELEPHONE ENCOUNTER
Please advise patient or caregiver:    Last appt with Dr Molina was 8/3/2023. No future IM appt presently scheduled.   Last lipid panel was performed 9/21/2023.    Need to at a minimum update lipids and ALT.

## 2025-02-19 NOTE — TELEPHONE ENCOUNTER
Call to patient to relay provider message. Patient agreeable to set up appointment when she is back in [place] at the end of April.    Patient asking for 3 month refill to be sent to [place].    Thank you,  Artis, Triage RN Rajesh Sousa    2:00 PM 2/19/2025

## 2025-02-24 NOTE — TELEPHONE ENCOUNTER
Patient calls to follow up on this request. Patient set up appointment for April 2025. Patient only has 2 pills left.    Apr 24, 2025 9:00 AM  (Arrive by 8:40 AM)  Annual Wellness Visit with Chavez Molina MD  [facility] ([facility] ) [phone]     Patient asking for a call back once this is filled.    Thank you,  Artis, Triage RN [facility]    3:19 PM 2/24/2025

## 2025-02-25 RX ORDER — ROSUVASTATIN CALCIUM 5 MG/1
5 TABLET, COATED ORAL DAILY
Qty: 90 TABLET | Refills: 0 | Status: SHIPPED | OUTPATIENT
Start: 2025-02-25

## 2025-02-26 NOTE — TELEPHONE ENCOUNTER
Call to patient to notify her.    Thank you,  Artis, Triage RN Rajesh Sousa    10:20 AM 2/26/2025

## 2025-03-02 LAB — INR HOME MONITORING: 2.8 (ref 2–3)

## 2025-03-03 ENCOUNTER — ANTICOAGULATION THERAPY VISIT (OUTPATIENT)
Dept: ANTICOAGULATION | Facility: CLINIC | Age: 85
End: 2025-03-03
Payer: COMMERCIAL

## 2025-03-03 ENCOUNTER — DOCUMENTATION ONLY (OUTPATIENT)
Dept: ANTICOAGULATION | Facility: CLINIC | Age: 85
End: 2025-03-03
Payer: COMMERCIAL

## 2025-03-03 DIAGNOSIS — Z79.01 LONG TERM CURRENT USE OF ANTICOAGULANTS WITH INR GOAL OF 2.0-3.0: Primary | ICD-10-CM

## 2025-03-03 DIAGNOSIS — I48.91 ATRIAL FIBRILLATION, UNSPECIFIED TYPE (H): Primary | ICD-10-CM

## 2025-03-03 DIAGNOSIS — I48.92 ATRIAL FLUTTER (H): ICD-10-CM

## 2025-03-03 DIAGNOSIS — Z79.01 LONG TERM CURRENT USE OF ANTICOAGULANTS WITH INR GOAL OF 2.0-3.0: ICD-10-CM

## 2025-03-03 DIAGNOSIS — I48.91 ATRIAL FIBRILLATION, UNSPECIFIED TYPE (H): ICD-10-CM

## 2025-03-03 DIAGNOSIS — I48.21 PERMANENT ATRIAL FIBRILLATION (H): ICD-10-CM

## 2025-03-03 NOTE — PROGRESS NOTES
ANTICOAGULATION MANAGEMENT     Mariya Rowe 84 year old female is on warfarin with therapeutic INR result. (Goal INR 2.0-3.0)    Recent labs: (last 7 days)     03/02/25  0000   INR 2.8       ASSESSMENT     Source(s): Chart Review and Patient/Caregiver Call     Warfarin doses taken: Warfarin taken as instructed  Diet: No new diet changes identified  Medication/supplement changes: None noted  New illness, injury, or hospitalization: No  Signs or symptoms of bleeding or clotting: No  Previous result: Therapeutic last 2(+) visits  Additional findings: None       PLAN     Recommended plan for no diet, medication or health factor changes affecting INR     Dosing Instructions: Continue your current warfarin dose with next INR in 2 weeks       Summary  As of 3/3/2025      Full warfarin instructions:  5 mg every Mon, Wed, Fri; 2.5 mg all other days   Next INR check:  3/17/2025               Telephone call with Mariya who verbalizes understanding and agrees to plan    Patient to recheck with home meter    Education provided: Goal range and lab monitoring: goal range and significance of current result and Importance of therapeutic range  Resume manage by exception with home monitor. Continue to submit INR results to home monitor company.You will only be called when your result is out of range and at 90 day check in. Please call and notify [facility] if new medication started, dose missed, signs or symptoms of bleeding or clotting, or a surgery/procedure is scheduled. Due for next call no later than: 6/1/25.    Plan made per [facility] anticoagulation protocol    Artis Dc RN  3/3/2025  Anticoagulation Clinic  [facility] for routing messages: [employer]  [facility] patient phone line: [phone]        _______________________________________________________________________     Anticoagulation Episode Summary       Current INR goal:  2.0-3.0   TTR:  88.6% (1 y)   Target end date:  Indefinite   Send INR reminders to:  [employer]  [facility]    Indications    Long term current use of anticoagulants with INR goal of 2.0-3.0 [Z79.01]  Atrial fibrillation (H) (Resolved) [I48.91]  Permanent atrial fibrillation (H) [I48.21]  Atrial fibrillation  unspecified type (H) [I48.91]             Comments:  [employer] home meter. Manage by exception.             Anticoagulation Care Providers       Provider Role Specialty Phone number    Chavez Molina MD Referring Internal Medicine [phone]    Edwin Ulrich MD Referring Internal Medicine [phone]

## 2025-03-03 NOTE — PROGRESS NOTES
ANTICOAGULATION CLINIC REFERRAL RENEWAL REQUEST       An annual renewal order is required for all patients referred to [facility].?  Please review and sign the pended referral order for Mariya Rowe.       ANTICOAGULATION SUMMARY      Warfarin indication(s)   Atrial Fibrillation    Mechanical heart valve present?  NO       Current goal range   INR: 2.0-3.0     Goal appropriate for indication? Goal INR 2-3, standard for indication(s) above     Time in Therapeutic Range (TTR)  (Goal > 60%) 88.6%       Office visit with referring provider's group within last year no on 8/3/23 but scheduled for follow up appointment on 4/24/25 with PCP       Artis Dc, RN  [facility]

## 2025-03-24 ENCOUNTER — TELEPHONE (OUTPATIENT)
Dept: ANTICOAGULATION | Facility: CLINIC | Age: 85
End: 2025-03-24
Payer: COMMERCIAL

## 2025-03-24 DIAGNOSIS — I48.21 PERMANENT ATRIAL FIBRILLATION (H): ICD-10-CM

## 2025-03-24 DIAGNOSIS — Z79.01 LONG TERM CURRENT USE OF ANTICOAGULANTS WITH INR GOAL OF 2.0-3.0: ICD-10-CM

## 2025-03-24 RX ORDER — WARFARIN SODIUM 5 MG/1
TABLET ORAL
Qty: 25 TABLET | Refills: 0 | Status: SHIPPED | OUTPATIENT
Start: 2025-03-24

## 2025-03-24 NOTE — TELEPHONE ENCOUNTER
ANTICOAGULATION MANAGEMENT:  Medication Refill    Anticoagulation Summary  As of 3/3/2025      Warfarin maintenance plan:  5 mg (5 mg x 1) every Mon, Wed, Fri; 2.5 mg (5 mg x 0.5) all other days   Next INR check:  3/17/2025   Target end date:  Indefinite    Indications    Long term current use of anticoagulants with INR goal of 2.0-3.0 [Z79.01]  Atrial fibrillation (H) (Resolved) [I48.91]  Permanent atrial fibrillation (H) [I48.21]  Atrial fibrillation  unspecified type (H) [I48.91]                 Anticoagulation Care Providers       Provider Role Specialty Phone number    Chavez Molina MD Referring Internal Medicine [phone]    Edwin Ulrich MD Referring Internal Medicine [phone]            Refill Criteria    Visit with referring provider/group: Overdue for referring provider visit, last visit on 8/3/23    [facility] referral last signed: 03/03/2025; within last year:  Yes    Lab monitoring is up to date (not exceeding 2 weeks overdue): No    Mariya does NOT meet all criteria for refill: Visit with referring provider's group was >= 15 months ago. 30 day quentin fill approved; patient notified to schedule labs per [facility] protocol    Next 5 appointments (look out 90 days)      Apr 24, 2025 9:00 AM  (Arrive by 8:40 AM)  Annual Wellness Visit with Chavez Molina MD  [facility] ([facility] ) [address]  [address]  [facility] [address]  [phone]              Naima Rod RN  Anticoagulation Clinic

## 2025-03-26 ENCOUNTER — DOCUMENTATION ONLY (OUTPATIENT)
Dept: ANTICOAGULATION | Facility: CLINIC | Age: 85
End: 2025-03-26
Payer: COMMERCIAL

## 2025-03-26 NOTE — PROGRESS NOTES
ANTICOAGULATION     Mariya KUHN Soledad is overdue for an INR check.     Mychart sent.    Thais Castro, RN  3/26/2025  Anticoagulation Clinic  [facility] for routing messages: [employer]  [facility] patient phone line: [phone]

## 2025-03-27 ENCOUNTER — ANTICOAGULATION THERAPY VISIT (OUTPATIENT)
Dept: ANTICOAGULATION | Facility: CLINIC | Age: 85
End: 2025-03-27
Payer: COMMERCIAL

## 2025-03-27 DIAGNOSIS — I48.91 ATRIAL FIBRILLATION, UNSPECIFIED TYPE (H): ICD-10-CM

## 2025-03-27 DIAGNOSIS — I48.21 PERMANENT ATRIAL FIBRILLATION (H): ICD-10-CM

## 2025-03-27 DIAGNOSIS — Z79.01 LONG TERM CURRENT USE OF ANTICOAGULANTS WITH INR GOAL OF 2.0-3.0: Primary | ICD-10-CM

## 2025-03-27 LAB — INR HOME MONITORING: 2.5 (ref 2–3)

## 2025-03-27 NOTE — PROGRESS NOTES
ANTICOAGULATION MANAGEMENT     Mariya Rowe 84 year old female is on warfarin with therapeutic INR result. (Goal INR 2.0-3.0)    Recent labs: (last 7 days)     03/27/25  0000   INR 2.5       ASSESSMENT     Source(s): Chart Review and Patient/Caregiver Call     Warfarin doses taken: Warfarin taken as instructed  Diet: No new diet changes identified  Medication/supplement changes: None noted  New illness, injury, or hospitalization: No  Signs or symptoms of bleeding or clotting: No  Previous result: Therapeutic last 2(+) visits  Additional findings: None       PLAN     Recommended plan for no diet, medication or health factor changes affecting INR     Dosing Instructions: Continue your current warfarin dose with next INR in 2 weeks       Summary  As of 3/27/2025      Full warfarin instructions:  5 mg every Mon, Wed, Fri; 2.5 mg all other days   Next INR check:  4/10/2025               Telephone call with Mariya who verbalizes understanding and agrees to plan    Patient to recheck with home meter    Education provided: Goal range and lab monitoring: goal range and significance of current result and Importance of therapeutic range  Resume manage by exception with home monitor. Continue to submit INR results to home monitor company.You will only be called when your result is out of range and at 90 day check in. Please call and notify [facility] if new medication started, dose missed, signs or symptoms of bleeding or clotting, or a surgery/procedure is scheduled. Due for next call no later than: 6/25/25.    Plan made per [facility] anticoagulation protocol    Artis Dc RN  3/27/2025  Anticoagulation Clinic  [facility] for routing messages: [employer]  [facility] patient phone line: [phone]        _______________________________________________________________________     Anticoagulation Episode Summary       Current INR goal:  2.0-3.0   TTR:  90.2% (1 y)   Target end date:  Indefinite   Send INR reminders to:  [employer]  [facility] [employer]IT[facility]    Indications    Long term current use of anticoagulants with INR goal of 2.0-3.0 [Z79.01]  Atrial fibrillation (H) (Resolved) [I48.91]  Permanent atrial fibrillation (H) [I48.21]  Atrial fibrillation  unspecified type (H) [I48.91]             Comments:  [employer] home meter. Manage by exception.             Anticoagulation Care Providers       Provider Role Specialty Phone number    Chavez Molina MD Referring Internal Medicine [phone]    Edwin Ulrich MD Referring Internal Medicine [phone]

## 2025-04-07 DIAGNOSIS — E78.5 HYPERLIPIDEMIA LDL GOAL <100: ICD-10-CM

## 2025-04-07 RX ORDER — ROSUVASTATIN CALCIUM 5 MG/1
5 TABLET, COATED ORAL DAILY
Qty: 90 TABLET | Refills: 0 | OUTPATIENT
Start: 2025-04-07

## 2025-04-17 ENCOUNTER — MYC MEDICAL ADVICE (OUTPATIENT)
Dept: ANTICOAGULATION | Facility: CLINIC | Age: 85
End: 2025-04-17
Payer: COMMERCIAL

## 2025-04-17 ENCOUNTER — ANTICOAGULATION THERAPY VISIT (OUTPATIENT)
Dept: ANTICOAGULATION | Facility: CLINIC | Age: 85
End: 2025-04-17
Payer: COMMERCIAL

## 2025-04-17 DIAGNOSIS — I48.91 ATRIAL FIBRILLATION, UNSPECIFIED TYPE (H): ICD-10-CM

## 2025-04-17 DIAGNOSIS — Z79.01 LONG TERM CURRENT USE OF ANTICOAGULANTS WITH INR GOAL OF 2.0-3.0: Primary | ICD-10-CM

## 2025-04-17 DIAGNOSIS — I48.21 PERMANENT ATRIAL FIBRILLATION (H): ICD-10-CM

## 2025-04-17 LAB — INR HOME MONITORING: 3 (ref 2–3)

## 2025-04-17 NOTE — PROGRESS NOTES
ANTICOAGULATION MANAGEMENT     Mariya Rowe 84 year old female is on warfarin with therapeutic INR result. (Goal INR 2.0-3.0)    Recent labs: (last 7 days)     04/17/25  0000   INR 3.0       ASSESSMENT     Source(s): Chart Review and Patient/Caregiver Call     Warfarin doses taken: Warfarin taken as instructed  Diet: No new diet changes identified  Medication/supplement changes: None noted  New illness, injury, or hospitalization: No  Signs or symptoms of bleeding or clotting: No  Previous result: Therapeutic last 2(+) visits  Additional findings: None       PLAN     Recommended plan for no diet, medication or health factor changes affecting INR     Dosing Instructions: Continue your current warfarin dose with next INR in 2 weeks       Summary  As of 4/17/2025      Full warfarin instructions:  5 mg every Mon, Wed, Fri; 2.5 mg all other days   Next INR check:  5/1/2025               Telephone call with Mariya who verbalizes understanding and agrees to plan    Patient to recheck with home meter    Education provided: Please call back if any changes to your diet, medications or how you've been taking warfarin  Resume manage by exception with home monitor. Continue to submit INR results to home monitor company.You will only be called when your result is out of range and at 90 day check in. Please call and notify [facility] if new medication started, dose missed, signs or symptoms of bleeding or clotting, or a surgery/procedure is scheduled. Due for next call no later than: 7/16/25.    Plan made per [facility] anticoagulation protocol    Naima Rod RN  4/17/2025  Anticoagulation Clinic  [facility] for routing messages: p [employer]  [facility] patient phone line: [phone]        _______________________________________________________________________     Anticoagulation Episode Summary       Current INR goal:  2.0-3.0   TTR:  96.0% (1 y)   Target end date:  Indefinite   Send INR reminders to:  [employer]     Indications    Long term current use of anticoagulants with INR goal of 2.0-3.0 [Z79.01]  Atrial fibrillation (H) (Resolved) [I48.91]  Permanent atrial fibrillation (H) [I48.21]  Atrial fibrillation  unspecified type (H) [I48.91]             Comments:  [employer] home meter. Manage by exception.             Anticoagulation Care Providers       Provider Role Specialty Phone number    Chavez Molina MD Referring Internal Medicine [phone]    Edwin Ulrich MD Referring Internal Medicine [phone]

## 2025-04-23 SDOH — HEALTH STABILITY: PHYSICAL HEALTH: ON AVERAGE, HOW MANY DAYS PER WEEK DO YOU ENGAGE IN MODERATE TO STRENUOUS EXERCISE (LIKE A BRISK WALK)?: 0 DAYS

## 2025-04-23 SDOH — HEALTH STABILITY: PHYSICAL HEALTH: ON AVERAGE, HOW MANY MINUTES DO YOU ENGAGE IN EXERCISE AT THIS LEVEL?: 10 MIN

## 2025-04-23 ASSESSMENT — SOCIAL DETERMINANTS OF HEALTH (SDOH): HOW OFTEN DO YOU GET TOGETHER WITH FRIENDS OR RELATIVES?: TWICE A WEEK

## 2025-04-24 ENCOUNTER — OFFICE VISIT (OUTPATIENT)
Dept: INTERNAL MEDICINE | Facility: CLINIC | Age: 85
End: 2025-04-24
Payer: COMMERCIAL

## 2025-04-24 VITALS
WEIGHT: 144 LBS | SYSTOLIC BLOOD PRESSURE: 120 MMHG | BODY MASS INDEX: 21.82 KG/M2 | HEART RATE: 80 BPM | TEMPERATURE: 97.7 F | DIASTOLIC BLOOD PRESSURE: 70 MMHG | RESPIRATION RATE: 16 BRPM | OXYGEN SATURATION: 98 % | HEIGHT: 68 IN

## 2025-04-24 DIAGNOSIS — E78.5 HYPERLIPIDEMIA LDL GOAL <100: ICD-10-CM

## 2025-04-24 DIAGNOSIS — Z79.01 LONG TERM CURRENT USE OF ANTICOAGULANTS WITH INR GOAL OF 2.0-3.0: ICD-10-CM

## 2025-04-24 DIAGNOSIS — R00.2 PALPITATIONS: ICD-10-CM

## 2025-04-24 DIAGNOSIS — Z00.00 ENCOUNTER FOR MEDICARE ANNUAL WELLNESS EXAM: Primary | ICD-10-CM

## 2025-04-24 DIAGNOSIS — I48.21 PERMANENT ATRIAL FIBRILLATION (H): ICD-10-CM

## 2025-04-24 LAB
ALBUMIN SERPL BCG-MCNC: 4 G/DL (ref 3.5–5.2)
ALP SERPL-CCNC: 84 U/L (ref 40–150)
ALT SERPL W P-5'-P-CCNC: 27 U/L (ref 0–50)
ANION GAP SERPL CALCULATED.3IONS-SCNC: 8 MMOL/L (ref 7–15)
AST SERPL W P-5'-P-CCNC: 32 U/L (ref 0–45)
BILIRUB SERPL-MCNC: 1 MG/DL
BUN SERPL-MCNC: 10.5 MG/DL (ref 8–23)
CALCIUM SERPL-MCNC: 9.2 MG/DL (ref 8.8–10.4)
CHLORIDE SERPL-SCNC: 105 MMOL/L (ref 98–107)
CHOLEST SERPL-MCNC: 151 MG/DL
CREAT SERPL-MCNC: 0.9 MG/DL (ref 0.51–0.95)
EGFRCR SERPLBLD CKD-EPI 2021: 63 ML/MIN/1.73M2
ERYTHROCYTE [DISTWIDTH] IN BLOOD BY AUTOMATED COUNT: 13.1 % (ref 10–15)
FASTING STATUS PATIENT QL REPORTED: YES
FASTING STATUS PATIENT QL REPORTED: YES
GLUCOSE SERPL-MCNC: 100 MG/DL (ref 70–99)
HCO3 SERPL-SCNC: 28 MMOL/L (ref 22–29)
HCT VFR BLD AUTO: 43.4 % (ref 35–47)
HDLC SERPL-MCNC: 70 MG/DL
HGB BLD-MCNC: 14.9 G/DL (ref 11.7–15.7)
LDLC SERPL CALC-MCNC: 69 MG/DL
MCH RBC QN AUTO: 32.1 PG (ref 26.5–33)
MCHC RBC AUTO-ENTMCNC: 34.3 G/DL (ref 31.5–36.5)
MCV RBC AUTO: 94 FL (ref 78–100)
NONHDLC SERPL-MCNC: 81 MG/DL
PLATELET # BLD AUTO: 177 10E3/UL (ref 150–450)
POTASSIUM SERPL-SCNC: 4.2 MMOL/L (ref 3.4–5.3)
PROT SERPL-MCNC: 6.9 G/DL (ref 6.4–8.3)
RBC # BLD AUTO: 4.64 10E6/UL (ref 3.8–5.2)
SODIUM SERPL-SCNC: 141 MMOL/L (ref 135–145)
TRIGL SERPL-MCNC: 62 MG/DL
TSH SERPL DL<=0.005 MIU/L-ACNC: 3.02 UIU/ML (ref 0.3–4.2)
WBC # BLD AUTO: 7 10E3/UL (ref 4–11)

## 2025-04-24 PROCEDURE — 99214 OFFICE O/P EST MOD 30 MIN: CPT | Mod: 25 | Performed by: INTERNAL MEDICINE

## 2025-04-24 PROCEDURE — 3078F DIAST BP <80 MM HG: CPT | Performed by: INTERNAL MEDICINE

## 2025-04-24 PROCEDURE — 36415 COLL VENOUS BLD VENIPUNCTURE: CPT | Performed by: INTERNAL MEDICINE

## 2025-04-24 PROCEDURE — 3074F SYST BP LT 130 MM HG: CPT | Performed by: INTERNAL MEDICINE

## 2025-04-24 PROCEDURE — G0439 PPPS, SUBSEQ VISIT: HCPCS | Performed by: INTERNAL MEDICINE

## 2025-04-24 PROCEDURE — 84443 ASSAY THYROID STIM HORMONE: CPT | Performed by: INTERNAL MEDICINE

## 2025-04-24 PROCEDURE — 85027 COMPLETE CBC AUTOMATED: CPT | Performed by: INTERNAL MEDICINE

## 2025-04-24 PROCEDURE — 80061 LIPID PANEL: CPT | Performed by: INTERNAL MEDICINE

## 2025-04-24 PROCEDURE — 80053 COMPREHEN METABOLIC PANEL: CPT | Performed by: INTERNAL MEDICINE

## 2025-04-24 RX ORDER — ROSUVASTATIN CALCIUM 5 MG/1
5 TABLET, COATED ORAL DAILY
Qty: 90 TABLET | Refills: 3 | Status: SHIPPED | OUTPATIENT
Start: 2025-04-24

## 2025-04-24 RX ORDER — METOPROLOL TARTRATE 25 MG/1
25 TABLET, FILM COATED ORAL 2 TIMES DAILY
Qty: 180 TABLET | Refills: 3 | Status: SHIPPED | OUTPATIENT
Start: 2025-04-24

## 2025-04-24 RX ORDER — WARFARIN SODIUM 5 MG/1
TABLET ORAL
Qty: 75 TABLET | Refills: 3 | Status: SHIPPED | OUTPATIENT
Start: 2025-04-24

## 2025-04-24 NOTE — PATIENT INSTRUCTIONS
Patient Education   Preventive Care Advice   This is general advice given by our system to help you stay healthy. However, your care team may have specific advice just for you. Please talk to your care team about your preventive care needs.  Nutrition  Eat 5 or more servings of fruits and vegetables each day.  Try wheat bread, brown rice and whole grain pasta (instead of white bread, rice, and pasta).  Get enough calcium and vitamin D. Check the label on foods and aim for 100% of the RDA (recommended daily allowance).  Lifestyle  Exercise at least 150 minutes each week  (30 minutes a day, 5 days a week).  Do muscle strengthening activities 2 days a week. These help control your weight and prevent disease.  No smoking.  Wear sunscreen to prevent skin cancer.  Have a dental exam and cleaning every 6 months.  Yearly exams  See your health care team every year to talk about:  Any changes in your health.  Any medicines your care team has prescribed.  Preventive care, family planning, and ways to prevent chronic diseases.  Shots (vaccines)   HPV shots (up to age 26), if you've never had them before.  Hepatitis B shots (up to age 59), if you've never had them before.  COVID-19 shot: Get this shot when it's due.  Flu shot: Get a flu shot every year.  Tetanus shot: Get a tetanus shot every 10 years.  Pneumococcal, hepatitis A, and RSV shots: Ask your care team if you need these based on your risk.  Shingles shot (for age 50 and up)  General health tests  Diabetes screening:  Starting at age 35, Get screened for diabetes at least every 3 years.  If you are younger than age 35, ask your care team if you should be screened for diabetes.  Cholesterol test: At age 39, start having a cholesterol test every 5 years, or more often if advised.  Bone density scan (DEXA): At age 50, ask your care team if you should have this scan for osteoporosis (brittle bones).  Hepatitis C: Get tested at least once in your life.  STIs (sexually  transmitted infections)  Before age 24: Ask your care team if you should be screened for STIs.  After age 24: Get screened for STIs if you're at risk. You are at risk for STIs (including HIV) if:  You are sexually active with more than one person.  You don't use condoms every time.  You or a partner was diagnosed with a sexually transmitted infection.  If you are at risk for HIV, ask about PrEP medicine to prevent HIV.  Get tested for HIV at least once in your life, whether you are at risk for HIV or not.  Cancer screening tests  Cervical cancer screening: If you have a cervix, begin getting regular cervical cancer screening tests starting at age 21.  Breast cancer scan (mammogram): If you've ever had breasts, begin having regular mammograms starting at age 40. This is a scan to check for breast cancer.  Colon cancer screening: It is important to start screening for colon cancer at age 45.  Have a colonoscopy test every 10 years (or more often if you're at risk) Or, ask your provider about stool tests like a FIT test every year or Cologuard test every 3 years.  To learn more about your testing options, visit:   .  For help making a decision, visit:   https://bit.ly/is52061.  Prostate cancer screening test: If you have a prostate, ask your care team if a prostate cancer screening test (PSA) at age 55 is right for you.  Lung cancer screening: If you are a current or former smoker ages 50 to 80, ask your care team if ongoing lung cancer screenings are right for you.  For informational purposes only. Not to replace the advice of your health care provider. Copyright   2023 [facility]. All rights reserved. Clinically reviewed by the [facility]. [employer] 374326 - REV 01/24.  Preventing Falls: Care Instructions  Injuries and health problems such as trouble walking or poor eyesight can increase your risk of falling. So can some medicines. But there are things you can do to help  "prevent falls. You can exercise to get stronger. You can also arrange your home to make it safer.    Talk to your doctor about the medicines you take. Ask if any of them increase the risk of falls and whether they can be changed or stopped.   Try to exercise regularly. It can help improve your strength and balance. This can help lower your risk of falling.         Practice fall safety and prevention.   Wear low-heeled shoes that fit well and give your feet good support. Talk to your doctor if you have foot problems that make this hard.  Carry a cellphone or wear a medical alert device that you can use to call for help.  Use stepladders instead of chairs to reach high objects. Don't climb if you're at risk for falls. Ask for help, if needed.  Wear the correct eyeglasses, if you need them.        Make your home safer.   Remove rugs, cords, clutter, and furniture from walkways.  Keep your house well lit. Use night-lights in hallways and bathrooms.  Install and use sturdy handrails on stairways.  Wear nonskid footwear, even inside. Don't walk barefoot or in socks without shoes.        Be safe outside.   Use handrails, curb cuts, and ramps whenever possible.  Keep your hands free by using a shoulder bag or backpack.  Try to walk in well-lit areas. Watch out for uneven ground, changes in pavement, and debris.  Be careful in the winter. Walk on the grass or gravel when sidewalks are slippery. Use de-icer on steps and walkways. Add non-slip devices to shoes.    Put grab bars and nonskid mats in your shower or tub and near the toilet. Try to use a shower chair or bath bench when bathing.   Get into a tub or shower by putting in your weaker leg first. Get out with your strong side first. Have a phone or medical alert device in the bathroom with you.   Where can you learn more?  Go to https://www.Infogramwise.net/patiented  Enter G117 in the search box to learn more about \"Preventing Falls: Care Instructions.\"  Current as of: " July 31, 2024  Content Version: 14.4    5356-6143 [facility].   Care instructions adapted under license by your healthcare professional. If you have questions about a medical condition or this instruction, always ask your healthcare professional. [facility] disclaims any warranty or liability for your use of this information.    Hearing Loss: Care Instructions  Overview     Hearing loss is a sudden or slow decrease in how well you hear. It can range from slight to profound. Permanent hearing loss can occur with aging. It also can happen when you are exposed long-term to loud noise. Examples include listening to loud music, riding motorcycles, or being around other loud machines.  Hearing loss can affect your work and home life. It can make you feel lonely or depressed. You may feel that you have lost your independence. But hearing aids and other devices can help you hear better and feel connected to others.  Follow-up care is a key part of your treatment and safety. Be sure to make and go to all appointments, and call your doctor if you are having problems. It's also a good idea to know your test results and keep a list of the medicines you take.  How can you care for yourself at home?  Avoid loud noises whenever possible. This helps keep your hearing from getting worse.  Always wear hearing protection around loud noises.  Wear a hearing aid as directed.  A professional can help you pick a hearing aid that will work best for you.  You can also get hearing aids over the counter for mild to moderate hearing loss.  Have hearing tests as your doctor suggests. They can show whether your hearing has changed. Your hearing aid may need to be adjusted.  Use other devices as needed. These may include:  Telephone amplifiers and hearing aids that can connect to a television, stereo, radio, or microphone.  Devices that use lights or vibrations. These alert you to the doorbell, a ringing telephone, or a  "baby monitor.  Television closed-captioning. This shows the words at the bottom of the screen. Most new TVs can do this.  TTY (text telephone). This lets you type messages back and forth on the telephone instead of talking or listening. These devices are also called TDD. When messages are typed on the keyboard, they are sent over the phone line to a receiving TTY. The message is shown on a monitor.  Use text messaging, social media, and email if it is hard for you to communicate by telephone.  Try to learn a listening technique called speechreading. It is not lipreading. You pay attention to people's gestures, expressions, posture, and tone of voice. These clues can help you understand what a person is saying. Face the person you are talking to, and have them face you. Make sure the lighting is good. You need to see the other person's face clearly.  Think about counseling if you need help to adjust to your hearing loss.  When should you call for help?  Watch closely for changes in your health, and be sure to contact your doctor if:    You think your hearing is getting worse.     You have new symptoms, such as dizziness or nausea.   Where can you learn more?  Go to https://www.Singularu.net/patiented  Enter R798 in the search box to learn more about \"Hearing Loss: Care Instructions.\"  Current as of: October 27, 2024  Content Version: 14.4    0209-2821 [employer].   Care instructions adapted under license by your healthcare professional. If you have questions about a medical condition or this instruction, always ask your healthcare professional. [employer] disclaims any warranty or liability for your use of this information.       Patient Instructions   Everything looks fine.    Would work with Manjinder Galvez or Lemuel Razo regarding right low back pain.     Refills of medications have been faxed to your pharmacy.     Lab results will be available soon on [place].    See me in a year, sooner if " problems.

## 2025-04-24 NOTE — PROGRESS NOTES
Preventive Care Visit  [facility]  Chavez Molina MD, Internal Medicine  Apr 24, 2025      Assessment & Plan   (Z00.00) Encounter for Medicare annual wellness exam  (primary encounter diagnosis)  Comment: Stable health. See epic orders.     (R00.2) Palpitations  Comment: Symptoms controlled with metoprolol.  Plan: metoprolol tartrate (LOPRESSOR) 25 MG tablet,         CBC with platelets, OFFICE/OUTPT VISIT,EST,LEVL        IV          (I48.21) Permanent atrial fibrillation (H)  Comment: Continue warfarin and metoprolol.   Plan: warfarin ANTICOAGULANT (COUMADIN) 5 MG tablet,         CBC with platelets, TSH with free T4 reflex,         OFFICE/OUTPT VISIT,EST,LEVL IV          (Z79.01) Long term current use of anticoagulants with INR goal of 2.0-3.0  Plan: warfarin ANTICOAGULANT (COUMADIN) 5 MG tablet,         OFFICE/OUTPT VISIT,EST,LEVL IV          (E78.5) Hyperlipidemia LDL goal <100  Comment: Update lipids. Continue current meds.   Plan: rosuvastatin (CRESTOR) 5 MG tablet,         Comprehensive metabolic panel, Lipid panel         reflex to direct LDL Fasting, OFFICE/OUTPT         VISIT,EST,LEVL IV              Patient has been advised of split billing requirements and indicates understanding: Yes        Counseling  Appropriate preventive services were addressed with this patient via screening, questionnaire, or discussion as appropriate for fall prevention, nutrition, physical activity, Tobacco-use cessation, social engagement, weight loss and cognition.  Checklist reviewing preventive services available has been given to the patient.  Reviewed patient's diet, addressing concerns and/or questions.   The patient was instructed to see the dentist every 6 months.   The patient was provided with written information regarding signs of hearing loss.       Patient Instructions   Everything looks fine.    Would work with Manjinder Galvez or Lemuel Razo regarding right low back pain.     Refills of  medications have been faxed to your pharmacy.     Lab results will be available soon on [place].    See me in a year, sooner if problems.           Subjective   Mariya is a 84 year old, presenting for the following:  Medicare Visit        4/24/2025     8:37 AM   Additional Questions   Roomed by Fatou ALBERT  In addition to an Annual Wellness Exam, we addressed atrial fibrillation, back pain, and hyperlipidemia.      She is tolerating current medications without side effects.   Agreed to update necessary labs.     - Experiencing back pain, primarily in the right low back, near the sacroiliac area, radiating to the outer side or front thigh.  - Back pain has been gradually worsening over a couple of years.  - History of hip replacement a few years ago.  - Previously recommended physical therapy for back pain.  - No new problems with the back since the last visit.  She intends to follow up with orthopedics at [facility] for this.     - No fevers, chills, or issues with energy or sleep.  - No new muscle or joint pains except for the back issue.    Advance Care Planning    Patient states has Health Care Directive and will send to [employer].        4/23/2025   General Health   How would you rate your overall physical health? Good   Feel stress (tense, anxious, or unable to sleep) Only a little   (!) STRESS CONCERN      4/23/2025   Nutrition   Diet: Regular (no restrictions)         4/23/2025   Exercise   Days per week of moderate/strenous exercise 0 days   Average minutes spent exercising at this level 10 min   (!) EXERCISE CONCERN      4/23/2025   Social Factors   Frequency of gathering with friends or relatives Twice a week   Worry food won't last until get money to buy more No   Food not last or not have enough money for food? No   Do you have housing? (Housing is defined as stable permanent housing and does not include staying outside in a car, in a tent, in an abandoned building, in an overnight shelter,  or couch-surfing.) Yes   Are you worried about losing your housing? No   Lack of transportation? No   Unable to get utilities (heat,electricity)? No         4/24/2025   Fall Risk   Gait Speed Test (Document in seconds) 4          4/23/2025   Activities of Daily Living- Home Safety   Needs help with the following daily activites None of the above   Safety concerns in the home None of the above         4/23/2025   Dental   Dentist two times every year? (!) NO         4/23/2025   Hearing Screening   Hearing concerns? (!) IT'S HARD TO FOLLOW A CONVERSATION IN A NOISY RESTAURANT OR CROWDED ROOM.         4/23/2025   Driving Risk Screening   Patient/family members have concerns about driving No         4/23/2025   General Alertness/Fatigue Screening   Have you been more tired than usual lately? No         4/23/2025   Urinary Incontinence Screening   Bothered by leaking urine in past 6 months No         Today's PHQ-2 Score:       4/23/2025     2:05 PM   PHQ-2 ( 1999 [employer])   Q1: Little interest or pleasure in doing things 0   Q2: Feeling down, depressed or hopeless 0   PHQ-2 Score 0    Q1: Little interest or pleasure in doing things Not at all   Q2: Feeling down, depressed or hopeless Not at all   PHQ-2 Score 0       Patient-reported           4/23/2025   Substance Use   Alcohol more than 3/day or more than 7/wk Not Applicable   Do you have a current opioid prescription? No   How severe/bad is pain from 1 to 10? 5/10   Do you use any other substances recreationally? No     Social History     Tobacco Use    Smoking status: Never    Smokeless tobacco: Never   Vaping Use    Vaping status: Never Used   Substance Use Topics    Alcohol use: No    Drug use: No           11/10/2023   LAST FHS-7 RESULTS   1st degree relative breast or ovarian cancer No   Any relative bilateral breast cancer No   Any male have breast cancer No   Any ONE woman have BOTH breast AND ovarian cancer No   Any woman with breast cancer before 50yrs No   2  "or more relatives with breast AND/OR ovarian cancer No   2 or more relatives with breast AND/OR bowel cancer No        Mammogram Screening - After age 74- determine frequency with patient based on health status, life expectancy and patient goals          Reviewed and updated as needed this visit by Provider                    Past medical, family and social histories as well as medications reviewed and updated as needed.    Current providers sharing in care for this patient include:  Patient Care Team:  Chavez Molina MD as PCP - General (Internal Medicine)  Chavez Molina MD as Assigned PCP    The following health maintenance items are reviewed in Epic and correct as of today:  Health Maintenance   Topic Date Due    Pneumococcal Vaccine: 50+ Years (2 of 2 - PCV) 09/06/2007    DTAP/TDAP/TD IMMUNIZATION (1 - Tdap) 12/18/2008    ZOSTER IMMUNIZATION (2 of 3) 01/16/2013    RSV VACCINE (1 - 1-dose 75+ series) Never done    MEDICARE ANNUAL WELLNESS VISIT  08/03/2024    ANNUAL REVIEW OF HM ORDERS  08/03/2024    INFLUENZA VACCINE (1) 09/01/2024    COVID-19 Vaccine (3 - 2024-25 season) 09/01/2024    LIPID  09/21/2024    FALL RISK ASSESSMENT  04/24/2026    ADVANCE CARE PLANNING  08/04/2028    DEXA  06/30/2038    PHQ-2 (once per calendar year)  Completed    HPV IMMUNIZATION  Aged Out    MENINGITIS IMMUNIZATION  Aged Out    MAMMO SCREENING  Discontinued       REVIEW OF SYSTEMS: The following systems have been completely reviewed and are negative except as noted above:   Constitutional, HEENT, respiratory, cardiovascular, gastrointestinal, genitourinary, musculoskeletal, dermatologic, hematologic, endocrine, psychiatric, and neurologic systems.       Objective    Exam  /70 (BP Location: Left arm, Patient Position: Sitting, Cuff Size: Adult Regular)   Pulse 118   Temp 97.7  F (36.5  C) (Oral)   Resp 16   Ht 1.722 m (5' 7.8\")   Wt 65.3 kg (144 lb)   LMP  (LMP Unknown)   SpO2 98%   Breastfeeding No   BMI 22.03 " "kg/m     Estimated body mass index is 22.03 kg/m  as calculated from the following:    Height as of this encounter: 1.722 m (5' 7.8\").    Weight as of this encounter: 65.3 kg (144 lb).    Physical Exam  GENERAL: healthy, alert and no distress  EYES: Eyes grossly normal to inspection, PERRL and conjunctivae and sclerae normal  HENT: ear canals and TM's normal, nose and mouth without ulcers or lesions  NECK: no adenopathy, no asymmetry, masses, or scars and thyroid normal to palpation  RESP: lungs clear to auscultation - no rales, rhonchi or wheezes  BREAST/PELVIC: exams not performed.  CV: regular rate and rhythm, normal S1 S2, no S3 or S4, no murmur, click or rub, no peripheral edema and peripheral pulses strong  ABDOMEN: soft, nontender, no hepatosplenomegaly, no masses and bowel sounds normal  MS: no gross musculoskeletal defects noted, no edema  SKIN: no suspicious lesions or rashes  NEURO: Normal strength and tone, mentation intact and speech normal  PSYCH: mentation appears normal, affect normal/bright         4/24/2025   Mini Cog   Clock Draw Score 2 Normal   3 Item Recall 3 objects recalled   Mini Cog Total Score 5              Signed Electronically by: Chavez Molina MD, MD    "

## 2025-05-08 ENCOUNTER — RESULTS FOLLOW-UP (OUTPATIENT)
Dept: ANTICOAGULATION | Facility: CLINIC | Age: 85
End: 2025-05-08

## 2025-05-08 ENCOUNTER — ANTICOAGULATION THERAPY VISIT (OUTPATIENT)
Dept: ANTICOAGULATION | Facility: CLINIC | Age: 85
End: 2025-05-08
Payer: COMMERCIAL

## 2025-05-08 DIAGNOSIS — Z79.01 LONG TERM CURRENT USE OF ANTICOAGULANTS WITH INR GOAL OF 2.0-3.0: Primary | ICD-10-CM

## 2025-05-08 DIAGNOSIS — I48.91 ATRIAL FIBRILLATION, UNSPECIFIED TYPE (H): ICD-10-CM

## 2025-05-08 DIAGNOSIS — I48.21 PERMANENT ATRIAL FIBRILLATION (H): ICD-10-CM

## 2025-05-08 LAB — INR HOME MONITORING: 3.1 (ref 2–3)

## 2025-05-08 NOTE — PROGRESS NOTES
ANTICOAGULATION MANAGEMENT     Mariya Rowe 84 year old female is on warfarin with supratherapeutic INR result. (Goal INR 2.0-3.0)    Recent labs: (last 7 days)     05/08/25  0000   INR 3.1*       ASSESSMENT     Source(s): Chart Review and Patient/Caregiver Call     Warfarin doses taken: Warfarin taken as instructed  Diet: Decreased greens/vitamin K in diet; plans to resume previous intake  Medication/supplement changes: None noted  New illness, injury, or hospitalization: No  Signs or symptoms of bleeding or clotting: No  Previous result: Therapeutic last 2(+) visits  Additional findings: None       PLAN     Recommended plan for temporary change(s) affecting INR     Dosing Instructions: Continue your current warfarin dose with next INR in 2 weeks       Summary  As of 5/8/2025      Full warfarin instructions:  5 mg every Mon, Wed, Fri; 2.5 mg all other days   Next INR check:  5/22/2025               Telephone call with Mariya who verbalizes understanding and agrees to plan    Patient to recheck with home meter    Education provided: Goal range and lab monitoring: goal range and significance of current result and Importance of therapeutic range  Dietary considerations: importance of consistent vitamin K intake and impact of vitamin K foods on INR    Plan made per [facility] anticoagulation protocol    Artis Dc, RN  5/8/2025  Anticoagulation Clinic  [place] for routing messages: p ANTICOAG HOME MONITORING  [facility] patient phone line: [phone]        _______________________________________________________________________     Anticoagulation Episode Summary       Current INR goal:  2.0-3.0   TTR:  93.0% (1 y)   Target end date:  Indefinite   Send INR reminders to:  ANTICOAG HOME MONITORING    Indications    Long term current use of anticoagulants with INR goal of 2.0-3.0 [Z79.01]  Atrial fibrillation (H) (Resolved) [I48.91]  Permanent atrial fibrillation (H) [I48.21]  Atrial fibrillation  unspecified type (H)  [I48.91]             Comments:  [employer] home meter. Manage by exception.             Anticoagulation Care Providers       Provider Role Specialty Phone number    Chavez Molina MD Referring Internal Medicine [phone]    Edwin Ulrich MD Referring Internal Medicine [phone]

## 2025-05-27 ENCOUNTER — ANTICOAGULATION THERAPY VISIT (OUTPATIENT)
Dept: ANTICOAGULATION | Facility: CLINIC | Age: 85
End: 2025-05-27
Payer: COMMERCIAL

## 2025-05-27 ENCOUNTER — RESULTS FOLLOW-UP (OUTPATIENT)
Dept: ANTICOAGULATION | Facility: CLINIC | Age: 85
End: 2025-05-27
Payer: COMMERCIAL

## 2025-05-27 DIAGNOSIS — I48.21 PERMANENT ATRIAL FIBRILLATION (H): ICD-10-CM

## 2025-05-27 DIAGNOSIS — Z79.01 LONG TERM CURRENT USE OF ANTICOAGULANTS WITH INR GOAL OF 2.0-3.0: Primary | ICD-10-CM

## 2025-05-27 DIAGNOSIS — I48.91 ATRIAL FIBRILLATION, UNSPECIFIED TYPE (H): ICD-10-CM

## 2025-05-27 LAB — INR HOME MONITORING: 3.4 (ref 2–3)

## 2025-05-27 NOTE — PROGRESS NOTES
ANTICOAGULATION MANAGEMENT     Mariya Rowe 84 year old female is on warfarin with supratherapeutic INR result. (Goal INR 2.0-3.0)    Recent labs: (last 7 days)     05/27/25  0000   INR 3.4*       ASSESSMENT     Source(s): Chart Review and Patient/Caregiver Call     Warfarin doses taken: Warfarin taken as instructed  Diet: No new diet changes identified  Medication/supplement changes: None noted  New illness, injury, or hospitalization: No  Signs or symptoms of bleeding or clotting: No  Previous result: Supratherapeutic  Additional findings: Shared clinical decision to eat some broccoli today to help bring inr done       PLAN     Recommended plan for no diet, medication or health factor changes affecting INR     Dosing Instructions: Continue your current warfarin dose with next INR in 2 weeks   - made aware that dose will be adjusted if next INR is still high.    Summary  As of 5/27/2025      Full warfarin instructions:  5 mg every Mon, Wed, Fri; 2.5 mg all other days   Next INR check:  6/10/2025               Telephone call with Mariya who verbalizes understanding and agrees to plan    Patient to recheck with home meter    Education provided: Dietary considerations: importance of consistent vitamin K intake and impact of vitamin K foods on INR  Contact [phone] with any changes, questions or concerns.     Plan made per [facility] anticoagulation protocol    Naima Urrutia RN  5/27/2025  Anticoagulation Clinic  [employer] for routing messages: p ANTICOAG HOME MONITORING  [facility] patient phone line: [phone]        _______________________________________________________________________     Anticoagulation Episode Summary       Current INR goal:  2.0-3.0   TTR:  87.8% (1 y)   Target end date:  Indefinite   Send INR reminders to:  ANTICOAG HOME MONITORING    Indications    Long term current use of anticoagulants with INR goal of 2.0-3.0 [Z79.01]  Atrial fibrillation (H) (Resolved) [I48.91]  Permanent atrial fibrillation  (H) [I48.21]  Atrial fibrillation  unspecified type (H) [I48.91]             Comments:  [employer] home meter. Manage by exception.             Anticoagulation Care Providers       Provider Role Specialty Phone number    Chavez Molina MD Referring Internal Medicine [phone]    Edwin Ulrich MD Referring Internal Medicine [phone]

## 2025-06-10 ENCOUNTER — TRANSFERRED RECORDS (OUTPATIENT)
Dept: HEALTH INFORMATION MANAGEMENT | Facility: CLINIC | Age: 85
End: 2025-06-10
Payer: COMMERCIAL

## 2025-06-12 ENCOUNTER — RESULTS FOLLOW-UP (OUTPATIENT)
Dept: ANTICOAGULATION | Facility: CLINIC | Age: 85
End: 2025-06-12

## 2025-06-12 ENCOUNTER — ANTICOAGULATION THERAPY VISIT (OUTPATIENT)
Dept: ANTICOAGULATION | Facility: CLINIC | Age: 85
End: 2025-06-12
Payer: COMMERCIAL

## 2025-06-12 DIAGNOSIS — I48.91 ATRIAL FIBRILLATION, UNSPECIFIED TYPE (H): ICD-10-CM

## 2025-06-12 DIAGNOSIS — I48.21 PERMANENT ATRIAL FIBRILLATION (H): ICD-10-CM

## 2025-06-12 DIAGNOSIS — Z79.01 LONG TERM CURRENT USE OF ANTICOAGULANTS WITH INR GOAL OF 2.0-3.0: Primary | ICD-10-CM

## 2025-06-12 LAB — INR HOME MONITORING: 3 (ref 2–3)

## 2025-06-12 NOTE — PROGRESS NOTES
ANTICOAGULATION MANAGEMENT     Mariya Rowe 84 year old female is on warfarin with therapeutic INR result. (Goal INR 2.0-3.0)    Recent labs: (last 7 days)     06/12/25  0000   INR 3.0       ASSESSMENT     Source(s): Chart Review and Patient/Caregiver Call     Warfarin doses taken: Warfarin taken as instructed  Diet: No new diet changes identified  Medication/supplement changes: Taking Tylenol PRN for pain  New illness, injury, or hospitalization: Yes: patient reports she broke her shoulder 5/31/25 following a fall. Patient states she did not hit her head. Patient states she was advised she does not need surgery, just needs to continue to wear a sling. Reports the pain is improving.   Signs or symptoms of bleeding or clotting: No  Previous result: Supratherapeutic  Additional findings: None       PLAN     Recommended plan for temporary change(s) affecting INR     Dosing Instructions: Continue your current warfarin dose with next INR in 2 weeks       Summary  As of 6/12/2025      Full warfarin instructions:  5 mg every Mon, Wed, Fri; 2.5 mg all other days   Next INR check:  6/26/2025               Telephone call with Mariya who verbalizes understanding and agrees to plan    Patient to recheck with home meter    Education provided: Please call back if any changes to your diet, medications or how you've been taking warfarin    Plan made per [facility] anticoagulation protocol    Naima Rod RN  6/12/2025  Anticoagulation Clinic  [facility] for routing messages: p ANTICOAG HOME MONITORING  [facility] patient phone line: [phone]        _______________________________________________________________________     Anticoagulation Episode Summary       Current INR goal:  2.0-3.0   TTR:  83.4% (1 y)   Target end date:  Indefinite   Send INR reminders to:  ANTICOAG HOME MONITORING    Indications    Long term current use of anticoagulants with INR goal of 2.0-3.0 [Z79.01]  Atrial fibrillation (H) (Resolved) [I48.91]  Permanent atrial  fibrillation (H) [I48.21]  Atrial fibrillation  unspecified type (H) [I48.91]             Comments:  [employer] home meter. Manage by exception.             Anticoagulation Care Providers       Provider Role Specialty Phone number    Chavez Molina MD Referring Internal Medicine [phone]    Edwin Ulrich MD Referring Internal Medicine [phone]

## 2025-07-01 ENCOUNTER — RESULTS FOLLOW-UP (OUTPATIENT)
Dept: ANTICOAGULATION | Facility: CLINIC | Age: 85
End: 2025-07-01

## 2025-07-01 ENCOUNTER — ANTICOAGULATION THERAPY VISIT (OUTPATIENT)
Dept: ANTICOAGULATION | Facility: CLINIC | Age: 85
End: 2025-07-01
Payer: COMMERCIAL

## 2025-07-01 DIAGNOSIS — Z79.01 LONG TERM CURRENT USE OF ANTICOAGULANTS WITH INR GOAL OF 2.0-3.0: Primary | ICD-10-CM

## 2025-07-01 DIAGNOSIS — I48.21 PERMANENT ATRIAL FIBRILLATION (H): ICD-10-CM

## 2025-07-01 DIAGNOSIS — I48.91 ATRIAL FIBRILLATION, UNSPECIFIED TYPE (H): ICD-10-CM

## 2025-07-01 LAB — INR HOME MONITORING: 3.3 (ref 2–3)

## 2025-07-01 NOTE — PROGRESS NOTES
ANTICOAGULATION MANAGEMENT     Mariya Rowe 84 year old female is on warfarin with supratherapeutic INR result. (Goal INR 2.0-3.0)    Recent labs: (last 7 days)     07/01/25  0000   INR 3.3*       ASSESSMENT     Source(s): Chart Review and Patient/Caregiver Call     Warfarin doses taken: Warfarin taken as instructed  Diet: No new diet changes identified  Medication/supplement changes: Yes, patient reports she is no longer taking tylenol  New illness, injury, or hospitalization: Yes, patient still recovering from right shoulder injury, she reports she is no longer having pain, will wear the sling for 2 more weeks.  Signs or symptoms of bleeding or clotting: No  Previous result: Therapeutic last visit; previously outside of goal range  Additional findings: 3rd supratherapeutic INR and 2 INRs at top of goal range (3.0) so warfarin maintenance dose decreased. Patient reports that she has plenty of 5 mg warfarin tablets left, writer informed Mariya that we may need to switch to 2.5 mg  tablets if the 10% dose decrease proves to be too much, patient verbalized understanding.       PLAN     Recommended plan for ongoing change(s) affecting INR     Dosing Instructions: decrease your warfarin dose (10% change) with next INR in 2 weeks       Summary  As of 7/1/2025      Full warfarin instructions:  5 mg every Mon, Fri; 2.5 mg all other days   Next INR check:  7/15/2025               Telephone call with Mariya who verbalizes understanding and agrees to plan and who agrees to plan and repeated back plan correctly    Patient to recheck with home meter    Education provided: Taking warfarin: Importance of taking warfarin as instructed  Dietary considerations: importance of consistent vitamin K intake    Plan made per [facility] anticoagulation protocol    Edith Leach RN  7/1/2025  Anticoagulation Clinic  [employer] for routing messages: sapna [facility]OFF HOME MONITORING  [facility] patient phone line:  [phone]        _______________________________________________________________________     Anticoagulation Episode Summary       Current INR goal:  2.0-3.0   TTR:  78.2% (1 y)   Target end date:  Indefinite   Send INR reminders to:  ANTICOAG HOME MONITORING    Indications    Long term current use of anticoagulants with INR goal of 2.0-3.0 [Z79.01]  Atrial fibrillation (H) (Resolved) [I48.91]  Permanent atrial fibrillation (H) [I48.21]  Atrial fibrillation  unspecified type (H) [I48.91]             Comments:  [employer] home meter. Manage by exception.             Anticoagulation Care Providers       Provider Role Specialty Phone number    Chavez Molina MD Referring Internal Medicine [phone]    Edwin Ulrich MD Referring Internal Medicine [phone]

## 2025-07-10 ENCOUNTER — TRANSFERRED RECORDS (OUTPATIENT)
Dept: HEALTH INFORMATION MANAGEMENT | Facility: CLINIC | Age: 85
End: 2025-07-10
Payer: COMMERCIAL

## 2025-07-15 ENCOUNTER — ANTICOAGULATION THERAPY VISIT (OUTPATIENT)
Dept: ANTICOAGULATION | Facility: CLINIC | Age: 85
End: 2025-07-15
Payer: COMMERCIAL

## 2025-07-15 DIAGNOSIS — I48.91 ATRIAL FIBRILLATION, UNSPECIFIED TYPE (H): ICD-10-CM

## 2025-07-15 DIAGNOSIS — I48.21 PERMANENT ATRIAL FIBRILLATION (H): ICD-10-CM

## 2025-07-15 DIAGNOSIS — Z79.01 LONG TERM CURRENT USE OF ANTICOAGULANTS WITH INR GOAL OF 2.0-3.0: Primary | ICD-10-CM

## 2025-07-15 LAB — INR HOME MONITORING: 1.9 (ref 2–3)

## 2025-07-15 NOTE — PROGRESS NOTES
ANTICOAGULATION MANAGEMENT     Mariya Rowe 84 year old female is on warfarin with subtherapeutic INR result. (Goal INR 2.0-3.0)    Recent labs: (last 7 days)     07/15/25  0000   INR 1.9*       ASSESSMENT     Source(s): Chart Review and Patient/Caregiver Call     Warfarin doses taken: More warfarin taken than planned which may be affecting INR - believes she's been taking warfarin 5 mg M/W/F, 2.5 mg all other days, which would be an increase of 11.1% from listed maintenance dose. Allowing Mariya one more week of this dosing to ensure that's exactly how she's been taking her warfarin. Also discussed again today the potential need to change tablet strength in the near future if unable to achieve therapeutic INR.  Diet: Increased greens/vitamin K in diet; plans to resume previous intake - had more greens 2 weeks ago when INR was elevated, then resumed her usual intake last week  Medication/supplement changes: None noted  New illness, injury, or hospitalization: No  Signs or symptoms of bleeding or clotting: No  Previous result: Supratherapeutic  Additional findings: None       PLAN     Recommended plan for temporary change(s) affecting INR     Dosing Instructions: Continue your current warfarin dose with next INR in 1 week       Summary  As of 7/15/2025      Full warfarin instructions:  5 mg every Mon, Wed, Fri; 2.5 mg all other days   Next INR check:  7/22/2025               Telephone call with Mariya who verbalizes understanding and agrees to plan    Patient to recheck with home meter    Education provided: Please call back if any changes to your diet, medications or how you've been taking warfarin    Plan made per [facility] anticoagulation protocol    Litzy Waller RN  7/15/2025  Anticoagulation Clinic  [employer] for routing messages: sapna [facility] HOME MONITOR[facility]  [facility] patient phone line: [phone]        _______________________________________________________________________     Anticoagulation Episode  Summary       Current INR goal:  2.0-3.0   TTR:  77.1% (1 y)   Target end date:  Indefinite   Send INR reminders to:  ANTICOAG HOME MONITORING    Indications    Long term current use of anticoagulants with INR goal of 2.0-3.0 [Z79.01]  Atrial fibrillation (H) (Resolved) [I48.91]  Permanent atrial fibrillation (H) [I48.21]  Atrial fibrillation  unspecified type (H) [I48.91]             Comments:  [employer] home meter. Manage by exception.             Anticoagulation Care Providers       Provider Role Specialty Phone number    Chavez Molina MD Referring Internal Medicine [phone]    Edwin Ulrich MD Referring Internal Medicine [phone]

## 2025-07-24 ENCOUNTER — RESULTS FOLLOW-UP (OUTPATIENT)
Dept: ANTICOAGULATION | Facility: CLINIC | Age: 85
End: 2025-07-24
Payer: COMMERCIAL

## 2025-07-24 ENCOUNTER — ANTICOAGULATION THERAPY VISIT (OUTPATIENT)
Dept: ANTICOAGULATION | Facility: CLINIC | Age: 85
End: 2025-07-24
Payer: COMMERCIAL

## 2025-07-24 DIAGNOSIS — Z79.01 LONG TERM CURRENT USE OF ANTICOAGULANTS WITH INR GOAL OF 2.0-3.0: Primary | ICD-10-CM

## 2025-07-24 DIAGNOSIS — I48.21 PERMANENT ATRIAL FIBRILLATION (H): ICD-10-CM

## 2025-07-24 DIAGNOSIS — I48.91 ATRIAL FIBRILLATION, UNSPECIFIED TYPE (H): ICD-10-CM

## 2025-07-24 LAB — INR HOME MONITORING: 3 (ref 2–3)

## 2025-07-24 NOTE — PROGRESS NOTES
ANTICOAGULATION MANAGEMENT     Mariya Rowe 84 year old female is on warfarin with therapeutic INR result. (Goal INR 2.0-3.0)    Recent labs: (last 7 days)     07/24/25  0000   INR 3.0       ASSESSMENT     Source(s): Chart Review  Previous INR was Subtherapeutic  Medication, diet, health changes since last INR: chart reviewed; none identified         PLAN     Recommended plan for no diet, medication or health factor changes affecting INR     Dosing Instructions: Continue your current warfarin dose with next INR in 1 week       Summary  As of 7/24/2025      Full warfarin instructions:  5 mg every Mon, Wed, Fri; 2.5 mg all other days   Next INR check:  7/31/2025               Detailed voice message left for Mariya with dosing instructions and follow up date.     Patient to recheck with home meter    Education provided: Please call back if any changes to your diet, medications or how you've been taking warfarin    Plan made per [facility] anticoagulation protocol    Rolando Perez RN  7/24/2025  Anticoagulation Clinic  [facility] for routing messages: p ANTICOAG HOME MONITORING  [facility] patient phone line: [phone]        _______________________________________________________________________     Anticoagulation Episode Summary       Current INR goal:  2.0-3.0   TTR:  76.9% (1 y)   Target end date:  Indefinite   Send INR reminders to:  ANTICOAG HOME MONITORING    Indications    Long term current use of anticoagulants with INR goal of 2.0-3.0 [Z79.01]  Atrial fibrillation (H) (Resolved) [I48.91]  Permanent atrial fibrillation (H) [I48.21]  Atrial fibrillation  unspecified type (H) [I48.91]             Comments:  [employer] home meter. Manage by exception.             Anticoagulation Care Providers       Provider Role Specialty Phone number    Chavez Molina MD Referring Internal Medicine [phone]    Edwin Ulrich MD Referring Internal Medicine [phone]

## 2025-07-30 ENCOUNTER — OFFICE VISIT (OUTPATIENT)
Dept: INTERNAL MEDICINE | Facility: CLINIC | Age: 85
End: 2025-07-30
Payer: COMMERCIAL

## 2025-07-30 VITALS
RESPIRATION RATE: 14 BRPM | OXYGEN SATURATION: 97 % | DIASTOLIC BLOOD PRESSURE: 70 MMHG | SYSTOLIC BLOOD PRESSURE: 124 MMHG | HEART RATE: 98 BPM | WEIGHT: 144 LBS | HEIGHT: 68 IN | TEMPERATURE: 97.8 F | BODY MASS INDEX: 21.82 KG/M2

## 2025-07-30 DIAGNOSIS — Q21.12 PFO (PATENT FORAMEN OVALE): ICD-10-CM

## 2025-07-30 DIAGNOSIS — I48.91 ATRIAL FIBRILLATION, UNSPECIFIED TYPE (H): ICD-10-CM

## 2025-07-30 DIAGNOSIS — R60.0 BILATERAL LOWER EXTREMITY EDEMA: Primary | ICD-10-CM

## 2025-07-30 DIAGNOSIS — S42.201S CLOSED FRACTURE OF PROXIMAL END OF RIGHT HUMERUS, UNSPECIFIED FRACTURE MORPHOLOGY, SEQUELA: ICD-10-CM

## 2025-07-30 PROCEDURE — 3074F SYST BP LT 130 MM HG: CPT | Performed by: INTERNAL MEDICINE

## 2025-07-30 PROCEDURE — 99214 OFFICE O/P EST MOD 30 MIN: CPT | Performed by: INTERNAL MEDICINE

## 2025-07-30 PROCEDURE — 3078F DIAST BP <80 MM HG: CPT | Performed by: INTERNAL MEDICINE

## 2025-07-30 NOTE — PATIENT INSTRUCTIONS
"I suspect that the leg swelling is from \"venous insufficiency\", or \"leaking\" of fluid from the blood vessels into the tissues from gravity and being less mobile.     To \"hedge our bets\", we should update an echocardiogram--it is probably overdue anyway as we've not checked it for years.     I'll also provide a written prescription for compressive stockings, which you could wear during the day and remove at night, to keep the swelling down.     If feeling well, see me back in April 2026 for next Annual Wellness Visit.   "

## 2025-07-30 NOTE — PROGRESS NOTES
"  Assessment & Plan   (R60.0) Bilateral lower extremity edema  (primary encounter diagnosis)  Comment: - Likely due to venous insufficiency, exacerbated by reduced mobility and gravit/dependency of lower extremities. No signs of heart failure, kidney disease, or liver disease. Labs from April were normal.  - Update echocardiogram to assess heart function. Consider compression stockings to manage edema. Prescription for compression stockings provided.  Plan: Compression Sleeve/Stocking Order for DME -         ONLY FOR DME, Echocardiogram Complete    (I48.91) Atrial fibrillation, unspecified type (H)  (Q21.12) PFO (patent foramen ovale)  Comment: - Chronic atrial fibrillation, previous PFO, no recent cardiology follow-up.  - Update echocardiogram to ensure heart function is normal.  Plan: Echocardiogram Complete    (S42.201S) Closed fracture of proximal end of right humerus, unspecified fracture morphology, sequela  Comment: - Fracture healing well, no surgery required. Patient has regained some use of the arm.  - Continue with current management and follow-up as needed.        Patient Instructions   I suspect that the leg swelling is from \"venous insufficiency\", or \"leaking\" of fluid from the blood vessels into the tissues from gravity and being less mobile.     To \"hedge our bets\", we should update an echocardiogram--it is probably overdue anyway as we've not checked it for years.     I'll also provide a written prescription for compressive stockings, which you could wear during the day and remove at night, to keep the swelling down.     If feeling well, see me back in April 2026 for next Annual Wellness Visit.      Netta Meraz is a 84 year old, presenting for the following health issues:  Follow Up and Musculoskeletal Problem      7/30/2025     1:11 PM   Additional Questions   Roomed by Fatou SHEPHERD CMA   Accompanied by daughter in law, Naima     Musculoskeletal Problem    History of Present Illness       Reason " "for visit:  Shoulder back pain   She is taking medications regularly.          - Fractured right humerus after a fall on May 30, 2025; arm was immobilized in a sling for a couple of weeks, followed by gradual improvement and increased use in the last week  - Compression fractures of at least 3 lumbar vertebrae  - New onset bilateral foot edema for the past 2 months, progressively worsening, first noticed during bathing  - No prior history of foot swelling before the last 2 months  - No shortness of breath, no orthopnea, no cough, no chest tightness, no dizziness, no palpitations, no TIA symptoms (no sudden change in vision, speech, or sudden weakness/numbness of one arm or leg)  - History of atrial fibrillation  - No history of heart failure reported  - No history of kidney or liver disease reported  - No swelling noted during previous winter in [place] and [place]    Past medical, family and social histories as well as medications reviewed and updated as needed.    REVIEW OF SYSTEMS: The following systems have been completely reviewed and are negative except as noted above:   Constitutional, respiratory, cardiovascular, musculoskeletal, dermatologic, hematologic, and neurologic systems.        Objective    /70 (BP Location: Right arm, Patient Position: Sitting, Cuff Size: Adult Regular)   Pulse 98   Temp 97.8  F (36.6  C) (Tympanic)   Resp 14   Ht 1.722 m (5' 7.8\")   Wt 65.3 kg (144 lb)   LMP  (LMP Unknown)   SpO2 97%   Breastfeeding No   BMI 22.02 kg/m    Body mass index is 22.02 kg/m .    Physical Exam   GENERAL: alert and no distress  EYES: Eyes grossly normal to inspection, PERRL and conjunctivae and sclerae normal  RESP: lungs clear to auscultation - no rales, rhonchi or wheezes  CV: irregularly irregular rhythm, normal S1 S2, no S3 or S4, and grade 3/6 systolic murmur   ABDOMEN: soft, nontender, no hepatosplenomegaly, no masses   MS: 2-3+ pedal, ankle and distal pretibial pitting edema " bilaterally.         Signed Electronically by: Chavez Molina MD,

## 2025-08-10 LAB — INR HOME MONITORING: 3.4 (ref 2–3)

## 2025-08-11 ENCOUNTER — ANTICOAGULATION THERAPY VISIT (OUTPATIENT)
Dept: ANTICOAGULATION | Facility: CLINIC | Age: 85
End: 2025-08-11
Payer: COMMERCIAL

## 2025-08-11 ENCOUNTER — RESULTS FOLLOW-UP (OUTPATIENT)
Dept: ANTICOAGULATION | Facility: CLINIC | Age: 85
End: 2025-08-11
Payer: COMMERCIAL

## 2025-08-11 DIAGNOSIS — I48.91 ATRIAL FIBRILLATION, UNSPECIFIED TYPE (H): ICD-10-CM

## 2025-08-11 DIAGNOSIS — Z79.01 LONG TERM CURRENT USE OF ANTICOAGULANTS WITH INR GOAL OF 2.0-3.0: Primary | ICD-10-CM

## 2025-08-11 DIAGNOSIS — I48.21 PERMANENT ATRIAL FIBRILLATION (H): ICD-10-CM

## 2025-08-14 ENCOUNTER — TRANSFERRED RECORDS (OUTPATIENT)
Dept: HEALTH INFORMATION MANAGEMENT | Facility: CLINIC | Age: 85
End: 2025-08-14
Payer: COMMERCIAL

## 2025-08-15 ENCOUNTER — HOSPITAL ENCOUNTER (OUTPATIENT)
Dept: CARDIOLOGY | Facility: CLINIC | Age: 85
Discharge: HOME | End: 2025-08-15
Attending: INTERNAL MEDICINE | Admitting: INTERNAL MEDICINE
Payer: COMMERCIAL

## 2025-08-15 DIAGNOSIS — Q21.12 PFO (PATENT FORAMEN OVALE): ICD-10-CM

## 2025-08-15 DIAGNOSIS — I48.91 ATRIAL FIBRILLATION, UNSPECIFIED TYPE (H): ICD-10-CM

## 2025-08-15 DIAGNOSIS — R60.0 BILATERAL LOWER EXTREMITY EDEMA: ICD-10-CM

## 2025-08-15 LAB — LVEF ECHO: NORMAL

## 2025-08-15 PROCEDURE — 93306 TTE W/DOPPLER COMPLETE: CPT

## 2025-08-15 PROCEDURE — 93306 TTE W/DOPPLER COMPLETE: CPT | Mod: 26 | Performed by: INTERNAL MEDICINE

## 2025-08-19 ENCOUNTER — ANTICOAGULATION THERAPY VISIT (OUTPATIENT)
Dept: ANTICOAGULATION | Facility: CLINIC | Age: 85
End: 2025-08-19
Payer: COMMERCIAL

## 2025-08-19 DIAGNOSIS — I48.21 PERMANENT ATRIAL FIBRILLATION (H): ICD-10-CM

## 2025-08-19 DIAGNOSIS — Z79.01 LONG TERM CURRENT USE OF ANTICOAGULANTS WITH INR GOAL OF 2.0-3.0: Primary | ICD-10-CM

## 2025-08-19 DIAGNOSIS — I48.91 ATRIAL FIBRILLATION, UNSPECIFIED TYPE (H): ICD-10-CM

## 2025-08-19 LAB — INR HOME MONITORING: 1.5 (ref 2–3)

## 2025-08-24 ENCOUNTER — RESULTS FOLLOW-UP (OUTPATIENT)
Dept: INTERNAL MEDICINE | Facility: CLINIC | Age: 85
End: 2025-08-24
Payer: COMMERCIAL

## 2025-08-24 DIAGNOSIS — R60.0 BILATERAL LOWER EXTREMITY EDEMA: ICD-10-CM

## 2025-08-24 DIAGNOSIS — I48.21 PERMANENT ATRIAL FIBRILLATION (H): ICD-10-CM

## 2025-08-24 DIAGNOSIS — I35.0 SEVERE AORTIC STENOSIS: Primary | ICD-10-CM

## 2025-08-26 ENCOUNTER — RESULTS FOLLOW-UP (OUTPATIENT)
Dept: ANTICOAGULATION | Facility: CLINIC | Age: 85
End: 2025-08-26
Payer: COMMERCIAL

## 2025-08-26 ENCOUNTER — ANTICOAGULATION THERAPY VISIT (OUTPATIENT)
Dept: ANTICOAGULATION | Facility: CLINIC | Age: 85
End: 2025-08-26
Payer: COMMERCIAL

## 2025-08-26 DIAGNOSIS — Z79.01 LONG TERM CURRENT USE OF ANTICOAGULANTS WITH INR GOAL OF 2.0-3.0: Primary | ICD-10-CM

## 2025-08-26 DIAGNOSIS — I48.91 ATRIAL FIBRILLATION, UNSPECIFIED TYPE (H): ICD-10-CM

## 2025-08-26 DIAGNOSIS — I48.21 PERMANENT ATRIAL FIBRILLATION (H): ICD-10-CM

## 2025-08-26 LAB — INR HOME MONITORING: 2.9 (ref 2–3)

## 2025-09-02 ENCOUNTER — OFFICE VISIT (OUTPATIENT)
Dept: CARDIOLOGY | Facility: CLINIC | Age: 85
End: 2025-09-02
Payer: COMMERCIAL

## 2025-09-02 VITALS
OXYGEN SATURATION: 100 % | RESPIRATION RATE: 18 BRPM | HEART RATE: 88 BPM | WEIGHT: 144 LBS | SYSTOLIC BLOOD PRESSURE: 141 MMHG | HEIGHT: 67 IN | DIASTOLIC BLOOD PRESSURE: 80 MMHG | BODY MASS INDEX: 22.6 KG/M2

## 2025-09-02 DIAGNOSIS — R60.0 BILATERAL LOWER EXTREMITY EDEMA: ICD-10-CM

## 2025-09-02 DIAGNOSIS — I48.21 PERMANENT ATRIAL FIBRILLATION (H): ICD-10-CM

## 2025-09-02 DIAGNOSIS — I35.0 SEVERE AORTIC STENOSIS: Primary | ICD-10-CM

## (undated) DEVICE — DRAPE IOBAN INCISE 23X17" 6650EZ

## (undated) DEVICE — DRAPE SHEET REV FOLD 3/4 9349

## (undated) DEVICE — PACK TOTAL HIP W/U DRAPE SOP15HUFSC

## (undated) DEVICE — SYR 50ML LL W/O NDL 309653

## (undated) DEVICE — SU STRATAFIX PDS PLUS 2-0 SPIRAL CT-1 30CM SXPP1B410

## (undated) DEVICE — ESU BIPOLAR SEALER AQUAMANTYS 6MM 23-112-1

## (undated) DEVICE — Device

## (undated) DEVICE — GLOVE PROTEXIS MICRO 6.5  2D73PM65

## (undated) DEVICE — NDL 21GA 1.5"

## (undated) DEVICE — DRAPE CONVERTORS U-DRAPE 60X72" 8476

## (undated) DEVICE — SU VICRYL 2-0 CP-1 27" UND J266H

## (undated) DEVICE — GLOVE PROTEXIS POWDER FREE 7.5 ORTHOPEDIC 2D73ET75

## (undated) DEVICE — PLUMEPEN PRO

## (undated) DEVICE — PREP CHLORAPREP 26ML TINTED ORANGE  260815

## (undated) DEVICE — BONE CLEANING TIP INTERPULSE  0210-010-000

## (undated) DEVICE — GLOVE PROTEXIS BLUE W/NEU-THERA 8.0  2D73EB80

## (undated) DEVICE — KIT PATIENT CARE HANA TABLE PROFX SUPINE 6855

## (undated) DEVICE — SOL NACL 0.9% INJ 250ML BAG 2B1322Q

## (undated) DEVICE — GLOVE PROTEXIS W/NEU-THERA 6.5  2D73TE65

## (undated) DEVICE — CATH TRAY FOLEY SURESTEP 16FR W/URINE MTR STATLK LF A303416A

## (undated) DEVICE — BLADE SAW SAGITTAL STRK 18X90X1.27MM HD SYS 6 6118-127-090

## (undated) DEVICE — SUCTION IRR SYSTEM W/O TIP INTERPULSE HANDPIECE 0210-100-000

## (undated) DEVICE — SOLUTION WOUND CLEANSING 3/4OZ 10% PVP EA-L3011FB-50

## (undated) DEVICE — GLOVE PROTEXIS BLUE W/NEU-THERA 7.0  2D73EB70

## (undated) DEVICE — LINEN TOWEL PACK X5 5464

## (undated) DEVICE — HOOD FLYTE W/PEELAWAY 408-800-100

## (undated) DEVICE — ESU GROUND PAD UNIVERSAL W/O CORD

## (undated) DEVICE — SU STRATAFIX PDS PLUS 0 CT-1 30CM SXPP1B450

## (undated) DEVICE — SOL WATER IRRIG 1000ML BOTTLE 2F7114

## (undated) DEVICE — ADH REMOVER PAD UNI-SOLVE 402300

## (undated) DEVICE — CLOSURE SYS SKIN PREMIERPRO EXOFIN FUSION 4X22CM STRL 3472

## (undated) DEVICE — SUCTION TIP YANKAUER STR K87

## (undated) DEVICE — GLOVE PROTEXIS W/NEU-THERA 8.0  2D73TE80

## (undated) DEVICE — IMP SCR BONE CAN ACE 6.5X20MM 1217-20-500: Type: IMPLANTABLE DEVICE | Site: HIP | Status: NON-FUNCTIONAL

## (undated) DEVICE — SU ETHIBOND 2 V-37 4X30" MX69G

## (undated) DEVICE — MANIFOLD NEPTUNE 4 PORT 700-20

## (undated) DEVICE — SU MONOCRYL 3-0 PS-2 27" Y427H

## (undated) DEVICE — GLOVE PROTEXIS W/NEU-THERA 8.5  2D73TE85

## (undated) DEVICE — DRAPE IOBAN ISOLATION VERTICAL 320X21CM 6617

## (undated) DEVICE — DRAPE C-ARM 60X42" 1013

## (undated) DEVICE — GLOVE PROTEXIS POWDER FREE 8.5 ORTHOPEDIC 2D73ET85

## (undated) DEVICE — SU VICRYL 0 CTX CR 8X18" J764D

## (undated) RX ORDER — PROPOFOL 10 MG/ML
INJECTION, EMULSION INTRAVENOUS
Status: DISPENSED
Start: 2021-05-06

## (undated) RX ORDER — VANCOMYCIN HYDROCHLORIDE 1 G/20ML
INJECTION, POWDER, LYOPHILIZED, FOR SOLUTION INTRAVENOUS
Status: DISPENSED
Start: 2021-05-06

## (undated) RX ORDER — HYDROMORPHONE HYDROCHLORIDE 1 MG/ML
INJECTION, SOLUTION INTRAMUSCULAR; INTRAVENOUS; SUBCUTANEOUS
Status: DISPENSED
Start: 2021-05-06

## (undated) RX ORDER — NEOSTIGMINE METHYLSULFATE 1 MG/ML
VIAL (ML) INJECTION
Status: DISPENSED
Start: 2021-05-06

## (undated) RX ORDER — FENTANYL CITRATE 50 UG/ML
INJECTION, SOLUTION INTRAMUSCULAR; INTRAVENOUS
Status: DISPENSED
Start: 2021-05-06

## (undated) RX ORDER — DEXAMETHASONE SODIUM PHOSPHATE 4 MG/ML
INJECTION, SOLUTION INTRA-ARTICULAR; INTRALESIONAL; INTRAMUSCULAR; INTRAVENOUS; SOFT TISSUE
Status: DISPENSED
Start: 2021-05-06

## (undated) RX ORDER — VECURONIUM BROMIDE 1 MG/ML
INJECTION, POWDER, LYOPHILIZED, FOR SOLUTION INTRAVENOUS
Status: DISPENSED
Start: 2021-05-06

## (undated) RX ORDER — TRANEXAMIC ACID 650 MG/1
TABLET ORAL
Status: DISPENSED
Start: 2021-05-06

## (undated) RX ORDER — PREGABALIN 150 MG/1
CAPSULE ORAL
Status: DISPENSED
Start: 2021-05-06

## (undated) RX ORDER — GLYCOPYRROLATE 0.2 MG/ML
INJECTION, SOLUTION INTRAMUSCULAR; INTRAVENOUS
Status: DISPENSED
Start: 2021-05-06

## (undated) RX ORDER — CEFAZOLIN SODIUM 2 G/100ML
INJECTION, SOLUTION INTRAVENOUS
Status: DISPENSED
Start: 2021-05-06

## (undated) RX ORDER — ONDANSETRON 2 MG/ML
INJECTION INTRAMUSCULAR; INTRAVENOUS
Status: DISPENSED
Start: 2021-05-06

## (undated) RX ORDER — CELECOXIB 200 MG/1
CAPSULE ORAL
Status: DISPENSED
Start: 2021-05-06

## (undated) RX ORDER — LIDOCAINE HYDROCHLORIDE 20 MG/ML
INJECTION, SOLUTION EPIDURAL; INFILTRATION; INTRACAUDAL; PERINEURAL
Status: DISPENSED
Start: 2021-05-06